# Patient Record
Sex: FEMALE | NOT HISPANIC OR LATINO | Employment: FULL TIME | URBAN - METROPOLITAN AREA
[De-identification: names, ages, dates, MRNs, and addresses within clinical notes are randomized per-mention and may not be internally consistent; named-entity substitution may affect disease eponyms.]

---

## 2017-02-07 ENCOUNTER — ALLIED HEALTH/NURSE VISIT (OUTPATIENT)
Dept: NURSING | Facility: CLINIC | Age: 27
End: 2017-02-07
Payer: COMMERCIAL

## 2017-02-07 VITALS — WEIGHT: 128 LBS | SYSTOLIC BLOOD PRESSURE: 98 MMHG | BODY MASS INDEX: 24.2 KG/M2 | DIASTOLIC BLOOD PRESSURE: 42 MMHG

## 2017-02-07 DIAGNOSIS — Z30.42 SURVEILLANCE FOR DEPO-PROVERA CONTRACEPTION: Primary | ICD-10-CM

## 2017-02-07 PROCEDURE — 99207 ZZC NO CHARGE NURSE ONLY: CPT

## 2017-02-07 PROCEDURE — 96372 THER/PROPH/DIAG INJ SC/IM: CPT

## 2017-02-08 NOTE — NURSING NOTE
"Chief Complaint   Patient presents with     Contraception     DEPO PROVERA 150MG      BP 98/42 mmHg  Wt 128 lb (58.06 kg) Estimated body mass index is 24.2 kg/(m^2) as calculated from the following:    Height as of 6/2/16: 5' 1\" (1.549 m).    Weight as of this encounter: 128 lb (58.06 kg).  bp completed using cuff size: regular      BLOOD PRESSURE: 98/42    DATE OF LAST PAP or ANNUAL EXAM: PAP      NIL   11/2/2015  URINE HCG:not indicated    The following medication was given:     MEDICATION: Depo Provera 150mg  ROUTE: IM  SITE: Deltoid - Left  : [employer]  LOT #: [id number]  EXPIRATION:6/2019  [id number]  NEXT INJECTION DUE: 4/25/2017 TO 5/9/2017  Provider: MONICO Rangel Certified Medical Assistant / AAMA  Clinic Ancillary  Advance Care Facilitator          "

## 2017-05-05 ENCOUNTER — ALLIED HEALTH/NURSE VISIT (OUTPATIENT)
Dept: NURSING | Facility: CLINIC | Age: 27
End: 2017-05-05
Payer: COMMERCIAL

## 2017-05-05 DIAGNOSIS — Z30.42 SURVEILLANCE FOR DEPO-PROVERA CONTRACEPTION: Primary | ICD-10-CM

## 2017-05-05 PROCEDURE — 96372 THER/PROPH/DIAG INJ SC/IM: CPT

## 2017-05-05 NOTE — MR AVS SNAPSHOT
After Visit Summary   5/5/2017    Reginaldo Ferraro    MRN: [id number]           Patient Information     Date Of Birth          1990        Visit Information        Provider Department      5/5/2017 9:30 AM NE ANCILLARY [facility]        Today's Diagnoses     Surveillance for Depo-Provera contraception    -  1       Follow-ups after your visit        Who to contact     If you have questions or need follow up information about today's clinic visit or your schedule please contact [facility] directly at [phone].  Normal or non-critical lab and imaging results will be communicated to you by Zidoff eCommercehart, letter or phone within 4 business days after the clinic has received the results. If you do not hear from us within 7 days, please contact the clinic through Aspiring Mindst or phone. If you have a critical or abnormal lab result, we will notify you by phone as soon as possible.  Submit refill requests through Litbloc or call your pharmacy and they will forward the refill request to us. Please allow 3 business days for your refill to be completed.          Additional Information About Your Visit        MyChart Information     Litbloc gives you secure access to your electronic health record. If you see a primary care provider, you can also send messages to your care team and make appointments. If you have questions, please call your primary care clinic.  If you do not have a primary care provider, please call [phone] and they will assist you.        Care EveryWhere ID     This is your Care EveryWhere ID. This could be used by other organizations to access your [place] medical records  [id number]         Blood Pressure from Last 3 Encounters:   02/07/17 98/42   11/21/16 110/54   09/21/16 104/71    Weight from Last 3 Encounters:   02/07/17 128 lb (58.1 kg)   11/21/16 130 lb (59 kg)   09/21/16 125 lb (56.7 kg)              We Performed the Following      Medroxyprogesterone inj/1mg (Depo Provera J-Code)        Primary Care Provider Office Phone # Fax #    Cally Rucker PA-C [phone]       [facility] [address] [place], [place]. \Hospital [facility] and Outlook.\"" [id number]        Thank you!     Thank you for choosing [facility]  for your care. Our goal is always to provide you with excellent care. Hearing back from our patients is one way we can continue to improve our services. Please take a few minutes to complete the written survey that you may receive in the mail after your visit with us. Thank you!             Your Updated Medication List - Protect others around you: Learn how to safely use, store and throw away your medicines at www.disposemymeds.org.          This list is accurate as of: 5/5/17 10:14 AM.  Always use your most recent med list.                   Brand Name Dispense Instructions for use    cholecalciferol 1000 UNIT tablet    vitamin D    100 tablet    Take 1 tablet (1,000 Units) by mouth daily       medroxyPROGESTERone 150 MG/ML injection    DEPO-PROVERA    3 mL    Inject 1 mL (150 mg) into the muscle every 3 months

## 2017-05-05 NOTE — NURSING NOTE
BLOOD PRESSURE: 110/62  (If over 140/90- RN or Provider needs to evaluate)     WEIGHT: 128lb    DATE OF LAST PAP or ANNUAL EXAM: PAP     11/2/15  URINE HCG:not indicated    MEDICATION: Depo Provera 150mg  ROUTE: IM  SITE: Deltoid - Right  : [employer]  LOT #: [id number]  EXPIRATION:11/2019  NEXT INJECTION DUE: July 21- August 4  Provider: Cally Rucker   Reminder card given to patient? Yes   Date order expires: 9/2017  Last appointment by a provider: 9/21/16    Robyn Mckinney Certified Medical Assistant (AAMA)

## 2017-08-08 DIAGNOSIS — Z30.018 ENCOUNTER FOR INITIAL PRESCRIPTION OF OTHER CONTRACEPTIVES: ICD-10-CM

## 2017-08-08 RX ORDER — MEDROXYPROGESTERONE ACETATE 150 MG/ML
150 INJECTION, SUSPENSION INTRAMUSCULAR
Qty: 3 ML | Refills: 3 | OUTPATIENT
Start: 2017-08-08 | End: 2018-05-21

## 2017-08-08 NOTE — NURSING NOTE
BP: Data Unavailable    LAST PAP/EXAM:   Lab Results   Component Value Date    PAP NIL 11/02/2015     URINE HCG:negative    The following medication was given:     MEDICATION: Depo Provera 150mg  ROUTE: IM  SITE: Deltoid - Right  : [employer]  LOT #: [id number]  EXP:1/30/2020  NEXT INJECTION DUE: 10/25-11/8  Provider: Alka Case MA

## 2017-08-09 ENCOUNTER — ALLIED HEALTH/NURSE VISIT (OUTPATIENT)
Dept: NURSING | Facility: CLINIC | Age: 27
End: 2017-08-09
Payer: COMMERCIAL

## 2017-08-09 VITALS
BODY MASS INDEX: 24.98 KG/M2 | WEIGHT: 132.2 LBS | HEART RATE: 65 BPM | DIASTOLIC BLOOD PRESSURE: 61 MMHG | SYSTOLIC BLOOD PRESSURE: 101 MMHG

## 2017-08-09 DIAGNOSIS — Z30.42 SURVEILLANCE FOR DEPO-PROVERA CONTRACEPTION: Primary | ICD-10-CM

## 2017-08-09 LAB — BETA HCG QUAL IFA URINE: NEGATIVE

## 2017-08-09 PROCEDURE — 96372 THER/PROPH/DIAG INJ SC/IM: CPT

## 2017-08-09 PROCEDURE — 99207 ZZC NO CHARGE NURSE ONLY: CPT

## 2017-08-09 PROCEDURE — 84703 CHORIONIC GONADOTROPIN ASSAY: CPT | Performed by: PHYSICIAN ASSISTANT

## 2017-08-09 NOTE — MR AVS SNAPSHOT
After Visit Summary   8/9/2017    Reginaldo Ferraro    MRN: [id number]           Patient Information     Date Of Birth          1990        Visit Information        Provider Department      8/9/2017 9:30 AM CP ANCILLARY [facility]        Today's Diagnoses     Surveillance for Depo-Provera contraception    -  1       Follow-ups after your visit        Who to contact     If you have questions or need follow up information about today's clinic visit or your schedule please contact [facility] directly at [phone].  Normal or non-critical lab and imaging results will be communicated to you by MyChart, letter or phone within 4 business days after the clinic has received the results. If you do not hear from us within 7 days, please contact the clinic through Narrativet or phone. If you have a critical or abnormal lab result, we will notify you by phone as soon as possible.  Submit refill requests through Curbsy or call your pharmacy and they will forward the refill request to us. Please allow 3 business days for your refill to be completed.          Additional Information About Your Visit        MyChart Information     Curbsy gives you secure access to your electronic health record. If you see a primary care provider, you can also send messages to your care team and make appointments. If you have questions, please call your primary care clinic.  If you do not have a primary care provider, please call [phone] and they will assist you.        Care EveryWhere ID     This is your Care EveryWhere ID. This could be used by other organizations to access your [place] medical records  [id number]        Your Vitals Were     Pulse BMI (Body Mass Index)                65 24.98 kg/m2           Blood Pressure from Last 3 Encounters:   08/09/17 101/61   02/07/17 98/42   11/21/16 110/54    Weight from Last 3 Encounters:   08/09/17 132 lb 3.2 oz (60 kg)    02/07/17 128 lb (58.1 kg)   11/21/16 130 lb (59 kg)              We Performed the Following     Beta HCG qual IFA urine     Medroxyprogesterone inj/1mg (Depo Provera J-Code)        Primary Care Provider Office Phone # Fax #    Cally Rucker PA-C [phone]       [address] [facility] [address]        Equal Access to Services     MAYRA TATE : Hadii aad ku hadasho Soomaali, waaxda luqadaha, qaybta kaalmada adeegyada, waxay idiin hayaan adeeg phush lalobito . So [facility] [phone].    ATENCIÓN: Si jacque hawthorne, tiene a braun disposición servicios gratuitos de asistencia lingüística. Llame al [phone].    We comply with applicable federal civil rights laws and [place] laws. We do not discriminate on the basis of race, color, national origin, age, disability sex, sexual orientation or gender identity.            Thank you!     Thank you for choosing [facility]  for your care. Our goal is always to provide you with excellent care. Hearing back from our patients is one way we can continue to improve our services. Please take a few minutes to complete the written survey that you may receive in the mail after your visit with us. Thank you!             Your Updated Medication List - Protect others around you: Learn how to safely use, store and throw away your medicines at www.disposemymeds.org.          This list is accurate as of: 8/9/17 10:11 AM.  Always use your most recent med list.                   Brand Name Dispense Instructions for use Diagnosis    cholecalciferol 1000 UNIT tablet    vitamin D    100 tablet    Take 1 tablet (1,000 Units) by mouth daily    Unspecified vitamin D deficiency       medroxyPROGESTERone 150 MG/ML injection    DEPO-PROVERA    3 mL    Inject 1 mL (150 mg) into the muscle every 3 months    Encounter for initial prescription of other contraceptives

## 2017-09-11 ENCOUNTER — OFFICE VISIT (OUTPATIENT)
Dept: FAMILY MEDICINE | Facility: CLINIC | Age: 27
End: 2017-09-11
Payer: MEDICAID

## 2017-09-11 VITALS
WEIGHT: 129 LBS | BODY MASS INDEX: 24.37 KG/M2 | SYSTOLIC BLOOD PRESSURE: 97 MMHG | DIASTOLIC BLOOD PRESSURE: 59 MMHG | HEART RATE: 65 BPM | OXYGEN SATURATION: 98 % | TEMPERATURE: 99.5 F

## 2017-09-11 DIAGNOSIS — R11.0 NAUSEA: Primary | ICD-10-CM

## 2017-09-11 DIAGNOSIS — F43.23 ADJUSTMENT DISORDER WITH MIXED ANXIETY AND DEPRESSED MOOD: ICD-10-CM

## 2017-09-11 PROCEDURE — 99213 OFFICE O/P EST LOW 20 MIN: CPT | Performed by: FAMILY MEDICINE

## 2017-09-11 NOTE — PROGRESS NOTES
SUBJECTIVE:   Reginaldo Ferraro is a 26 year old female who presents to clinic today for the following health issues:      Depression follow up  Forms - School  Medication request for Melatonin    Patient has missed a lot of school.  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  She has had chronic anxiety and depression   She had a hypersensitivity to smells and had problems with her clinicals.    She has to repeat the semester because of this     She needs to show she is taking some action towards this     Past Medical History:   Diagnosis Date     High risk HPV infection 10/15/13, 10/31/14    normal pap. plan to repeat pap in 1 year     LSIL (low grade squamous intraepithelial lesion) on Pap smear 12/23/11    cannot rule out HSIL. 4/30/12 colp: no bx taken. pap at that time is NIL/neg HPV     Moderate major depression 12/23/2011     Moderate major depression (H) 12/23/2011     NO ACTIVE PROBLEMS        Past Surgical History:   Procedure Laterality Date     EYE SURGERY  01/2013    Lasic surgery     NO HISTORY OF SURGERY         Family History   Problem Relation Age of Onset     DIABETES Paternal Grandmother      Hypertension Paternal Grandmother      Respiratory Paternal Grandmother      asthma     HEART DISEASE Father        Social History   Substance Use Topics     Smoking status: Never Smoker     Smokeless tobacco: Never Used     Alcohol use No         O: BP 97/59  Pulse 65  Temp 99.5  F (37.5  C) (Oral)  Wt 129 lb (58.5 kg)  SpO2 98%  Breastfeeding? No  BMI 24.37 kg/m2    Patient appears anxious   She is staring at the floor during the interview   She is well groomed   She is appropriately dressed   She is having trouble talking   She is not tearful   Her affect is somewhat flat       ICD-10-CM    1. Nausea R11.0 sertraline (ZOLOFT) 50 MG tablet   2. Adjustment disorder with mixed anxiety and depressed mood F43.23 sertraline (ZOLOFT) 50 MG tablet       Patient has been reluctant to take medication    She had this last addressed 6 years ago   She was put on a trial of omeprazole 2 years ago and she did not take this     She agrees with taking sertaline now     Follow up in 3 weeks

## 2017-09-11 NOTE — NURSING NOTE
"Chief Complaint   Patient presents with     Depression     Forms     School     Medication Request     Melatonin       Initial BP 97/59  Pulse 65  Temp 99.5  F (37.5  C) (Oral)  Wt 129 lb (58.5 kg)  SpO2 98%  Breastfeeding? No  BMI 24.37 kg/m2 Estimated body mass index is 24.37 kg/(m^2) as calculated from the following:    Height as of 6/2/16: 5' 1\" (1.549 m).    Weight as of this encounter: 129 lb (58.5 kg).  Medication Reconciliation: complete   Meche Albert MA      "

## 2017-09-11 NOTE — LETTER
[address] [address]  [phone]      September 11, 2017      RE: Laurensybil AGGIE Ferraro       This patient was in our clinic today and has started treatment for her chronic nausea that she has been experiencing.  She is taking action to deal with this problem. Because of this nausea she is taking a medical leave of absence.     She should be able to start back to school for the next semester, October 1, 2017.     Sincerely,           Mario Newton MD

## 2017-09-11 NOTE — MR AVS SNAPSHOT
After Visit Summary   9/11/2017    Reginaldo Ferraro    MRN: [id number]           Patient Information     Date Of Birth          1990        Visit Information        Provider Department      9/11/2017 1:40 PM Mario Newton MD [facility]        Today's Diagnoses     Nausea    -  1    Adjustment disorder with mixed anxiety and depressed mood           Follow-ups after your visit        Your next 10 appointments already scheduled     Sep 15, 2017  2:20 PM CDT   Office Visit with Melba Soriano PA-C   [facility]    [address] [place] [id number] [phone]           Bring a current list of meds and any records pertaining to this visit. For Physicals, please bring immunization records and any forms needing to be filled out. Please arrive 10 minutes early to complete paperwork.              Who to contact     If you have questions or need follow up information about today's clinic visit or your schedule please contact [facility] directly at [phone].  Normal or non-critical lab and imaging results will be communicated to you by Spireonhart, letter or phone within 4 business days after the clinic has received the results. If you do not hear from us within 7 days, please contact the clinic through Lifesquaret or phone. If you have a critical or abnormal lab result, we will notify you by phone as soon as possible.  Submit refill requests through Unbounce or call your pharmacy and they will forward the refill request to us. Please allow 3 business days for your refill to be completed.          Additional Information About Your Visit        MyChart Information     Unbounce gives you secure access to your electronic health record. If you see a primary care provider, you can also send messages to your care team and make appointments. If you have questions,  please call your primary care clinic.  If you do not have a primary care provider, please call [phone] and they will assist you.        Care EveryWhere ID     This is your Care EveryWhere ID. This could be used by other organizations to access your [place] medical records  [id number]        Your Vitals Were     Pulse Temperature Pulse Oximetry Breastfeeding? BMI (Body Mass Index)       65 99.5  F (37.5  C) (Oral) 98% No 24.37 kg/m2        Blood Pressure from Last 3 Encounters:   09/11/17 97/59   08/09/17 101/61   02/07/17 98/42    Weight from Last 3 Encounters:   09/11/17 129 lb (58.5 kg)   08/09/17 132 lb 3.2 oz (60 kg)   02/07/17 128 lb (58.1 kg)              Today, you had the following     No orders found for display         Today's Medication Changes          These changes are accurate as of: 9/11/17  2:25 PM.  If you have any questions, ask your nurse or doctor.               Start taking these medicines.        Dose/Directions    sertraline 50 MG tablet   Commonly known as:  ZOLOFT   Used for:  Nausea, Adjustment disorder with mixed anxiety and depressed mood   Started by:  Mario Newton MD        Dose:  50 mg   Take 1 tablet (50 mg) by mouth daily   Quantity:  30 tablet   Refills:  1            Where to get your medicines      These medications were sent to [facility] [address] [place], [place] - [address] [facility] [address]  [address] [facility] [address]     Phone:  [phone]     sertraline 50 MG tablet                Primary Care Provider Office Phone # Fax #    Cally Rucker PA-C [phone] [address] [facility] [address]        Equal Access to Services     MAYRA TATE AH: Saroj Levy, april holcomb, nikia kaalmasun cortés, irina nielsen. So [facility] [phone].    ATENCIÓN: Si habla español, tiene a braun disposición servicios gratuitos de asistencia lingüística.  Abdulkadir rene [phone].    We comply with applicable federal civil rights laws and [place] laws. We do not discriminate on the basis of race, color, national origin, age, disability sex, sexual orientation or gender identity.            Thank you!     Thank you for choosing [facility]  for your care. Our goal is always to provide you with excellent care. Hearing back from our patients is one way we can continue to improve our services. Please take a few minutes to complete the written survey that you may receive in the mail after your visit with us. Thank you!             Your Updated Medication List - Protect others around you: Learn how to safely use, store and throw away your medicines at www.disposemymeds.org.          This list is accurate as of: 9/11/17  2:25 PM.  Always use your most recent med list.                   Brand Name Dispense Instructions for use Diagnosis    cholecalciferol 1000 UNIT tablet    vitamin D    100 tablet    Take 1 tablet (1,000 Units) by mouth daily    Unspecified vitamin D deficiency       medroxyPROGESTERone 150 MG/ML injection    DEPO-PROVERA    3 mL    Inject 1 mL (150 mg) into the muscle every 3 months    Encounter for initial prescription of other contraceptives       sertraline 50 MG tablet    ZOLOFT    30 tablet    Take 1 tablet (50 mg) by mouth daily    Nausea, Adjustment disorder with mixed anxiety and depressed mood

## 2017-10-09 ENCOUNTER — ALLIED HEALTH/NURSE VISIT (OUTPATIENT)
Dept: NURSING | Facility: CLINIC | Age: 27
End: 2017-10-09
Payer: COMMERCIAL

## 2017-10-09 DIAGNOSIS — Z23 NEED FOR PROPHYLACTIC VACCINATION AND INOCULATION AGAINST INFLUENZA: Primary | ICD-10-CM

## 2017-10-09 DIAGNOSIS — Z11.1 SCREENING FOR TUBERCULOSIS: ICD-10-CM

## 2017-10-09 PROCEDURE — 90686 IIV4 VACC NO PRSV 0.5 ML IM: CPT

## 2017-10-09 PROCEDURE — 99207 ZZC NO CHARGE NURSE ONLY: CPT

## 2017-10-09 PROCEDURE — 86580 TB INTRADERMAL TEST: CPT

## 2017-10-09 PROCEDURE — 90471 IMMUNIZATION ADMIN: CPT

## 2017-10-09 NOTE — NURSING NOTE
The patient is asked the following questions today and these are her answers:    -Have you had a mantoux administered in the past 30 days?    No  -Have you had a previous positive Mantoux.  No  -Have you received BCG in the past.  No  -Have you had a live vaccine  (MMR, Varicella, OPV, Yellow Fever) in the last 6 weeks.  No  -Have you had and active  viral or bacterial infection in the past 6 weeks.  No  -Have you received corticosteroids or immunosuppressive agents in the past 6 weeks.  No  -Have you been diagnosed with HIV?  No  -Do you have a maglinancy?  No     Mantoux given to patient today    Time of administration 8:13Am      Date of administration 10/9/17     Given in left forearm.     Patient advised to return 48- 72 hours for reading.     Rasheed Flower See LORRAINE Browning

## 2017-10-09 NOTE — NURSING NOTE
Patient instructed to remain in clinic for 15 minutes afterwards, and to report any adverse reaction to me immediately.    Prior to injection verified patient identity using patient's name and date of birth.  Vaccine information supplied.  Ching See LORRAINE Browning

## 2017-10-09 NOTE — PROGRESS NOTES
Injectable Influenza Immunization Documentation    1.  Is the person to be vaccinated sick today?   No    2. Does the person to be vaccinated have an allergy to a component   of the vaccine?   No    3. Has the person to be vaccinated ever had a serious reaction   to influenza vaccine in the past?   No    4. Has the person to be vaccinated ever had Guillain-Barré syndrome?   No    Form completed by Ching See LORRAINE Browning

## 2017-10-09 NOTE — MR AVS SNAPSHOT
After Visit Summary   10/9/2017    Reginaldo Ferraro    MRN: [id number]           Patient Information     Date Of Birth          1990        Visit Information        Provider Department      10/9/2017 8:00 AM CP ANCILLARY [facility]        Today's Diagnoses     Need for prophylactic vaccination and inoculation against influenza    -  1    Screening for tuberculosis           Follow-ups after your visit        Who to contact     If you have questions or need follow up information about today's clinic visit or your schedule please contact [facility] directly at [phone].  Normal or non-critical lab and imaging results will be communicated to you by LikeBrighthart, letter or phone within 4 business days after the clinic has received the results. If you do not hear from us within 7 days, please contact the clinic through Aridhia Informaticst or phone. If you have a critical or abnormal lab result, we will notify you by phone as soon as possible.  Submit refill requests through ThirdMotion or call your pharmacy and they will forward the refill request to us. Please allow 3 business days for your refill to be completed.          Additional Information About Your Visit        MyChart Information     ThirdMotion gives you secure access to your electronic health record. If you see a primary care provider, you can also send messages to your care team and make appointments. If you have questions, please call your primary care clinic.  If you do not have a primary care provider, please call [phone] and they will assist you.        Care EveryWhere ID     This is your Care EveryWhere ID. This could be used by other organizations to access your [place] medical records  [id number]         Blood Pressure from Last 3 Encounters:   09/11/17 97/59   08/09/17 101/61   02/07/17 98/42    Weight from Last 3 Encounters:   09/11/17 129 lb (58.5 kg)   08/09/17 132 lb 3.2 oz (60 kg)   02/07/17  128 lb (58.1 kg)              We Performed the Following     FLU VAC, SPLIT VIRUS IM > 3 YO (QUADRIVALENT) [69663]     TB INTRADERMAL TEST     Vaccine Administration, Initial [54987]        Primary Care Provider Office Phone # Fax #    Cally Rucker PA-C [phone]       [address] [facility] [address]        Equal Access to Services     MAYRA TATE : Hadii aad ku hadasho Soomaali, waaxda luqadaha, qaybta kaalmada adeegyada, waxay idiin hayaan adeeg kharash la'aan . So [facility] [phone].    ATENCIÓN: Si jacque hawthorne, tiene a braun disposición servicios gratuitos de asistencia lingüística. Cheyanneame al [phone].    We comply with applicable federal civil rights laws and [place] laws. We do not discriminate on the basis of race, color, national origin, age, disability, sex, sexual orientation, or gender identity.            Thank you!     Thank you for choosing [facility]  for your care. Our goal is always to provide you with excellent care. Hearing back from our patients is one way we can continue to improve our services. Please take a few minutes to complete the written survey that you may receive in the mail after your visit with us. Thank you!             Your Updated Medication List - Protect others around you: Learn how to safely use, store and throw away your medicines at www.disposemymeds.org.          This list is accurate as of: 10/9/17  8:41 AM.  Always use your most recent med list.                   Brand Name Dispense Instructions for use Diagnosis    cholecalciferol 1000 UNIT tablet    vitamin D    100 tablet    Take 1 tablet (1,000 Units) by mouth daily    Unspecified vitamin D deficiency       medroxyPROGESTERone 150 MG/ML injection    DEPO-PROVERA    3 mL    Inject 1 mL (150 mg) into the muscle every 3 months    Encounter for initial prescription of other contraceptives       sertraline 50 MG tablet    ZOLOFT    30 tablet    Take 1 tablet (50  mg) by mouth daily    Nausea, Adjustment disorder with mixed anxiety and depressed mood

## 2017-10-11 ENCOUNTER — ALLIED HEALTH/NURSE VISIT (OUTPATIENT)
Dept: NURSING | Facility: CLINIC | Age: 27
End: 2017-10-11
Payer: COMMERCIAL

## 2017-10-11 DIAGNOSIS — Z11.1 VISIT FOR MANTOUX TEST: Primary | ICD-10-CM

## 2017-10-11 LAB
PPDINDURATION: 0 MM (ref 0–5)
PPDREDNESS: 0 MM

## 2017-10-11 PROCEDURE — 99207 ZZC NO CHARGE NURSE ONLY: CPT

## 2017-10-11 NOTE — NURSING NOTE
Mantoux result: done at 0813  Lab Results   Component Value Date    PPDREDNESS 0 10/11/17    PPDINDURATIO 0 10/11/17     Divina Moreno RN [facility] Triage.

## 2017-10-11 NOTE — MR AVS SNAPSHOT
After Visit Summary   10/11/2017    Reginaldo Ferraro    MRN: [id number]           Patient Information     Date Of Birth          1990        Visit Information        Provider Department      10/11/2017 8:00 AM CP RN [facility]        Today's Diagnoses     Visit for Mantoux test    -  1       Follow-ups after your visit        Who to contact     If you have questions or need follow up information about today's clinic visit or your schedule please contact [facility] directly at [phone].  Normal or non-critical lab and imaging results will be communicated to you by [email or web address], letter or phone within 4 business days after the clinic has received the results. If you do not hear from us within 7 days, please contact the clinic through [email or web address] or phone. If you have a critical or abnormal lab result, we will notify you by phone as soon as possible.  Submit refill requests through [employer] or call your pharmacy and they will forward the refill request to us. Please allow 3 business days for your refill to be completed.          Additional Information About Your Visit        MyChart Information     [employer] gives you secure access to your electronic health record. If you see a primary care provider, you can also send messages to your care team and make appointments. If you have questions, please call your primary care clinic.  If you do not have a primary care provider, please call [phone] and they will assist you.        Care EveryWhere ID     This is your Care EveryWhere ID. This could be used by other organizations to access your [place] medical records  [id number]         Blood Pressure from Last 3 Encounters:   09/11/17 97/59   08/09/17 101/61   02/07/17 98/42    Weight from Last 3 Encounters:   09/11/17 129 lb (58.5 kg)   08/09/17 132 lb 3.2 oz (60 kg)   02/07/17 128 lb (58.1 kg)              Today, you had the following     No orders found for  display       Primary Care Provider Office Phone # Fax #    Cally Rucker PA-C [phone] [address] [facility] [address]        Equal Access to Services     MAYRA TATE : Hadii aad ku hadsilvioo Soeneidaali, waaxda luqadaha, qaybta kaalmada adeaudra, irina jaramillo lindashayy dutta natan nielsen. So [facility] [phone].    ATENCIÓN: Si habla español, tiene a braun disposición servicios gratuitos de asistencia lingüística. Llame al [phone].    We comply with applicable federal civil rights laws and [place] laws. We do not discriminate on the basis of race, color, national origin, age, disability, sex, sexual orientation, or gender identity.            Thank you!     Thank you for choosing [facility]  for your care. Our goal is always to provide you with excellent care. Hearing back from our patients is one way we can continue to improve our services. Please take a few minutes to complete the written survey that you may receive in the mail after your visit with us. Thank you!             Your Updated Medication List - Protect others around you: Learn how to safely use, store and throw away your medicines at www.disposemymeds.org.          This list is accurate as of: 10/11/17  8:27 AM.  Always use your most recent med list.                   Brand Name Dispense Instructions for use Diagnosis    cholecalciferol 1000 UNIT tablet    vitamin D    100 tablet    Take 1 tablet (1,000 Units) by mouth daily    Unspecified vitamin D deficiency       medroxyPROGESTERone 150 MG/ML injection    DEPO-PROVERA    3 mL    Inject 1 mL (150 mg) into the muscle every 3 months    Encounter for initial prescription of other contraceptives       sertraline 50 MG tablet    ZOLOFT    30 tablet    Take 1 tablet (50 mg) by mouth daily    Nausea, Adjustment disorder with mixed anxiety and depressed mood

## 2017-10-23 ENCOUNTER — OFFICE VISIT (OUTPATIENT)
Dept: FAMILY MEDICINE | Facility: CLINIC | Age: 27
End: 2017-10-23
Payer: COMMERCIAL

## 2017-10-23 VITALS
DIASTOLIC BLOOD PRESSURE: 67 MMHG | OXYGEN SATURATION: 98 % | HEIGHT: 61 IN | WEIGHT: 130.6 LBS | BODY MASS INDEX: 24.66 KG/M2 | TEMPERATURE: 98.5 F | HEART RATE: 70 BPM | SYSTOLIC BLOOD PRESSURE: 101 MMHG

## 2017-10-23 DIAGNOSIS — F43.23 ADJUSTMENT DISORDER WITH MIXED ANXIETY AND DEPRESSED MOOD: Primary | ICD-10-CM

## 2017-10-23 DIAGNOSIS — Z30.42 SURVEILLANCE FOR DEPO-PROVERA CONTRACEPTION: ICD-10-CM

## 2017-10-23 PROCEDURE — 96372 THER/PROPH/DIAG INJ SC/IM: CPT | Performed by: PHYSICIAN ASSISTANT

## 2017-10-23 PROCEDURE — 99213 OFFICE O/P EST LOW 20 MIN: CPT | Mod: 25 | Performed by: PHYSICIAN ASSISTANT

## 2017-10-23 RX ORDER — SERTRALINE HYDROCHLORIDE 100 MG/1
100 TABLET, FILM COATED ORAL DAILY
Qty: 30 TABLET | Refills: 1 | Status: SHIPPED | OUTPATIENT
Start: 2017-10-23 | End: 2017-11-07

## 2017-10-23 ASSESSMENT — PATIENT HEALTH QUESTIONNAIRE - PHQ9: SUM OF ALL RESPONSES TO PHQ QUESTIONS 1-9: 16

## 2017-10-23 NOTE — PATIENT INSTRUCTIONS
Start 100mg zoloft tonight.     Check in with Cally on mychart in 2 weeks. Let me know if your mood seems better-worse or the same.

## 2017-10-23 NOTE — PROGRESS NOTES
"  SUBJECTIVE:   Reginaldo Ferraro is a 27 year old female who presents to clinic today for the following health issues:      Pt is feeling that the Sertraline has not helped. She has not seen any improvements but things are not worse.     Has been going to counseling once per week   Life is just stressful. Having relationship problems. Patient is safe at [facility].   Has not felt any changes in her mood with the zoloft. Has been taking for 6 weeks.   No side effects from this.     Problem list and histories reviewed & adjusted, as indicated.  Additional history: as documented    Patient Active Problem List   Diagnosis     CARDIOVASCULAR SCREENING; LDL GOAL LESS THAN 160     Encounter for initial prescription of other contraceptives     Insomnia, unspecified insomnia     Vitamin D deficiency     Abnormal finding on Pap smear, HPV DNA positive     Surveillance for Depo-Provera contraception     Past Surgical History:   Procedure Laterality Date     EYE SURGERY  01/2013    Lasic surgery     NO HISTORY OF SURGERY         Social History   Substance Use Topics     Smoking status: Never Smoker     Smokeless tobacco: Never Used     Alcohol use No     Family History   Problem Relation Age of Onset     DIABETES Paternal Grandmother      Hypertension Paternal Grandmother      Respiratory Paternal Grandmother      asthma     HEART DISEASE Father              Reviewed and updated as needed this visit by clinical staffTobacco  Allergies  Meds  Med Hx  Surg Hx  Fam Hx  Soc Hx      Reviewed and updated as needed this visit by Provider         ROS:  Constitutional, HEENT, cardiovascular, pulmonary, gi and gu systems are negative, except as otherwise noted.      OBJECTIVE:   /67 (BP Location: Left arm, Patient Position: Chair, Cuff Size: Adult Regular)  Pulse 70  Temp 98.5  F (36.9  C) (Oral)  Ht 5' 1.02\" (1.55 m)  Wt 130 lb 9.6 oz (59.2 kg)  SpO2 98%  Breastfeeding? No  BMI 24.66 kg/m2  Body mass index is 24.66 " kg/(m^2).  GENERAL: healthy, alert and no distress  PSYCH: mentation appears normal, depressed affect.     Diagnostic Test Results:  none     ASSESSMENT/PLAN:       ICD-10-CM    1. Adjustment disorder with mixed anxiety and depressed mood F43.23 sertraline (ZOLOFT) 100 MG tablet     cholecalciferol (VITAMIN D3) 1000 UNIT tablet   2. Surveillance for Depo-Provera contraception Z30.42 Medroxyprogesterone inj  1mg   (Depo Provera J-Code)     INJECTION INTRAMUSCULAR OR SUB-Q   Increase zoloft to 100mg and take daily. Check in via mychart in 2 weeks, if not improving mood will change to Celexa 20mg at that time.   Start vitamin D.   Patient due for depo in 2 days, will give today early.         Cally Rucker PA-C  [facility]

## 2017-10-23 NOTE — LETTER
My Depression Action Plan  Name: Reginaldo Ferraro   Date of Birth 1990  Date: 10/23/2017    My doctor: Cally Rucker   My clinic: [address] [id number] [phone]          GREEN    ZONE   Good Control    What it looks like:     Things are going generally well. You have normal up s and down s. You may even feel depressed from time to time, but bad moods usually last less than a day.   What you need to do:  1. Continue to care for yourself (see self care plan)  2. Check your depression survival kit and update it as needed  3. Follow your physician s recommendations including any medication.  4. Do not stop taking medication unless you consult with your physician first.           YELLOW         ZONE Getting Worse    What it looks like:     Depression is starting to interfere with your life.     It may be hard to get out of bed; you may be starting to isolate yourself from others.    Symptoms of depression are starting to last most all day and this has happened for several days.     You may have suicidal thoughts but they are not constant.   What you need to do:     1. Call your care team, your response to treatment will improve if you keep your care team informed of your progress. Yellow periods are signs an adjustment may need to be made.     2. Continue your self-care, even if you have to fake it!    3. Talk to someone in your support network    4. Open up your depression survival kit           RED    ZONE Medical Alert - Get Help    What it looks like:     Depression is seriously interfering with your life.     You may experience these or other symptoms: You can t get out of bed most days, can t work or engage in other necessary activities, you have trouble taking care of basic hygiene, or basic responsibilities, thoughts of suicide or death that will not go away, self-injurious behavior.     What you need to  do:  1. Call your care team and request a same-day appointment. If they are not available (weekends or after hours) call your local crisis line, emergency room or 911.      Electronically signed by: Cally Rucker, October 23, 2017    Depression Self Care Plan / Survival Kit    Self-Care for Depression  Here s the deal. Your body and mind are really not as separate as most people think.  What you do and think affects how you feel and how you feel influences what you do and think. This means if you do things that people who feel good do, it will help you feel better.  Sometimes this is all it takes.  There is also a place for medication and therapy depending on how severe your depression is, so be sure to consult with your medical provider and/ or Behavioral Health Consultant if your symptoms are worsening or not improving.     In order to better manage my stress, I will:    Exercise  Get some form of exercise, every day. This will help reduce pain and release endorphins, the  feel good  chemicals in your brain. This is almost as good as taking antidepressants!  This is not the same as joining a gym and then never going! (they count on that by the way ) It can be as simple as just going for a walk or doing some gardening, anything that will get you moving.      Hygiene   Maintain good hygiene (Get out of bed in the morning, Make your bed, Brush your teeth, Take a shower, and Get dressed like you were going to work, even if you are unemployed).  If your clothes don't fit try to get ones that do.    Diet  I will strive to eat foods that are good for me, drink plenty of water, and avoid excessive sugar, caffeine, alcohol, and other mood-altering substances.  Some foods that are helpful in depression are: complex carbohydrates, B vitamins, flaxseed, fish or fish oil, fresh fruits and vegetables.    Psychotherapy  I agree to participate in Individual Therapy (if recommended).    Medication  If prescribed medications, I  agree to take them.  Missing doses can result in serious side effects.  I understand that drinking alcohol, or other illicit drug use, may cause potential side effects.  I will not stop my medication abruptly without first discussing it with my provider.    Staying Connected With Others  I will stay in touch with my friends, family members, and my primary care provider/team.    Use your imagination  Be creative.  We all have a creative side; it doesn t matter if it s oil painting, sand castles, or mud pies! This will also kick up the endorphins.    Witness Beauty  (AKA stop and smell the roses) Take a look outside, even in mid-winter. Notice colors, textures. Watch the squirrels and birds.     Service to others  Be of service to others.  There is always someone else in need.  By helping others we can  get out of ourselves  and remember the really important things.  This also provides opportunities for practicing all the other parts of the program.    Humor  Laugh and be silly!  Adjust your TV habits for less news and crime-drama and more comedy.    Control your stress  Try breathing deep, massage therapy, biofeedback, and meditation. Find time to relax each day.     My support system    Clinic Contact:  Phone number:    Contact 1:  Phone number:    Contact 2:  Phone number:    Evangelical/:  Phone number:    Therapist:  Phone number:    Local crisis center:    Phone number:    Other community support:  Phone number:

## 2017-10-23 NOTE — NURSING NOTE
"Chief Complaint   Patient presents with     Other     Reviewing Sertraline med     Health Maintenance     PHQ-9 and DAP       Initial /67 (BP Location: Left arm, Patient Position: Chair, Cuff Size: Adult Regular)  Pulse 70  Temp 98.5  F (36.9  C) (Oral)  Ht 5' 1.02\" (1.55 m)  Wt 130 lb 9.6 oz (59.2 kg)  SpO2 98%  Breastfeeding? No  BMI 24.66 kg/m2 Estimated body mass index is 24.66 kg/(m^2) as calculated from the following:    Height as of this encounter: 5' 1.02\" (1.55 m).    Weight as of this encounter: 130 lb 9.6 oz (59.2 kg).  Medication Reconciliation: tosin Styles MA      "

## 2017-10-23 NOTE — MR AVS SNAPSHOT
After Visit Summary   10/23/2017    Reginaldo Ferraro    MRN: [id number]           Patient Information     Date Of Birth          1990        Visit Information        Provider Department      10/23/2017 2:40 PM Cally Rucker PA-C [facility]        Today's Diagnoses     Adjustment disorder with mixed anxiety and depressed mood    -  1    Surveillance for Depo-Provera contraception          Care Instructions    Start 100mg zoloft tonight.     Check in with Cally on mychart in 2 weeks. Let me know if your mood seems better-worse or the same.             Follow-ups after your visit        Who to contact     If you have questions or need follow up information about today's clinic visit or your schedule please contact [facility] directly at [phone].  Normal or non-critical lab and imaging results will be communicated to you by MyChart, letter or phone within 4 business days after the clinic has received the results. If you do not hear from us within 7 days, please contact the clinic through [employer] or phone. If you have a critical or abnormal lab result, we will notify you by phone as soon as possible.  Submit refill requests through [employer] or call your pharmacy and they will forward the refill request to us. Please allow 3 business days for your refill to be completed.          Additional Information About Your Visit        MyChart Information     [employer] gives you secure access to your electronic health record. If you see a primary care provider, you can also send messages to your care team and make appointments. If you have questions, please call your primary care clinic.  If you do not have a primary care provider, please call [phone] and they will assist you.        Care EveryWhere ID     This is your Care EveryWhere ID. This could be used by other organizations to access your [place] medical records  [id number]        Your Vitals Were   "   Pulse Temperature Height Pulse Oximetry Breastfeeding? BMI (Body Mass Index)    70 98.5  F (36.9  C) (Oral) 5' 1.02\" (1.55 m) 98% No 24.66 kg/m2       Blood Pressure from Last 3 Encounters:   10/23/17 101/67   09/11/17 97/59   08/09/17 101/61    Weight from Last 3 Encounters:   10/23/17 130 lb 9.6 oz (59.2 kg)   09/11/17 129 lb (58.5 kg)   08/09/17 132 lb 3.2 oz (60 kg)              We Performed the Following     DEPRESSION ACTION PLAN (DAP)          Today's Medication Changes          These changes are accurate as of: 10/23/17  3:08 PM.  If you have any questions, ask your nurse or doctor.               These medicines have changed or have updated prescriptions.        Dose/Directions    sertraline 100 MG tablet   Commonly known as:  ZOLOFT   This may have changed:    - medication strength  - how much to take   Used for:  Adjustment disorder with mixed anxiety and depressed mood   Changed by:  Cally Rucker PA-C        Dose:  100 mg   Take 1 tablet (100 mg) by mouth daily   Quantity:  30 tablet   Refills:  1            Where to get your medicines      These medications were sent to [email or web address] [id number] [place] [address] AT SEC OF [address] [address]     Phone:  [phone]     cholecalciferol 1000 UNIT tablet    sertraline 100 MG tablet                Primary Care Provider Office Phone # Fax #    Cally Rucker PA-C [phone] [address] [facility] [address]        Equal Access to Services     [facility] : Hadii gumaro harris hadasho Soomaali, waaxda luqadaha, qaybta kaalmada adeegyasun, irnia nielsen. So [facility] [phone].    ATENCIÓN: Si habla español, tiene a braun disposición servicios gratuitos de asistencia lingüística. Llame al [phone].    We comply with applicable federal civil rights laws and [place] laws. We do not discriminate on the basis of race, color, national origin, " age, disability, sex, sexual orientation, or gender identity.            Thank you!     Thank you for choosing [facility]  for your care. Our goal is always to provide you with excellent care. Hearing back from our patients is one way we can continue to improve our services. Please take a few minutes to complete the written survey that you may receive in the mail after your visit with us. Thank you!             Your Updated Medication List - Protect others around you: Learn how to safely use, store and throw away your medicines at www.disposemymeds.org.          This list is accurate as of: 10/23/17  3:08 PM.  Always use your most recent med list.                   Brand Name Dispense Instructions for use Diagnosis    cholecalciferol 1000 UNIT tablet    vitamin D3    100 tablet    Take 1 tablet (1,000 Units) by mouth daily    Adjustment disorder with mixed anxiety and depressed mood       medroxyPROGESTERone 150 MG/ML injection    DEPO-PROVERA    3 mL    Inject 1 mL (150 mg) into the muscle every 3 months    Encounter for initial prescription of other contraceptives       sertraline 100 MG tablet    ZOLOFT    30 tablet    Take 1 tablet (100 mg) by mouth daily    Adjustment disorder with mixed anxiety and depressed mood

## 2017-12-20 ENCOUNTER — OFFICE VISIT (OUTPATIENT)
Dept: FAMILY MEDICINE | Facility: CLINIC | Age: 27
End: 2017-12-20

## 2017-12-20 VITALS
DIASTOLIC BLOOD PRESSURE: 62 MMHG | HEART RATE: 72 BPM | HEIGHT: 61 IN | WEIGHT: 138 LBS | TEMPERATURE: 98.1 F | SYSTOLIC BLOOD PRESSURE: 108 MMHG | BODY MASS INDEX: 26.06 KG/M2

## 2017-12-20 DIAGNOSIS — M25.562 ACUTE PAIN OF LEFT KNEE: Primary | ICD-10-CM

## 2017-12-20 DIAGNOSIS — F43.23 ADJUSTMENT DISORDER WITH MIXED ANXIETY AND DEPRESSED MOOD: ICD-10-CM

## 2017-12-20 PROCEDURE — 99214 OFFICE O/P EST MOD 30 MIN: CPT | Performed by: PHYSICIAN ASSISTANT

## 2017-12-20 RX ORDER — SERTRALINE HYDROCHLORIDE 100 MG/1
TABLET, FILM COATED ORAL
Qty: 45 TABLET | Refills: 1 | Status: SHIPPED | OUTPATIENT
Start: 2017-12-20 | End: 2018-05-21

## 2017-12-20 ASSESSMENT — ANXIETY QUESTIONNAIRES
3. WORRYING TOO MUCH ABOUT DIFFERENT THINGS: MORE THAN HALF THE DAYS
IF YOU CHECKED OFF ANY PROBLEMS ON THIS QUESTIONNAIRE, HOW DIFFICULT HAVE THESE PROBLEMS MADE IT FOR YOU TO DO YOUR WORK, TAKE CARE OF THINGS AT HOME, OR GET ALONG WITH OTHER PEOPLE: VERY DIFFICULT
6. BECOMING EASILY ANNOYED OR IRRITABLE: MORE THAN HALF THE DAYS
7. FEELING AFRAID AS IF SOMETHING AWFUL MIGHT HAPPEN: MORE THAN HALF THE DAYS
GAD7 TOTAL SCORE: 12
5. BEING SO RESTLESS THAT IT IS HARD TO SIT STILL: SEVERAL DAYS
1. FEELING NERVOUS, ANXIOUS, OR ON EDGE: SEVERAL DAYS
2. NOT BEING ABLE TO STOP OR CONTROL WORRYING: MORE THAN HALF THE DAYS

## 2017-12-20 ASSESSMENT — PATIENT HEALTH QUESTIONNAIRE - PHQ9
SUM OF ALL RESPONSES TO PHQ QUESTIONS 1-9: 14
5. POOR APPETITE OR OVEREATING: MORE THAN HALF THE DAYS

## 2017-12-20 NOTE — MR AVS SNAPSHOT
After Visit Summary   12/20/2017    Reginaldo Ferraro    MRN: [id number]           Patient Information     Date Of Birth          1990        Visit Information        Provider Department      12/20/2017 8:40 AM Cally Rucker PA-C [facility]        Today's Diagnoses     Acute pain of left knee    -  1    Adjustment disorder with mixed anxiety and depressed mood          Care Instructions    Next Depo from 1/8/17-1/22/17          Follow-ups after your visit        Additional Services     [facility] PT, HAND, AND CHIROPRACTIC REFERRAL       **This order will print in the [facility] Scheduling Office**    Physical Therapy, Hand Therapy and Chiropractic Care are available through:    *[facility]  *[facility]  *[facility]    Call one number to schedule at any of the above locations: [phone].    Your provider has referred you to: Physical Therapy at [facility] or [facility]    Indication/Reason for Referral: Left knee pain  Onset of Illness: weeks  Therapy Orders: Evaluate and Treat  Special Programs: None  Special Request: None    Adebayo Fiore      Additional Comments for the Therapist or Chiropractor:     Please be aware that coverage of these services is subject to the terms and limitations of your health insurance plan.  Call member services at your health plan with any benefit or coverage questions.      Please bring the following to your appointment:    *Your personal calendar for scheduling future appointments  *Comfortable clothing                  Follow-up notes from your care team     Return in about 4 weeks (around 1/17/2018) for Depression, Anxiety.      Who to contact     If you have questions or need follow up information about today's clinic visit or your schedule please contact [facility] directly at [phone].  Normal or non-critical lab and imaging results will be communicated to you by Julio Cesar  "letter or phone within 4 business days after the clinic has received the results. If you do not hear from us within 7 days, please contact the clinic through [employer] or phone. If you have a critical or abnormal lab result, we will notify you by phone as soon as possible.  Submit refill requests through [employer] or call your pharmacy and they will forward the refill request to us. Please allow 3 business days for your refill to be completed.          Additional Information About Your Visit        [employer] Information     [employer] gives you secure access to your electronic health record. If you see a primary care provider, you can also send messages to your care team and make appointments. If you have questions, please call your primary care clinic.  If you do not have a primary care provider, please call [phone] and they will assist you.        Care EveryWhere ID     This is your Care EveryWhere ID. This could be used by other organizations to access your [place] medical records  [id number]        Your Vitals Were     Pulse Temperature Height BMI (Body Mass Index)          72 98.1  F (36.7  C) (Oral) 5' 1.02\" (1.55 m) 26.06 kg/m2         Blood Pressure from Last 3 Encounters:   12/20/17 108/62   10/23/17 101/67   09/11/17 97/59    Weight from Last 3 Encounters:   12/20/17 138 lb (62.6 kg)   10/23/17 130 lb 9.6 oz (59.2 kg)   09/11/17 129 lb (58.5 kg)              We Performed the Following     [facility] PT, HAND, AND CHIROPRACTIC REFERRAL          Today's Medication Changes          These changes are accurate as of: 12/20/17  9:11 AM.  If you have any questions, ask your nurse or doctor.               These medicines have changed or have updated prescriptions.        Dose/Directions    sertraline 100 MG tablet   Commonly known as:  ZOLOFT   This may have changed:    - how much to take  - how to take this  - when to take this  - additional instructions   Used for:  Adjustment disorder with mixed anxiety and depressed " mood   Changed by:  Cally Rucker PA-C        Start with 1/2 tablet daily for 4 days then increase to 1 tablet daily for 4 days then increase to 1.5 tablets daily.   Quantity:  45 tablet   Refills:  1            Where to get your medicines      These medications were sent to [facility] [address] [address] [place]  [address] [facility] [address]     Phone:  [phone]     sertraline 100 MG tablet                Primary Care Provider Office Phone # Fax #    Cally Rucker PA-C [phone]       [address] [facility] [address]        Equal Access to Services     MAYRA TATE : Hadii aad ku hadasho Soomaali, waaxda luqadaha, qaybta kaalmada adeegyada, waxay vargasin clint nielsen. So [facility] [phone].    ATENCIÓN: Si habla español, tiene a braun disposición servicios gratuitos de asistencia lingüística. [facility] [phone].    We comply with applicable federal civil rights laws and [place] laws. We do not discriminate on the basis of race, color, national origin, age, disability, sex, sexual orientation, or gender identity.            Thank you!     Thank you for choosing [facility]  for your care. Our goal is always to provide you with excellent care. Hearing back from our patients is one way we can continue to improve our services. Please take a few minutes to complete the written survey that you may receive in the mail after your visit with us. Thank you!             Your Updated Medication List - Protect others around you: Learn how to safely use, store and throw away your medicines at www.disposemymeds.org.          This list is accurate as of: 12/20/17  9:11 AM.  Always use your most recent med list.                   Brand Name Dispense Instructions for use Diagnosis    cholecalciferol 1000 UNIT tablet    vitamin D3    100 tablet    Take 1 tablet (1,000 Units) by mouth daily     Adjustment disorder with mixed anxiety and depressed mood       medroxyPROGESTERone 150 MG/ML injection    DEPO-PROVERA    3 mL    Inject 1 mL (150 mg) into the muscle every 3 months    Encounter for initial prescription of other contraceptives       sertraline 100 MG tablet    ZOLOFT    45 tablet    Start with 1/2 tablet daily for 4 days then increase to 1 tablet daily for 4 days then increase to 1.5 tablets daily.    Adjustment disorder with mixed anxiety and depressed mood

## 2017-12-20 NOTE — PROGRESS NOTES
SUBJECTIVE:   Reginaldo Ferraro is a 27 year old female who presents to clinic today for the following health issues:      Depression and Anxiety Follow-Up    Status since last visit: Worsened due to stopping the medication (insurance issues)    Other associated symptoms:unsure    Complicating factors:     Significant life event: Yes-  Going through a seperation     Current substance abuse: None    PHQ-9 Score and MyChart F/U Questions 11/2/2015 6/2/2016 10/23/2017   Total Score 3 3 16   Q9: Suicide Ideation Not at all Not at all Not at all     MOHAN-7 SCORE 6/2/2016   Total Score 2     PHQ-9  English  PHQ-9   Any Language  GAD7  Suicide Assessment Five-step Evaluation and Treatment (SAFE-T)      Amount of exercise or physical activity: 2-3 days/week for an average of 45-60 minutes    Problems taking medications regularly: Yes,  cost of medication and side effects    Medication side effects: lightheaded, shaky hands, left knee pain    Diet: regular (no restrictions)    Has been off the zoloft for about a month. She ran out and was unable to fill d/t insurance problems. She did not wean from this.     Once she was off the medication she has been having dizziness and lightheadedness and she has noticed a shakiness in her hands. The tremor lasted for about 3 weeks, first week was extreme and then it started to go away.     Currently going through a divorce. Patient is moving out of the house on Monday. Feels safe at home. Was going to counseling weekly and plans to re-start once insurance is sorted out. Patient will have custody of their daughter.   Working 2 jobs currently.     Left knee has been hurting and stiff for 2 weeks. She notes it is hard to straighten it at times. Doesn't recall and injury.     Problem list and histories reviewed & adjusted, as indicated.  Additional history: as documented    Patient Active Problem List   Diagnosis     CARDIOVASCULAR SCREENING; LDL GOAL LESS THAN 160     Encounter for  "initial prescription of other contraceptives     Insomnia, unspecified insomnia     Vitamin D deficiency     Abnormal finding on Pap smear, HPV DNA positive     Surveillance for Depo-Provera contraception     Past Surgical History:   Procedure Laterality Date     EYE SURGERY  01/2013    Lasic surgery     NO HISTORY OF SURGERY         Social History   Substance Use Topics     Smoking status: Never Smoker     Smokeless tobacco: Never Used     Alcohol use Yes      Comment: Socially     Family History   Problem Relation Age of Onset     DIABETES Paternal Grandmother      Hypertension Paternal Grandmother      Respiratory Paternal Grandmother      asthma     HEART DISEASE Father              Reviewed and updated as needed this visit by clinical staffTobacco  Allergies  Meds  Problems  Med Hx  Surg Hx  Fam Hx  Soc Hx        Reviewed and updated as needed this visit by Provider  Allergies  Meds  Problems         ROS:  Constitutional, HEENT, cardiovascular, pulmonary, gi and gu systems are negative, except as otherwise noted.      OBJECTIVE:   /62 (Cuff Size: Adult Regular)  Pulse 72  Temp 98.1  F (36.7  C) (Oral)  Ht 5' 1.02\" (1.55 m)  Wt 138 lb (62.6 kg)  BMI 26.06 kg/m2  Body mass index is 26.06 kg/(m^2).  GENERAL: healthy, alert and no distress  NECK: no adenopathy, no asymmetry, masses, or scars and thyroid normal to palpation  MS: no gross musculoskeletal defects noted, no edema- Left knee with no pain with palpation. Negative valgus and varus. Pain with Yemi's. Negative Lachman's.   PSYCH: mentation appears normal, affect normal/bright    Diagnostic Test Results:  none     ASSESSMENT/PLAN:   1. Adjustment disorder with mixed anxiety and depressed mood  Restart zoloft. Discussed importance of not stopping abruptly. Restart with counseling. Follow up in 4-6 weeks.   - sertraline (ZOLOFT) 100 MG tablet; Start with 1/2 tablet daily for 4 days then increase to 1 tablet daily for 4 days then " increase to 1.5 tablets daily.  Dispense: 45 tablet; Refill: 1    2. Acute pain of left knee  Try physical therapy, if not improving consider MRI.   - [facility] PT, HAND, AND CHIROPRACTIC REFERRAL    FUTURE APPOINTMENTS:       - Follow-up visit in 4-6 weeks    Cally Rucker PA-C  [facility]

## 2017-12-21 ASSESSMENT — ANXIETY QUESTIONNAIRES: GAD7 TOTAL SCORE: 12

## 2018-02-05 ENCOUNTER — TELEPHONE (OUTPATIENT)
Dept: FAMILY MEDICINE | Facility: CLINIC | Age: 28
End: 2018-02-05

## 2018-02-05 NOTE — TELEPHONE ENCOUNTER
Reason for Call:  Other     Detailed comments: Patient needs a note on Kirk letter head stating her immunization and tb results. Please contact patient when this has been completed .    Phone Number Patient can be reached at: Cell number on file:    Telephone Information:   Mobile [phone]       Best Time:     Can we leave a detailed message on this number? Not Applicable    Call taken on 2/5/2018 at 11:27 AM by Gypsy Choe

## 2018-02-05 NOTE — LETTER
February 5, 2018        Reginaldo Ferraro  [address]  [place] [address]          To whom it may concern:    RE: Reginaldo Ferraro    On 10/9/2017 patient had a negative ppd test. On 9/21/2016 patient had a positive antibody test for varicella. Please see the attached immunization sheet for documentation about her vaccinations.     Please contact me for questions or concerns.      Sincerely,        Cally Rucker PA-C

## 2018-05-21 ENCOUNTER — RESULT FOLLOW UP (OUTPATIENT)
Dept: FAMILY MEDICINE | Facility: CLINIC | Age: 28
End: 2018-05-21

## 2018-05-21 ENCOUNTER — OFFICE VISIT (OUTPATIENT)
Dept: FAMILY MEDICINE | Facility: CLINIC | Age: 28
End: 2018-05-21
Payer: COMMERCIAL

## 2018-05-21 VITALS
DIASTOLIC BLOOD PRESSURE: 73 MMHG | OXYGEN SATURATION: 99 % | TEMPERATURE: 98.4 F | HEART RATE: 64 BPM | WEIGHT: 141.6 LBS | BODY MASS INDEX: 26.73 KG/M2 | HEIGHT: 61 IN | SYSTOLIC BLOOD PRESSURE: 109 MMHG

## 2018-05-21 DIAGNOSIS — Z11.3 SCREEN FOR STD (SEXUALLY TRANSMITTED DISEASE): ICD-10-CM

## 2018-05-21 DIAGNOSIS — Z00.00 ROUTINE GENERAL MEDICAL EXAMINATION AT A HEALTH CARE FACILITY: Primary | ICD-10-CM

## 2018-05-21 DIAGNOSIS — F43.23 ADJUSTMENT DISORDER WITH MIXED ANXIETY AND DEPRESSED MOOD: ICD-10-CM

## 2018-05-21 DIAGNOSIS — N91.2 AMENORRHEA: ICD-10-CM

## 2018-05-21 DIAGNOSIS — Z12.4 SCREENING FOR MALIGNANT NEOPLASM OF CERVIX: ICD-10-CM

## 2018-05-21 PROCEDURE — 84702 CHORIONIC GONADOTROPIN TEST: CPT | Performed by: PHYSICIAN ASSISTANT

## 2018-05-21 PROCEDURE — G0145 SCR C/V CYTO,THINLAYER,RESCR: HCPCS | Performed by: PHYSICIAN ASSISTANT

## 2018-05-21 PROCEDURE — 87591 N.GONORRHOEAE DNA AMP PROB: CPT | Performed by: PHYSICIAN ASSISTANT

## 2018-05-21 PROCEDURE — 87389 HIV-1 AG W/HIV-1&-2 AB AG IA: CPT | Performed by: PHYSICIAN ASSISTANT

## 2018-05-21 PROCEDURE — 99395 PREV VISIT EST AGE 18-39: CPT | Performed by: PHYSICIAN ASSISTANT

## 2018-05-21 PROCEDURE — 87624 HPV HI-RISK TYP POOLED RSLT: CPT | Performed by: PHYSICIAN ASSISTANT

## 2018-05-21 PROCEDURE — 36415 COLL VENOUS BLD VENIPUNCTURE: CPT | Performed by: PHYSICIAN ASSISTANT

## 2018-05-21 PROCEDURE — 86780 TREPONEMA PALLIDUM: CPT | Performed by: PHYSICIAN ASSISTANT

## 2018-05-21 PROCEDURE — 86803 HEPATITIS C AB TEST: CPT | Performed by: PHYSICIAN ASSISTANT

## 2018-05-21 PROCEDURE — 87491 CHLMYD TRACH DNA AMP PROBE: CPT | Performed by: PHYSICIAN ASSISTANT

## 2018-05-21 NOTE — LETTER
May 29, 2019      Reginaldo AGGIE Ronan  [address]  [place] [address]    Dear MsAmieRonan,      At [facility], your health and wellness is our primary concern. That is why we are following up on a positive high risk HPV test from 5/21/18. Your provider had recommended that you have a Pap smear and HPV test completed by 5/21/19. Our records do not show that this has been scheduled.    It is important to complete the follow up that your provider has suggested for you to ensure that there are no worsening changes which may, over time, develop into cancer.      Please contact our office at  [phone] to schedule an appointment for a Pap smear and HPV test at your earliest convenience. If you have questions or concerns, please call the clinic and we will be happy to assist you.    If you have completed the tests outside of [facility], please have the results forwarded to our office. We will update the chart for your primary Physician to review before your next annual physical.     Thank you for choosing [facility]!    Sincerely,      Your [facility] Care Team/fariha

## 2018-05-21 NOTE — LETTER
May 8, 2019      Reginaldo Ferraro  [address]  [place] [address]    Dear MsAmieRonan,      At [facility], your health and wellness is our primary concern. That is why we are following up on a positive high risk HPV test from 5/21/18. Your provider had recommended that you have a Pap smear and HPV test completed by 5/21/19. Our records do not show that this has been scheduled.    It is important to complete the follow up that your provider has suggested for you to ensure that there are no worsening changes which may, over time, develop into cancer.      Please contact our office at  [phone] to schedule an appointment for a Pap smear and HPV test at your earliest convenience. If you have questions or concerns, please call the clinic and we will be happy to assist you.    If you have completed the tests outside of [place], please have the results forwarded to our office. We will update the chart for your primary Physician to review before your next annual physical.     Thank you for choosing [facility]!    Sincerely,      Your [facility] Care Team/fariha

## 2018-05-21 NOTE — MR AVS SNAPSHOT
After Visit Summary   5/21/2018    Reginaldo Ferraro    MRN: [id number]           Patient Information     Date Of Birth          1990        Visit Information        Provider Department      5/21/2018 6:00 PM Cally Rucker PA-C [facility]        Today's Diagnoses     Routine general medical examination at a health care facility    -  1    Screening for malignant neoplasm of cervix        Adjustment disorder with mixed anxiety and depressed mood        Amenorrhea        Screen for STD (sexually transmitted disease)          Care Instructions    Melatonin 3mg at night or Benadryl 25mg at night if needed.       Preventive Health Recommendations  Female Ages 26 - 39  Yearly exam:   See your health care provider every year in order to    Review health changes.     Discuss preventive care.      Review your medicines if you your doctor has prescribed any.    Until age 30: Get a Pap test every three years (more often if you have had an abnormal result).    After age 30: Talk to your doctor about whether you should have a Pap test every 3 years or have a Pap test with HPV screening every 5 years.   You do not need a Pap test if your uterus was removed (hysterectomy) and you have not had cancer.  You should be tested each year for STDs (sexually transmitted diseases), if you're at risk.   Talk to your provider about how often to have your cholesterol checked.  If you are at risk for diabetes, you should have a diabetes test (fasting glucose).  Shots: Get a flu shot each year. Get a tetanus shot every 10 years.   Nutrition:     Eat at least 5 servings of fruits and vegetables each day.    Eat whole-grain bread, whole-wheat pasta and brown rice instead of white grains and rice.    Talk to your provider about Calcium and Vitamin D.     Lifestyle    Exercise at least 150 minutes a week (30 minutes a day, 5 days of the week). This will help you control your weight and prevent  "disease.    Limit alcohol to one drink per day.    No smoking.     Wear sunscreen to prevent skin cancer.    See your dentist every six months for an exam and cleaning.            Follow-ups after your visit        Who to contact     If you have questions or need follow up information about today's clinic visit or your schedule please contact [facility] directly at [phone].  Normal or non-critical lab and imaging results will be communicated to you by MyChart, letter or phone within 4 business days after the clinic has received the results. If you do not hear from us within 7 days, please contact the clinic through [employer] or phone. If you have a critical or abnormal lab result, we will notify you by phone as soon as possible.  Submit refill requests through [employer] environment or call your pharmacy and they will forward the refill request to us. Please allow 3 business days for your refill to be completed.          Additional Information About Your Visit        [employer] Information     [employer] environment gives you secure access to your electronic health record. If you see a primary care provider, you can also send messages to your care team and make appointments. If you have questions, please call your primary care clinic.  If you do not have a primary care provider, please call [phone] and they will assist you.        Care EveryWhere ID     This is your Care EveryWhere ID. This could be used by other organizations to access your [place] medical records  [id number]        Your Vitals Were     Pulse Temperature Height Pulse Oximetry Breastfeeding? BMI (Body Mass Index)    64 98.4  F (36.9  C) (Oral) 5' 0.75\" (1.543 m) 99% No 26.98 kg/m2       Blood Pressure from Last 3 Encounters:   05/21/18 109/73   12/20/17 108/62   10/23/17 101/67    Weight from Last 3 Encounters:   05/21/18 141 lb 9.6 oz (64.2 kg)   12/20/17 138 lb (62.6 kg)   10/23/17 130 lb 9.6 oz (59.2 kg)              We Performed the Following  "    Chlamydia trachomatis PCR     HCG Quantitative Pregnancy, Blood (NPE191)     Hepatitis C antibody     HIV Antigen Antibody Combo     HPV High Risk Types DNA Cervical     Neisseria gonorrhoeae PCR     Pap imaged thin layer screen with HPV - recommended age 30 - 65 years (select HPV order below)     Treponema Abs w Reflex to RPR and Titer          Where to get your medicines      These medications were sent to [id number] [address]  [address] [facility] [address]     Phone:  [phone]     cholecalciferol 1000 UNIT tablet          Primary Care Provider Office Phone # Fax #    Cally Rucker PA-C [phone]       [address] [facility] [address]        Equal Access to Services     MAYRA TTAE : Saroj velazquezo Sobo, waaxda luqadaha, qaybta kaalmada adeegyada, irina gama . So [facility] [phone].    ATENCIÓN: Si habla español, tiene a braun disposición servicios gratuitos de asistencia lingüística. Llame al [phone].    We comply with applicable federal civil rights laws and [place] laws. We do not discriminate on the basis of race, color, national origin, age, disability, sex, sexual orientation, or gender identity.            Thank you!     Thank you for choosing [facility]  for your care. Our goal is always to provide you with excellent care. Hearing back from our patients is one way we can continue to improve our services. Please take a few minutes to complete the written survey that you may receive in the mail after your visit with us. Thank you!             Your Updated Medication List - Protect others around you: Learn how to safely use, store and throw away your medicines at www.disposemymeds.org.          This list is accurate as of 5/21/18  6:16 PM.  Always use your most recent med list.                   Brand Name Dispense  Instructions for use Diagnosis    cholecalciferol 1000 UNIT tablet    vitamin D3    100 tablet    Take 1 tablet (1,000 Units) by mouth daily    Adjustment disorder with mixed anxiety and depressed mood

## 2018-05-21 NOTE — PATIENT INSTRUCTIONS
Melatonin 3mg at night or Benadryl 25mg at night if needed.       Preventive Health Recommendations  Female Ages 26 - 39  Yearly exam:   See your health care provider every year in order to    Review health changes.     Discuss preventive care.      Review your medicines if you your doctor has prescribed any.    Until age 30: Get a Pap test every three years (more often if you have had an abnormal result).    After age 30: Talk to your doctor about whether you should have a Pap test every 3 years or have a Pap test with HPV screening every 5 years.   You do not need a Pap test if your uterus was removed (hysterectomy) and you have not had cancer.  You should be tested each year for STDs (sexually transmitted diseases), if you're at risk.   Talk to your provider about how often to have your cholesterol checked.  If you are at risk for diabetes, you should have a diabetes test (fasting glucose).  Shots: Get a flu shot each year. Get a tetanus shot every 10 years.   Nutrition:     Eat at least 5 servings of fruits and vegetables each day.    Eat whole-grain bread, whole-wheat pasta and brown rice instead of white grains and rice.    Talk to your provider about Calcium and Vitamin D.     Lifestyle    Exercise at least 150 minutes a week (30 minutes a day, 5 days of the week). This will help you control your weight and prevent disease.    Limit alcohol to one drink per day.    No smoking.     Wear sunscreen to prevent skin cancer.    See your dentist every six months for an exam and cleaning.

## 2018-05-21 NOTE — PROGRESS NOTES
SUBJECTIVE:   CC: Reginaldo Ferraro is an 27 year old woman who presents for preventive health visit.     Physical   Annual:     Getting at least 3 servings of Calcium per day::  Yes    Bi-annual eye exam::  NO    Dental care twice a year::  NO    Sleep apnea or symptoms of sleep apnea::  Daytime drowsiness    Diet::  Regular (no restrictions)    Frequency of exercise::  2-3 days/week    Duration of exercise::  45-60 minutes    Taking medications regularly::  Not Applicable    Medication side effects::  Not applicable    Additional concerns today::  YES            Stopped the zoloft d/t insurance and then never restarted because she wanted to ok without medication. She cries at random times. No suicidal thoughts.   Had 3-4 days where she couldn't get up and go to work d/t her mood.  Patient is in counseling right. She is doing counseling every other week. Wants to wait on medications.    Hard to fall asleep, wakes up a lot. Feels exhausted all day. Has not taken anything for it, because of her daughter at home.       Vaginal Bleeding  Duration of complaint: x21 days behind, pt took pregnancy test at home and came back negative.   Some nausea, one particularly bad day. Breast tenderness periodically. Just slight weight gain.     Today's PHQ-2 Score:   PHQ-2 ( 1999 Pf[employer]) 5/21/2018   Q1: Little interest or pleasure in doing things 1   Q2: Feeling down, depressed or hopeless 1   PHQ-2 Score 2   Q1: Little interest or pleasure in doing things Several days   Q2: Feeling down, depressed or hopeless Several days   PHQ-2 Score 2       Abuse: Current or Past(Physical, Sexual or Emotional)- No  Do you feel safe in your environment - Yes    Social History   Substance Use Topics     Smoking status: Never Smoker     Smokeless tobacco: Never Used     Alcohol use Yes      Comment: Socially     Alcohol Use 5/21/2018   If you drink alcohol do you typically have greater than 3 drinks per day OR greater than 7 drinks per week? No  "    Reviewed orders with patient.  Reviewed health maintenance and updated orders accordingly - Yes  Labs reviewed in EPIC  BP Readings from Last 3 Encounters:   05/21/18 109/73   12/20/17 108/62   10/23/17 101/67    Wt Readings from Last 3 Encounters:   05/21/18 141 lb 9.6 oz (64.2 kg)   12/20/17 138 lb (62.6 kg)   10/23/17 130 lb 9.6 oz (59.2 kg)                    Mammogram not appropriate for this patient based on age.    Pertinent mammograms are reviewed under the imaging tab.  History of abnormal Pap smear: NO - age 21-29 PAP every 3 years recommended    Reviewed and updated as needed this visit by clinical staff  Tobacco  Allergies  Meds  Problems  Med Hx  Surg Hx  Fam Hx  Soc Hx          Reviewed and updated as needed this visit by Provider  Allergies  Meds  Problems            Review of Systems  CONSTITUTIONAL: NEGATIVE for fever, chills, change in weight  INTEGUMENTARU/SKIN: NEGATIVE for worrisome rashes, moles or lesions  EYES: NEGATIVE for vision changes or irritation  ENT: NEGATIVE for ear, mouth and throat problems  RESP: NEGATIVE for significant cough or SOB  BREAST: NEGATIVE for masses, tenderness or discharge  CV: NEGATIVE for chest pain, palpitations or peripheral edema  GI: NEGATIVE for nausea, abdominal pain, heartburn, or change in bowel habits  : NEGATIVE for unusual urinary or vaginal symptoms. amenorrhea.  MUSCULOSKELETAL: NEGATIVE for significant arthralgias or myalgia  NEURO: NEGATIVE for weakness, dizziness or paresthesias  PSYCHIATRIC: +depression     OBJECTIVE:   /73 (BP Location: Left arm, Patient Position: Chair, Cuff Size: Adult Regular)  Pulse 64  Temp 98.4  F (36.9  C) (Oral)  Ht 5' 0.75\" (1.543 m)  Wt 141 lb 9.6 oz (64.2 kg)  SpO2 99%  Breastfeeding? No  BMI 26.98 kg/m2  Physical Exam  GENERAL: healthy, alert and no distress  EYES: Eyes grossly normal to inspection, PERRL and conjunctivae and sclerae normal  HENT: ear canals and TM's normal, nose and mouth " without ulcers or lesions  NECK: no adenopathy, no asymmetry, masses, or scars and thyroid normal to palpation  RESP: lungs clear to auscultation - no rales, rhonchi or wheezes  BREAST: normal without masses, tenderness or nipple discharge and no palpable axillary masses or adenopathy  CV: regular rate and rhythm, normal S1 S2, no S3 or S4, no murmur, click or rub, no peripheral edema and peripheral pulses strong  ABDOMEN: soft, nontender, no hepatosplenomegaly, no masses and bowel sounds normal   (female): normal female external genitalia, normal urethral meatus, vaginal mucosa pink, moist, well rugated, and normal cervix/adnexa/uterus without masses or discharge  MS: no gross musculoskeletal defects noted, no edema  SKIN: no suspicious lesions or rashes  NEURO: Normal strength and tone, mentation intact and speech normal  PSYCH: mentation appears normal, affect normal/bright    ASSESSMENT/PLAN:       ICD-10-CM    1. Routine general medical examination at a health care facility Z00.00    2. Screening for malignant neoplasm of cervix Z12.4 HPV High Risk Types DNA Cervical     Pap imaged thin layer screen with HPV - recommended age 30 - 65 years (select HPV order below)   3. Adjustment disorder with mixed anxiety and depressed mood F43.23 cholecalciferol (VITAMIN D3) 1000 UNIT tablet   4. Amenorrhea N91.2 HCG Quantitative Pregnancy, Blood (PMQ599)   5. Screen for STD (sexually transmitted disease) Z11.3 Chlamydia trachomatis PCR     Neisseria gonorrhoeae PCR     Hepatitis C antibody     HIV Antigen Antibody Combo     Treponema Abs w Reflex to RPR and Titer   HCG quant as home pregnancy test was negative.   STD testing.   Restart vitamin D.   Pap smear.     COUNSELING:  Reviewed preventive health counseling, as reflected in patient instructions       Regular exercise       Healthy diet/nutrition       Family planning       Safe sex practices/STD prevention     reports that she has never smoked. She has never used  "smokeless tobacco.    Estimated body mass index is 26.98 kg/(m^2) as calculated from the following:    Height as of this encounter: 5' 0.75\" (1.543 m).    Weight as of this encounter: 141 lb 9.6 oz (64.2 kg).   Weight management plan: Discussed healthy diet and exercise guidelines and patient will follow up in 12 months in clinic to re-evaluate.    Counseling Resources:  ATP IV Guidelines  Pooled Cohorts Equation Calculator  Breast Cancer Risk Calculator  FRAX Risk Assessment  ICSI Preventive Guidelines  Dietary Guidelines for Americans, 2010  USDA's MyPlate  ASA Prophylaxis  Lung CA Screening    Cally Rucker PA-C  [facility]  Answers for HPI/ROS submitted by the patient on 5/21/2018   PHQ-2 Score: 2    "

## 2018-05-22 LAB
B-HCG SERPL-ACNC: <1 IU/L (ref 0–5)
HCV AB SERPL QL IA: NONREACTIVE
HIV 1+2 AB+HIV1 P24 AG SERPL QL IA: NONREACTIVE

## 2018-05-22 ASSESSMENT — PATIENT HEALTH QUESTIONNAIRE - PHQ9: SUM OF ALL RESPONSES TO PHQ QUESTIONS 1-9: 13

## 2018-05-23 LAB
C TRACH DNA SPEC QL NAA+PROBE: NEGATIVE
N GONORRHOEA DNA SPEC QL NAA+PROBE: NEGATIVE
SPECIMEN SOURCE: NORMAL
SPECIMEN SOURCE: NORMAL
T PALLIDUM AB SER QL: NONREACTIVE

## 2018-05-25 LAB
COPATH REPORT: NORMAL
PAP: NORMAL

## 2018-05-30 LAB
FINAL DIAGNOSIS: ABNORMAL
HPV HR 12 DNA CVX QL NAA+PROBE: POSITIVE
HPV16 DNA SPEC QL NAA+PROBE: NEGATIVE
HPV18 DNA SPEC QL NAA+PROBE: NEGATIVE
SPECIMEN DESCRIPTION: ABNORMAL
SPECIMEN SOURCE CVX/VAG CYTO: ABNORMAL

## 2018-05-30 NOTE — PROGRESS NOTES
12/23/11--LSIL/cannot rule out HSIL @ age 21. Plan-- colposcopy within 3 months.  3/12/12 reminder phone call  3/22/12 reminder letter sent.  Recheck 2 weeks.   4/4/12 telephone encounter-- return mail received.  msg left to contact clinic with new address  4/16/12 telephone reminder. Recheck 2 weeks.  4/24/12--telephone reminder.   4/30/12-- colposcopy. No bx taken.  Pap--NIL/ neg HPV. Plan-- pap in 1 year (due 4/30/13)  10/15/13 pap NIL/+ HR HPV (58). Plan-- pap in 1 year (due 10/15/14)  10/31/14 pap NIL/+ HR HPV plan-- repeat pap and HPV in 1 year (due 10/31/15)  11/2/15 pap NIL/neg HPV. Plan: pap and HPV in 3 yrs.  Problem List copied and pasted above  5/21/18 NIL pap, + HR HPV (not 16/18) @ age 27. Plan 1 year cotest per provider  5/8/19 My Chart cotest reminder message sent (rlm)  5/29/19 Reminder call placed, busy signal, additional reminder letter sent (rlm)  7/8/19 Patient is lost to follow-up. Routed to provider as JEWEL. (rlm)

## 2018-06-11 PROBLEM — F33.0 MILD EPISODE OF RECURRENT MAJOR DEPRESSIVE DISORDER (H): Status: ACTIVE | Noted: 2018-06-11

## 2018-11-16 ENCOUNTER — OFFICE VISIT (OUTPATIENT)
Dept: FAMILY MEDICINE | Facility: CLINIC | Age: 28
End: 2018-11-16
Payer: COMMERCIAL

## 2018-11-16 VITALS
TEMPERATURE: 98.4 F | HEART RATE: 71 BPM | SYSTOLIC BLOOD PRESSURE: 109 MMHG | BODY MASS INDEX: 24.08 KG/M2 | OXYGEN SATURATION: 98 % | WEIGHT: 126.4 LBS | DIASTOLIC BLOOD PRESSURE: 69 MMHG

## 2018-11-16 DIAGNOSIS — Z86.2 HISTORY OF ANEMIA: ICD-10-CM

## 2018-11-16 DIAGNOSIS — F33.0 MILD EPISODE OF RECURRENT MAJOR DEPRESSIVE DISORDER (H): ICD-10-CM

## 2018-11-16 DIAGNOSIS — Z01.818 PREOP GENERAL PHYSICAL EXAM: Primary | ICD-10-CM

## 2018-11-16 DIAGNOSIS — Z97.5 IUD (INTRAUTERINE DEVICE) IN PLACE: ICD-10-CM

## 2018-11-16 DIAGNOSIS — Z41.1 ENCOUNTER FOR COSMETIC PROCEDURE: ICD-10-CM

## 2018-11-16 LAB
ERYTHROCYTE [DISTWIDTH] IN BLOOD BY AUTOMATED COUNT: 12.6 % (ref 10–15)
HCG SERPL QL: NEGATIVE
HCT VFR BLD AUTO: 37.6 % (ref 35–47)
HGB BLD-MCNC: 12.4 G/DL (ref 11.7–15.7)
MCH RBC QN AUTO: 30.9 PG (ref 26.5–33)
MCHC RBC AUTO-ENTMCNC: 33 G/DL (ref 31.5–36.5)
MCV RBC AUTO: 94 FL (ref 78–100)
PLATELET # BLD AUTO: 437 10E9/L (ref 150–450)
RBC # BLD AUTO: 4.01 10E12/L (ref 3.8–5.2)
WBC # BLD AUTO: 7.1 10E9/L (ref 4–11)

## 2018-11-16 PROCEDURE — 84703 CHORIONIC GONADOTROPIN ASSAY: CPT | Performed by: PHYSICIAN ASSISTANT

## 2018-11-16 PROCEDURE — 99214 OFFICE O/P EST MOD 30 MIN: CPT | Performed by: PHYSICIAN ASSISTANT

## 2018-11-16 PROCEDURE — 85027 COMPLETE CBC AUTOMATED: CPT | Performed by: PHYSICIAN ASSISTANT

## 2018-11-16 PROCEDURE — 36415 COLL VENOUS BLD VENIPUNCTURE: CPT | Performed by: PHYSICIAN ASSISTANT

## 2018-11-16 RX ORDER — COPPER 313.4 MG/1
1 INTRAUTERINE DEVICE INTRAUTERINE ONCE
COMMUNITY
End: 2021-08-19

## 2018-11-16 ASSESSMENT — PATIENT HEALTH QUESTIONNAIRE - PHQ9: SUM OF ALL RESPONSES TO PHQ QUESTIONS 1-9: 12

## 2018-11-16 NOTE — LETTER
My Depression Action Plan  Name: Reginaldo Ferraro   Date of Birth 1990  Date: 11/16/2018    My doctor: Cally Rucker   My clinic: [address] [id number] [phone]          GREEN    ZONE   Good Control    What it looks like:     Things are going generally well. You have normal up s and down s. You may even feel depressed from time to time, but bad moods usually last less than a day.   What you need to do:  1. Continue to care for yourself (see self care plan)  2. Check your depression survival kit and update it as needed  3. Follow your physician s recommendations including any medication.  4. Do not stop taking medication unless you consult with your physician first.           YELLOW         ZONE Getting Worse    What it looks like:     Depression is starting to interfere with your life.     It may be hard to get out of bed; you may be starting to isolate yourself from others.    Symptoms of depression are starting to last most all day and this has happened for several days.     You may have suicidal thoughts but they are not constant.   What you need to do:     1. Call your care team, your response to treatment will improve if you keep your care team informed of your progress. Yellow periods are signs an adjustment may need to be made.     2. Continue your self-care, even if you have to fake it!    3. Talk to someone in your support network    4. Open up your depression survival kit           RED    ZONE Medical Alert - Get Help    What it looks like:     Depression is seriously interfering with your life.     You may experience these or other symptoms: You can t get out of bed most days, can t work or engage in other necessary activities, you have trouble taking care of basic hygiene, or basic responsibilities, thoughts of suicide or death that will not go away, self-injurious behavior.     What you need to  do:  1. Call your care team and request a same-day appointment. If they are not available (weekends or after hours) call your local crisis line, emergency room or 911.            Depression Self Care Plan / Survival Kit    Self-Care for Depression  Here s the deal. Your body and mind are really not as separate as most people think.  What you do and think affects how you feel and how you feel influences what you do and think. This means if you do things that people who feel good do, it will help you feel better.  Sometimes this is all it takes.  There is also a place for medication and therapy depending on how severe your depression is, so be sure to consult with your medical provider and/ or Behavioral Health Consultant if your symptoms are worsening or not improving.     In order to better manage my stress, I will:    Exercise  Get some form of exercise, every day. This will help reduce pain and release endorphins, the  feel good  chemicals in your brain. This is almost as good as taking antidepressants!  This is not the same as joining a gym and then never going! (they count on that by the way ) It can be as simple as just going for a walk or doing some gardening, anything that will get you moving.      Hygiene   Maintain good hygiene (Get out of bed in the morning, Make your bed, Brush your teeth, Take a shower, and Get dressed like you were going to work, even if you are unemployed).  If your clothes don't fit try to get ones that do.    Diet  I will strive to eat foods that are good for me, drink plenty of water, and avoid excessive sugar, caffeine, alcohol, and other mood-altering substances.  Some foods that are helpful in depression are: complex carbohydrates, B vitamins, flaxseed, fish or fish oil, fresh fruits and vegetables.    Psychotherapy  I agree to participate in Individual Therapy (if recommended).    Medication  If prescribed medications, I agree to take them.  Missing doses can result in serious  side effects.  I understand that drinking alcohol, or other illicit drug use, may cause potential side effects.  I will not stop my medication abruptly without first discussing it with my provider.    Staying Connected With Others  I will stay in touch with my friends, family members, and my primary care provider/team.    Use your imagination  Be creative.  We all have a creative side; it doesn t matter if it s oil painting, sand castles, or mud pies! This will also kick up the endorphins.    Witness Beauty  (AKA stop and smell the roses) Take a look outside, even in mid-winter. Notice colors, textures. Watch the squirrels and birds.     Service to others  Be of service to others.  There is always someone else in need.  By helping others we can  get out of ourselves  and remember the really important things.  This also provides opportunities for practicing all the other parts of the program.    Humor  Laugh and be silly!  Adjust your TV habits for less news and crime-drama and more comedy.    Control your stress  Try breathing deep, massage therapy, biofeedback, and meditation. Find time to relax each day.     My support system    Clinic Contact:  Phone number:    Contact 1:  Phone number:    Contact 2:  Phone number:    Mandaen/:  Phone number:    Therapist:  Phone number:    Local crisis center:    Phone number:    Other community support:  Phone number:

## 2018-11-16 NOTE — PROGRESS NOTES
"[address] [id number] [phone]  Dept: [phone]    PRE-OP EVALUATION:  Today's date: 2018    Reginaldo Ferraro (: 1990) presents for pre-operative evaluation assessment as requested by Dr. Bradford.  She requires evaluation and anesthesia risk assessment prior to undergoing surgery/procedure for treatment of breast augmentation  .    Fax number for surgical facility: [facility]   Primary Physician: Cally Rucker  Type of Anesthesia Anticipated: {ANESTHESIA:973189}    Patient has a Health Care Directive or Living Will:  {YES/NO:076034::\"NO\"}    Preop Questions 2018   Who is doing your surgery? dr bradford   What are you having done? breast augmentation   Date of Surgery/Procedure:    Facility or Hospital where procedure/surgery will be performed: [facility]   1.  Do you have a history of Heart attack, stroke, stent, coronary bypass surgery, or other heart surgery? No   2.  Do you ever have any pain or discomfort in your chest? No   3.  Do you have a history of  Heart Failure? No   4.   Are you troubled by shortness of breath when:  walking on a level surface, or up a slight hill, or at night? No   5.  Do you currently have a cold, bronchitis or other respiratory infection? No   6.  Do you have a cough, shortness of breath, or wheezing? No   7.  Do you sometimes get pains in the calves of your legs when you walk? No   8. Do you or anyone in your family have previous history of blood clots? UNKNOWN - ***   9.  Do you or does anyone in your family have a serious bleeding problem such as prolonged bleeding following surgeries or cuts? No   10. Have you ever had problems with anemia or been told to take iron pills? YES - ***   11. Have you had any abnormal blood loss such as black, tarry or bloody stools, or abnormal vaginal bleeding? YES - ***   12. Have you ever had a blood " "transfusion? No   13. Have you or any of your relatives ever had problems with anesthesia? No   14. Do you have sleep apnea, excessive snoring or daytime drowsiness? No   15. Do you have any prosthetic heart valves? No   16. Do you have prosthetic joints? No   17. Is there any chance that you may be pregnant? No         HPI:     HPI related to upcoming procedure: ***      {. Problems:201714}    MEDICAL HISTORY:     Patient Active Problem List    Diagnosis Date Noted     Mild episode of recurrent major depressive disorder (H) 06/11/2018     Priority: Medium     Insomnia, unspecified insomnia 11/02/2015     Priority: Medium     Vitamin D deficiency 11/02/2015     Priority: Medium     Abnormal finding on Pap smear, HPV DNA positive 11/02/2015     Priority: Medium     CARDIOVASCULAR SCREENING; LDL GOAL LESS THAN 160 10/31/2010     Priority: Medium      Past Medical History:   Diagnosis Date     Depressive disorder 12/23/2011     High risk HPV infection 10/15/13, 10/31/14, 5/21/18    see problem list     LSIL (low grade squamous intraepithelial lesion) on Pap smear 12/23/2011    cannot rule out HSIL. 4/30/12 colp: no bx taken. pap at that time is NIL/neg HPV     Moderate major depression 12/23/2011     Moderate major depression (H) 12/23/2011     NO ACTIVE PROBLEMS      Past Surgical History:   Procedure Laterality Date     EYE SURGERY  01/2013    Lasic surgery     NO HISTORY OF SURGERY       Current Outpatient Prescriptions   Medication Sig Dispense Refill     cholecalciferol (VITAMIN D3) 1000 UNIT tablet Take 1 tablet (1,000 Units) by mouth daily 100 tablet 3     sertraline (ZOLOFT) 50 MG tablet Take 1 tablet (50 mg) by mouth daily 30 tablet 1     OTC products: {OTC ANALGESICS:128333}    Allergies   Allergen Reactions     No Known Drug Allergies       Latex Allergy: {YES/NO WITH DEFAULT:964445::\"NO\"}    Social History   Substance Use Topics     Smoking status: Never Smoker     Smokeless tobacco: Never Used     " "Alcohol use Yes      Comment: Socially     History   Drug Use No       REVIEW OF SYSTEMS:   {ROS Preop Choices:562765}    EXAM:   There were no vitals taken for this visit.  {EXAM Preop Choices:407366}    DIAGNOSTICS:   {DIAGNOSTIC FOR PREOP:643071}    Recent Labs   Lab Test  11/02/15   1717  02/18/15   1344   HGB  11.5*  11.5*   PLT  313  340        IMPRESSION:   {PREOP REASONS:139871::\"Reason for surgery/procedure: ***\",\"Diagnosis/reason for consult: ***\"}    The proposed surgical procedure is considered {HIGH=major cardiovascular or procedures requiring prolonged anesthesia >4 hours or large fluid shifts;    INTERMEDIATE=abdominal, most orthopedic and intrathoracic surgery; LOW= endoscopy, cataract and breast surgery:403333} risk.    REVISED CARDIAC RISK INDEX  The patient has the following serious cardiovascular risks for perioperative complications such as (MI, PE, VFib and 3  AV Block):  {PREOP REVISED CARDIAC INDEX (RCI):023402:p:\"No serious cardiac risks\"}  INTERPRETATION: {REVISED CARDIAC RISK INTERPRETATION:129726}    The patient has the following additional risks for perioperative complications:  {Additional perioperative risks:612465:p:\"No identified additional risks\"}      ICD-10-CM    1. Need for prophylactic vaccination and inoculation against influenza Z23    2. Preop general physical exam Z01.818        RECOMMENDATIONS:     {IMPORTANT - Conditions - complete carefully!!:825377}    {IMPORTANT - Medications:207456::\"--Patient is to take all scheduled medications on the day of surgery EXCEPT for modifications listed below.\"}    {IMPORTANT - Approval:227395:p:\"APPROVAL GIVEN to proceed with proposed procedure, without further diagnostic evaluation\"}       Signed Electronically by: Cally Rucker PA-C    Copy of this evaluation report is provided to requesting physician.    [facility] Preop Guidelines    Revised Cardiac Risk Index  "

## 2018-11-16 NOTE — PATIENT INSTRUCTIONS
Before Your Surgery      Call your surgeon if there is any change in your health. This includes signs of a cold or flu (such as a sore throat, runny nose, cough, rash or fever).    Do not smoke, drink alcohol or take over the counter medicine (unless your surgeon or primary care doctor tells you to) for the 24 hours before and after surgery.    If you take prescribed drugs: Follow your doctor s orders about which medicines to take and which to stop until after surgery.    Eating and drinking prior to surgery: follow the instructions from your surgeon    Take a shower or bath the night before surgery. Use the soap your surgeon gave you to gently clean your skin. If you do not have soap from your surgeon, use your regular soap. Do not shave or scrub the surgery site.  Wear clean pajamas and have clean sheets on your bed.     Before Your Surgery      Call your surgeon if there is any change in your health. This includes signs of a cold or flu (such as a sore throat, runny nose, cough, rash or fever).    Do not smoke, drink alcohol or take over the counter medicine (unless your surgeon or primary care doctor tells you to) for the 24 hours before and after surgery.    If you take prescribed drugs: Follow your doctor s orders about which medicines to take and which to stop until after surgery.    Eating and drinking prior to surgery: follow the instructions from your surgeon    Take a shower or bath the night before surgery. Use the soap your surgeon gave you to gently clean your skin. If you do not have soap from your surgeon, use your regular soap. Do not shave or scrub the surgery site.  Wear clean pajamas and have clean sheets on your bed.     You should stop using any Ibuprofen (Advil), Naproxen (Aleve) or Aspirin containing products 7 days before your procedure.

## 2018-11-16 NOTE — PROGRESS NOTES
Your pregnancy test was negative and your CBC was normal, no anemia currently.   Cally Rucker PA-C

## 2018-11-16 NOTE — MR AVS SNAPSHOT
After Visit Summary   11/16/2018    Reginaldo Ferraro    MRN: [id number]           Patient Information     Date Of Birth          1990        Visit Information        Provider Department      11/16/2018 9:20 AM Cally Rucker PA-C [facility]        Today's Diagnoses     Preop general physical exam    -  1    Encounter for cosmetic procedure        IUD (intrauterine device) in place        Mild episode of recurrent major depressive disorder (H)        History of anemia          Care Instructions      Before Your Surgery      Call your surgeon if there is any change in your health. This includes signs of a cold or flu (such as a sore throat, runny nose, cough, rash or fever).    Do not smoke, drink alcohol or take over the counter medicine (unless your surgeon or primary care doctor tells you to) for the 24 hours before and after surgery.    If you take prescribed drugs: Follow your doctor s orders about which medicines to take and which to stop until after surgery.    Eating and drinking prior to surgery: follow the instructions from your surgeon    Take a shower or bath the night before surgery. Use the soap your surgeon gave you to gently clean your skin. If you do not have soap from your surgeon, use your regular soap. Do not shave or scrub the surgery site.  Wear clean pajamas and have clean sheets on your bed.     Before Your Surgery      Call your surgeon if there is any change in your health. This includes signs of a cold or flu (such as a sore throat, runny nose, cough, rash or fever).    Do not smoke, drink alcohol or take over the counter medicine (unless your surgeon or primary care doctor tells you to) for the 24 hours before and after surgery.    If you take prescribed drugs: Follow your doctor s orders about which medicines to take and which to stop until after surgery.    Eating and drinking prior to surgery: follow the instructions from your surgeon    Take  a shower or bath the night before surgery. Use the soap your surgeon gave you to gently clean your skin. If you do not have soap from your surgeon, use your regular soap. Do not shave or scrub the surgery site.  Wear clean pajamas and have clean sheets on your bed.     You should stop using any Ibuprofen (Advil), Naproxen (Aleve) or Aspirin containing products 7 days before your procedure.               Follow-ups after your visit        Who to contact     If you have questions or need follow up information about today's clinic visit or your schedule please contact [facility] directly at [phone].  Normal or non-critical lab and imaging results will be communicated to you by [employer], letter or phone within 4 business days after the clinic has received the results. If you do not hear from us within 7 days, please contact the clinic through [employer] or phone. If you have a critical or abnormal lab result, we will notify you by phone as soon as possible.  Submit refill requests through [employer] or call your pharmacy and they will forward the refill request to us. Please allow 3 business days for your refill to be completed.          Additional Information About Your Visit        [employer] Information     [employer] gives you secure access to your electronic health record. If you see a primary care provider, you can also send messages to your care team and make appointments. If you have questions, please call your primary care clinic.  If you do not have a primary care provider, please call [phone] and they will assist you.        Care EveryWhere ID     This is your Care EveryWhere ID. This could be used by other organizations to access your [facility] medical records  [id number]        Your Vitals Were     Pulse Temperature Pulse Oximetry Breastfeeding? BMI (Body Mass Index)       71 98.4  F (36.9  C) (Oral) 98% No 24.08 kg/m2        Blood Pressure from Last 3 Encounters:   11/16/18 109/69    05/21/18 109/73   12/20/17 108/62    Weight from Last 3 Encounters:   11/16/18 126 lb 6.4 oz (57.3 kg)   05/21/18 141 lb 9.6 oz (64.2 kg)   12/20/17 138 lb (62.6 kg)              We Performed the Following     CBC with platelets     HCG Qual, Blood ([id number])        Primary Care Provider Office Phone # Fax #    Cally Rucker PA-C [phone]       [address] [facility] [address]        Equal Access to Services     [facility] LEA : Hadii aad ku hadasho Soomaali, waaxda luqadaha, qaybta kaalmada adeshayyyasun, irina gama . So [facility] [phone].    ATENCIÓN: Si habla español, tiene a braun disposición servicios gratuitos de asistencia lingüística. Llame al [phone].    We comply with applicable federal civil rights laws and [place] laws. We do not discriminate on the basis of race, color, national origin, age, disability, sex, sexual orientation, or gender identity.            Thank you!     Thank you for choosing [facility]  for your care. Our goal is always to provide you with excellent care. Hearing back from our patients is one way we can continue to improve our services. Please take a few minutes to complete the written survey that you may receive in the mail after your visit with us. Thank you!             Your Updated Medication List - Protect others around you: Learn how to safely use, store and throw away your medicines at www.disposemymeds.org.          This list is accurate as of 11/16/18  9:50 AM.  Always use your most recent med list.                   Brand Name Dispense Instructions for use Diagnosis    cholecalciferol 1000 UNIT tablet    vitamin D3    100 tablet    Take 1 tablet (1,000 Units) by mouth daily    Adjustment disorder with mixed anxiety and depressed mood       paragard intrauterine copper      1 each by Intrauterine route once

## 2018-11-27 ENCOUNTER — TELEPHONE (OUTPATIENT)
Dept: FAMILY MEDICINE | Facility: CLINIC | Age: 28
End: 2018-11-27

## 2018-11-27 NOTE — TELEPHONE ENCOUNTER
Reason for Call:  Other     Detailed comments: patient was in on 11/16 for a pre op. Procedure is today at 12, patient is requesting to get a copy of the pre op paperwork. She would like to pick it this morning.    Phone Number Patient can be reached at: Home number on file [phone] (home)    Best Time: anytime this morning    Can we leave a detailed message on this number? YES    Call taken on 11/27/2018 at 7:34 AM by Ruby Perales

## 2019-01-01 NOTE — PROGRESS NOTES
[address] [id number] [phone]  Dept: [phone]    PRE-OP EVALUATION:  Today's date: 2018    Reginaldo Ferraro (: 1990) presents for pre-operative evaluation assessment as requested by Dr. Bradford.  She requires evaluation and anesthesia risk assessment prior to undergoing surgery/procedure for treatment of cosmetic Breast Augmentation .    Fax number for surgical facility: [phone]  Primary Physician: Cally Rucker  Type of Anesthesia Anticipated: General    Patient has a Health Care Directive or Living Will:  NO    Preop Questions 2018   Who is doing your surgery? dr bradford   What are you having done? breast augmentation   Date of Surgery/Procedure:    Facility or Hospital where procedure/surgery will be performed: [facility]   1.  Do you have a history of Heart attack, stroke, stent, coronary bypass surgery, or other heart surgery? No   2.  Do you ever have any pain or discomfort in your chest? No   3.  Do you have a history of  Heart Failure? No   4.   Are you troubled by shortness of breath when:  walking on a level surface, or up a slight hill, or at night? No   5.  Do you currently have a cold, bronchitis or other respiratory infection? No   6.  Do you have a cough, shortness of breath, or wheezing? No   7.  Do you sometimes get pains in the calves of your legs when you walk? No   8. Do you or anyone in your family have previous history of blood clots? No   9.  Do you or does anyone in your family have a serious bleeding problem such as prolonged bleeding following surgeries or cuts? No   10. Have you ever had problems with anemia or been told to take iron pills? YES -    11. Have you had any abnormal blood loss such as black, tarry or bloody stools, or abnormal vaginal bleeding? YES - heavy prolonged periods   12. Have you ever had a blood transfusion? No   13. Have you or  Neonatology Delivery Note/Sneads History and Physical   Baby Girl  Melissa Emmanueljthy 0 days female MRN: [id number]  Unit/Bed#: L&D 323(N) Encounter: [id number]      Maternal Information     ATTENDING PROVIDER:  Adriana Barraza MD    DELIVERY PROVIDER:  Dr Mary Suarez     Maternal History  History of Present Illness   HPI:  Baby Girl  Melissa Patel is a 3175 g (7 lb) female  at Gestational Age: 38w7d born to a 27 y o   Marcela Boatman  mother with Estimated Date of Delivery: 19      PTA medications:   Medications Prior to Admission   Medication    ferrous sulfate 325 (65 Fe) mg tablet    loratadine (CLARITIN) 10 mg tablet    Prenatal Vit-Fe Fumarate-FA (PRENATAL COMPLETE PO)       Prenatal Labs  Lab Results   Component Value Date/Time    Chlamydia, DNA Probe C  trachomatis Amplified DNA Negative 2018 05:35 PM    N gonorrhoeae, DNA Probe N  gonorrhoeae Amplified DNA Negative 2018 05:35 PM    ABO Grouping A 2019 09:43 PM    Rh Factor Positive 2019 09:43 PM    Antibody Screen Negative 2018 11:02 AM    Hepatitis B Surface Ag Non-reactive 2018 01:33 PM    RPR Non-Reactive 2019 09:43 PM    Rubella IgG Quant 16 2 2018 01:33 PM    HIV-1/HIV-2 Ab Non-Reactive 2018 01:33 PM    Glucose 77 2018 04:28 PM     Externally resulted Prenatal labs  No results found for: EXTCHLAMYDIA, GLUTA, LABGLUC, [id number], EXTRUBELIGGQ   GBS: negative   GBS Prophylaxis: negative  OB Suspicion of Chorio: no  Maternal antibiotics: none  Diabetes: negative  Herpes: negative  Prenatal U/S: normal   Prenatal care: good  Family History: non-contributory    Pregnancy complications:none  Fetal complications: none  Maternal medical history and medications: none    Maternal social history: none  Delivery Summary   Labor was:     Tocolytics: None   Steroid: None  Other medications: None    ROM Date:    ROM Time:    Length of ROM: rupture date, rupture time, delivery date, or any of your relatives ever had problems with anesthesia? No   14. Do you have sleep apnea, excessive snoring or daytime drowsiness? No   15. Do you have any prosthetic heart valves? No   16. Do you have prosthetic joints? No   17. Is there any chance that you may be pregnant? No         HPI:     HPI related to upcoming procedure: Patient desires a breast augmentation.       See problem list for active medical problems.  Problems all longstanding and stable, except as noted/documented.  See ROS for pertinent symptoms related to these conditions.                                                                                                                                                          .    MEDICAL HISTORY:     Patient Active Problem List    Diagnosis Date Noted     Mild episode of recurrent major depressive disorder (H) 06/11/2018     Priority: Medium     Insomnia, unspecified insomnia 11/02/2015     Priority: Medium     Vitamin D deficiency 11/02/2015     Priority: Medium     Abnormal finding on Pap smear, HPV DNA positive 11/02/2015     Priority: Medium     CARDIOVASCULAR SCREENING; LDL GOAL LESS THAN 160 10/31/2010     Priority: Medium      Past Medical History:   Diagnosis Date     Depressive disorder 12/23/2011     High risk HPV infection 10/15/13, 10/31/14, 5/21/18    see problem list     LSIL (low grade squamous intraepithelial lesion) on Pap smear 12/23/2011    cannot rule out HSIL. 4/30/12 colp: no bx taken. pap at that time is NIL/neg HPV     Moderate major depression 12/23/2011     Moderate major depression (H) 12/23/2011     NO ACTIVE PROBLEMS      Past Surgical History:   Procedure Laterality Date     EYE SURGERY  01/2013    Lasic surgery     NO HISTORY OF SURGERY       Current Outpatient Prescriptions   Medication Sig Dispense Refill     paragard intrauterine copper 1 each by Intrauterine route once       cholecalciferol (VITAMIN D3) 1000 UNIT tablet Take 1 tablet (1,000 Units) by mouth daily  delivery time have not been documented                Fluid Color: Clear    Additional  information:  Forceps:   No [0]   Vacuum:   No [0]   Number of pop offs: None   Presentation: vertex       Anesthesia:   Cord Complications:   Nuchal Cord #:     Nuchal Cord Description:     Delayed Cord Clamping: Yes    Birth information:  YOB: 2019   Time of birth: 4:54 PM   Sex: female   Delivery type: Vaginal, Spontaneous   Gestational Age: 38w7d           APGARS  One minute Five minutes Ten minutes   Heart rate: 2  2      Respiratory Effort: 2  2      Muscle tone: 2  2       Reflex Irritability: 2   2         Skin color: 1  1        Totals: 9  9          Neonatologist Note   I was called the Delivery Room for the birth of Baby Girl  Salena  My presence requested was due to meconium by [facility] Provider   interventions: dried, warmed and stimulated  Infant response to intervention: good  Vitamin K given:   Recent administrations for PHYTONADIONE 1 MG/0 5ML IJ SOLN:    2019 1719         Erythromycin given:   Recent administrations for ERYTHROMYCIN 5 MG/GM OP OINT:    2019 1720         Meds/Allergies   None    Objective   Vitals:   Temperature: 98 1 °F (36 7 °C)  Pulse: 138  Respirations: 48  Length: 20" (50 8 cm)(Filed from Delivery Summary)  Weight: 3175 g (7 lb)(Filed from Delivery Summary)    Physical Exam:   General Appearance:  Alert, active, no distress  Head:  Normocephalic, AFOF                             Eyes:  Conjunctiva clear, +RR  Ears:  Normally placed, no anomalies  Nose: nares patent                           Mouth:  Palate intact  Respiratory:  No grunting, flaring, retractions, breath sounds clear and equal  Cardiovascular:  Regular rate and rhythm  No murmur  Adequate perfusion/capillary refill   Femoral pulse present  Abdomen:   Soft, non-distended, no masses, bowel sounds present, no HSM  Genitourinary:  Normal genitalia  Spine:  No hair domonique, dimples  Musculoskeletal: (Patient not taking: Reported on 11/16/2018) 100 tablet 3     OTC products: None, except as noted above    Allergies   Allergen Reactions     No Known Drug Allergies       Latex Allergy: NO    Social History   Substance Use Topics     Smoking status: Never Smoker     Smokeless tobacco: Never Used     Alcohol use Yes      Comment: Socially     History   Drug Use No       REVIEW OF SYSTEMS:   CONSTITUTIONAL: NEGATIVE for fever, chills, change in weight  INTEGUMENTARY/SKIN: NEGATIVE for worrisome rashes, moles or lesions  ENT/MOUTH: NEGATIVE for ear, mouth and throat problems  RESP: NEGATIVE for significant cough or SOB  BREAST: NEGATIVE for masses, tenderness or discharge  CV: NEGATIVE for chest pain, palpitations or peripheral edema  GI: NEGATIVE for nausea, abdominal pain, heartburn, or change in bowel habits  : NEGATIVE for frequency, dysuria, or hematuria  MUSCULOSKELETAL: NEGATIVE for significant arthralgias or myalgia  NEURO: NEGATIVE for weakness, dizziness or paresthesias  HEME: NEGATIVE for bleeding problems  PSYCHIATRIC: NEGATIVE for changes in mood or affect    EXAM:   /69 (BP Location: Right arm, Patient Position: Chair, Cuff Size: Adult Regular)  Pulse 71  Temp 98.4  F (36.9  C) (Oral)  Wt 126 lb 6.4 oz (57.3 kg)  SpO2 98%  Breastfeeding? No  BMI 24.08 kg/m2    GENERAL APPEARANCE: healthy, alert and no distress     EYES: EOMI, PERRL     HENT: ear canals and TM's normal and nose and mouth without ulcers or lesions     NECK: no adenopathy, no asymmetry, masses, or scars and thyroid normal to palpation     RESP: lungs clear to auscultation - no rales, rhonchi or wheezes     CV: regular rates and rhythm, normal S1 S2, no S3 or S4 and no murmur, click or rub     ABDOMEN:  soft, nontender, no HSM or masses and bowel sounds normal     MS: extremities normal- no gross deformities noted, no evidence of inflammation in joints, FROM in all extremities.     SKIN: no suspicious lesions or rashes      Normal hips  Skin/Hair/Nails:   Skin warm, dry, and intact, no rashes               Neurologic:   Normal tone and reflexes    Assessment/Plan     Assessment:  Well   Plan:  Routine care    Hearing screen, CCHD,  screen, bili check per protocol and Hep B vaccine after parental consent prior to d/c    Electronically signed by Yeni Strickland MD 2019 9:22 PM NEURO: Normal strength and tone, sensory exam grossly normal, mentation intact and speech normal     PSYCH: mentation appears normal. and affect normal/bright     LYMPHATICS: No cervical adenopathy    DIAGNOSTICS:     EKG: Not indicated due to non-vascular surgery and low risk of event (age <65 and without cardiac risk factors)  Labs Drawn and in Process:   Unresulted Labs Ordered in the Past 30 Days of this Admission     Date and Time Order Name Status Description    11/16/2018 0947 HCG QUALITATIVE In process           Recent Labs   Lab Test  11/02/15   1717  02/18/15   1344   HGB  11.5*  11.5*   PLT  313  340        IMPRESSION:   Reason for surgery/procedure: Breast Augmentation  Diagnosis/reason for consult: Pre-operative consult.     The proposed surgical procedure is considered LOW risk.    REVISED CARDIAC RISK INDEX  The patient has the following serious cardiovascular risks for perioperative complications such as (MI, PE, VFib and 3  AV Block):  No serious cardiac risks  INTERPRETATION: 0 risks: Class I (very low risk - 0.4% complication rate)    The patient has the following additional risks for perioperative complications:  No identified additional risks      ICD-10-CM    1. Preop general physical exam Z01.818    2. Encounter for cosmetic procedure Z41.1    3. IUD (intrauterine device) in place Z97.5 HCG Qual, Blood (UWS301)   4. Mild episode of recurrent major depressive disorder (H) F33.0    5. History of anemia Z86.2 CBC with platelets       RECOMMENDATIONS:   Anemia  Anemia and does not require treatment prior to surgery.  Monitor Hemoglobin postoperatively.    --Patient is on no chronic medications    APPROVAL GIVEN to proceed with proposed procedure, without further diagnostic evaluation       Signed Electronically by: Cally Rucker PA-C    Copy of this evaluation report is provided to requesting physician.    [facility] Preop Guidelines    Revised Cardiac Risk Index      The above chart was reviewed.   The patient was not examined by me.  Alex Ryan MD (supervising physician)  Family Medicine  [facility]

## 2020-02-24 ENCOUNTER — HEALTH MAINTENANCE LETTER (OUTPATIENT)
Age: 30
End: 2020-02-24

## 2020-09-11 ASSESSMENT — ENCOUNTER SYMPTOMS
NAUSEA: 0
ARTHRALGIAS: 0
DYSURIA: 0
BREAST MASS: 0
HEMATURIA: 0
EYE PAIN: 0
DIZZINESS: 1
WEAKNESS: 0
SHORTNESS OF BREATH: 1
HEADACHES: 1
JOINT SWELLING: 0
DIARRHEA: 0
HEMATOCHEZIA: 0
CHILLS: 0
PALPITATIONS: 0
PARESTHESIAS: 0
HEARTBURN: 0
SORE THROAT: 0
COUGH: 1
FEVER: 0
NERVOUS/ANXIOUS: 1
FREQUENCY: 1
MYALGIAS: 0
CONSTIPATION: 0

## 2020-09-17 ENCOUNTER — OFFICE VISIT (OUTPATIENT)
Dept: FAMILY MEDICINE | Facility: CLINIC | Age: 30
End: 2020-09-17
Payer: COMMERCIAL

## 2020-09-17 VITALS
OXYGEN SATURATION: 99 % | BODY MASS INDEX: 25.53 KG/M2 | DIASTOLIC BLOOD PRESSURE: 62 MMHG | SYSTOLIC BLOOD PRESSURE: 102 MMHG | WEIGHT: 135.2 LBS | TEMPERATURE: 98.1 F | HEIGHT: 61 IN | HEART RATE: 69 BPM

## 2020-09-17 DIAGNOSIS — B96.89 BV (BACTERIAL VAGINOSIS): ICD-10-CM

## 2020-09-17 DIAGNOSIS — Z12.4 CERVICAL CANCER SCREENING: ICD-10-CM

## 2020-09-17 DIAGNOSIS — Z00.00 ROUTINE GENERAL MEDICAL EXAMINATION AT A HEALTH CARE FACILITY: Primary | ICD-10-CM

## 2020-09-17 DIAGNOSIS — R09.81 CHRONIC NASAL CONGESTION: ICD-10-CM

## 2020-09-17 DIAGNOSIS — Z11.3 SCREEN FOR STD (SEXUALLY TRANSMITTED DISEASE): ICD-10-CM

## 2020-09-17 DIAGNOSIS — N76.0 BV (BACTERIAL VAGINOSIS): ICD-10-CM

## 2020-09-17 LAB
SPECIMEN SOURCE: ABNORMAL
WET PREP SPEC: ABNORMAL

## 2020-09-17 PROCEDURE — 36415 COLL VENOUS BLD VENIPUNCTURE: CPT | Performed by: PHYSICIAN ASSISTANT

## 2020-09-17 PROCEDURE — 87389 HIV-1 AG W/HIV-1&-2 AB AG IA: CPT | Performed by: PHYSICIAN ASSISTANT

## 2020-09-17 PROCEDURE — 86803 HEPATITIS C AB TEST: CPT | Performed by: PHYSICIAN ASSISTANT

## 2020-09-17 PROCEDURE — 87491 CHLMYD TRACH DNA AMP PROBE: CPT | Performed by: PHYSICIAN ASSISTANT

## 2020-09-17 PROCEDURE — 86780 TREPONEMA PALLIDUM: CPT | Performed by: PHYSICIAN ASSISTANT

## 2020-09-17 PROCEDURE — 87210 SMEAR WET MOUNT SALINE/INK: CPT | Performed by: PHYSICIAN ASSISTANT

## 2020-09-17 PROCEDURE — 99213 OFFICE O/P EST LOW 20 MIN: CPT | Mod: 25 | Performed by: PHYSICIAN ASSISTANT

## 2020-09-17 PROCEDURE — G0145 SCR C/V CYTO,THINLAYER,RESCR: HCPCS | Performed by: PHYSICIAN ASSISTANT

## 2020-09-17 PROCEDURE — 87591 N.GONORRHOEAE DNA AMP PROB: CPT | Performed by: PHYSICIAN ASSISTANT

## 2020-09-17 PROCEDURE — 99395 PREV VISIT EST AGE 18-39: CPT | Performed by: PHYSICIAN ASSISTANT

## 2020-09-17 RX ORDER — METRONIDAZOLE 7.5 MG/G
1 GEL VAGINAL DAILY
Qty: 70 G | Refills: 0 | Status: SHIPPED | OUTPATIENT
Start: 2020-09-17 | End: 2020-09-22

## 2020-09-17 ASSESSMENT — ANXIETY QUESTIONNAIRES
1. FEELING NERVOUS, ANXIOUS, OR ON EDGE: SEVERAL DAYS
2. NOT BEING ABLE TO STOP OR CONTROL WORRYING: SEVERAL DAYS
7. FEELING AFRAID AS IF SOMETHING AWFUL MIGHT HAPPEN: SEVERAL DAYS
5. BEING SO RESTLESS THAT IT IS HARD TO SIT STILL: SEVERAL DAYS
6. BECOMING EASILY ANNOYED OR IRRITABLE: SEVERAL DAYS
3. WORRYING TOO MUCH ABOUT DIFFERENT THINGS: SEVERAL DAYS
IF YOU CHECKED OFF ANY PROBLEMS ON THIS QUESTIONNAIRE, HOW DIFFICULT HAVE THESE PROBLEMS MADE IT FOR YOU TO DO YOUR WORK, TAKE CARE OF THINGS AT HOME, OR GET ALONG WITH OTHER PEOPLE: NOT DIFFICULT AT ALL
GAD7 TOTAL SCORE: 7

## 2020-09-17 ASSESSMENT — ENCOUNTER SYMPTOMS
DIZZINESS: 1
CHILLS: 0
DYSURIA: 0
CONSTIPATION: 0
NAUSEA: 0
DIARRHEA: 0
MYALGIAS: 0
EYE PAIN: 0
HEMATOCHEZIA: 0
WEAKNESS: 0
JOINT SWELLING: 0
ARTHRALGIAS: 0
SHORTNESS OF BREATH: 1
HEARTBURN: 0
COUGH: 1
FEVER: 0
BREAST MASS: 0
PALPITATIONS: 0
FREQUENCY: 1
HEADACHES: 1
HEMATURIA: 0
NERVOUS/ANXIOUS: 1
PARESTHESIAS: 0
SORE THROAT: 0

## 2020-09-17 ASSESSMENT — PATIENT HEALTH QUESTIONNAIRE - PHQ9
5. POOR APPETITE OR OVEREATING: SEVERAL DAYS
SUM OF ALL RESPONSES TO PHQ QUESTIONS 1-9: 7

## 2020-09-17 ASSESSMENT — MIFFLIN-ST. JEOR: SCORE: 1270.38

## 2020-09-17 NOTE — RESULT ENCOUNTER NOTE
Reginaldo,     Your vaginal swab did show bacterial vaginosis. This is not an STD but rather an infection of the vagina. This could be causing the discharge. I have sent an prescription to your pharmacy for you to use. I will send a message when your other labs result.   Cally Rucker PA-C

## 2020-09-17 NOTE — PATIENT INSTRUCTIONS
I recommend that everyone age 6 months or older receives the flu shot every year. Please schedule with our medical assistants for your flu vaccine. All local pharmacies also vaccinate adults for the flu. Please speak to your pharmacist about receiving this vaccine. Egg free versions are available to those with allergies.       Preventive Health Recommendations  Female Ages 26 - 39  Yearly exam:   See your health care provider every year in order to    Review health changes.     Discuss preventive care.      Review your medicines if you your doctor has prescribed any.    Until age 30: Get a Pap test every three years (more often if you have had an abnormal result).    After age 30: Talk to your doctor about whether you should have a Pap test every 3 years or have a Pap test with HPV screening every 5 years.   You do not need a Pap test if your uterus was removed (hysterectomy) and you have not had cancer.  You should be tested each year for STDs (sexually transmitted diseases), if you're at risk.   Talk to your provider about how often to have your cholesterol checked.  If you are at risk for diabetes, you should have a diabetes test (fasting glucose).  Shots: Get a flu shot each year. Get a tetanus shot every 10 years.   Nutrition:     Eat at least 5 servings of fruits and vegetables each day.    Eat whole-grain bread, whole-wheat pasta and brown rice instead of white grains and rice.    Get adequate Calcium and Vitamin D.     Lifestyle    Exercise at least 150 minutes a week (30 minutes a day, 5 days of the week). This will help you control your weight and prevent disease.    Limit alcohol to one drink per day.    No smoking.     Wear sunscreen to prevent skin cancer.    See your dentist every six months for an exam and cleaning.
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
## 2020-09-18 LAB
C TRACH DNA SPEC QL NAA+PROBE: NEGATIVE
HCV AB SERPL QL IA: NONREACTIVE
HIV 1+2 AB+HIV1 P24 AG SERPL QL IA: NONREACTIVE
N GONORRHOEA DNA SPEC QL NAA+PROBE: NEGATIVE
SPECIMEN SOURCE: NORMAL
SPECIMEN SOURCE: NORMAL
T PALLIDUM AB SER QL: NONREACTIVE

## 2020-09-18 ASSESSMENT — ANXIETY QUESTIONNAIRES: GAD7 TOTAL SCORE: 7

## 2020-09-22 LAB
COPATH REPORT: NORMAL
PAP: NORMAL

## 2020-10-05 ENCOUNTER — PATIENT OUTREACH (OUTPATIENT)
Dept: FAMILY MEDICINE | Facility: CLINIC | Age: 30
End: 2020-10-05

## 2020-10-05 NOTE — TELEPHONE ENCOUNTER
12/23/11--LSIL/cannot rule out HSIL @ age 21. Plan-- colposcopy within 3 months.  4/30/12-- colposcopy. No bx taken.  Pap--NIL/ neg HPV. Plan-- pap in 1 year (due 4/30/13)  10/15/13 pap NIL/+ HR HPV (58). Plan-- pap in 1 year (due 10/15/14)  10/31/14 pap NIL/+ HR HPV plan-- repeat pap and HPV in 1 year (due 10/31/15)  11/2/15 pap NIL/neg HPV. Plan: pap and HPV in 3 yrs.  5/21/18 NIL pap, + HR HPV (not 16/18) @ age 27. Plan 1 year cotest per provider  9/17/20 NIL pap, HPV was not able to be added due to age of sample. Plan: Cotest in 1 yr, due 9/17/21

## 2020-11-24 ENCOUNTER — OFFICE VISIT (OUTPATIENT)
Dept: OTOLARYNGOLOGY | Facility: CLINIC | Age: 30
End: 2020-11-24
Payer: COMMERCIAL

## 2020-11-24 VITALS
RESPIRATION RATE: 17 BRPM | BODY MASS INDEX: 25.49 KG/M2 | OXYGEN SATURATION: 99 % | HEIGHT: 61 IN | HEART RATE: 70 BPM | WEIGHT: 135 LBS

## 2020-11-24 DIAGNOSIS — J34.89 NASAL OBSTRUCTION: ICD-10-CM

## 2020-11-24 DIAGNOSIS — R05.3 CHRONIC COUGH: ICD-10-CM

## 2020-11-24 DIAGNOSIS — J32.4 CHRONIC PANSINUSITIS: Primary | ICD-10-CM

## 2020-11-24 PROCEDURE — 99204 OFFICE O/P NEW MOD 45 MIN: CPT | Mod: 25 | Performed by: OTOLARYNGOLOGY

## 2020-11-24 PROCEDURE — 31231 NASAL ENDOSCOPY DX: CPT | Performed by: OTOLARYNGOLOGY

## 2020-11-24 RX ORDER — PREDNISONE 20 MG/1
TABLET ORAL
Qty: 20 TABLET | Refills: 0 | Status: SHIPPED | OUTPATIENT
Start: 2020-11-24 | End: 2021-02-04

## 2020-11-24 ASSESSMENT — MIFFLIN-ST. JEOR: SCORE: 1264.48

## 2020-11-24 NOTE — LETTER
November 24, 2020      Reginaldo Ferraro  [address] [address]        To Whom It May Concern:    Reginaldo Ferraro was seen in our clinic. She may return to work without restrictions. She has a chronic cough condition we are working to improve. It is not COVID related at this time.      Sincerely,        Miquel Rutledge MD

## 2020-11-24 NOTE — LETTER
11/24/2020         RE: Reginaldo Ferraro  [address] [address]        Dear Colleague,    Thank you for referring your patient, Reginaldo Ferraro, to the [facility]. Please see a copy of my visit note below.    I am seeing this patient in consultation for chronic nasal congestion at the request of the provider Cally Rucker.    Chief Complaint - Nasal obstruction    History of Present Illness - Reginaldo Ferraro is a 30 year old female who presents for evaluation of nasal obstruction. The patient describes symptoms of nasal obstruction, only occasional drainage for the past year. It came on after an upper respiratory infection. The patient notes symptoms on both sides. She has some cough she feels is due to dryness. The patient notes no allergies. Treatments have included sudefed, nasal steroids. The treatments seem to not help. No prior history of nasal surgery. no history of smoking. no epistaxis. She has poor smell. No family history of nose problems. She has lots of coughing fits. Also had this when I sprayed her nose with lidocaine to scope her.     Past Medical History -   Patient Active Problem List   Diagnosis     CARDIOVASCULAR SCREENING; LDL GOAL LESS THAN 160     Cervical high risk HPV (human papillomavirus) test positive     Insomnia, unspecified insomnia     Vitamin D deficiency     Mild episode of recurrent major depressive disorder (H)       Current Medications -   Current Outpatient Medications:      cholecalciferol (VITAMIN D3) 1000 UNIT tablet, Take 1 tablet (1,000 Units) by mouth daily (Patient not taking: Reported on 11/16/2018), Disp: 100 tablet, Rfl: 3     paragard intrauterine copper, 1 each by Intrauterine route once, Disp: , Rfl:     Allergies -   Allergies   Allergen Reactions     No Known Drug Allergies        Social History -   Social History     Socioeconomic History     Marital status: Single     Spouse name: partner- Pastor     Number of  "children: 0     Years of education: Not on file     Highest education level: Not on file   Occupational History     Occupation:      Employer: UNKNOWN     Comment: [place]     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   Social Needs     Financial resource strain: Not on file     Food insecurity     Worry: Not on file     Inability: Not on file     Transportation needs     Medical: Not on file     Non-medical: Not on file   Tobacco Use     Smoking status: Never Smoker     Smokeless tobacco: Never Used   Substance and Sexual Activity     Alcohol use: Yes     Comment: Socially     Drug use: No     Sexual activity: Yes     Partners: Male     Birth control/protection: Pull-out method, Condom, Injection   Lifestyle     Physical activity     Days per week: Not on file     Minutes per session: Not on file     Stress: Not on file   Relationships     Social connections     Talks on phone: Not on file     Gets together: Not on file     Attends Jainism service: Not on file     Active member of club or organization: Not on file     Attends meetings of clubs or organizations: Not on file     Relationship status: Not on file     Intimate partner violence     Fear of current or ex partner: Not on file     Emotionally abused: Not on file     Physically abused: Not on file     Forced sexual activity: Not on file   Other Topics Concern     Parent/sibling w/ CABG, MI or angioplasty before 65F 55M? No   Social History Narrative     Not on file       Family History -   Family History   Problem Relation Age of Onset     Diabetes Paternal Grandmother      Hypertension Paternal Grandmother      Respiratory Paternal Grandmother         asthma     Heart Disease Father        Review of Systems - As per HPI and PMHx, otherwise 10+ comprehensive system review is negative.    Physical Exam  Pulse 70   Resp 17   Ht 1.541 m (5' 0.67\")   Wt 61.2 kg (135 lb)   SpO2 99%   BMI 25.79 kg/m    General - The patient is in no distress. Alert and " oriented to person and place, answers questions and cooperates with examination appropriately.   Neurologic - CN II-XII are grossly intact. No focal neurologic deficits.   Voice and Breathing - The patient was breathing comfortably without the use of accessory muscles. There was no wheezing, stridor, or stertor.  The patients voice was clear and strong.  Eyes - Extraocular movements intact. Sclera were not icteric or injected, conjunctiva were pink and moist.  Mouth - Examination of the oral cavity showed pink, healthy oral mucosa. No lesions or ulcerations noted.  The tongue was mobile and midline, and the dentition were in good condition.    Throat - The walls of the oropharynx were smooth, pink, moist, symmetric, and had no lesions or ulcerations.  The tonsillar pillars and soft palate were symmetric.  The uvula was midline on elevation. Tonsils 1-2+, no exudate.  Neck -  Soft, non-tender. Palpation of the occipital, submental, submandibular, internal jugular chain, and supraclavicular nodes did not demonstrate any abnormal lymph nodes or masses. No parotid masses. Palpation of the thyroid was soft and smooth, with no nodules or goiter appreciated.  The trachea was mobile and midline.  Cardiovascular - carotid pulses are 2+ bilaterally, regular rhythm  Nose - External contour is symmetric, no gross deflection or scars.  Nasal mucosa is pink and moist with clear mucus. However the turbinates are hypertrophic. The septum was deviated to the right.  No polyps, masses, or purulence noted on examination anteriorly.     NASAL ENDOSCOPY- To further evaluate the nasal cavity, I performed flexible nasal endoscopy, and color photographs were taken for the permanent medical record.  I first sprayed both sides of the nasal cavity with a mix of lidocaine and neosynephrine.  I then began on the right side using an adult flexible fiberoptic endoscope.  The septum was deviated to the left so the nasal airway was more open right  side.the head of the right middle turbinate had polyp growing off.  The right middle turbinate and middle meatus were clearly visualized and with some edema.  Looking up, the olfactory cleft was unobstructed.  Going further back, the sphenoethmoid recess was normal in appearance, with healthy appearing mucosa on the face of the sphenoid.  The nasopharynx was unremarkable, and the eustachian tube opening on this side was unobstructed. Some clear postnasal drainage.      I then turned my attention to the left side.  Once again, the septum was deviated to the left with a spur. The airway was narrow.  No abnormal secretions, purulence, polyps were noted on this side.  The left middle turbinate and middle meatus were clearly visualized and normal in appearance.  Looking up, the olfactory cleft was unobstructed.  Going further back the left sphenoethmoid recess was normal in appearance, and eustachian tube opening was unobstructed.        A/P - Reginaldo Ferraro is a 30 year old female with nasal obstruction due to turbinate hypertrophy. I suspect chronic rhinosinusitis as she had a nasal polyp right side. She has significant cough and I worry about asthma or chronic rhinosinusitis and postnasal drainage causing cough. I recommend a CT sinus and chest x-ray. We may have to consider treatments for chronic rhinosinusitis, and we'll try prednisone for now. Also to consider PFTs and singulair. Allergy referral for this would be appropriate. I'll call with results and next steps. She hates nose sprays.     Miquel Rutledge MD  Otolaryngology  [facility]        Again, thank you for allowing me to participate in the care of your patient.        Sincerely,        Miquel Rutledge MD

## 2020-11-24 NOTE — PROGRESS NOTES
I am seeing this patient in consultation for chronic nasal congestion at the request of the provider Cally Rucker.    Chief Complaint - Nasal obstruction    History of Present Illness - Reginaldo Ferraro is a 30 year old female who presents for evaluation of nasal obstruction. The patient describes symptoms of nasal obstruction, only occasional drainage for the past year. It came on after an upper respiratory infection. The patient notes symptoms on both sides. She has some cough she feels is due to dryness. The patient notes no allergies. Treatments have included sudefed, nasal steroids. The treatments seem to not help. No prior history of nasal surgery. no history of smoking. no epistaxis. She has poor smell. No family history of nose problems. She has lots of coughing fits. Also had this when I sprayed her nose with lidocaine to scope her.     Past Medical History -   Patient Active Problem List   Diagnosis     CARDIOVASCULAR SCREENING; LDL GOAL LESS THAN 160     Cervical high risk HPV (human papillomavirus) test positive     Insomnia, unspecified insomnia     Vitamin D deficiency     Mild episode of recurrent major depressive disorder (H)       Current Medications -   Current Outpatient Medications:      cholecalciferol (VITAMIN D3) 1000 UNIT tablet, Take 1 tablet (1,000 Units) by mouth daily (Patient not taking: Reported on 11/16/2018), Disp: 100 tablet, Rfl: 3     paragard intrauterine copper, 1 each by Intrauterine route once, Disp: , Rfl:     Allergies -   Allergies   Allergen Reactions     No Known Drug Allergies        Social History -   Social History     Socioeconomic History     Marital status: Single     Spouse name: partnerLisa Baumann     Number of children: 0     Years of education: Not on file     Highest education level: Not on file   Occupational History     Occupation:      Employer: UNKNOWN     Comment: [place]     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   Social Needs     Financial resource  "strain: Not on file     Food insecurity     Worry: Not on file     Inability: Not on file     Transportation needs     Medical: Not on file     Non-medical: Not on file   Tobacco Use     Smoking status: Never Smoker     Smokeless tobacco: Never Used   Substance and Sexual Activity     Alcohol use: Yes     Comment: Socially     Drug use: No     Sexual activity: Yes     Partners: Male     Birth control/protection: Pull-out method, Condom, Injection   Lifestyle     Physical activity     Days per week: Not on file     Minutes per session: Not on file     Stress: Not on file   Relationships     Social connections     Talks on phone: Not on file     Gets together: Not on file     Attends Scientologist service: Not on file     Active member of club or organization: Not on file     Attends meetings of clubs or organizations: Not on file     Relationship status: Not on file     Intimate partner violence     Fear of current or ex partner: Not on file     Emotionally abused: Not on file     Physically abused: Not on file     Forced sexual activity: Not on file   Other Topics Concern     Parent/sibling w/ CABG, MI or angioplasty before 65F 55M? No   Social History Narrative     Not on file       Family History -   Family History   Problem Relation Age of Onset     Diabetes Paternal Grandmother      Hypertension Paternal Grandmother      Respiratory Paternal Grandmother         asthma     Heart Disease Father        Review of Systems - As per HPI and PMHx, otherwise 10+ comprehensive system review is negative.    Physical Exam  Pulse 70   Resp 17   Ht 1.541 m (5' 0.67\")   Wt 61.2 kg (135 lb)   SpO2 99%   BMI 25.79 kg/m    General - The patient is in no distress. Alert and oriented to person and place, answers questions and cooperates with examination appropriately.   Neurologic - CN II-XII are grossly intact. No focal neurologic deficits.   Voice and Breathing - The patient was breathing comfortably without the use of accessory " muscles. There was no wheezing, stridor, or stertor.  The patients voice was clear and strong.  Eyes - Extraocular movements intact. Sclera were not icteric or injected, conjunctiva were pink and moist.  Mouth - Examination of the oral cavity showed pink, healthy oral mucosa. No lesions or ulcerations noted.  The tongue was mobile and midline, and the dentition were in good condition.    Throat - The walls of the oropharynx were smooth, pink, moist, symmetric, and had no lesions or ulcerations.  The tonsillar pillars and soft palate were symmetric.  The uvula was midline on elevation. Tonsils 1-2+, no exudate.  Neck -  Soft, non-tender. Palpation of the occipital, submental, submandibular, internal jugular chain, and supraclavicular nodes did not demonstrate any abnormal lymph nodes or masses. No parotid masses. Palpation of the thyroid was soft and smooth, with no nodules or goiter appreciated.  The trachea was mobile and midline.  Cardiovascular - carotid pulses are 2+ bilaterally, regular rhythm  Nose - External contour is symmetric, no gross deflection or scars.  Nasal mucosa is pink and moist with clear mucus. However the turbinates are hypertrophic. The septum was deviated to the right.  No polyps, masses, or purulence noted on examination anteriorly.     NASAL ENDOSCOPY- To further evaluate the nasal cavity, I performed flexible nasal endoscopy, and color photographs were taken for the permanent medical record.  I first sprayed both sides of the nasal cavity with a mix of lidocaine and neosynephrine.  I then began on the right side using an adult flexible fiberoptic endoscope.  The septum was deviated to the left so the nasal airway was more open right side.the head of the right middle turbinate had polyp growing off.  The right middle turbinate and middle meatus were clearly visualized and with some edema.  Looking up, the olfactory cleft was unobstructed.  Going further back, the sphenoethmoid recess was  normal in appearance, with healthy appearing mucosa on the face of the sphenoid.  The nasopharynx was unremarkable, and the eustachian tube opening on this side was unobstructed. Some clear postnasal drainage.      I then turned my attention to the left side.  Once again, the septum was deviated to the left with a spur. The airway was narrow.  No abnormal secretions, purulence, polyps were noted on this side.  The left middle turbinate and middle meatus were clearly visualized and normal in appearance.  Looking up, the olfactory cleft was unobstructed.  Going further back the left sphenoethmoid recess was normal in appearance, and eustachian tube opening was unobstructed.        A/P - Reginaldo Ferraro is a 30 year old female with nasal obstruction due to turbinate hypertrophy. I suspect chronic rhinosinusitis as she had a nasal polyp right side. She has significant cough and I worry about asthma or chronic rhinosinusitis and postnasal drainage causing cough. I recommend a CT sinus and chest x-ray. We may have to consider treatments for chronic rhinosinusitis, and we'll try prednisone for now. Also to consider PFTs and singulair. Allergy referral for this would be appropriate. I'll call with results and next steps. She hates nose sprays.     Miquel Rutledge MD  Otolaryngology  [facility]

## 2020-11-25 ENCOUNTER — ANCILLARY PROCEDURE (OUTPATIENT)
Dept: GENERAL RADIOLOGY | Facility: CLINIC | Age: 30
End: 2020-11-25
Attending: OTOLARYNGOLOGY
Payer: COMMERCIAL

## 2020-11-25 ENCOUNTER — ANCILLARY PROCEDURE (OUTPATIENT)
Dept: CT IMAGING | Facility: CLINIC | Age: 30
End: 2020-11-25
Attending: OTOLARYNGOLOGY
Payer: COMMERCIAL

## 2020-11-25 DIAGNOSIS — J32.4 CHRONIC PANSINUSITIS: Primary | ICD-10-CM

## 2020-11-25 DIAGNOSIS — R05.3 CHRONIC COUGH: ICD-10-CM

## 2020-11-25 DIAGNOSIS — J32.4 CHRONIC PANSINUSITIS: ICD-10-CM

## 2020-11-25 PROCEDURE — 71046 X-RAY EXAM CHEST 2 VIEWS: CPT

## 2020-11-25 PROCEDURE — 70486 CT MAXILLOFACIAL W/O DYE: CPT | Mod: TC

## 2020-11-25 RX ORDER — MONTELUKAST SODIUM 10 MG/1
10 TABLET ORAL AT BEDTIME
Qty: 30 TABLET | Refills: 3 | Status: SHIPPED | OUTPATIENT
Start: 2020-11-25 | End: 2021-08-19

## 2020-11-25 NOTE — PROGRESS NOTES
I spoke with the patient on the phone and gave her her CT sinus and chest x-ray results.  Her chest x-ray was clear.  Her CT sinus does show some mucosal thickening in the sinuses and narrowing of the right ostiomeatal complex.  That is consistent with in clinic nasal endoscopy that showed more edema in her right middle meatus.  Fortunately she does not have any acute infection and still has a significant cough.  It is possible this is chronic rhinosinusitis, but I am more worried about something such as reactive airway disease or asthma and possibly allergic rhinitis I want her to take the course of prednisone but also try Singulair.  Return in 3 to 4 weeks.

## 2020-12-13 ENCOUNTER — HEALTH MAINTENANCE LETTER (OUTPATIENT)
Age: 30
End: 2020-12-13

## 2020-12-16 ENCOUNTER — OFFICE VISIT (OUTPATIENT)
Dept: OTOLARYNGOLOGY | Facility: CLINIC | Age: 30
End: 2020-12-16
Payer: COMMERCIAL

## 2020-12-16 ENCOUNTER — MYC MEDICAL ADVICE (OUTPATIENT)
Dept: FAMILY MEDICINE | Facility: CLINIC | Age: 30
End: 2020-12-16

## 2020-12-16 VITALS
DIASTOLIC BLOOD PRESSURE: 73 MMHG | BODY MASS INDEX: 25.49 KG/M2 | RESPIRATION RATE: 16 BRPM | WEIGHT: 135 LBS | HEART RATE: 94 BPM | OXYGEN SATURATION: 95 % | HEIGHT: 61 IN | SYSTOLIC BLOOD PRESSURE: 109 MMHG

## 2020-12-16 DIAGNOSIS — R09.81 NASAL CONGESTION: ICD-10-CM

## 2020-12-16 DIAGNOSIS — R05.3 CHRONIC COUGH: Primary | ICD-10-CM

## 2020-12-16 PROCEDURE — 99214 OFFICE O/P EST MOD 30 MIN: CPT | Performed by: OTOLARYNGOLOGY

## 2020-12-16 ASSESSMENT — PAIN SCALES - GENERAL: PAINLEVEL: NO PAIN (0)

## 2020-12-16 ASSESSMENT — MIFFLIN-ST. JEOR: SCORE: 1264.5

## 2020-12-16 NOTE — PATIENT INSTRUCTIONS
General Scheduling Information  To schedule your CT/MRI scan, please contact [place] [facility] at [phone]   [address] [place] [address]    To schedule your Surgery, please contact our Specialty Schedulers at [phone]    ENT Clinic Locations Clinic Hours Telephone Number     [facility]  [address] [place] [address]   Tuesday:       8:00am -- 4:00pm    Wednesday:  8:00am - 4:00pm   To schedule an appointment with   Dr. Rutledge,   please contact our   Specialty Scheduling Department at:     [phone]       [facility]  [address] [place] [address]   Friday:          8:00am - 4:00pm         Urgent Care Locations Clinic Hours Telephone Numbers     [facility]  [address] [place] [address]     Monday-Friday:     11:00pm - 9:00pm    Saturday-Sunday:  9:00am - 5:00pm   [phone]     [place]  [address] [place] [address]     Monday-Friday:      5:00pm - 9:00pm     Saturday-Sunday:  9:00am - 5:00pm   [phone]

## 2020-12-16 NOTE — TELEPHONE ENCOUNTER
Please see Topicmark[employer] message and advise. Cued referral if needed.    Per chart review, pt was seen by ENT, Dr. Rutledge 12/16:  A/P - Reginaldo Ferraro is a 30 year old female with nasal obstruction due to turbinate hypertrophy and septal deviation. She has significant cough and I worry about asthma or chronic rhinosinusitis and postnasal drainage causing cough/reactive airway disease. prednisone helped some, but she didn't tolerate the side effects. I recommend allergy referral with allergy testing and PFTs.  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  I did explain that this procedure may not help her cough but would be best geared for improving nasal obstruction and reducing sinusitis.  She will return after meeting with allergy and trying their proposed plan.

## 2020-12-16 NOTE — LETTER
12/16/2020         RE: Reginaldo Ferraro  [address] [facility] [address]        Dear Colleague,    Thank you for referring your patient, Reginaldo Ferraro, to the [facility]. Please see a copy of my visit note below.    Chief Complaint - Nasal symptoms, cough recheck    History of Present Illness - Reginaldo Ferraro is a 30 year old female who returns for evaluation of nasal symptoms. The patient describes symptoms of nasal obstruction, only occasional drainage for the past year. It came on after an upper respiratory infection. The patient notes symptoms on both sides. She has some cough she feels is due to dryness. The patient notes no allergies. The treatments seem to not help. No prior history of nasal surgery. no history of smoking. no epistaxis. She has poor smell. No family history of nose problems. She has lots of coughing fits. Also had this when I sprayed her nose with lidocaine to scope her.     Her chest x-ray was clear.  Her CT sinus, images personally reviewed with the patient, does show some mucosal thickening in the sinuses and narrowing of the right ostiomeatal complex.  That is consistent with in clinic nasal endoscopy last visit that showed more edema in her right middle meatus. She also has significant left septal deviation, and conchal bullosa causing nasal obstruction. Fortunately she does not have any acute infection, but still has a significant cough.  It is possible this is chronic rhinosinusitis, but I am more worried about something such as reactive airway disease or asthma and possibly allergic rhinitis. I had her take a course of prednisone and try Singulair.  She returns and notes the prednisone made her feel sick, nauseaus, stomach pain, dizziness and headaches. The cough got better though, but is still present. She still has nasal congestion. She can't breath through her nose. No new symptoms.     Past Medical History -   Patient Active Problem List    Diagnosis     CARDIOVASCULAR SCREENING; LDL GOAL LESS THAN 160     Cervical high risk HPV (human papillomavirus) test positive     Insomnia, unspecified insomnia     Vitamin D deficiency     Mild episode of recurrent major depressive disorder (H)       Current Medications -   Current Outpatient Medications:      cholecalciferol (VITAMIN D3) 1000 UNIT tablet, Take 1 tablet (1,000 Units) by mouth daily (Patient not taking: Reported on 11/16/2018), Disp: 100 tablet, Rfl: 3     montelukast (SINGULAIR) 10 MG tablet, Take 1 tablet (10 mg) by mouth At Bedtime, Disp: 30 tablet, Rfl: 3     paragard intrauterine copper, 1 each by Intrauterine route once, Disp: , Rfl:      predniSONE (DELTASONE) 20 MG tablet, Take 2 tabs by mouth daily x 7 days, then 1 tabs daily x 4 days, then 1/2 tab daily x 4 days., Disp: 20 tablet, Rfl: 0    Allergies -   Allergies   Allergen Reactions     No Known Drug Allergies        Social History -   Social History     Socioeconomic History     Marital status: Single     Spouse name: partner- Pastor     Number of children: 0     Years of education: Not on file     Highest education level: Not on file   Occupational History     Occupation:      Employer: UNKNOWN     Comment: [place]     Occupation: [employer]   Social Needs     Financial resource strain: Not on file     Food insecurity     Worry: Not on file     Inability: Not on file     Transportation needs     Medical: Not on file     Non-medical: Not on file   Tobacco Use     Smoking status: Never Smoker     Smokeless tobacco: Never Used   Substance and Sexual Activity     Alcohol use: Yes     Comment: Socially     Drug use: No     Sexual activity: Yes     Partners: Male     Birth control/protection: Pull-out method, Condom, Injection   Lifestyle     Physical activity     Days per week: Not on file     Minutes per session: Not on file     Stress: Not on file   Relationships     Social connections     Talks on phone: Not on  "file     Gets together: Not on file     Attends [employer] service: Not on file     Active member of club or organization: Not on file     Attends meetings of clubs or organizations: Not on file     Relationship status: Not on file     Intimate partner violence     Fear of current or ex partner: Not on file     Emotionally abused: Not on file     Physically abused: Not on file     Forced sexual activity: Not on file   Other Topics Concern     Parent/sibling w/ CABG, MI or angioplasty before 65F 55M? No   Social History Narrative     Not on file       Family History -   Family History   Problem Relation Age of Onset     Diabetes Paternal Grandmother      Hypertension Paternal Grandmother      Respiratory Paternal Grandmother         asthma     Heart Disease Father      Review of Systems - As per HPI and PMHx, otherwise 7 system review of the head and neck is negative.    Physical Exam  /73   Pulse 94   Resp 16   Ht 1.541 m (5' 0.67\")   Wt 61.2 kg (135 lb)   SpO2 95%   BMI 25.79 kg/m    General - The patient is in no distress. Alert and oriented to person and place, answers questions and cooperates with examination appropriately.   Neurologic - CN II-XII are grossly intact. No focal neurologic deficits.   Voice and Breathing - The patient was breathing comfortably without the use of accessory muscles. There was no wheezing, stridor, or stertor.  The patients voice was clear and strong.  Eyes - Extraocular movements intact. Sclera were not icteric or injected, conjunctiva were pink and moist.  Neck -  Soft, non-tender. Palpation of the occipital, submental, submandibular, internal jugular chain, and supraclavicular nodes did not demonstrate any abnormal lymph nodes or masses. No parotid masses. Palpation of the thyroid was soft and smooth, with no nodules or goiter appreciated.  The trachea was mobile and midline.  Nose - External contour is symmetric, no gross deflection or scars.  Nasal mucosa is pink and " moist with clear mucus. However the turbinates are hypertrophic. The septum was deviated to the left.  No polyps, masses, or purulence noted on examination anteriorly.     A/P - Reginaldo Ferraro is a 30 year old female with nasal obstruction due to turbinate hypertrophy and septal deviation. She has significant cough and I worry about asthma or chronic rhinosinusitis and postnasal drainage causing cough/reactive airway disease. prednisone helped some, but she didn't tolerate the side effects. I recommend allergy referral with allergy testing and PFTs.  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  I did explain that this procedure may not help her cough but would be best geared for improving nasal obstruction and reducing sinusitis.  She will return after meeting with allergy and trying their proposed plan.    Miquel Rutledge MD  Otolaryngology  [facility]        Again, thank you for allowing me to participate in the care of your patient.        Sincerely,        Miquel Rutledge MD

## 2020-12-16 NOTE — PROGRESS NOTES
Chief Complaint - Nasal symptoms, cough recheck    History of Present Illness - Reginaldo Ferraro is a 30 year old female who returns for evaluation of nasal symptoms. The patient describes symptoms of nasal obstruction, only occasional drainage for the past year. It came on after an upper respiratory infection. The patient notes symptoms on both sides. She has some cough she feels is due to dryness. The patient notes no allergies. The treatments seem to not help. No prior history of nasal surgery. no history of smoking. no epistaxis. She has poor smell. No family history of nose problems. She has lots of coughing fits. Also had this when I sprayed her nose with lidocaine to scope her.     Her chest x-ray was clear.  Her CT sinus, images personally reviewed with the patient, does show some mucosal thickening in the sinuses and narrowing of the right ostiomeatal complex.  That is consistent with in clinic nasal endoscopy last visit that showed more edema in her right middle meatus. She also has significant left septal deviation, and conchal bullosa causing nasal obstruction. Fortunately she does not have any acute infection, but still has a significant cough.  It is possible this is chronic rhinosinusitis, but I am more worried about something such as reactive airway disease or asthma and possibly allergic rhinitis. I had her take a course of prednisone and try Singulair.  She returns and notes the prednisone made her feel sick, nauseaus, stomach pain, dizziness and headaches. The cough got better though, but is still present. She still has nasal congestion. She can't breath through her nose. No new symptoms.     Past Medical History -   Patient Active Problem List   Diagnosis     CARDIOVASCULAR SCREENING; LDL GOAL LESS THAN 160     Cervical high risk HPV (human papillomavirus) test positive     Insomnia, unspecified insomnia     Vitamin D deficiency     Mild episode of recurrent major depressive disorder (H)        Current Medications -   Current Outpatient Medications:      cholecalciferol (VITAMIN D3) 1000 UNIT tablet, Take 1 tablet (1,000 Units) by mouth daily (Patient not taking: Reported on 11/16/2018), Disp: 100 tablet, Rfl: 3     montelukast (SINGULAIR) 10 MG tablet, Take 1 tablet (10 mg) by mouth At Bedtime, Disp: 30 tablet, Rfl: 3     paragard intrauterine copper, 1 each by Intrauterine route once, Disp: , Rfl:      predniSONE (DELTASONE) 20 MG tablet, Take 2 tabs by mouth daily x 7 days, then 1 tabs daily x 4 days, then 1/2 tab daily x 4 days., Disp: 20 tablet, Rfl: 0    Allergies -   Allergies   Allergen Reactions     No Known Drug Allergies        Social History -   Social History     Socioeconomic History     Marital status: Single     Spouse name: partner- Pastor     Number of children: 0     Years of education: Not on file     Highest education level: Not on file   Occupational History     Occupation:      Employer: UNKNOWN     Comment: [place]     Occupation: [employer]   Social Needs     Financial resource strain: Not on file     Food insecurity     Worry: Not on file     Inability: Not on file     Transportation needs     Medical: Not on file     Non-medical: Not on file   Tobacco Use     Smoking status: Never Smoker     Smokeless tobacco: Never Used   Substance and Sexual Activity     Alcohol use: Yes     Comment: Socially     Drug use: No     Sexual activity: Yes     Partners: Male     Birth control/protection: Pull-out method, Condom, Injection   Lifestyle     Physical activity     Days per week: Not on file     Minutes per session: Not on file     Stress: Not on file   Relationships     Social connections     Talks on phone: Not on file     Gets together: Not on file     Attends Orthodox service: Not on file     Active member of club or organization: Not on file     Attends meetings of clubs or organizations: Not on file     Relationship status: Not on file     Intimate  "partner violence     Fear of current or ex partner: Not on file     Emotionally abused: Not on file     Physically abused: Not on file     Forced sexual activity: Not on file   Other Topics Concern     Parent/sibling w/ CABG, MI or angioplasty before 65F 55M? No   Social History Narrative     Not on file       Family History -   Family History   Problem Relation Age of Onset     Diabetes Paternal Grandmother      Hypertension Paternal Grandmother      Respiratory Paternal Grandmother         asthma     Heart Disease Father      Review of Systems - As per HPI and PMHx, otherwise 7 system review of the head and neck is negative.    Physical Exam  /73   Pulse 94   Resp 16   Ht 1.541 m (5' 0.67\")   Wt 61.2 kg (135 lb)   SpO2 95%   BMI 25.79 kg/m    General - The patient is in no distress. Alert and oriented to person and place, answers questions and cooperates with examination appropriately.   Neurologic - CN II-XII are grossly intact. No focal neurologic deficits.   Voice and Breathing - The patient was breathing comfortably without the use of accessory muscles. There was no wheezing, stridor, or stertor.  The patients voice was clear and strong.  Eyes - Extraocular movements intact. Sclera were not icteric or injected, conjunctiva were pink and moist.  Neck -  Soft, non-tender. Palpation of the occipital, submental, submandibular, internal jugular chain, and supraclavicular nodes did not demonstrate any abnormal lymph nodes or masses. No parotid masses. Palpation of the thyroid was soft and smooth, with no nodules or goiter appreciated.  The trachea was mobile and midline.  Nose - External contour is symmetric, no gross deflection or scars.  Nasal mucosa is pink and moist with clear mucus. However the turbinates are hypertrophic. The septum was deviated to the left.  No polyps, masses, or purulence noted on examination anteriorly.     A/P - Reginaldo Ferraro is a 30 year old female with nasal obstruction " due to turbinate hypertrophy and septal deviation. She has significant cough and I worry about asthma or chronic rhinosinusitis and postnasal drainage causing cough/reactive airway disease. prednisone helped some, but she didn't tolerate the side effects. I recommend allergy referral with allergy testing and PFTs.  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  I did explain that this procedure may not help her cough but would be best geared for improving nasal obstruction and reducing sinusitis.  She will return after meeting with allergy and trying their proposed plan.    Miquel Rutledge MD  Otolaryngology  [facility]

## 2020-12-17 NOTE — TELEPHONE ENCOUNTER
This message is in reference to her daughter-Anne Marie. Please make an encounter under the correct patient so that action can be taken appropriately. Likely her daughter will need a telephone visit to discuss symptoms.   Cally Rucker PA-C

## 2020-12-17 NOTE — TELEPHONE ENCOUNTER
Mychart sent. Need to verify full name and .    Archana Aviles RN  [facility]

## 2021-02-04 ENCOUNTER — OFFICE VISIT (OUTPATIENT)
Dept: ALLERGY | Facility: CLINIC | Age: 31
End: 2021-02-04
Payer: COMMERCIAL

## 2021-02-04 VITALS
DIASTOLIC BLOOD PRESSURE: 67 MMHG | OXYGEN SATURATION: 94 % | SYSTOLIC BLOOD PRESSURE: 104 MMHG | BODY MASS INDEX: 27.47 KG/M2 | WEIGHT: 143.8 LBS | HEART RATE: 71 BPM

## 2021-02-04 DIAGNOSIS — R05.9 COUGH: ICD-10-CM

## 2021-02-04 DIAGNOSIS — R09.81 CHRONIC NASAL CONGESTION: Primary | ICD-10-CM

## 2021-02-04 DIAGNOSIS — R06.02 SHORTNESS OF BREATH: ICD-10-CM

## 2021-02-04 PROCEDURE — 95004 PERQ TESTS W/ALRGNC XTRCS: CPT | Performed by: ALLERGY & IMMUNOLOGY

## 2021-02-04 PROCEDURE — 36415 COLL VENOUS BLD VENIPUNCTURE: CPT | Performed by: ALLERGY & IMMUNOLOGY

## 2021-02-04 PROCEDURE — 99204 OFFICE O/P NEW MOD 45 MIN: CPT | Mod: 25 | Performed by: ALLERGY & IMMUNOLOGY

## 2021-02-04 PROCEDURE — 86003 ALLG SPEC IGE CRUDE XTRC EA: CPT | Performed by: ALLERGY & IMMUNOLOGY
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
## 2021-02-05 LAB
A ALTERNATA IGE QN: <0.1 KU(A)/L
A FUMIGATUS IGE QN: <0.1 KU(A)/L
C HERBARUM IGE QN: <0.1 KU(A)/L
CAT DANDER IGG QN: >100 KU(A)/L
CEDAR IGE QN: <0.1 KU(A)/L
COCKSFOOT IGE QN: 1.73 KU(A)/L
COMMON RAGWEED IGE QN: 3.82 KU(A)/L
COTTONWOOD IGE QN: 0.18 KU(A)/L
D FARINAE IGE QN: 0.29 KU(A)/L
D PTERONYSS IGE QN: 0.51 KU(A)/L
DOG DANDER+EPITH IGE QN: 15.4 KU(A)/L
E PURPURASCENS IGE QN: 0.11 KU(A)/L
EAST WHITE PINE IGE QN: 0.32 KU(A)/L
ENGL PLANTAIN IGE QN: 0.12 KU(A)/L
FIREBUSH IGE QN: <0.1 KU(A)/L
GIANT RAGWEED IGE QN: 0.26 KU(A)/L
GOOSEFOOT IGE QN: 0.16 KU(A)/L
JOHNSON GRASS IGE QN: 0.16 KU(A)/L
MAPLE IGE QN: 0.16 KU(A)/L
MUGWORT IGE QN: <0.1 KU(A)/L
NETTLE IGE QN: 0.12 KU(A)/L
P NOTATUM IGE QN: <0.1 KU(A)/L
RED MULBERRY IGE QN: <0.1 KU(A)/L
SALTWORT IGE QN: 0.17 KU(A)/L
SHEEP SORREL IGE QN: 0.16 KU(A)/L
SILVER BIRCH IGE QN: 14.2 KU(A)/L
TIMOTHY IGE QN: 1.93 KU(A)/L
WHITE ASH IGE QN: 0.12 KU(A)/L
WHITE ELM IGE QN: <0.1 KU(A)/L
WHITE MULBERRY IGE QN: <0.1 KU(A)/L
WHITE OAK IGE QN: 9.64 KU(A)/L
WORMWOOD IGE QN: <0.1 KU(A)/L

## 2021-02-08 ENCOUNTER — TELEPHONE (OUTPATIENT)
Dept: ALLERGY | Facility: CLINIC | Age: 31
End: 2021-02-08

## 2021-02-08 LAB — CALIF WALNUT POLN IGE QN: 0.12 KU(A)/L

## 2021-08-16 ASSESSMENT — ENCOUNTER SYMPTOMS
NAUSEA: 1
DIZZINESS: 1
ARTHRALGIAS: 0
CONSTIPATION: 0
PALPITATIONS: 0
FREQUENCY: 1
SHORTNESS OF BREATH: 1
COUGH: 0
HEADACHES: 1
MYALGIAS: 0
DIARRHEA: 0
PARESTHESIAS: 0
HEARTBURN: 0
CHILLS: 1
HEMATOCHEZIA: 0
DYSURIA: 0
HEMATURIA: 0
SORE THROAT: 0
EYE PAIN: 0
NERVOUS/ANXIOUS: 0
JOINT SWELLING: 0
FEVER: 0
ABDOMINAL PAIN: 1
BREAST MASS: 0

## 2021-08-19 ENCOUNTER — OFFICE VISIT (OUTPATIENT)
Dept: FAMILY MEDICINE | Facility: CLINIC | Age: 31
End: 2021-08-19
Payer: COMMERCIAL

## 2021-08-19 VITALS
SYSTOLIC BLOOD PRESSURE: 127 MMHG | OXYGEN SATURATION: 99 % | HEIGHT: 61 IN | DIASTOLIC BLOOD PRESSURE: 78 MMHG | BODY MASS INDEX: 26.92 KG/M2 | WEIGHT: 142.6 LBS | HEART RATE: 75 BPM | TEMPERATURE: 98.7 F

## 2021-08-19 DIAGNOSIS — Z12.4 CERVICAL CANCER SCREENING: ICD-10-CM

## 2021-08-19 DIAGNOSIS — Z11.3 SCREENING FOR STD (SEXUALLY TRANSMITTED DISEASE): ICD-10-CM

## 2021-08-19 DIAGNOSIS — R10.84 ABDOMINAL PAIN, GENERALIZED: ICD-10-CM

## 2021-08-19 DIAGNOSIS — Z00.00 ROUTINE GENERAL MEDICAL EXAMINATION AT A HEALTH CARE FACILITY: Primary | ICD-10-CM

## 2021-08-19 DIAGNOSIS — B96.89 BV (BACTERIAL VAGINOSIS): ICD-10-CM

## 2021-08-19 DIAGNOSIS — Z32.00 PREGNANCY EXAMINATION OR TEST, PREGNANCY UNCONFIRMED: ICD-10-CM

## 2021-08-19 DIAGNOSIS — N76.0 BV (BACTERIAL VAGINOSIS): ICD-10-CM

## 2021-08-19 DIAGNOSIS — R11.0 NAUSEA: ICD-10-CM

## 2021-08-19 LAB
CLUE CELLS: PRESENT
HCG UR QL: NEGATIVE
TRICHOMONAS, WET PREP: ABNORMAL
WBC'S/HIGH POWER FIELD, WET PREP: ABNORMAL
YEAST, WET PREP: ABNORMAL

## 2021-08-19 PROCEDURE — 86780 TREPONEMA PALLIDUM: CPT | Performed by: PHYSICIAN ASSISTANT

## 2021-08-19 PROCEDURE — 99213 OFFICE O/P EST LOW 20 MIN: CPT | Mod: 25 | Performed by: PHYSICIAN ASSISTANT

## 2021-08-19 PROCEDURE — 99395 PREV VISIT EST AGE 18-39: CPT | Performed by: PHYSICIAN ASSISTANT

## 2021-08-19 PROCEDURE — 81025 URINE PREGNANCY TEST: CPT | Performed by: PHYSICIAN ASSISTANT

## 2021-08-19 PROCEDURE — G0124 SCREEN C/V THIN LAYER BY MD: HCPCS | Performed by: PATHOLOGY

## 2021-08-19 PROCEDURE — 87491 CHLMYD TRACH DNA AMP PROBE: CPT | Performed by: PHYSICIAN ASSISTANT

## 2021-08-19 PROCEDURE — 87624 HPV HI-RISK TYP POOLED RSLT: CPT | Performed by: PHYSICIAN ASSISTANT

## 2021-08-19 PROCEDURE — 87591 N.GONORRHOEAE DNA AMP PROB: CPT | Performed by: PHYSICIAN ASSISTANT

## 2021-08-19 PROCEDURE — 87210 SMEAR WET MOUNT SALINE/INK: CPT | Performed by: PHYSICIAN ASSISTANT

## 2021-08-19 PROCEDURE — 87389 HIV-1 AG W/HIV-1&-2 AB AG IA: CPT | Performed by: PHYSICIAN ASSISTANT

## 2021-08-19 PROCEDURE — G0123 SCREEN CERV/VAG THIN LAYER: HCPCS | Performed by: PHYSICIAN ASSISTANT

## 2021-08-19 PROCEDURE — 36415 COLL VENOUS BLD VENIPUNCTURE: CPT | Performed by: PHYSICIAN ASSISTANT

## 2021-08-19 PROCEDURE — 87902 NFCT AGT GNTYP ALYS HEP C: CPT | Performed by: PHYSICIAN ASSISTANT

## 2021-08-19 RX ORDER — ONDANSETRON 4 MG/1
4 TABLET, ORALLY DISINTEGRATING ORAL EVERY 8 HOURS PRN
Qty: 10 TABLET | Refills: 0 | Status: SHIPPED | OUTPATIENT
Start: 2021-08-19 | End: 2022-03-17

## 2021-08-19 RX ORDER — FEXOFENADINE HCL AND PSEUDOEPHEDRINE HCL 180; 240 MG/1; MG/1
1 TABLET, EXTENDED RELEASE ORAL DAILY
COMMUNITY
End: 2021-12-29

## 2021-08-19 ASSESSMENT — ENCOUNTER SYMPTOMS
ARTHRALGIAS: 0
CHILLS: 1
HEADACHES: 1
PARESTHESIAS: 0
CONSTIPATION: 0
EYE PAIN: 0
SHORTNESS OF BREATH: 1
DIARRHEA: 0
NAUSEA: 1
FEVER: 0
HEMATURIA: 0
ABDOMINAL PAIN: 1
MYALGIAS: 0
FREQUENCY: 1
JOINT SWELLING: 0
COUGH: 0
HEARTBURN: 0
NERVOUS/ANXIOUS: 0
BREAST MASS: 0
SORE THROAT: 0
PALPITATIONS: 0
HEMATOCHEZIA: 0
DIZZINESS: 1
DYSURIA: 0

## 2021-08-19 ASSESSMENT — MIFFLIN-ST. JEOR: SCORE: 1304.21

## 2021-08-19 ASSESSMENT — PATIENT HEALTH QUESTIONNAIRE - PHQ9: SUM OF ALL RESPONSES TO PHQ QUESTIONS 1-9: 6
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
## 2021-08-19 NOTE — PATIENT INSTRUCTIONS
I recommend all patients receive the COVID-19 vaccine. We are now scheduling all people age 12 and older for COVID-19 vaccines (patients age 12-17 can only receive the Pfizer vaccine).     To schedule your appointment, please log in to [employer] using this link to see when and where we have openings.     If you have technical difficulty using [employer], call [phone] for assistance.      More information is on our website: [email or web address].     Patient Education     Preventive Health Recommendations  Female Ages 26 - 39  Yearly exam:   See your health care provider every year in order to    Review health changes.     Discuss preventive care.      Review your medicines if you your doctor has prescribed any.    Until age 30: Get a Pap test every three years (more often if you have had an abnormal result).    After age 30: Talk to your doctor about whether you should have a Pap test every 3 years or have a Pap test with HPV screening every 5 years.   You do not need a Pap test if your uterus was removed (hysterectomy) and you have not had cancer.  You should be tested each year for STDs (sexually transmitted diseases), if you're at risk.   Talk to your provider about how often to have your cholesterol checked.  If you are at risk for diabetes, you should have a diabetes test (fasting glucose).  Shots: Get a flu shot each year. Get a tetanus shot every 10 years.   Nutrition:     Eat at least 5 servings of fruits and vegetables each day.    Eat whole-grain bread, whole-wheat pasta and brown rice instead of white grains and rice.    Get adequate Calcium and Vitamin D.     Lifestyle    Exercise at least 150 minutes a week (30 minutes a day, 5 days of the week). This will help you control your weight and prevent disease.    Limit alcohol to one drink per day.    No smoking.     Wear sunscreen to prevent skin cancer.    See your dentist every six months for an exam and cleaning.

## 2021-08-20 LAB
C TRACH DNA SPEC QL NAA+PROBE: NEGATIVE
HIV 1+2 AB+HIV1 P24 AG SERPL QL IA: NONREACTIVE
N GONORRHOEA DNA SPEC QL NAA+PROBE: NEGATIVE
T PALLIDUM AB SER QL: NONREACTIVE

## 2021-08-20 RX ORDER — METRONIDAZOLE 500 MG/1
500 TABLET ORAL 2 TIMES DAILY
Qty: 14 TABLET | Refills: 0 | Status: SHIPPED | OUTPATIENT
Start: 2021-08-20 | End: 2021-08-27

## 2021-08-20 NOTE — RESULT ENCOUNTER NOTE
Reginaldo,     It looks like you have a bacterial infection of the vagina. I would recommend that we treat this. I have prescribed an antibiotic for you to take 2 times a day for 7 days. You should not drink any alcohol with this medication. I will let you know on the rest of your labs as they come back.   Cally Rucker PA-C

## 2021-08-22 LAB — HCV RNA SERPL NAA+PROBE-ACNC: NOT DETECTED IU/ML

## 2021-08-25 LAB
BKR LAB AP GYN ADEQUACY: ABNORMAL
BKR LAB AP GYN INTERPRETATION: ABNORMAL
BKR LAB AP HPV REFLEX: ABNORMAL
BKR LAB AP LMP: ABNORMAL
BKR LAB AP PREVIOUS ABNL DX: ABNORMAL
BKR LAB AP PREVIOUS ABNORMAL: ABNORMAL
PATH REPORT.COMMENTS IMP SPEC: ABNORMAL
PATH REPORT.RELEVANT HX SPEC: ABNORMAL

## 2021-08-26 LAB
HUMAN PAPILLOMA VIRUS 16 DNA: NEGATIVE
HUMAN PAPILLOMA VIRUS 18 DNA: NEGATIVE
HUMAN PAPILLOMA VIRUS FINAL DIAGNOSIS: NORMAL
HUMAN PAPILLOMA VIRUS OTHER HR: NEGATIVE

## 2021-08-30 ENCOUNTER — PATIENT OUTREACH (OUTPATIENT)
Dept: FAMILY MEDICINE | Facility: CLINIC | Age: 31
End: 2021-08-30

## 2021-08-30 ENCOUNTER — TELEPHONE (OUTPATIENT)
Dept: OBGYN | Facility: CLINIC | Age: 31
End: 2021-08-30

## 2021-08-30 NOTE — TELEPHONE ENCOUNTER
[facility] Call [facility]    Phone Message    May a detailed message be left on voicemail: yes     Reason for Call: Other: pt calling and needs a ooloscopy, please advise     Action Taken: Message routed to:  [facility] p [phone]    Travel Screening: Not Applicable

## 2021-08-31 ENCOUNTER — TELEPHONE (OUTPATIENT)
Dept: OBGYN | Facility: CLINIC | Age: 31
End: 2021-08-31

## 2021-08-31 NOTE — TELEPHONE ENCOUNTER
[facility] Call [facility]    Phone Message    May a detailed message be left on voicemail: yes     Reason for Call: Patient calling regarding getting scheduled for a Colposcopy. Patient stated she has availability between 11:15- 11:45 for call or after 5:30pm. Please advise. Thank you    Action Taken: Message routed to:  [facility] p [phone]    Travel Screening: Not Applicable

## 2021-09-16 ENCOUNTER — OFFICE VISIT (OUTPATIENT)
Dept: OBGYN | Facility: CLINIC | Age: 31
End: 2021-09-16
Payer: COMMERCIAL

## 2021-09-16 VITALS
DIASTOLIC BLOOD PRESSURE: 73 MMHG | HEART RATE: 91 BPM | WEIGHT: 132.5 LBS | SYSTOLIC BLOOD PRESSURE: 111 MMHG | OXYGEN SATURATION: 98 % | BODY MASS INDEX: 25.04 KG/M2

## 2021-09-16 DIAGNOSIS — B37.31 YEAST INFECTION OF THE VAGINA: ICD-10-CM

## 2021-09-16 DIAGNOSIS — B96.89 BV (BACTERIAL VAGINOSIS): Primary | ICD-10-CM

## 2021-09-16 DIAGNOSIS — Z01.812 PRE-PROCEDURE LAB EXAM: ICD-10-CM

## 2021-09-16 DIAGNOSIS — N76.0 BV (BACTERIAL VAGINOSIS): Primary | ICD-10-CM

## 2021-09-16 DIAGNOSIS — N89.8 VAGINAL DISCHARGE: ICD-10-CM

## 2021-09-16 DIAGNOSIS — R87.612 LGSIL ON PAP SMEAR OF CERVIX: Primary | ICD-10-CM

## 2021-09-16 LAB
CLUE CELLS: PRESENT
HCG UR QL: NEGATIVE
TRICHOMONAS, WET PREP: ABNORMAL
WBC'S/HIGH POWER FIELD, WET PREP: ABNORMAL
YEAST, WET PREP: PRESENT

## 2021-09-16 PROCEDURE — 87210 SMEAR WET MOUNT SALINE/INK: CPT | Performed by: OBSTETRICS & GYNECOLOGY

## 2021-09-16 PROCEDURE — 88305 TISSUE EXAM BY PATHOLOGIST: CPT | Performed by: PATHOLOGY

## 2021-09-16 PROCEDURE — 81025 URINE PREGNANCY TEST: CPT | Performed by: OBSTETRICS & GYNECOLOGY

## 2021-09-16 PROCEDURE — 99203 OFFICE O/P NEW LOW 30 MIN: CPT | Mod: 25 | Performed by: OBSTETRICS & GYNECOLOGY

## 2021-09-16 PROCEDURE — 57456 ENDOCERV CURETTAGE W/SCOPE: CPT | Performed by: OBSTETRICS & GYNECOLOGY

## 2021-09-16 RX ORDER — METRONIDAZOLE 500 MG/1
500 TABLET ORAL 2 TIMES DAILY
Qty: 14 TABLET | Refills: 0 | Status: SHIPPED | OUTPATIENT
Start: 2021-09-16 | End: 2021-09-23

## 2021-09-16 RX ORDER — FLUCONAZOLE 150 MG/1
150 TABLET ORAL ONCE
Qty: 1 TABLET | Refills: 0 | Status: SHIPPED | OUTPATIENT
Start: 2021-09-16 | End: 2021-09-16

## 2021-09-16 ASSESSMENT — PAIN SCALES - GENERAL: PAINLEVEL: NO PAIN (0)

## 2021-09-16 NOTE — PATIENT INSTRUCTIONS
If you have any questions regarding your visit, Please contact your care team.    [facility]: [phone]      [facility] HOURS TELEPHONE NUMBER   Ene Fitzgerald DO.    Josefina -  Dina -     DANAE Damon RN     Monday, Wednesday, Thursday and Friday[facility]  8:30a.m-5:00 p.m   [facility]  [address] [place] [address]  [phone] ask for [facility]    Imaging [phone]       Urgent Care locations:    [facility]   Monday-Friday   10 am - 8 pm  Saturday and Sunday   9 am - 5 pm    Monday-Friday   10 am - 8 pm  Saturday and Sunday   9 am - 5 pm   [phone]     [facility] Labor and Delivery:  [phone]    If you need a medication refill, please contact your pharmacy. Please allow 3 business days for your refill to be completed.  As always, Thank you for trusting us with your healthcare needs!

## 2021-09-16 NOTE — PROGRESS NOTES
This 29 y/o female, , LMP 2021, using condoms for contraception, presents as a new patient to the [facility] g[facility] dept for colposcopy examination due to her hx of a LSIL pap on 2021 with normal DNA marker results.  Her UPT is negative and she denies any risk of recent pregnancy exposure.  She also would like to be checked for a possible BV infection due to a 4-day course of vaginal irritation, odor, and yellow-tinged discharge.  However, she declines a STD screen.  Informed consent was reviewed and obtained for the colposcopy with biopsy procedure and all her questions and concerns were addressed.  /73 (BP Location: Left arm, Patient Position: Chair, Cuff Size: Adult Regular)   Pulse 91   Wt 60.1 kg (132 lb 8 oz)   LMP 2021   SpO2 98%   Breastfeeding No   BMI 25.04 kg/m    ROS:  10 systems were reviewed and the positives were listed under problems.  In the dorsal lithotomy position, a bi-valve speculum was placed and a wet prep was obtained and submitted.  There was a small amount of yellow-tinged vaginal discharge noted but no lesions or growths were seen.  Next, her cervix was soaked with 3% acetic acid.  After several minutes, this fluid was removed and colposcopy examination was performed.  No areas of acetowhite were noted on the ectocervix so endocervical curettings were obtained and submitted.  She had difficulty tolerating the procedure but there were no complications.  EBL was 1 ml and it was a satisfactory examination.  Assessment - Colposcopy examination with biopsy for follow up of a LSIL pap, vaginal discharge with odor  Plan - Submit wet prep and treat if +.  Also, submit ECC to pathology with follow up if indicated.  If normal, then the plan is a repeat pap in 1 year.  Her test results were discussed in detail and all her questions and concerns were addressed.    20 minutes were spent today in chart review, the patient visit, review of tests, and documentation in  regards to the issues noted above.  This did NOT include the time spent in the procedure (colposcopy with biopsy).

## 2021-09-20 LAB
PATH REPORT.COMMENTS IMP SPEC: NORMAL
PATH REPORT.COMMENTS IMP SPEC: NORMAL
PATH REPORT.FINAL DX SPEC: NORMAL
PATH REPORT.GROSS SPEC: NORMAL
PATH REPORT.MICROSCOPIC SPEC OTHER STN: NORMAL
PATH REPORT.RELEVANT HX SPEC: NORMAL
PHOTO IMAGE: NORMAL

## 2021-09-26 ENCOUNTER — HEALTH MAINTENANCE LETTER (OUTPATIENT)
Age: 31
End: 2021-09-26

## 2021-10-19 ENCOUNTER — PATIENT OUTREACH (OUTPATIENT)
Dept: OBGYN | Facility: CLINIC | Age: 31
End: 2021-10-19

## 2021-11-23 ENCOUNTER — MYC MEDICAL ADVICE (OUTPATIENT)
Dept: FAMILY MEDICINE | Facility: CLINIC | Age: 31
End: 2021-11-23
Payer: COMMERCIAL

## 2021-11-24 ENCOUNTER — E-VISIT (OUTPATIENT)
Dept: URGENT CARE | Facility: URGENT CARE | Age: 31
End: 2021-11-24
Payer: COMMERCIAL

## 2021-11-24 DIAGNOSIS — R05.9 COUGH: Primary | ICD-10-CM

## 2021-11-24 PROCEDURE — 99207 PR NON-BILLABLE SERV PER CHARTING: CPT | Performed by: PHYSICIAN ASSISTANT

## 2021-11-24 NOTE — TELEPHONE ENCOUNTER
Appointment needed for refill, inhaler not noted on current or past med list.   Cassie Ibanez RN

## 2021-11-24 NOTE — PATIENT INSTRUCTIONS
Dear Reginaldo Ferraro,    We are sorry you are not feeling well. Based on the responses you provided, it is recommended that you be seen in-person in urgent care so we can better evaluate your symptoms. Please click here to find the nearest urgent care location to you.   You will not be charged for this Visit. Thank you for trusting us with your care.    Fitz Arroyo PA-C

## 2021-12-09 ENCOUNTER — MYC MEDICAL ADVICE (OUTPATIENT)
Dept: FAMILY MEDICINE | Facility: CLINIC | Age: 31
End: 2021-12-09
Payer: COMMERCIAL

## 2021-12-20 ENCOUNTER — MYC MEDICAL ADVICE (OUTPATIENT)
Dept: ALLERGY | Facility: CLINIC | Age: 31
End: 2021-12-20
Payer: COMMERCIAL

## 2021-12-21 NOTE — TELEPHONE ENCOUNTER
Corwinhart message sent to patient to advise that provider is out of the office. Patient also advised that she should schedule a follow up with provider as it has been almost 1 year since last OV.    Chandrika العراقي MA

## 2021-12-22 ENCOUNTER — OFFICE VISIT (OUTPATIENT)
Dept: FAMILY MEDICINE | Facility: CLINIC | Age: 31
End: 2021-12-22
Payer: COMMERCIAL

## 2021-12-22 VITALS
TEMPERATURE: 97.6 F | SYSTOLIC BLOOD PRESSURE: 108 MMHG | OXYGEN SATURATION: 98 % | DIASTOLIC BLOOD PRESSURE: 68 MMHG | WEIGHT: 143 LBS | HEART RATE: 76 BPM | BODY MASS INDEX: 27.02 KG/M2

## 2021-12-22 DIAGNOSIS — R05.9 COUGH: ICD-10-CM

## 2021-12-22 DIAGNOSIS — N91.2 AMENORRHEA: Primary | ICD-10-CM

## 2021-12-22 DIAGNOSIS — R06.2 WHEEZING: ICD-10-CM

## 2021-12-22 DIAGNOSIS — Z32.01 PREGNANCY TEST POSITIVE: ICD-10-CM

## 2021-12-22 LAB — HCG UR QL: POSITIVE

## 2021-12-22 PROCEDURE — 81025 URINE PREGNANCY TEST: CPT | Performed by: PHYSICIAN ASSISTANT

## 2021-12-22 PROCEDURE — 99214 OFFICE O/P EST MOD 30 MIN: CPT | Performed by: PHYSICIAN ASSISTANT

## 2021-12-22 RX ORDER — ALBUTEROL SULFATE 90 UG/1
1-2 AEROSOL, METERED RESPIRATORY (INHALATION) EVERY 4 HOURS PRN
Qty: 18 G | Refills: 1 | Status: SHIPPED | OUTPATIENT
Start: 2021-12-22 | End: 2022-11-09

## 2021-12-22 RX ORDER — ALBUTEROL SULFATE 0.83 MG/ML
2.5 SOLUTION RESPIRATORY (INHALATION) EVERY 6 HOURS PRN
Qty: 90 ML | Refills: 1 | Status: SHIPPED | OUTPATIENT
Start: 2021-12-22 | End: 2022-04-28

## 2021-12-22 RX ORDER — ALBUTEROL SULFATE 90 UG/1
AEROSOL, METERED RESPIRATORY (INHALATION)
COMMUNITY
Start: 2021-12-06 | End: 2021-12-22

## 2021-12-22 RX ORDER — AZITHROMYCIN 250 MG/1
TABLET, FILM COATED ORAL
Qty: 6 TABLET | Refills: 0 | Status: SHIPPED | OUTPATIENT
Start: 2021-12-22 | End: 2021-12-27

## 2021-12-22 RX ORDER — PRENATAL VIT/IRON FUM/FOLIC AC 27MG-0.8MG
1 TABLET ORAL DAILY
Qty: 90 TABLET | Refills: 3 | Status: SHIPPED | OUTPATIENT
Start: 2021-12-22 | End: 2022-03-17

## 2021-12-22 RX ORDER — BENZONATATE 100 MG/1
100 CAPSULE ORAL
COMMUNITY
Start: 2021-12-06 | End: 2022-01-20

## 2021-12-22 NOTE — PROGRESS NOTES
Assessment & Plan     1. Amenorrhea    2. Cough    3. Wheezing    4. Pregnancy test positive      A pregnancy test was obtained since patient has missed a period and was positive. Patient will follow up with OB/GYN. Start a prenatal. Change allegra D to Allegra at this time.     Discussed risks and benefits of x-ray while pregnant. Patient elected not to have an x-ray and will treat with azithromycin at this time. Patient would benefit from continued prn albuterol use and will add in pulmicort BID. If worsening or not improving needs to be seen. Differential includes worsening pneumonia, PE, COVID or influenza.                  No follow-ups on file.    Cally Rucker PA-C  [facility] NEGRITA Hair is a 31 year old who presents for the following health issues     HPI     ED/ Followup:    Facility:  [facility]  Date of visit: 12/6/2021  Reason for visit: Cough  Current Status: Pt stated that she is still feeling s.o.b.. Pt said that she did already finish the prednisone and inhaler that was prescribed for her. There is construction going on at home and was thinking that maybe the cause. But she said even when she leaves the house the symptoms still are present. She said that when she coughs that she sometimes has issues with leaking of urine.        Had a negative COVID-19 test prior to being seen at .   Medications did help. Albuterol, benzonate and perdnisone. Still using the benzonatte prn. She notes she was given an albuterol inhaler on 12/6/21 and she is out of the inhaler already.   She notes she sometimes needs it every hour.   No chest x-ray while at .   Was not tested for flu at that time. No other symptoms other than breathing wheezing and coughing.   She did an inhaler at about 3am. Woke up, unable to breathe. Coughing fits. Having urinary leakage when she coughs. Sometimes hard to catch her breath. She is wheezing a lot.    Has an appointment for PFT's tomorrow.   Has construction going on in her house. Is planning on getting the vents cleaned    No period since October. Has not taken a pregnancy test.   LMP 10/22/21.    . Review of Systems   Constitutional, HEENT, cardiovascular, pulmonary, gi and gu systems are negative, except as otherwise noted.      Objective    /68 (BP Location: Right arm, Patient Position: Chair, Cuff Size: Adult Regular)   Pulse 76   Temp 97.6  F (36.4  C) (Oral)   Wt 64.9 kg (143 lb)   LMP 10/25/2021   SpO2 98%   Breastfeeding No   BMI 27.02 kg/m    Body mass index is 27.02 kg/m .  Physical Exam   GENERAL: healthy, alert and no distress  HENT: ear canals and TM's normal, nose and mouth without ulcers or lesions  NECK: no adenopathy, no asymmetry, masses, or scars and thyroid normal to palpation  RESP: Harsh barking cough. Right upper and lower lobes with expiratory wheezing. Left lung fields are clear.   CV: regular rate and rhythm, normal S1 S2, no S3 or S4, no murmur,

## 2021-12-22 NOTE — PATIENT INSTRUCTIONS
Schedule with OB/GYN- number below.   Start a prenatal vitamin over the counter     Start zithromax antibiotics.   Use albtuerol as needed.     Start Pulmicort inhaler 2 times a day after your testing tomorrow.     Follow up next week or sooner if your cough does not improve.

## 2021-12-23 ENCOUNTER — TELEPHONE (OUTPATIENT)
Dept: OBGYN | Facility: CLINIC | Age: 31
End: 2021-12-23

## 2021-12-23 DIAGNOSIS — R05.9 COUGH: ICD-10-CM

## 2021-12-23 DIAGNOSIS — R06.02 SHORTNESS OF BREATH: ICD-10-CM

## 2021-12-23 DIAGNOSIS — Z32.01 PREGNANCY TEST POSITIVE: Primary | ICD-10-CM

## 2021-12-23 PROCEDURE — 94060 EVALUATION OF WHEEZING: CPT | Performed by: INTERNAL MEDICINE

## 2021-12-23 PROCEDURE — 94726 PLETHYSMOGRAPHY LUNG VOLUMES: CPT | Performed by: INTERNAL MEDICINE

## 2021-12-23 PROCEDURE — 94729 DIFFUSING CAPACITY: CPT | Performed by: INTERNAL MEDICINE

## 2021-12-23 NOTE — TELEPHONE ENCOUNTER
[facility] Call Center    Phone Message    May a detailed message be left on voicemail: yes     Reason for Call: Order(s): Other:   Reason for requested: OBI US  Date needed: 1/6/2022  Provider name:Nikki       Action Taken: Message routed to:  Other: corine rn    Travel Screening: Not Applicable

## 2021-12-24 ENCOUNTER — NURSE TRIAGE (OUTPATIENT)
Dept: FAMILY MEDICINE | Facility: CLINIC | Age: 31
End: 2021-12-24
Payer: COMMERCIAL

## 2021-12-24 NOTE — TELEPHONE ENCOUNTER
RN received call thru priority line      Patient having difficulty breathing    Patient had difficulty speaking on the phone.    Had appointment 12/22.    Using her inhaler and nebulizer without relief.    RN advised patient 911 needed to be called    RN called 911.    As RN speaking to 911 dispatch,  Patient stated her friends were there and they would take her to the emergency room.  Patient stopped speaking with RN and began talking to Friends.  Phone disconnected        Reason for Disposition    SEVERE difficulty breathing (e.g., struggling for each breath, speaks in single words, pulse > 120)    Answer Assessment - Initial Assessment Questions  See Documentation    Protocols used: BREATHING DIFFICULTY-A-OH        Noah Green, RN, BSN, PHN  [facility]

## 2021-12-26 LAB
DLCOUNC-%PRED-PRE: 110 %
DLCOUNC-PRE: 22.38 ML/MIN/MMHG
DLCOUNC-PRED: 20.16 ML/MIN/MMHG
ERV-%PRED-PRE: 26 %
ERV-PRE: 0.26 L
ERV-PRED: 0.98 L
EXPTIME-PRE: 6.37 SEC
FEF2575-%PRED-POST: 72 %
FEF2575-%PRED-PRE: 30 %
FEF2575-POST: 2.29 L/SEC
FEF2575-PRE: 0.96 L/SEC
FEF2575-PRED: 3.14 L/SEC
FEFMAX-%PRED-PRE: 61 %
FEFMAX-PRE: 4.02 L/SEC
FEFMAX-PRED: 6.55 L/SEC
FEV1-%PRED-PRE: 56 %
FEV1-PRE: 1.51 L
FEV1FEV6-PRE: 69 %
FEV1FEV6-PRED: 85 %
FEV1FVC-PRE: 68 %
FEV1FVC-PRED: 86 %
FEV1SVC-PRE: 54 %
FEV1SVC-PRED: 77 %
FIFMAX-PRE: 4.04 L/SEC
FRCPLETH-%PRED-PRE: 81 %
FRCPLETH-PRE: 2.03 L
FRCPLETH-PRED: 2.5 L
FVC-%PRED-PRE: 70 %
FVC-PRE: 2.21 L
FVC-PRED: 3.13 L
IC-%PRED-PRE: 101 %
IC-PRE: 2.56 L
IC-PRED: 2.52 L
RVPLETH-%PRED-PRE: 135 %
RVPLETH-PRE: 1.77 L
RVPLETH-PRED: 1.3 L
TLCPLETH-%PRED-PRE: 103 %
TLCPLETH-PRE: 4.58 L
TLCPLETH-PRED: 4.44 L
VA-%PRED-PRE: 87 %
VA-PRE: 3.76 L
VC-%PRED-PRE: 80 %
VC-PRE: 2.81 L
VC-PRED: 3.5 L

## 2021-12-29 ENCOUNTER — OFFICE VISIT (OUTPATIENT)
Dept: FAMILY MEDICINE | Facility: CLINIC | Age: 31
End: 2021-12-29
Payer: COMMERCIAL

## 2021-12-29 VITALS
OXYGEN SATURATION: 97 % | DIASTOLIC BLOOD PRESSURE: 62 MMHG | TEMPERATURE: 98 F | WEIGHT: 140.8 LBS | HEIGHT: 61 IN | HEART RATE: 79 BPM | SYSTOLIC BLOOD PRESSURE: 103 MMHG | BODY MASS INDEX: 26.58 KG/M2

## 2021-12-29 DIAGNOSIS — J10.1 INFLUENZA A: Primary | ICD-10-CM

## 2021-12-29 PROCEDURE — 99212 OFFICE O/P EST SF 10 MIN: CPT | Performed by: PHYSICIAN ASSISTANT

## 2021-12-29 ASSESSMENT — MIFFLIN-ST. JEOR: SCORE: 1291.04

## 2021-12-29 NOTE — PROGRESS NOTES
"  Assessment & Plan     Influenza A          No follow-ups on file.    Sulaiman Fontaine PA-C  [facility] NEGRITA Hair is a 31 year old who presents for the following health issues  accompanied by her self.    [facility]       Hospital Follow-up Visit:    Hospital/Nursing Home/IP Rehab Facility: [facility]  Date of Admission: 12/24/2021  Date of Discharge: 12/25/2021  Reason(s) for Admission: Fever and cough, Tested Positive for Influenza A      Was your hospitalization related to COVID-19? No   Problems taking medications regularly:  None  Medication changes since discharge: None  Problems adhering to non-medication therapy:  None    Summary of hospitalization:  CareEverywhere information obtained and reviewed  Diagnostic Tests/Treatments reviewed.  Follow up needed: none  Other Healthcare Providers Involved in Patient s Care:         None  Update since discharge: improved.       Post Discharge Medication Reconciliation: discharge medications reconciled, continue medications without change.  Plan of care communicated with patient              Review of Systems   Constitutional, HEENT, cardiovascular, pulmonary, gi and gu systems are negative, except as otherwise noted.      Objective    /62   Pulse 79   Temp 98  F (36.7  C) (Oral)   Ht 1.549 m (5' 1\")   Wt 63.9 kg (140 lb 12.8 oz)   SpO2 97%   BMI 26.60 kg/m    Body mass index is 26.6 kg/m .  Physical Exam   GENERAL: healthy, alert and no distress  NECK: no adenopathy, no asymmetry, masses, or scars and thyroid normal to palpation  RESP: lungs clear to auscultation - no rales, rhonchi or wheezes  CV: regular rate and rhythm, normal S1 S2, no S3 or S4, no murmur, click or rub, no peripheral edema and peripheral pulses strong  MS: no gross musculoskeletal defects noted, no edema                "

## 2021-12-30 ENCOUNTER — TELEPHONE (OUTPATIENT)
Dept: FAMILY MEDICINE | Facility: CLINIC | Age: 31
End: 2021-12-30
Payer: COMMERCIAL

## 2021-12-30 NOTE — TELEPHONE ENCOUNTER
Left a message for Reginaldo to call the clinic to schedule an appointment. Please help the patient schedule for Provider Follow up appointment in clinic.  Melissa Lamar-  Vanessa

## 2021-12-30 NOTE — TELEPHONE ENCOUNTER
Pt has appointment on 1/6/22 with Zhen to have forms filled out. Giving form to new provider to have at appointment.  Melissa Lamra-  Vanessa

## 2021-12-30 NOTE — TELEPHONE ENCOUNTER
Reason for call:  Form   Our goal is to have forms completed within 72 hours, however some forms may require a visit or additional information.     Who is the form from? Insurance comp  Where did the form come from? form was faxed in  What clinic location was the form placed at? [facility]  Where was the form placed? Given to physician  What number is listed as a contact on the form? [employer]  Ph: [phone]  Fx: [phone]    Phone call message - patient request for a letter, form or note:     Date needed: as soon as possible  Please fax to [phone]  Has the patient signed a consent form for release of information? unknown    Additional comments: [place] form. Additional copy sent in via fax and saved in [employer] work book if needed.    Type of letter, form or note: FMLA

## 2022-01-03 PROBLEM — E87.6 HYPOKALEMIA: Status: ACTIVE | Noted: 2021-12-24

## 2022-01-03 PROBLEM — J09.X1 INFLUENZA A WITH PNEUMONIA: Status: ACTIVE | Noted: 2021-12-24

## 2022-01-03 PROBLEM — A41.9 SEPSIS (H): Status: ACTIVE | Noted: 2021-12-24

## 2022-01-04 PROBLEM — Z34.90 SUPERVISION OF NORMAL PREGNANCY: Status: ACTIVE | Noted: 2022-01-04

## 2022-01-05 NOTE — TELEPHONE ENCOUNTER
Patient is re-scheduled with Dr. Velasco on 1/6/2022. Form in the Team Blue book. Cecilia Velez,

## 2022-01-06 ENCOUNTER — NURSE TRIAGE (OUTPATIENT)
Dept: NURSING | Facility: CLINIC | Age: 32
End: 2022-01-06
Payer: COMMERCIAL

## 2022-01-06 ENCOUNTER — OFFICE VISIT (OUTPATIENT)
Dept: FAMILY MEDICINE | Facility: CLINIC | Age: 32
End: 2022-01-06
Payer: COMMERCIAL

## 2022-01-06 ENCOUNTER — ANCILLARY PROCEDURE (OUTPATIENT)
Dept: ULTRASOUND IMAGING | Facility: CLINIC | Age: 32
End: 2022-01-06
Attending: ADVANCED PRACTICE MIDWIFE
Payer: COMMERCIAL

## 2022-01-06 VITALS
WEIGHT: 142 LBS | HEIGHT: 60 IN | HEART RATE: 75 BPM | BODY MASS INDEX: 27.88 KG/M2 | OXYGEN SATURATION: 98 % | SYSTOLIC BLOOD PRESSURE: 111 MMHG | DIASTOLIC BLOOD PRESSURE: 66 MMHG | TEMPERATURE: 99 F

## 2022-01-06 DIAGNOSIS — Z32.01 PREGNANCY TEST POSITIVE: ICD-10-CM

## 2022-01-06 DIAGNOSIS — J10.1 INFLUENZA A: Primary | ICD-10-CM

## 2022-01-06 DIAGNOSIS — Z34.81 ENCOUNTER FOR SUPERVISION OF OTHER NORMAL PREGNANCY IN FIRST TRIMESTER: Primary | ICD-10-CM

## 2022-01-06 DIAGNOSIS — O09.91 HRP (HIGH RISK PREGNANCY), FIRST TRIMESTER: Primary | ICD-10-CM

## 2022-01-06 PROCEDURE — 99213 OFFICE O/P EST LOW 20 MIN: CPT | Performed by: FAMILY MEDICINE

## 2022-01-06 PROCEDURE — 76801 OB US < 14 WKS SINGLE FETUS: CPT | Mod: 26 | Performed by: OBSTETRICS & GYNECOLOGY

## 2022-01-06 PROCEDURE — 76801 OB US < 14 WKS SINGLE FETUS: CPT

## 2022-01-06 ASSESSMENT — MIFFLIN-ST. JEOR: SCORE: 1286.86

## 2022-01-06 NOTE — TELEPHONE ENCOUNTER
Huddled with the MA working with the provider today. The form is at their work space. Cecilia Velez,

## 2022-01-06 NOTE — TELEPHONE ENCOUNTER
Patient calling requesting to confirm she is to keep 9 a.m. ultrasound appointment.  Reporting 940 a.m. appointment had been cancelled.    Per [facility] reviewed appointment is for 9 a.m.    Connected patient to [facility] [phone] to verify appointment for ultrasound remains as scheduled.      Additional Information    Negative: Nursing judgment    Negative: Nursing judgment    Negative: Nursing judgment    Negative: Nursing judgment    Information only question and nurse able to answer    Protocols used: INFORMATION ONLY CALL - NO TRIAGE-A-OH

## 2022-01-06 NOTE — PROGRESS NOTES
"  Assessment & Plan     Influenza A  Pt feels well and is back to work on 12-28-21  Advised follow up as needed    Forms were work filled out for her time off 12-24 to 12-28-21    Return in about 8 months (around 9/6/2022) for Physical Exam.    Shani Velasco MD  [facility] NEGRITA Hair is a 31 year old who presents for the following health issues  accompanied by her self.  Pt had Influenza Dec 24th and was Off till 12-28-21  [facility]-see Care everywhere  She Feels fine  Needs Forms done for work    HPI     Patient presents with:  Forms: [employer]- Disability and FMLA    Review of Systems   CONSTITUTIONAL: NEGATIVE for fever, chills, change in weight  ENT/MOUTH: NEGATIVE for ear, mouth and throat problems  RESP: NEGATIVE for significant cough or SOB  CV: NEGATIVE for chest pain, palpitations or peripheral edema  GI: NEGATIVE for nausea, abdominal pain, heartburn, or change in bowel habits  ROS otherwise negative      Objective    /66   Pulse 75   Temp 99  F (37.2  C) (Oral)   Ht 1.534 m (5' 0.39\")   Wt 64.4 kg (142 lb)   LMP 10/22/2021   SpO2 98%   BMI 27.37 kg/m    Body mass index is 27.37 kg/m .  Physical Exam   GENERAL: healthy, alert and no distress  HENT: ear canals and TM's normal, nose and mouth without ulcers or lesions  NECK: no adenopathy, no asymmetry, masses, or scars and thyroid normal to palpation  RESP: lungs clear to auscultation - no rales, rhonchi or wheezes  CV: regular rate and rhythm, normal S1 S2, no S3 or S4, no murmur, click or rub, no peripheral edema and peripheral pulses strong  ABDOMEN: soft, nontender, no hepatosplenomegaly, no masses and bowel sounds normal  MS: no gross musculoskeletal defects noted, no edema      "

## 2022-01-06 NOTE — TELEPHONE ENCOUNTER
The Form has been completed by the provider, confirmed faxed to the fax number on the form and listed below and a copy has been sent to be added to the chart. Cecilia Velez,     Patient left with a copy.

## 2022-01-20 ENCOUNTER — OFFICE VISIT (OUTPATIENT)
Dept: OBGYN | Facility: CLINIC | Age: 32
End: 2022-01-20
Attending: ADVANCED PRACTICE MIDWIFE
Payer: COMMERCIAL

## 2022-01-20 ENCOUNTER — ANCILLARY PROCEDURE (OUTPATIENT)
Dept: ULTRASOUND IMAGING | Facility: CLINIC | Age: 32
End: 2022-01-20
Attending: MIDWIFE
Payer: COMMERCIAL

## 2022-01-20 VITALS
BODY MASS INDEX: 26.58 KG/M2 | HEIGHT: 61 IN | HEART RATE: 77 BPM | WEIGHT: 140.8 LBS | DIASTOLIC BLOOD PRESSURE: 71 MMHG | SYSTOLIC BLOOD PRESSURE: 109 MMHG

## 2022-01-20 DIAGNOSIS — O09.91 SUPERVISION OF HIGH RISK PREGNANCY IN FIRST TRIMESTER: ICD-10-CM

## 2022-01-20 DIAGNOSIS — O35.8XX0 FETAL CYSTIC HYGROMA, SINGLE GESTATION: ICD-10-CM

## 2022-01-20 DIAGNOSIS — Z34.81 ENCOUNTER FOR SUPERVISION OF OTHER NORMAL PREGNANCY IN FIRST TRIMESTER: ICD-10-CM

## 2022-01-20 PROBLEM — Z34.90 SUPERVISION OF NORMAL PREGNANCY: Status: RESOLVED | Noted: 2022-01-04 | Resolved: 2022-01-20

## 2022-01-20 PROCEDURE — 99207 PR PRENATAL VISIT: CPT | Performed by: ADVANCED PRACTICE MIDWIFE

## 2022-01-20 PROCEDURE — 76801 OB US < 14 WKS SINGLE FETUS: CPT | Mod: 26 | Performed by: OBSTETRICS & GYNECOLOGY

## 2022-01-20 PROCEDURE — 76801 OB US < 14 WKS SINGLE FETUS: CPT

## 2022-01-20 PROCEDURE — G0463 HOSPITAL OUTPT CLINIC VISIT: HCPCS | Mod: 25

## 2022-01-20 RX ORDER — SIMETHICONE 40MG/0.6ML
40 SUSPENSION, DROPS(FINAL DOSAGE FORM)(ML) ORAL 4 TIMES DAILY PRN
Qty: 30 ML | Refills: 1 | Status: SHIPPED | OUTPATIENT
Start: 2022-01-20 | End: 2022-01-24 | Stop reason: ALTCHOICE

## 2022-01-20 RX ORDER — DIPHENHYDRAMINE HCL 25 MG
25 CAPSULE ORAL EVERY 6 HOURS PRN
Qty: 90 CAPSULE | Refills: 1 | Status: SHIPPED | OUTPATIENT
Start: 2022-01-20 | End: 2022-03-31

## 2022-01-20 RX ORDER — LANOLIN ALCOHOL/MO/W.PET/CERES
50 CREAM (GRAM) TOPICAL 3 TIMES DAILY PRN
Qty: 90 TABLET | Refills: 1 | Status: SHIPPED | OUTPATIENT
Start: 2022-01-20 | End: 2022-08-16

## 2022-01-20 ASSESSMENT — MIFFLIN-ST. JEOR: SCORE: 1291.04

## 2022-01-20 NOTE — PATIENT INSTRUCTIONS
It was a pleasure to meet you today!    For optimal nutrition/health we recommend that you:  -Take a prenatal vitamin daily  -Take 1,000mg Fish Oil or 500mg DHA daily  -Take 2,000 IU vitamin D daily  -150 mcg of Iodine daily    -Try to eat small frequent meals with a focus on protein, ideally consuming 80-100g of protein/daily.   -Aim for 1,200mg Calcium daily     -Exercise more days out of the week than not with getting your heart rate up for at least 30 minutes.     -The recommended weight gain for your pregnancy is: 15-25      Recommendations to reduce Nausea of pregnancy:    -small frequent meals, focus on protein 80-100g/day. Avoid heavy, greasy meals  -fresh air/exercise more days out of the week than not, 30 min  -Bella or peppermint tea, lozenges, gum  -Seabands  -vitamin B6 50mg three times a day, with 50mg of Unisom at night (unisom makes you sleepy)  (recommend benadryl 25-50mg due to allergies)

## 2022-01-20 NOTE — LETTER
"2022       RE: Reginaldo Ferraro  [address] [facility] [place] [address]     Dear Colleague,    Thank you for referring your patient, Reginaldo Ferraro, to the [facility] [place] at [facility]. Please see a copy of my visit note below.    [facility] OB Intake note  Subjective   31 year old female presents to clinic for initiation of OB care.     Patient's last menstrual period was 10/22/2021.      at 12 by LMP, Estimated Date of Delivery: 2022 based on LMP, US confirms      Pregnancy is unplanned but welcomed.    US 22: SLIUP /3 with skin edema from fetal head to trunk  22: US large cystic hygroma noted, 11w6d,   Reginaldo is tearful during her visit today, she is very upset about the news of the abnormal ultrasound report.    Partner name - Kevon, who is present today      Symptoms since LMP:  Reginaldo feels like this pregnancy is completely different than her last pregnancy: she has headaches, congestion, nausea, vomiting. She typically takes Allegra D, but hasn't as it isn't recommended in pregnancy. She has taken Claritin in the past, but it has not worked for her.  She is feeling nauseous but has not vomited much.   Reginaldo also was hospitalized for influenza A      Reginaldo has a history of abnormal paps, depression, allergies (HA with allergies)    2010: , 39/0, 6lbs 11oz, PROM, induction  Induced for PROM, , no tear, no PPH, no PPD, \"passed out after giving birth due to the pain\" and \"had to be shaken awake to push out the placenta\"    Currently feeling anxiety and depression related to the news of an abnormal finding on the baby's US  Her family is in town and are supportive  Kevon has family that is in town    - Genetic/Infection questionnaire completed, risks include None  . Pt  does not have a recent known exposure to Parvo or CMV so IgG/IgM testing WILL NOT be ordered. "   Recommended Flu Vaccine.  Patient declined, will consider next visit   Reginaldo was hospitalized for influenza A recently   Will consider COVID vaccine this pregnancy    - Current Medications    Current Outpatient Medications   Medication Sig Dispense Refill     diphenhydrAMINE (BENADRYL) 25 MG capsule Take 1 capsule (25 mg) by mouth every 6 hours as needed for itching or allergies 90 capsule 1     ondansetron (ZOFRAN-ODT) 4 MG ODT tab Take 1 tablet (4 mg) by mouth every 8 hours as needed for nausea 10 tablet 0     Prenatal Vit-Fe Fumarate-FA (PRENATAL MULTIVITAMIN W/IRON) 27-0.8 MG tablet Take 1 tablet by mouth daily 90 tablet 3     simethicone (MYLICON) 40 MG/0.6ML suspension Take 0.6 mLs (40 mg) by mouth 4 times daily as needed for cramping 30 mL 1     VENTOLIN  (90 Base) MCG/ACT inhaler Inhale 1-2 puffs into the lungs every 4 hours as needed for shortness of breath / dyspnea or wheezing 18 g 1     vitamin B6 (PYRIDOXINE) 50 MG TABS Take 1 tablet (50 mg) by mouth 3 times daily as needed (nausea) 90 tablet 1     albuterol (PROVENTIL) (2.5 MG/3ML) 0.083% neb solution Take 1 vial (2.5 mg) by nebulization every 6 hours as needed for shortness of breath / dyspnea or wheezing (Patient not taking: Reported on 1/20/2022) 90 mL 1     budesonide (PULMICORT FLEXHALER) 180 MCG/ACT inhaler Inhale 1 puff into the lungs 2 times daily (Patient not taking: Reported on 1/20/2022) 1 each 1         - Co-morbids    Past Medical History:   Diagnosis Date     Abnormal Pap smear of cervix 12/23/2011 9/21 LSIL     Cervical high risk HPV (human papillomavirus) test positive 2013    10/31/14, 5/21/18     Depressive disorder 12/23/2011    possibly chronic     Environmental allergies      Migraine      Migraines     headaches with allergies     Moderate major depression 12/23/2011     Moderate major depression (H) 12/23/2011     NO ACTIVE PROBLEMS      Seasonal allergic rhinitis      - Risk for GDM : First degree relative with  GDM or DM so  WILL have an early GCT and Hgb A1C    - High risk factors for Pre E-  No known risk factors of High risk for Pre E       - Moderate risk factor for Pre E Sociodemographic characteristics (Racism, Less access given lower SES)  Meets one high risk factors or one of the moderate risk facrtors  so WILL NOT consider starting low dose aspirin (81mg) starting between 12 and 28 weeks to prevent early onset preeclampsia    - The patient  does not have a history of spontaneous  birth so  WILL NOT consider progesterone starting at 16-20 weeks and/or serial transvaginal cervical length ultrasounds from 16-24 weeks.     -The patient does not have a history of immunosuppresion or HIV so Toxoplasma IgG/IgM WILL NOT be ordered.    -Assess risk for asymptomatic latent TB (prior infection, recent immigrant from epidemic areas, immunosuppression, living in overcrowded environment):   WILL NOT have PPD skin test or Quantiferon-TB Gold Plus blood draw. *both options valid*       PERSONAL/SOCIAL HISTORY  partnered  Lives with partner and her daughter (half of the time, has joint custody with her father)  Employment: Full time as a .  Job involves sedentary activity .  Kevon works in [employer]  History of anxiety or depression, very tearful today when discussing US results and her pregnancy     Additional items: declines need for social support   PHQ9: 0  Objective  -VS: reviewed and within normal limits   -General appearance: no acute distress, patient is comfortable   NEUROLOGICAL/PSYCHIATRIC   - Orientated x3,   -Mood and affect: : normal     Assessment/Plan  Reginaldo was seen today for prenatal care.    Diagnoses and all orders for this visit:    Supervision of high risk pregnancy in first trimester  -     ABO/Rh type and screen  -     CBC with platelets  -     HIV Antigen Antibody Combo  -     Hepatitis B surface antigen  -     Hepatitis B Surface Antibody  -     Hepatitis C  antibody  -     Rubella Antibody IgG  -     Treponema Abs w Reflex to RPR and Titer  -     Vitamin D Deficiency  -     Urine Culture  -     Varicella Zoster Virus Antibody IgG  -     simethicone (MYLICON) 40 MG/0.6ML suspension; Take 0.6 mLs (40 mg) by mouth 4 times daily as needed for cramping  -     vitamin B6 (PYRIDOXINE) 50 MG TABS; Take 1 tablet (50 mg) by mouth 3 times daily as needed (nausea)  -     diphenhydrAMINE (BENADRYL) 25 MG capsule; Take 1 capsule (25 mg) by mouth every 6 hours as needed for itching or allergies  -     Hgb A1c  -     [facility] Referral - Pregnancy; Future    Fetal cystic hygroma, single gestation        31 year old  12/6 weeks of pregnancy with CATIE of 2022 by LMP and confirmed by early US  Large cystic hygroma noted on fetal US  Family hx of DM  Desires genetic screening and level II US  Hx of depression/anxiety    Outpatient Encounter Medications as of 2022   Medication Sig Dispense Refill     diphenhydrAMINE (BENADRYL) 25 MG capsule Take 1 capsule (25 mg) by mouth every 6 hours as needed for itching or allergies 90 capsule 1     ondansetron (ZOFRAN-ODT) 4 MG ODT tab Take 1 tablet (4 mg) by mouth every 8 hours as needed for nausea 10 tablet 0     Prenatal Vit-Fe Fumarate-FA (PRENATAL MULTIVITAMIN W/IRON) 27-0.8 MG tablet Take 1 tablet by mouth daily 90 tablet 3     simethicone (MYLICON) 40 MG/0.6ML suspension Take 0.6 mLs (40 mg) by mouth 4 times daily as needed for cramping 30 mL 1     VENTOLIN  (90 Base) MCG/ACT inhaler Inhale 1-2 puffs into the lungs every 4 hours as needed for shortness of breath / dyspnea or wheezing 18 g 1     vitamin B6 (PYRIDOXINE) 50 MG TABS Take 1 tablet (50 mg) by mouth 3 times daily as needed (nausea) 90 tablet 1     albuterol (PROVENTIL) (2.5 MG/3ML) 0.083% neb solution Take 1 vial (2.5 mg) by nebulization every 6 hours as needed for shortness of breath / dyspnea or wheezing (Patient not taking: Reported on 2022)  90 mL 1     budesonide (PULMICORT FLEXHALER) 180 MCG/ACT inhaler Inhale 1 puff into the lungs 2 times daily (Patient not taking: Reported on 1/20/2022) 1 each 1     [DISCONTINUED] benzonatate (TESSALON) 100 MG capsule Take 100 mg by mouth       No facility-administered encounter medications on file as of 1/20/2022.      Orders Placed This Encounter   Procedures     CBC with platelets     HIV Antigen Antibody Combo     Hepatitis B surface antigen     Hepatitis B Surface Antibody     Hepatitis C antibody     Rubella Antibody IgG     Treponema Abs w Reflex to RPR and Titer     Vitamin D Deficiency     Varicella Zoster Virus Antibody IgG     Hgb A1c     [facility] Referral - Pregnancy                 Orders Placed This Encounter   Procedures     CBC with platelets     HIV Antigen Antibody Combo     Hepatitis B surface antigen     Hepatitis B Surface Antibody     Hepatitis C antibody     Rubella Antibody IgG     Treponema Abs w Reflex to RPR and Titer     Vitamin D Deficiency     Varicella Zoster Virus Antibody IgG     Hgb A1c     [facility] Referral - Pregnancy     ABO/Rh type and screen       -Discussed that a cystic hygroma can be associated with Down's Syndrome, but that we don't know for sure if that is what the baby has.   -Offered therapeutic listening and emotional support. Offered mental health therapy to help Reginaldo process this news, Loretta declines for now.   -Has appt with MFM 1/27/22 for NT/MFM consult  -Will also order level II US  -Recommended Vitamin B6 TID with 25-50mg Benadryl at night (to see if that doesn't help with her allergies/headaches)  -Claritin or Zyrtec for allergies  -Encouraged hydration, 1,000mg Tylenol, caffeine to help with headaches. Call if they persist, may need to consider Reglan or other therapies    - Oriented to Practice, types of care, and how to reach a provider.  Pt prefers Undecided between CNM and MD, will continue to consider.   - Patient received 1st  trimester new OB education packet complete with aide of [employer] Expectant Family booklet including information on genetic screening test options.  - Patient declines all genetic screening and desires level II ultrasound which was ordered.  - Educational handout on the prevention of infections diseases during pregnancy provided.  - Patient was encouraged to start prenatal vitamins as tolerated.    - Patient was sent to lab for routine OB labs including varicella IgG.   - Reviewed low risk for PTL/PTB   - Reviewed increased risk of diabetes due to family hx, accepts early GCT next visit   - Reviewed low risk for preeclampsia  - Pregnancy concerns to be addressed by provider at new OB exam include: follow up from M appt re cystic hygroma, mental health check in.    Pt to RTO for NOB visit in 4 weeks or sooner prn if questions or concerns    Nikki Soria CNM, APRN

## 2022-01-20 NOTE — PROGRESS NOTES
"[facility] OB Intake note  Subjective   31 year old female presents to clinic for initiation of OB care.     Patient's last menstrual period was 10/22/2021.      at 12/6 by LMP, Estimated Date of Delivery: 2022 based on LMP, US confirms      Pregnancy is unplanned but welcomed.    US 22: SLIUP 9/3 with skin edema from fetal head to trunk  22: US large cystic hygroma noted, 11w6d,   Reginaldo is tearful during her visit today, she is very upset about the news of the abnormal ultrasound report.    Partner name - Kevon, who is present today      Symptoms since LMP:  Reginaldo feels like this pregnancy is completely different than her last pregnancy: she has headaches, congestion, nausea, vomiting. She typically takes Allegra D, but hasn't as it isn't recommended in pregnancy. She has taken Claritin in the past, but it has not worked for her.  She is feeling nauseous but has not vomited much.   Reginaldo also was hospitalized for influenza A      Reginaldo has a history of abnormal paps, depression, allergies (HA with allergies)    2010: , 39/0, 6lbs 11oz, PROM, induction  Induced for PROM, , no tear, no PPH, no PPD, \"passed out after giving birth due to the pain\" and \"had to be shaken awake to push out the placenta\"    Currently feeling anxiety and depression related to the news of an abnormal finding on the baby's US  Her family is in town and are supportive  Kevon has family that is in town    - Genetic/Infection questionnaire completed, risks include None  . Pt  does not have a recent known exposure to Parvo or CMV so IgG/IgM testing WILL NOT be ordered.   Recommended Flu Vaccine.  Patient declined, will consider next visit   Reginaldo was hospitalized for influenza A recently   Will consider COVID vaccine this pregnancy    - Current Medications    Current Outpatient Medications   Medication Sig Dispense Refill     diphenhydrAMINE (BENADRYL) 25 MG capsule Take 1 capsule (25 " mg) by mouth every 6 hours as needed for itching or allergies 90 capsule 1     ondansetron (ZOFRAN-ODT) 4 MG ODT tab Take 1 tablet (4 mg) by mouth every 8 hours as needed for nausea 10 tablet 0     Prenatal Vit-Fe Fumarate-FA (PRENATAL MULTIVITAMIN W/IRON) 27-0.8 MG tablet Take 1 tablet by mouth daily 90 tablet 3     simethicone (MYLICON) 40 MG/0.6ML suspension Take 0.6 mLs (40 mg) by mouth 4 times daily as needed for cramping 30 mL 1     VENTOLIN  (90 Base) MCG/ACT inhaler Inhale 1-2 puffs into the lungs every 4 hours as needed for shortness of breath / dyspnea or wheezing 18 g 1     vitamin B6 (PYRIDOXINE) 50 MG TABS Take 1 tablet (50 mg) by mouth 3 times daily as needed (nausea) 90 tablet 1     albuterol (PROVENTIL) (2.5 MG/3ML) 0.083% neb solution Take 1 vial (2.5 mg) by nebulization every 6 hours as needed for shortness of breath / dyspnea or wheezing (Patient not taking: Reported on 1/20/2022) 90 mL 1     budesonide (PULMICORT FLEXHALER) 180 MCG/ACT inhaler Inhale 1 puff into the lungs 2 times daily (Patient not taking: Reported on 1/20/2022) 1 each 1         - Co-morbids    Past Medical History:   Diagnosis Date     Abnormal Pap smear of cervix 12/23/2011 9/21 LSIL     Cervical high risk HPV (human papillomavirus) test positive 2013    10/31/14, 5/21/18     Depressive disorder 12/23/2011    possibly chronic     Environmental allergies      Migraine      Migraines     headaches with allergies     Moderate major depression 12/23/2011     Moderate major depression (H) 12/23/2011     NO ACTIVE PROBLEMS      Seasonal allergic rhinitis      - Risk for GDM : First degree relative with GDM or DM so  WILL have an early GCT and Hgb A1C    - High risk factors for Pre E-  No known risk factors of High risk for Pre E       - Moderate risk factor for Pre E Sociodemographic characteristics (Racism, Less access given lower SES)  Meets one high risk factors or one of the moderate risk facrtors  so WILL NOT consider  starting low dose aspirin (81mg) starting between 12 and 28 weeks to prevent early onset preeclampsia    - The patient  does not have a history of spontaneous  birth so  WILL NOT consider progesterone starting at 16-20 weeks and/or serial transvaginal cervical length ultrasounds from 16-24 weeks.     -The patient does not have a history of immunosuppresion or HIV so Toxoplasma IgG/IgM WILL NOT be ordered.    -Assess risk for asymptomatic latent TB (prior infection, recent immigrant from epidemic areas, immunosuppression, living in overcrowded environment):   WILL NOT have PPD skin test or Quantiferon-TB Gold Plus blood draw. *both options valid*       PERSONAL/SOCIAL HISTORY  partnered  Lives with partner and her daughter (half of the time, has joint custody with her father)  Employment: Full time as a .  Job involves sedentary activity .  Kevon works in [employer]  History of anxiety or depression, very tearful today when discussing US results and her pregnancy     Additional items: declines need for social support   PHQ9: 0  Objective  -VS: reviewed and within normal limits   -General appearance: no acute distress, patient is comfortable   NEUROLOGICAL/PSYCHIATRIC   - Orientated x3,   -Mood and affect: : normal     Assessment/Plan  Reginaldo was seen today for prenatal care.    Diagnoses and all orders for this visit:    Supervision of high risk pregnancy in first trimester  -     ABO/Rh type and screen  -     CBC with platelets  -     HIV Antigen Antibody Combo  -     Hepatitis B surface antigen  -     Hepatitis B Surface Antibody  -     Hepatitis C antibody  -     Rubella Antibody IgG  -     Treponema Abs w Reflex to RPR and Titer  -     Vitamin D Deficiency  -     Urine Culture  -     Varicella Zoster Virus Antibody IgG  -     simethicone (MYLICON) 40 MG/0.6ML suspension; Take 0.6 mLs (40 mg) by mouth 4 times daily as needed for cramping  -     vitamin B6 (PYRIDOXINE) 50 MG  TABS; Take 1 tablet (50 mg) by mouth 3 times daily as needed (nausea)  -     diphenhydrAMINE (BENADRYL) 25 MG capsule; Take 1 capsule (25 mg) by mouth every 6 hours as needed for itching or allergies  -     Hgb A1c  -     [facility] Referral - Pregnancy; Future    Fetal cystic hygroma, single gestation        31 year old  12/6 weeks of pregnancy with CATIE of 2022 by LMP and confirmed by early US  Large cystic hygroma noted on fetal US  Family hx of DM  Desires genetic screening and level II US  Hx of depression/anxiety    Outpatient Encounter Medications as of 2022   Medication Sig Dispense Refill     diphenhydrAMINE (BENADRYL) 25 MG capsule Take 1 capsule (25 mg) by mouth every 6 hours as needed for itching or allergies 90 capsule 1     ondansetron (ZOFRAN-ODT) 4 MG ODT tab Take 1 tablet (4 mg) by mouth every 8 hours as needed for nausea 10 tablet 0     Prenatal Vit-Fe Fumarate-FA (PRENATAL MULTIVITAMIN W/IRON) 27-0.8 MG tablet Take 1 tablet by mouth daily 90 tablet 3     simethicone (MYLICON) 40 MG/0.6ML suspension Take 0.6 mLs (40 mg) by mouth 4 times daily as needed for cramping 30 mL 1     VENTOLIN  (90 Base) MCG/ACT inhaler Inhale 1-2 puffs into the lungs every 4 hours as needed for shortness of breath / dyspnea or wheezing 18 g 1     vitamin B6 (PYRIDOXINE) 50 MG TABS Take 1 tablet (50 mg) by mouth 3 times daily as needed (nausea) 90 tablet 1     albuterol (PROVENTIL) (2.5 MG/3ML) 0.083% neb solution Take 1 vial (2.5 mg) by nebulization every 6 hours as needed for shortness of breath / dyspnea or wheezing (Patient not taking: Reported on 2022) 90 mL 1     budesonide (PULMICORT FLEXHALER) 180 MCG/ACT inhaler Inhale 1 puff into the lungs 2 times daily (Patient not taking: Reported on 2022) 1 each 1     [DISCONTINUED] benzonatate (TESSALON) 100 MG capsule Take 100 mg by mouth       No facility-administered encounter medications on file as of 2022.      Orders  Placed This Encounter   Procedures     CBC with platelets     HIV Antigen Antibody Combo     Hepatitis B surface antigen     Hepatitis B Surface Antibody     Hepatitis C antibody     Rubella Antibody IgG     Treponema Abs w Reflex to RPR and Titer     Vitamin D Deficiency     Varicella Zoster Virus Antibody IgG     Hgb A1c     [facility] Referral - Pregnancy                 Orders Placed This Encounter   Procedures     CBC with platelets     HIV Antigen Antibody Combo     Hepatitis B surface antigen     Hepatitis B Surface Antibody     Hepatitis C antibody     Rubella Antibody IgG     Treponema Abs w Reflex to RPR and Titer     Vitamin D Deficiency     Varicella Zoster Virus Antibody IgG     Hgb A1c     [facility] Referral - Pregnancy     ABO/Rh type and screen       -Discussed that a cystic hygroma can be associated with Down's Syndrome, but that we don't know for sure if that is what the baby has.   -Offered therapeutic listening and emotional support. Offered mental health therapy to help Reginaldo process this news, Loretta declines for now.   -Has appt with MFM 1/27/22 for NT/MFM consult  -Will also order level II US  -Recommended Vitamin B6 TID with 25-50mg Benadryl at night (to see if that doesn't help with her allergies/headaches)  -Claritin or Zyrtec for allergies  -Encouraged hydration, 1,000mg Tylenol, caffeine to help with headaches. Call if they persist, may need to consider Reglan or other therapies    - Oriented to Practice, types of care, and how to reach a provider.  Pt prefers Undecided between CNM and MD, will continue to consider.   - Patient received 1st trimester new OB education packet complete with aide of The Expectant Family booklet including information on genetic screening test options.  - Patient declines all genetic screening and desires level II ultrasound which was ordered.  - Educational handout on the prevention of infections diseases during pregnancy provided.  -  Patient was encouraged to start prenatal vitamins as tolerated.    - Patient was sent to lab for routine OB labs including varicella IgG.   - Reviewed low risk for PTL/PTB   - Reviewed increased risk of diabetes due to family hx, accepts early GCT next visit   - Reviewed low risk for preeclampsia  - Pregnancy concerns to be addressed by provider at new OB exam include: follow up from M appt re cystic hygroma, mental health check in.    Pt to RTO for NOB visit in 4 weeks or sooner prn if questions or concerns    Nikki Soria, SANTY, APRN

## 2022-01-21 PROBLEM — O35.8XX0 FETAL CYSTIC HYGROMA, SINGLE GESTATION: Status: ACTIVE | Noted: 2022-01-21

## 2022-01-24 DIAGNOSIS — R14.1 FLATULENCE, ERUCTATION AND GAS PAIN: Primary | ICD-10-CM

## 2022-01-24 DIAGNOSIS — K30 INDIGESTION: ICD-10-CM

## 2022-01-24 DIAGNOSIS — R14.3 FLATULENCE, ERUCTATION AND GAS PAIN: Primary | ICD-10-CM

## 2022-01-24 DIAGNOSIS — O09.91 SUPERVISION OF HIGH RISK PREGNANCY IN FIRST TRIMESTER: Primary | ICD-10-CM

## 2022-01-24 DIAGNOSIS — R14.2 FLATULENCE, ERUCTATION AND GAS PAIN: Primary | ICD-10-CM

## 2022-01-24 RX ORDER — SIMETHICONE 125 MG
125 TABLET,CHEWABLE ORAL 2 TIMES DAILY
Qty: 30 TABLET | Refills: 1 | Status: ON HOLD | OUTPATIENT
Start: 2022-01-24 | End: 2022-02-16

## 2022-01-25 ENCOUNTER — PRE VISIT (OUTPATIENT)
Dept: MATERNAL FETAL MEDICINE | Facility: CLINIC | Age: 32
End: 2022-01-25
Payer: COMMERCIAL

## 2022-01-25 ENCOUNTER — OFFICE VISIT (OUTPATIENT)
Dept: MATERNAL FETAL MEDICINE | Facility: CLINIC | Age: 32
End: 2022-01-25
Attending: MIDWIFE
Payer: COMMERCIAL

## 2022-01-25 ENCOUNTER — HOSPITAL ENCOUNTER (OUTPATIENT)
Dept: ULTRASOUND IMAGING | Facility: CLINIC | Age: 32
End: 2022-01-25
Attending: MIDWIFE
Payer: COMMERCIAL

## 2022-01-25 ENCOUNTER — TRANSCRIBE ORDERS (OUTPATIENT)
Dept: MATERNAL FETAL MEDICINE | Facility: CLINIC | Age: 32
End: 2022-01-25
Payer: COMMERCIAL

## 2022-01-25 DIAGNOSIS — O35.8XX0 FETAL CYSTIC HYGROMA, SINGLE GESTATION: ICD-10-CM

## 2022-01-25 DIAGNOSIS — O28.3 ABNORMAL FETAL ULTRASOUND: Primary | ICD-10-CM

## 2022-01-25 DIAGNOSIS — O26.90 PREGNANCY RELATED CONDITION, ANTEPARTUM: ICD-10-CM

## 2022-01-25 DIAGNOSIS — O35.8XX0 FETAL CYSTIC HYGROMA, SINGLE GESTATION: Primary | ICD-10-CM

## 2022-01-25 DIAGNOSIS — O26.90 PREGNANCY RELATED CONDITION, ANTEPARTUM: Primary | ICD-10-CM

## 2022-01-25 DIAGNOSIS — O28.3 ABNORMAL FETAL ULTRASOUND: ICD-10-CM

## 2022-01-25 LAB
ABO/RH(D): NORMAL
ANTIBODY SCREEN: NEGATIVE
Lab: NORMAL
PERFORMING LABORATORY: NORMAL
SPECIMEN EXPIRATION DATE: NORMAL
SPECIMEN STATUS: NORMAL
TEST NAME: NORMAL

## 2022-01-25 PROCEDURE — 76813 OB US NUCHAL MEAS 1 GEST: CPT | Mod: 26 | Performed by: OBSTETRICS & GYNECOLOGY

## 2022-01-25 PROCEDURE — 81265 STR MARKERS SPECIMEN ANAL: CPT | Performed by: OBSTETRICS & GYNECOLOGY

## 2022-01-25 PROCEDURE — 76945 ECHO GUIDE VILLUS SAMPLING: CPT

## 2022-01-25 PROCEDURE — 86901 BLOOD TYPING SEROLOGIC RH(D): CPT | Performed by: OBSTETRICS & GYNECOLOGY

## 2022-01-25 PROCEDURE — 76813 OB US NUCHAL MEAS 1 GEST: CPT

## 2022-01-25 PROCEDURE — 96040 HC GENETIC COUNSELING, EACH 30 MINUTES: CPT | Performed by: GENETIC COUNSELOR, MS

## 2022-01-25 PROCEDURE — 88267 CHROMOSOME ANALYS PLACENTA: CPT | Performed by: OBSTETRICS & GYNECOLOGY

## 2022-01-25 PROCEDURE — 88275 CYTOGENETICS 100-300: CPT | Mod: 59 | Performed by: OBSTETRICS & GYNECOLOGY

## 2022-01-25 PROCEDURE — 76945 ECHO GUIDE VILLUS SAMPLING: CPT | Mod: 26 | Performed by: OBSTETRICS & GYNECOLOGY

## 2022-01-25 PROCEDURE — 250N000011 HC RX IP 250 OP 636: Performed by: OBSTETRICS & GYNECOLOGY

## 2022-01-25 PROCEDURE — 36415 COLL VENOUS BLD VENIPUNCTURE: CPT | Performed by: OBSTETRICS & GYNECOLOGY

## 2022-01-25 PROCEDURE — 59015 CHORION BIOPSY: CPT | Performed by: OBSTETRICS & GYNECOLOGY

## 2022-01-25 PROCEDURE — 59015 CHORION BIOPSY: CPT

## 2022-01-25 RX ORDER — HEPARIN SODIUM (PORCINE) LOCK FLUSH IV SOLN 100 UNIT/ML 100 UNIT/ML
1 SOLUTION INTRAVENOUS ONCE
Status: COMPLETED | OUTPATIENT
Start: 2022-01-25 | End: 2022-01-25

## 2022-01-25 RX ADMIN — HEPARIN 1 ML: 100 SYRINGE at 16:30

## 2022-01-25 NOTE — PROGRESS NOTES
[facility]  Genetic Counseling Consult    Patient: Reginaldo Ferraro YOB: 1990   Date of Service: 1/25/22      Reginaldo Ferraro was seen at [facility] for genetic consultation to discuss the options for routine screening for fetal chromosome abnormalities. The indication was abnormal first trimester outside ultrasound. She was accompanied by her partner.       Impression/Plan:   1.  Reginaldo had an ultrasound and CVS procedure.  The following testing was ordered on the prenatal sample: FISH preliminary analysis with relfex to chromosomes (FISH abnormal) or microarray analysis (FISH abnormal).  Results are expected within 3-14 days, and will be available in [facility].  We will contact her to discuss the results, and a copy will be forwarded to the office of the referring OB provider. Reginaldo Ferraro provided verbal permission for results to be left on her voicemail.    2. We discussed the next steps in the pregnancy. Reginaldo is unsure how they would use the diagnostic information but has experienced a lot of anxiety since hearing about these concerns. She feels simply knowing for sure if there is a chromosome condition will be helpful. Depending on the diagnosis and potential prognosis Reginaldo feels termination of the pregnancy would be an option for them and she asked for a brief summary of those options. She was overwhelmed by the information as she was hoping the termination could be done simply with pills and passing at home. We discussed that this gestation cervical dilation would be needed. If Reginaldo does elect for termination we reviewed that [facility] will help check insurance coverage and help coordinate the logistics such as scheduling.     If Rgeinaldo continues the pregnancy, we will likely see her for another ultrasound around 15-16 weeks, an anatomy ultrasound at 18-20 weeks and a fetal echocardiogram  at 20-22 weeks.     Pregnancy History:   /Parity:    Age at Delivery: 31 year old  CATIE: 2022, by Ultrasound   Gestational Age: 12w1d    Risk Assessment for Chromosome Conditions:   We explained that the risk for fetal chromosome abnormalities increases with maternal age. We discussed specific features of common chromosome abnormalities, including Down syndrome, trisomy 13, trisomy 18, and sex chromosome trisomies.      At age 31 at midtrimester, the risk to have a baby with Down syndrome is 1 in 597.    At age 31 at midtrimester, the risk to have a baby with any chromosome abnormality is 1 in 299.     We reviewed the cystic hygroma finding in today's ultrasound.     Nuchal translucency refers to a region between the skin and soft tissues behind the cervical spine. This space is expected to have an amount of fluid between the 10th and 14th weeks of gestation and is considered normal below a defined threshold. The nuchal translucency measurement is taken when the fetus measures a crown-rump length between 36 to 84 mm which corresponds to approximately the 10th to 14 week of gestation. Typically any measurement over 3.0 mm or the 95th percentile is concerning.     Increased nuchal translucency has been associated with an increased risk for aneuploidy, heart defects, and other more rare genetic conditions. Approximately 20-65% of babies who have an increased nuchal translucency on ultrasound may have an associated chromosome abnormality, most commonly Down syndrome or Rajan syndrome. As the measurement increases the chance of aneuploidy, fetal death, and major anomalies increases.  Increased nuchal translucency has been associated with a 5-20% risk for a heart defect.     Increased nuchal translucency can resolve spontaneously by the second trimester and this is reassuring in the context of a normal euploid fetus. However, due to the finding of an increased nuchal translucency, extra ultrasounds and  possibly a fetal echocardiogram may still be indicated. If the increased nuchal translucency remains or progresses there is concern for an underlying abnormality even in the context of normal screening or diagnostic chromosome analysis.     A cystic hygroma is a congenital malformation resulting from lymph accumulation and are often large in size and extend further down the length of the fetus than an increased nuchal translucency. Cystic hygromas are generally more concerning than an increased nuchal translucency. The mean size of a first trimester cystic hygroma is 8mm with some measuring over 30mm. Cystic hygromas can be septated or simple. Cystic hygromas are associated with an increased risk for aneuploidy and structural malformations which can increase the risk for miscarriages, hydrops, fetal demise, and  death.  A septated cystic hygroma does have increased risk for aneuploidy compared to simple. Furthermore, one third of euploid fetuses with first trimester septated cystic hygromas have major structural anomalies, primarily cardiac and skeletal. In comparison, over 80% of euploidy fetuses with first trimester simple cystic hygromas have them resolve within four weeks and are phenotypically normal     We discussed the options of screening by NIPT or diagnostic options such as a CVS or amniocentesis. Laurensybil elected to proceed with CVS.    Testing Options:   We discussed the following options:    Chorionic villus sampling (CVS)    Invasive procedure typically performed in the first trimester by which placental villi are obtained for the purpose of chromosome analysis and/or other prenatal genetic analysis    Diagnostic results; >99% sensitivity for fetal chromosome abnormalities    Cannot test for open neural tube defects; maternal serum AFP after 15 weeks is recommended    The risk of pregnancy loss association with CVS is generally estimated to be 1/300 to 1/500.     We reviewed the chorionic  villus sampling consent form as well as the genetic testing consent form. All questions were answered to their apparent satisfaction. The following testing was ordered on the prenatal sample:    FISH preliminary analysis with results available in 24-72 hours     If FISH results are abnormal- reflex to chromatosome analysis with results likely taking 7-10 days after the FISH results    If FISH results are normal-reflex to microarray analysis with results likely taking 7-10 days after the FISH results.       We discussed that FISH results are considered preliminary with chromosome analysis or a microarray result determined final. There is a less than 1% chance for FISH results to be discordant from the final results. We often encourage patients to make pregnancy decisions based on those final results but understand that some patients may feel comfortable making those after FISH given the context.       Due to the sample being placental in origin, rather than fetal, there is a less than 1% chance the results will be discordant from the fetus. Given a positive result this chance may be lower if ultrasound anomalies or aneuploidy markers are detected. This chance can increase if there are mosaic results, discussed below.       Mosaic genetic changes are common in the placenta. In these cases the genetic change, like an extra chromosome, is present in some cells but not all. Even if a chance is mosaic this can be enough to cause a non-invasive prenatal screen (which is based on the placenta cell-free DNA) to be abnormal. The fetus could also be affected, potentially also mosaic, or not at all. Mosaicism in the placenta is more common for some chromosome conditions over others. Furthermore, if the mosaicism is found the chance that the result is concordant with the fetus differs for each condition.     For trisomy 21 (Down syndrome) there is an estimated 2% chance of confined placental mosaicism. In those cases, there was  a 44% chance of confirming trisomy 21 in the fetus.       These results will be available in [facility].      We will contact her to discuss the results, and a copy will be forwarded to the office of the referring OB provider.    We reviewed the benefits and limitations of this testing.  Screening tests provide a risk assessment specific to the pregnancy for certain fetal chromosome abnormalities, but cannot definitively diagnose or exclude a fetal chromosome abnormality.  Follow-up genetic counseling and consideration of diagnostic testing is recommended with any abnormal screening result.      It was a pleasure to be involved with Reginaldo s care. Face-to-face time of the meeting was 45 minutes.    Cindi Snow MS, [facility]  Licensed Genetic Counselor   [facility]  Maternal Fetal Medicine  [email or web address]  [email or web address]  Office: [phone]  Pager [phone]  M: [phone]   Fax: [phone]

## 2022-01-25 NOTE — NURSING NOTE
Pt here for Chorionic Villi Sampling d/t cystic hygroma. See genetic counseling dictation.  After consent signed and TimeOut completed, Dr. Christiansen withdrew adequate villi x1  transabdominal pass.  Maternal cell contamination bloodwork drawn.  Pt is RH positive.  MD reviewed blood type and screen and Rhogam ordered. Rhogam not given today. Discharge teaching completed and questions answered.   Patient reports minimal pain.  Pt discharged ambulatory and stable.  Charge tech pager called to notify of specimen, specimen transported to [facility] and handed off to [facility].  Work-aid completed, signed and accompanied specimen.  Dayanara Ward RN

## 2022-01-26 NOTE — PROGRESS NOTES
"Please see \"Imaging\" tab under \"Chart Review\" for details of today's visit.    Guerline Christiansen    "

## 2022-01-28 ENCOUNTER — TELEPHONE (OUTPATIENT)
Dept: MATERNAL FETAL MEDICINE | Facility: CLINIC | Age: 32
End: 2022-01-28
Payer: COMMERCIAL

## 2022-01-28 LAB — CHROM ANALY INTERPHASE CVS FISH-IMP: NORMAL

## 2022-01-28 NOTE — TELEPHONE ENCOUNTER
Called Reginaldo to discuss the results from her chorionic villus sampling earlier this week. Reginaldo's pregnancy was diagnosed with skin edema at 9w3d and 11w6d on outside scans and confirmed at the time of her CVS. After discussing the cystic hygroma and pleural effusions, Reginaldo elected for CVS.     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     We reviewed chromosomal inheritance. I explained that we typically have 46 chromosomes total and that chromosomes come in pairs. We inherit one copy of each chromosome from our mother and one copy of each chromosome from our father. Chromosomes are numbered 1-22 and these are the same for men and women. The 23rd pair of chromosomes is the sex chromosomes and these determine our gender. Most women have two X chromosomes and most men have one X and one Y chromosome. The presence of a Y chromosome early in development results in development as a male. If there is no Y chromosome present, an embryo develops as a female.    We discussed monosomy X, also called Rajan syndrome. This is a chromosome difference that occurs in about 1 in 2000 females. The presence of 45, X causes the symptoms of Rajan syndrome. The features of Rajan syndrome can include: shorter stature, absence of the ovaries and infertility, learning trouble, webbing at the neck, increased risk for thyroid trouble, increased risk for type ll diabetes, and congenital heart defects. The features or symptoms are variable; it is unlikely that a female with monosomy X would have each and every symptom. However, short stature and infertility or lack of menstrual cycles are consistent findings in females with Rajan syndrome.     We discussed that monosomy X is a common chromosome abnormality in pregnancy losses. In fact, approximately 10% of spontaneous abortions that have testing have a finding of monosomy X. In addition, approximately 99% of babies with monosomy X  will not survive to birth. The majority of these pregnancy losses occur in the first trimester. Those affected babies that do survive to birth likely have less severe features or may be mosaic which means some of their cells have two copies of X (46,XX) and others only have one copy of X (45,X)    There is limited research regarding the diagnostic value of sonographic findings in fetuses with Rajan syndrome. One study of 69 reported cases of Rajan syndrome suggests that 29.8% of affected fetuses have a sign detected in the first trimester including an increased NT (>3mm) and a nuchal cystic hygroma. However, most cases of cystic hygroma were diagnosed in the second trimester. Other sonographic findings in the second trimester inlcude hydrops, ventriculomegaly, renal abnormalities, short femur, choroid plexus cyst, echogenic bowel, and echogenic intracardial focus.Congenital heart defects were diagnosed in 13% of cases, most commonly aortic coarctation. Overall 68.1% of affected fetuses had findings on sonography while 31.9% did not. While this study is limited by small sample size it does support the value of sonographic findings to increase the detection rate of Rajan syndrome but not be diagnostic. [Susannah et al., 2006]     Unfortunately, the ultrasound findings and this diagnosis of monosomy X are associated with a high chance of pregnancy loss. If the pregnancy continues we will recommend another ultrasound with [facility] in a few weeks. Later in pregnancy we will also do a comprehensive ultrasound and fetal echocardiogram.     Reginaldo and her partner are undecided about how they would like to proceed. They may be interested in completing the 24 hour womens right to know consent on Monday.     Plan: Dr. Christiansen will call them Monday to discuss the prognosis, next steps in the pregnancy and/or termination    Cindi Snow MS, [facility]  Licensed Genetic Counselor   [facility] Fetal  Medicine  [email or web address]  [email or web address]  Office: [phone]  Pager [phone]  [facility]: [phone]   Fax: [phone]

## 2022-02-01 ENCOUNTER — MYC MEDICAL ADVICE (OUTPATIENT)
Dept: ALLERGY | Facility: CLINIC | Age: 32
End: 2022-02-01
Payer: COMMERCIAL

## 2022-02-01 ENCOUNTER — TELEPHONE (OUTPATIENT)
Dept: MATERNAL FETAL MEDICINE | Facility: CLINIC | Age: 32
End: 2022-02-01
Payer: COMMERCIAL

## 2022-02-01 NOTE — TELEPHONE ENCOUNTER
Called Reginaldo to connect after she complete the 24 hour womens right to know consent with Dr. Christiansen yesterday.     We discussed insurance coverage was verified. Reginaldo's primary insurance through [facility] [employer] [facility] [place] will NOT cover the termination of pregnancy unless maternal life is in danger, which cannot technically be argued in this case. However, Reginaldo informed me she also has active  care MA. This was verified with the financial counselors. Once Reginaldo's D&C is scheduled financial counselor will need to be notified so they can ensure her primary billed insurance is the MA AND the MA waiver will need to be completed by the physician doing the procedure.     Since Reginaldo is a patient of [facility], she will be referred back to those providers for the D&C.       Cindi Snow MS, [facility]  Licensed Genetic Counselor   [facility]  Maternal Fetal Medicine  [email or web address]  [email or web address]  Office: [phone]  Pager [phone]  M: [phone]   Fax: [phone]

## 2022-02-02 ENCOUNTER — DOCUMENTATION ONLY (OUTPATIENT)
Dept: MATERNAL FETAL MEDICINE | Facility: CLINIC | Age: 32
End: 2022-02-02
Payer: COMMERCIAL

## 2022-02-02 NOTE — TELEPHONE ENCOUNTER
Called [facility], and provided warm hand off to schedule patient's D&C. Patient was seen in [facility] and had a CVS due to presence of cystic hygroma. Preliminary CVS (FISH) results consistent with a fetal diagnosis of Monosomy X.     Sylvia Tomlinson MS, [facility]  Maternal Fetal Medicine  [facility]  Phone:[phone]

## 2022-02-04 ENCOUNTER — TELEPHONE (OUTPATIENT)
Dept: MATERNAL FETAL MEDICINE | Facility: CLINIC | Age: 32
End: 2022-02-04
Payer: COMMERCIAL

## 2022-02-04 ENCOUNTER — PREP FOR PROCEDURE (OUTPATIENT)
Dept: OBGYN | Facility: CLINIC | Age: 32
End: 2022-02-04
Payer: COMMERCIAL

## 2022-02-04 DIAGNOSIS — Q96.9 MONOSOMY X: Primary | ICD-10-CM

## 2022-02-04 NOTE — PROGRESS NOTES
Reginaldo Ferraro is a 31 year old  at 13w4d    CATIE: 22  Fetal Diagnosis: Cystic hygroma, CVS with FISH consistent with monosomy X  Ob History:  x 1  Referral provider: ELAINE MADERA    Surgery planning:  -- WRTK: done 22  -- Miso: yes  -- Osmotic dilators: likely no, if scheduled prior to 18w  -- Cremation: unknown  -- Cytogenetics: unkonwn    Pre-op orders placed.  MD Nathanael

## 2022-02-04 NOTE — TELEPHONE ENCOUNTER
Called Reginaldo with results from her CVS chromosome result. We previously discussed positive FISH results which determined a large number, if not all, of the cells to be monosomy X meaning the presence of only one X chromosome. We discussed the diagnosis of monosomy X did likely explain the findings of cystic hygroma and pleural effusions as those are common findings for monosomy X (Rajan syndrome). We discussed that fetuses affected with monosomy X have poor outcomes and the majority, close to 99%, of those pregnancy will end in a miscarriage at some point during the pregnancy. Even without the genetic diagnosis, the ultrasound findings alone have a high chance of pregnancy loss.     The chromosome results (below) also found cells with monosomy X but also found cells with two normal X chromosomes. The condition of monosomy X is not uncommon to find mosaic in either the placenta or fetus. Most cases of monosomy X are thought to have some level of mosaicism although some cases are undetectable.     [email or web address] is now performing maternal cell contamination studies to determine if any of the normal XX cells are due to maternal contamination. Those results should be available this coming Wednesday or Thursday and will be communicated with Reginaldo when available. She had no further questions.     Test Performed: Chromosome Analysis  Specimen Type: Chorionic Villi  Indication for Testing: Cystic hygroma, Pleural effusion  Number of cells counted: 26  Number of cells analyzed: 26  Number of cells karyotyped: 26  [facility]: 400  Banding method: G-Banding  -------------------------------------------------------  RESULT  Abnormal Karyotype  Monosomy X with X Chromosome Mosaicism  45,X[6]/46,XX[20]  ---------------------------------------------------------  INTERPRETATION  This analysis detected two cell lines. One cell line showed a  single X chromosome (monosomy) without a second sex chromosome  in 6/26  cells (23%). The remaining cells showed a normal female  karyotype.  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 Due to the nature of the sample submitted (CVS), it  is uncertain whether this abnormality is confined to the  placental tissues, represents true fetal mosaicism, or complete  monosomy X with maternal cell contamination. A follow-up  amniocentesis is recommended to determine if this abnormality is  present in the fetus (see recommendations). If representative of  confined placental mosaicism (CPM), placental function may be  impacted.  Complete monosomy X (45,X) is consistent with a clinical  diagnosis of Rajan syndrome (TS). However, individuals mosaic  for monosomy X and a 46,XX cell line can have a phenotype that  ranges from normal female to classic TS. Based on prospective  studies, the majority of 45,X/46,XX mosaic individuals diagnosed  prenatally have a normal phenotype at birth, and if features of  TS exist, they tend to be mild. Features associated with TS may  include kidney and cardiac defects, lymphedema, a short and  webbed neck, broad chest with widely spaced nipples, short  stature, and gonadal dysgenesis. Although fertility is often  spared in women with monosomy X mosaicism, premature ovarian  failure is common. Intelligence is generally within the normal  range, but delayed motor skills/poor coordination as well as  difficulties with math and social skills may be present.    Cindi Snow MS, [facility]  Licensed Genetic Counselor   [facility]  Maternal Fetal Medicine  [email or web address]  [email or web address].org  Office: [phone]  Pager [phone]  [facility]: [phone]   Fax: [phone]

## 2022-02-07 ENCOUNTER — HOSPITAL ENCOUNTER (OUTPATIENT)
Facility: CLINIC | Age: 32
End: 2022-02-07
Attending: OBSTETRICS & GYNECOLOGY | Admitting: OBSTETRICS & GYNECOLOGY
Payer: COMMERCIAL

## 2022-02-07 ENCOUNTER — TELEPHONE (OUTPATIENT)
Dept: OBGYN | Facility: CLINIC | Age: 32
End: 2022-02-07
Payer: COMMERCIAL

## 2022-02-07 DIAGNOSIS — Z11.59 ENCOUNTER FOR SCREENING FOR OTHER VIRAL DISEASES: Primary | ICD-10-CM

## 2022-02-07 DIAGNOSIS — Z33.2 LEGAL ABORTION: Primary | ICD-10-CM

## 2022-02-07 RX ORDER — MISOPROSTOL 200 UG/1
TABLET ORAL
Qty: 2 TABLET | Refills: 0 | Status: ON HOLD | OUTPATIENT
Start: 2022-02-07 | End: 2022-02-16

## 2022-02-07 NOTE — TELEPHONE ENCOUNTER
"Referral received from [facility] for D&E.  She is being [facility] because of cystic hygroma and preliminary CVS results consistent with Monosomy X  Gestational age 14 0/7 weeks  EDD8/8/22  Medical records have been received    An email has been sent to Jan/financial counselor to verify insurance.  Verified covered service  WTRK consent completed:1/31/22  If MA, the Medical Necessity form is complete and has been priority scanned to the patient's chart.    Reginaldo is scheduled for 2/9 at 1030.  Reviewed NPO instructions, need for covid testing (she plans to do through \"[facility]\", and states that they will provide PCR results next day).  She will route results via [employer], and bring results day of surgery as well.  Reviewed shower with antibacterial soap.    Directions provided to surgery center and parking.    She does not want to speak with a  at this time, declines day of procedure.    Rx reviewed and sent to local pharmacy for .    Reginaldo denies further questions at this time, has direct number for triage if concerns arise          "

## 2022-02-07 NOTE — TELEPHONE ENCOUNTER
Scheduled surgery, note placed in chart per RN checklist.    Type of surgery: DILATION AND EVACUATION, UTERUS  Location of surgery: [facility]  Date and time of surgery: 2/9/22  Surgeon: Mitzi Farias  Pre-Op Appt Date: GERALDINE  Post-Op Appt Date: NA   Packet sent out: Not Applicable - RN to call  Pre-cert/Authorization completed:  Yes  Date: 2/7/22    Telma Rothman  Clinical Services Assistant

## 2022-02-08 ENCOUNTER — ANESTHESIA EVENT (OUTPATIENT)
Dept: SURGERY | Facility: CLINIC | Age: 32
End: 2022-02-08
Payer: COMMERCIAL

## 2022-02-08 RX ORDER — OXYCODONE HYDROCHLORIDE 5 MG/1
5 TABLET ORAL EVERY 4 HOURS PRN
Status: CANCELLED | OUTPATIENT
Start: 2022-02-08

## 2022-02-08 RX ORDER — FENTANYL CITRATE 50 UG/ML
25 INJECTION, SOLUTION INTRAMUSCULAR; INTRAVENOUS EVERY 5 MIN PRN
Status: CANCELLED | OUTPATIENT
Start: 2022-02-08

## 2022-02-08 RX ORDER — HYDROMORPHONE HYDROCHLORIDE 1 MG/ML
0.2 INJECTION, SOLUTION INTRAMUSCULAR; INTRAVENOUS; SUBCUTANEOUS EVERY 5 MIN PRN
Status: CANCELLED | OUTPATIENT
Start: 2022-02-08

## 2022-02-08 RX ORDER — ONDANSETRON 2 MG/ML
4 INJECTION INTRAMUSCULAR; INTRAVENOUS EVERY 30 MIN PRN
Status: CANCELLED | OUTPATIENT
Start: 2022-02-08

## 2022-02-08 RX ORDER — ONDANSETRON 4 MG/1
4 TABLET, ORALLY DISINTEGRATING ORAL EVERY 30 MIN PRN
Status: CANCELLED | OUTPATIENT
Start: 2022-02-08

## 2022-02-08 RX ORDER — SODIUM CHLORIDE, SODIUM LACTATE, POTASSIUM CHLORIDE, CALCIUM CHLORIDE 600; 310; 30; 20 MG/100ML; MG/100ML; MG/100ML; MG/100ML
INJECTION, SOLUTION INTRAVENOUS CONTINUOUS
Status: CANCELLED | OUTPATIENT
Start: 2022-02-08

## 2022-02-08 RX ORDER — FENTANYL CITRATE 50 UG/ML
25 INJECTION, SOLUTION INTRAMUSCULAR; INTRAVENOUS
Status: CANCELLED | OUTPATIENT
Start: 2022-02-08

## 2022-02-08 RX ORDER — LIDOCAINE 40 MG/G
CREAM TOPICAL
Status: CANCELLED | OUTPATIENT
Start: 2022-02-08

## 2022-02-09 ENCOUNTER — MYC MEDICAL ADVICE (OUTPATIENT)
Dept: FAMILY MEDICINE | Facility: CLINIC | Age: 32
End: 2022-02-09
Payer: COMMERCIAL

## 2022-02-09 ENCOUNTER — ANESTHESIA (OUTPATIENT)
Dept: SURGERY | Facility: CLINIC | Age: 32
End: 2022-02-09
Payer: COMMERCIAL

## 2022-02-09 NOTE — TELEPHONE ENCOUNTER
Reginaldo's boyfriend called this AM to report that Reginaldo developed a cough last night, and was having difficulty breathing last night.  She is currently in ED at [facility] and was given prednisone.    Procedure cancelled.  They will call after discharge to reschedule.

## 2022-02-10 ENCOUNTER — TELEPHONE (OUTPATIENT)
Dept: MATERNAL FETAL MEDICINE | Facility: CLINIC | Age: 32
End: 2022-02-10
Payer: COMMERCIAL

## 2022-02-10 LAB — MCCMA SPECIMEN: NORMAL

## 2022-02-10 NOTE — TELEPHONE ENCOUNTER
Called Reginaldo to discuss the final results from her CVS.    We previously discussed positive FISH results which determined a large number, if not all, of the cells to be monosomy X meaning the presence of only one X chromosome. We discussed the diagnosis of monosomy X did likely explain the findings of cystic hygroma and pleural effusions as those are common findings for monosomy X (Rajan syndrome). We discussed that fetuses affected with monosomy X have poor outcomes and the majority, close to 99%, of those pregnancy will end in a miscarriage at some point during the pregnancy. Even without the genetic diagnosis, the ultrasound findings alone have a high chance of pregnancy loss.      The chromosome results also found cells with monosomy X but also found cells with two normal X chromosomes. In fact, 23% of the cells were monosomy X and the remaining were two X chromosomes. The condition of monosomy X is not uncommon to find mosaic in either the placenta or fetus. Most cases of monosomy X are thought to have some level of mosaicism although some cases are undetectable.     Today, the maternal cell contamination studies showed 10% and 7% (in two separate sample extractions) of cells were maternal in origin. Therefore, it is possible that a large portion of the normal XX cells in the chromosome sample were maternal normal XX cells. Also possible, is a mosaic placenta and fetus with some X and some XX and a contamination of some maternal XX cells. Overall, this suggests the true mosaic level likely is higher than 23% abnormal. Regardless, the ultrasound findings are still significant.     Reginaldo had no further questions. Her D&C was originally scheduled for 02/11/2022 but was cancelled due to bronchospasms. They will work with [facility] to reschedule.           Cindi Snow MS, [facility]  Licensed Genetic Counselor   [facility]  Maternal Fetal Medicine  [email or web address]  [email or web address]  Office:  [phone]  Pager [phone]  [facility]: [phone]   Fax: [phone]

## 2022-02-11 ENCOUNTER — TELEPHONE (OUTPATIENT)
Dept: OBGYN | Facility: CLINIC | Age: 32
End: 2022-02-11

## 2022-02-11 ENCOUNTER — OFFICE VISIT (OUTPATIENT)
Dept: FAMILY MEDICINE | Facility: CLINIC | Age: 32
End: 2022-02-11
Payer: COMMERCIAL

## 2022-02-11 VITALS
DIASTOLIC BLOOD PRESSURE: 71 MMHG | HEIGHT: 61 IN | SYSTOLIC BLOOD PRESSURE: 116 MMHG | BODY MASS INDEX: 26.06 KG/M2 | HEART RATE: 79 BPM | TEMPERATURE: 97.3 F | OXYGEN SATURATION: 97 % | WEIGHT: 138 LBS

## 2022-02-11 DIAGNOSIS — J98.01 ACUTE BRONCHOSPASM: Primary | ICD-10-CM

## 2022-02-11 DIAGNOSIS — J98.01 BRONCHOSPASM, ACUTE: Primary | ICD-10-CM

## 2022-02-11 DIAGNOSIS — O35.8XX0 FETAL CYSTIC HYGROMA, SINGLE GESTATION: ICD-10-CM

## 2022-02-11 PROCEDURE — 99495 TRANSJ CARE MGMT MOD F2F 14D: CPT | Performed by: FAMILY MEDICINE

## 2022-02-11 ASSESSMENT — MIFFLIN-ST. JEOR: SCORE: 1278.34

## 2022-02-11 NOTE — Clinical Note
Future Appointments  2/15/2022  8:45 AM    Miquel Rutledge MD [facility]  2/17/2022  12:40 PM   Polo Mccain MD [facility]  5/10/2022  2:00 PM     PFL C                   [facility]  5/10/2022  3:00 PM    Uzair Machado MD [facility]

## 2022-02-11 NOTE — TELEPHONE ENCOUNTER
Reginaldo was seen by her PCP today as follow up to ED visit.  She is given clearance to reschedule her D&E procedure anytime after 2/14/22.     normal range of motion

## 2022-02-11 NOTE — TELEPHONE ENCOUNTER
----- Message from Bee Urbano RN sent at 2/11/2022  4:03 PM CST -----  Regarding: calling adolfo back about procedure

## 2022-02-11 NOTE — LETTER
[facility]  [address]  [address]  [facility] [address]  [phone]          February 11, 2022    RE:  Reginaldo Ferraro                                                                                                                                                       [address] [facility] [address]            To whom it may concern:    Reginaldo Ferraro is under our professional care for    Acute bronchospasm and Fetal cystic hygroma .   She needs to rest at home until next week when she will see her PCP and gynecologist      Sincerely,        Constantino Amaya MD

## 2022-02-11 NOTE — Clinical Note
Hi Dr. Machado - this new pt will be ~28 wks pregnant when she sees you for new pt appt on 5/10 (acute bronchospasm).  We will set her up for pfts, but I didn't order CXR, let me know if you need one.     Co-ordinators - Please link pft prior to appt with Dr. Machado.      Thanks, Ho

## 2022-02-14 ENCOUNTER — OFFICE VISIT (OUTPATIENT)
Dept: FAMILY MEDICINE | Facility: CLINIC | Age: 32
End: 2022-02-14
Payer: COMMERCIAL

## 2022-02-14 VITALS
WEIGHT: 138 LBS | DIASTOLIC BLOOD PRESSURE: 68 MMHG | SYSTOLIC BLOOD PRESSURE: 107 MMHG | TEMPERATURE: 98 F | HEART RATE: 74 BPM | OXYGEN SATURATION: 96 % | BODY MASS INDEX: 26.07 KG/M2

## 2022-02-14 DIAGNOSIS — O09.91 SUPERVISION OF HIGH RISK PREGNANCY IN FIRST TRIMESTER: ICD-10-CM

## 2022-02-14 DIAGNOSIS — J98.01 ACUTE BRONCHOSPASM: Primary | ICD-10-CM

## 2022-02-14 DIAGNOSIS — J45.20 MILD INTERMITTENT ASTHMA WITHOUT COMPLICATION: ICD-10-CM

## 2022-02-14 PROCEDURE — 99213 OFFICE O/P EST LOW 20 MIN: CPT | Performed by: PHYSICIAN ASSISTANT

## 2022-02-14 RX ORDER — BENZONATATE 200 MG/1
200 CAPSULE ORAL 3 TIMES DAILY PRN
Qty: 30 CAPSULE | Refills: 0 | Status: SHIPPED | OUTPATIENT
Start: 2022-02-14 | End: 2022-05-27

## 2022-02-14 ASSESSMENT — PATIENT HEALTH QUESTIONNAIRE - PHQ9: SUM OF ALL RESPONSES TO PHQ QUESTIONS 1-9: 12

## 2022-02-14 NOTE — PROGRESS NOTES
Chief Complaint - Nasal symptoms, cough recheck    History of Present Illness - Reginaldo Ferraro is a 31 year old female who returns for evaluation of nasal symptoms. The patient describes symptoms of nasal obstruction, only occasional drainage for the past year. It came on after an upper respiratory infection. The patient notes symptoms on both sides. She has some cough she feels is due to dryness. The patient notes no allergies. The treatments seem to not help. No prior history of nasal surgery. no history of smoking. She has poor smell. No family history of nose problems. She has lots of coughing fits.     Her chest x-ray was clear.  Her CT sinus, images personally reviewed with the patient, does show some mucosal thickening in the sinuses and narrowing of the right ostiomeatal complex.  That is consistent with in clinic nasal endoscopy last visit that showed more edema in her right middle meatus. She also has significant left septal deviation, and conchal bullosa causing nasal obstruction. I had her take a course of prednisone and try Singulair. The prednisone made her feel sick, nauseaus, stomach pain, dizziness and headaches. She still has nasal congestion. She can't breath through her nose at all. I reviewed her PFTs from 12/23/21 that showed moderately severe airflow obstruction consistent with asthma. She was prescribed inhalers and this has helped her cough. She underwent allergy testing 1/4/21 and I reviewed the results showing allergies to dust mites, grasses, dog, and severe to cat.    She has some nasal dryness and bleeding. She feels she can't breath through nose. Sometimes gets discolored drainage.    Past Medical History -   Patient Active Problem List   Diagnosis     CARDIOVASCULAR SCREENING; LDL GOAL LESS THAN 160     Cervical high risk HPV (human papillomavirus) test positive     Insomnia, unspecified insomnia     Vitamin D deficiency     Mild episode of recurrent major depressive disorder (H)      Adjustment disorder with depressed mood     Hypokalemia     Influenza A with pneumonia     Mixed personality disorder (H)     Sepsis (H)     Supervision of high risk pregnancy in first trimester     Fetal cystic hygroma, single gestation       Current Medications -   Current Outpatient Medications:      albuterol (PROVENTIL) (2.5 MG/3ML) 0.083% neb solution, Take 1 vial (2.5 mg) by nebulization every 6 hours as needed for shortness of breath / dyspnea or wheezing (Patient not taking: Reported on 1/20/2022), Disp: 90 mL, Rfl: 1     budesonide (PULMICORT FLEXHALER) 180 MCG/ACT inhaler, Inhale 1 puff into the lungs 2 times daily (Patient not taking: Reported on 1/20/2022), Disp: 1 each, Rfl: 1     diphenhydrAMINE (BENADRYL) 25 MG capsule, Take 1 capsule (25 mg) by mouth every 6 hours as needed for itching or allergies, Disp: 90 capsule, Rfl: 1     fluticasone-salmeterol (ADVAIR) 250-50 MCG/DOSE inhaler, Inhale 1 puff into the lungs every 12 hours, Disp: 60 each, Rfl: 0     misoprostol (CYTOTEC) 200 MCG tablet, Take 3 hours before procedure. Place 1 tab between cheek and gum on the right, and 2nd tab on the left. Dissolve for 30 min, then swallow., Disp: 2 tablet, Rfl: 0     ondansetron (ZOFRAN-ODT) 4 MG ODT tab, Take 1 tablet (4 mg) by mouth every 8 hours as needed for nausea, Disp: 10 tablet, Rfl: 0     Prenatal Vit-Fe Fumarate-FA (PRENATAL MULTIVITAMIN W/IRON) 27-0.8 MG tablet, Take 1 tablet by mouth daily, Disp: 90 tablet, Rfl: 3     simethicone (MYLICON) 125 MG chewable tablet, Take 1 tablet (125 mg) by mouth 2 times daily, Disp: 30 tablet, Rfl: 1     simethicone 80 MG TABS, Take 0.5 tablets by mouth 4 times daily as needed (cramping), Disp: 60 tablet, Rfl: 1     VENTOLIN  (90 Base) MCG/ACT inhaler, Inhale 1-2 puffs into the lungs every 4 hours as needed for shortness of breath / dyspnea or wheezing, Disp: 18 g, Rfl: 1     vitamin B6 (PYRIDOXINE) 50 MG TABS, Take 1 tablet (50 mg) by mouth 3 times daily  as needed (nausea), Disp: 90 tablet, Rfl: 1    Current Facility-Administered Medications:      doxycycline (VIBRAMYCIN) 200 mg in sodium chloride 0.9 % 100 mL intermittent infusion, 200 mg, Intravenous, Q12H, Vianey Domingo MD    Allergies -   No Known Allergies    Social History -   Social History     Socioeconomic History     Marital status: Single     Spouse name: partner- Pastor     Number of children: 0     Years of education: Not on file     Highest education level: Not on file   Occupational History     Occupation:      Employer: UNKNOWN     Comment: [place]     Occupation: [employer]   Social Needs     Financial resource strain: Not on file     Food insecurity     Worry: Not on file     Inability: Not on file     Transportation needs     Medical: Not on file     Non-medical: Not on file   Tobacco Use     Smoking status: Never Smoker     Smokeless tobacco: Never Used   Substance and Sexual Activity     Alcohol use: Yes     Comment: Socially     Drug use: No     Sexual activity: Yes     Partners: Male     Birth control/protection: Pull-out method, Condom, Injection   Lifestyle     Physical activity     Days per week: Not on file     Minutes per session: Not on file     Stress: Not on file   Relationships     Social connections     Talks on phone: Not on file     Gets together: Not on file     Attends Gnosticism service: Not on file     Active member of club or organization: Not on file     Attends meetings of clubs or organizations: Not on file     Relationship status: Not on file     Intimate partner violence     Fear of current or ex partner: Not on file     Emotionally abused: Not on file     Physically abused: Not on file     Forced sexual activity: Not on file   Other Topics Concern     Parent/sibling w/ CABG, MI or angioplasty before 65F 55M? No   Social History Narrative     Not on file       Family History -   Family History   Problem Relation Age of Onset     Diabetes  Paternal Grandmother      Hypertension Paternal Grandmother      Respiratory Paternal Grandmother         asthma     Heart Disease Father      Physical Exam  General - The patient is in no distress. Alert and oriented to person and place, answers questions and cooperates with examination appropriately.   Neurologic - CN II-XII are grossly intact. No focal neurologic deficits.   Voice and Breathing - The patient was breathing comfortably without the use of accessory muscles. There was no wheezing, stridor, or stertor.  The patients voice was clear and strong.  Eyes - Extraocular movements intact. Sclera were not icteric or injected, conjunctiva were pink and moist.  Neck -  Soft, non-tender. Palpation of the occipital, submental, submandibular, internal jugular chain, and supraclavicular nodes did not demonstrate any abnormal lymph nodes or masses. No parotid masses. Palpation of the thyroid was soft and smooth, with no nodules or goiter appreciated.  The trachea was mobile and midline.  Nose - External contour is symmetric, no gross deflection or scars.  Nasal mucosa is pink and moist with clear mucus. However the turbinates are hypertrophic. The septum was deviated to the left.  No polyps, masses, or purulence noted on examination anteriorly.     A/P -     ICD-10-CM    1. Nasal obstruction  J34.89 Case Request: FUNCTIONAL ENDOSCOPIC SINUS SURGERY, with bilateral maxillary antrostomies and bilateral michael bullosa reduction, WITH NASAL SEPTOPLASTY and bilateral inferior turbinate reduction     Case Request: FUNCTIONAL ENDOSCOPIC SINUS SURGERY, with bilateral maxillary antrostomies and bilateral michael bullosa reduction, WITH NASAL SEPTOPLASTY and bilateral inferior turbinate reduction   2. Nasal septal deviation  J34.2 Case Request: FUNCTIONAL ENDOSCOPIC SINUS SURGERY, with bilateral maxillary antrostomies and bilateral michael bullosa reduction, WITH NASAL SEPTOPLASTY and bilateral inferior turbinate reduction      Case Request: FUNCTIONAL ENDOSCOPIC SINUS SURGERY, with bilateral maxillary antrostomies and bilateral michael bullosa reduction, WITH NASAL SEPTOPLASTY and bilateral inferior turbinate reduction   3. Nasal turbinate hypertrophy  J34.3 Case Request: FUNCTIONAL ENDOSCOPIC SINUS SURGERY, with bilateral maxillary antrostomies and bilateral michael bullosa reduction, WITH NASAL SEPTOPLASTY and bilateral inferior turbinate reduction     Case Request: FUNCTIONAL ENDOSCOPIC SINUS SURGERY, with bilateral maxillary antrostomies and bilateral michael bullosa reduction, WITH NASAL SEPTOPLASTY and bilateral inferior turbinate reduction   4. Chronic maxillary sinusitis  J32.0 Case Request: FUNCTIONAL ENDOSCOPIC SINUS SURGERY, with bilateral maxillary antrostomies and bilateral michael bullosa reduction, WITH NASAL SEPTOPLASTY and bilateral inferior turbinate reduction     Case Request: FUNCTIONAL ENDOSCOPIC SINUS SURGERY, with bilateral maxillary antrostomies and bilateral michael bullosa reduction, WITH NASAL SEPTOPLASTY and bilateral inferior turbinate reduction   5. Encounter for preoperative screening laboratory testing for COVID-19 virus  Z01.812 Asymptomatic COVID-19 Virus (Coronavirus) by PCR    Z20.822        Reginaldo Ferraro is a 31 year old female with nasal obstruction due to turbinate hypertrophy and septal deviation and allergic rhinitis. She has significant cough due to asthma and likely flaired by chronic rhinosinusitis and postnasal drainage causing cough/reactive airway disease. Her PFTs showed moderately severe airway obstruction. Prednisone helped some, but she didn't tolerate the side effects. Her breathing and cough are better using inhalers. Her nasal obstruction is still a severe problem with little to no airway breathing despite maximal medical treatment.     I recommend surgery in the form of septoplasty and bilateral inferior turbinate reduction with endoscopic sinus surgery to open up her narrowed  ostiomeatal complex bilaterally and remove her michael bullosa. She is considering dorsum hump reduction and I can file this at the time. I did explain that this procedure may not help her cough or other allergy symptoms and asthma symptoms but would be best geared for improving nasal obstruction, opening her airway, and reducing sinusitis.     The basic premise of functional endoscopic sinus surgery was discussed at length.  This included the concept behind restoration of the natural drainage pathways for the paranasal sinuses using the latest minimally invasive, minimally traumatic techniques and instruments.    Risks discussed included the risks on infection, bleeding, the risks of general anesthesia.  Also specific to functional endoscopic sinus surgery, the risks of permanent damage to the eyes/vision, tear ducts, sense of smell, base of skull/brain, and CSF leak were described.  And finally, the possibility that functional endoscopic sinus surgery may not relieve sinus disease, and that there might be continued need for medical management was also discussed.  The patient understood and wished to proceed with scheduling.      Miquel Rutledge MD  Otolaryngology  [facility]

## 2022-02-14 NOTE — PROGRESS NOTES
"  Assessment & Plan     1. Acute bronchospasm    2. Mild intermittent asthma without complication    3. Supervision of high risk pregnancy in first trimester      Will try tessalon, did discuss not studied in pregnancy. Patient aware. No wheezing, use advair and albuterol as needed.   Short term disability filled out regarding her breathing. Patient notes OB will fill out paperwork related to the D&C.              BMI:   Estimated body mass index is 26.07 kg/m  as calculated from the following:    Height as of 2/11/22: 1.549 m (5' 1\").    Weight as of this encounter: 62.6 kg (138 lb).           No follow-ups on file.    Cally Rucker PA-C  [facility] NEGRITA Hair is a 31 year old who presents for the following health issues     History of Present Illness     Asthma:  She presents for follow up of asthma.  She has some cough, some wheezing, and some shortness of breath. She is using a relief medication 2-3 times per day. She does not have a controller medication. Patient is aware of the following triggers: animal dander, cold air, dust mites, emotions, humidity, mold, pollens, strong odors and fumes and upper respiratory infections. The patient has had a visit to the Emergency Room, Urgent Care or Hospital due to asthma since the last clinic visit. She has been to the Emergency Room or Urgent Care 1 time.She has had a Hospitalization 0 times.    She eats 0-1 servings of fruits and vegetables daily.She consumes 0 sweetened beverage(s) daily.She exercises with enough effort to increase her heart rate 10 to 19 minutes per day.  She exercises with enough effort to increase her heart rate 3 or less days per week.   She is taking medications regularly.       Patient is here for Paper work for Short Term Disability    Patient with recent ED visit due to bronchospasm. Improving now, but still with a cough and shortness of breath at times. Just picked up advair today.   If she talks a lot " or has to breath a lot then she is coughing a lot.   Carmen used her albuterol at 9am. It is lasting longer now.   She thinks she had a panic attack at the ED.     Patient also with current pregnancy showing cystic hygroma-planned for D&C, had to cancel due to the bronchospasm.   Rescheduling is planned.       Review of Systems         Objective    /68 (BP Location: Right arm, Patient Position: Chair, Cuff Size: Adult Regular)   Pulse 74   Temp 98  F (36.7  C) (Oral)   Wt 62.6 kg (138 lb)   LMP 10/22/2021   SpO2 96%   BMI 26.07 kg/m    Body mass index is 26.07 kg/m .  Physical Exam   GENERAL: healthy, alert and no distress  RESP: lungs clear to auscultation - no rales, rhonchi or wheezes- harsh coughing fits.   CV: regular rate and rhythm, normal S1 S2, no S3 or S4, no murmur,   PSYCH: mentation appears normal, affect depressed and tearful at times.

## 2022-02-15 ENCOUNTER — TELEPHONE (OUTPATIENT)
Dept: OBGYN | Facility: CLINIC | Age: 32
End: 2022-02-15

## 2022-02-15 ENCOUNTER — OFFICE VISIT (OUTPATIENT)
Dept: OTOLARYNGOLOGY | Facility: CLINIC | Age: 32
End: 2022-02-15
Payer: COMMERCIAL

## 2022-02-15 ENCOUNTER — TELEPHONE (OUTPATIENT)
Dept: OTOLARYNGOLOGY | Facility: CLINIC | Age: 32
End: 2022-02-15

## 2022-02-15 VITALS
SYSTOLIC BLOOD PRESSURE: 109 MMHG | RESPIRATION RATE: 16 BRPM | DIASTOLIC BLOOD PRESSURE: 69 MMHG | HEART RATE: 77 BPM | OXYGEN SATURATION: 96 %

## 2022-02-15 DIAGNOSIS — J34.2 NASAL SEPTAL DEVIATION: ICD-10-CM

## 2022-02-15 DIAGNOSIS — J34.89 NASAL OBSTRUCTION: Primary | ICD-10-CM

## 2022-02-15 DIAGNOSIS — Z01.812 ENCOUNTER FOR PREOPERATIVE SCREENING LABORATORY TESTING FOR COVID-19 VIRUS: Primary | ICD-10-CM

## 2022-02-15 DIAGNOSIS — Z01.812 ENCOUNTER FOR PREOPERATIVE SCREENING LABORATORY TESTING FOR COVID-19 VIRUS: ICD-10-CM

## 2022-02-15 DIAGNOSIS — Z11.52 ENCOUNTER FOR PREOPERATIVE SCREENING LABORATORY TESTING FOR COVID-19 VIRUS: Primary | ICD-10-CM

## 2022-02-15 DIAGNOSIS — J34.3 NASAL TURBINATE HYPERTROPHY: ICD-10-CM

## 2022-02-15 DIAGNOSIS — Z11.52 ENCOUNTER FOR PREOPERATIVE SCREENING LABORATORY TESTING FOR COVID-19 VIRUS: ICD-10-CM

## 2022-02-15 DIAGNOSIS — J32.0 CHRONIC MAXILLARY SINUSITIS: ICD-10-CM

## 2022-02-15 PROCEDURE — 99214 OFFICE O/P EST MOD 30 MIN: CPT | Performed by: OTOLARYNGOLOGY

## 2022-02-15 NOTE — ANESTHESIA PREPROCEDURE EVALUATION
Anesthesia Pre-Procedure Evaluation    Patient: Reginaldo Ferraro   MRN: [id number] : 1990        Preoperative Diagnosis: Monosomy X [Q96.9]    Procedure : Procedure(s):  DILATION AND EVACUATION, UTERUS          Past Medical History:   Diagnosis Date     Abnormal Pap smear of cervix 2011 [facility]     Cervical high risk HPV (human papillomavirus) test positive 2013    10/31/14, 18     Depressive disorder 2011    possibly chronic     Environmental allergies      Migraine      Migraines     headaches with allergies     Moderate major depression 2011     Moderate major depression (H) 2011     NO ACTIVE PROBLEMS      Seasonal allergic rhinitis       Past Surgical History:   Procedure Laterality Date     EYE SURGERY  2013    Lasic surgery     NO HISTORY OF SURGERY       UT BREAST AUGMENTATION        No Known Allergies   Social History     Tobacco Use     Smoking status: Never Smoker     Smokeless tobacco: Never Used   Substance Use Topics     Alcohol use: Not Currently     Comment: Socially      Wt Readings from Last 1 Encounters:   22 62.6 kg (138 lb)        Anesthesia Evaluation            ROS/MED HX  ENT/Pulmonary:     (+) asthma Last exacerbation: 22,      Neurologic:  - neg neurologic ROS     Cardiovascular:     (+) Dyslipidemia -----    METS/Exercise Tolerance:     Hematologic:  - neg hematologic  ROS     Musculoskeletal:  - neg musculoskeletal ROS     GI/Hepatic:  - neg GI/hepatic ROS     Renal/Genitourinary:  - neg Renal ROS     Endo:  - neg endo ROS     Psychiatric/Substance Use: Comment: Depression, mixed personality disorder      Infectious Disease:  - neg infectious disease ROS     Malignancy:  - neg malignancy ROS     Other: Comment: Fetal cystic hygroma              OUTSIDE LABS:  CBC:   Lab Results   Component Value Date    WBC 7.1 2018    WBC 8.3 2015    HGB 12.4 2018    HGB 11.5 (L) 2015    HCT 37.6 2018    HCT 34.2  (L) 11/02/2015     11/16/2018     11/02/2015     BMP: No results found for: NA, POTASSIUM, CHLORIDE, CO2, BUN, CR, GLC  COAGS: No results found for: PTT, INR, FIBR  POC:   Lab Results   Component Value Date    HCG Positive (A) 12/22/2021    HCGS Negative 11/16/2018     HEPATIC: No results found for: ALBUMIN, PROTTOTAL, ALT, AST, GGT, ALKPHOS, BILITOTAL, BILIDIRECT, ZHANG  OTHER:   Lab Results   Component Value Date    TSH 1.13 10/31/2014       Anesthesia Plan    ASA Status:  2      Anesthesia Type: MAC.   Induction: Intravenous.   Maintenance: TIVA.        Consents            Postoperative Care    Pain management: IV analgesics, Oral pain medications, Multi-modal analgesia.   PONV prophylaxis: Ondansetron (or other 5HT-3), Dexamethasone or Solumedrol     Comments:                Josefina Snowden

## 2022-02-15 NOTE — TELEPHONE ENCOUNTER
Left message for patient to call back about covid test as patient was a last minute add on. Two call attempts were made.    Telma Rothman  Clinical Services Assistant

## 2022-02-15 NOTE — TELEPHONE ENCOUNTER
Reginaldo was rescheduled due to a medical issue on her previously scheduled date.    Now scheduled for 2/16/22 at 11AM.  All instructions reviewed.    She has not yet been contacted for covid testing.  Provided phone number to schedule.

## 2022-02-15 NOTE — TELEPHONE ENCOUNTER
RECORDS RECEIVED FROM: [facility], [facility]   DATE RECEIVED: 5.10.22   NOTES STATUS DETAILS   OFFICE NOTE from referring provider Internal 2.11.22 KATRIN Amaya Loxley   OFFICE NOTE from other specialist Care Everywhere 2.14.22, 12.22.21 KATRIN Rucker  12.6.21 SAYRA Mahan  5.13.21 SINDI Tompkins   DISCHARGE SUMMARY from hospital     DISCHARGE REPORT from the ER Care Everywhere 2.9.22 Ulises Rose   MEDICATION LIST Internal    IMAGING  (NEED IMAGES AND REPORTS)     CT SCAN     CHEST XRAY (CXR) In process/ internal 11.25.20 2.9.22 [facility]  12.24.21 [facility]   TESTS     PULMONARY FUNCTION TESTING (PFT) Internal 12.23.21      Action 2.15.22 MJ   Action Taken Requested images from [facility]

## 2022-02-15 NOTE — TELEPHONE ENCOUNTER
Type of surgery: FUNCTIONAL ENDOSCOPIC SINUS SURGERY, with bilateral maxillary antrostomies and bilateral michael bullosa reduction, WITH NASAL SEPTOPLASTY and bilateral inferior turbinate reduction (Bilateral)   CPT 27897,47017,53494,16445   Nasal obstruction J34.89     Nasal septal deviation J34.2     Nasal turbinate hypertrophy J34.3     Chronic maxillary sinusitis J32.0    [facility] of surgery:  [facility]  Date and time of surgery: 4-18-22  Surgeon: Aamir  Pre-Op Appt Date: Patient will schedule  Post-Op Appt Date: 4-26-22 and 5-10-22   Packet sent out: Yes  Pre-cert/Authorization completed: Per [employer]: Procedure Code 1  44082    Reference Number  01  Status  NO AUTH REQUIRED    Medical Policy Information or Criteria  XI-03 Site of Service for Selected Outpatient Procedures: [facility]  IV-167 Occipital Nerve Decompression for Treatment of Chronic Headache    Procedure Code 2  87863    Reference Number  02  Status  NO AUTH REQUIRED    Procedure Code 3  49193    Reference Number  03  Status  NO AUTH REQUIRED    Procedure Code 4  83621    Reference Number  04  Status  NO AUTH REQUIRED    Per [employer] online list, no prior auth required.    Date: 2-15-22    Kaye Resendez  Prior Authorization Dept  [phone]

## 2022-02-15 NOTE — LETTER
2/15/2022         RE: Reginaldo Ferraro  [address] [facility] [address]        Dear Colleague,    Thank you for referring your patient, Reginaldo Ferraro, to the [facility]. Please see a copy of my visit note below.    Chief Complaint - Nasal symptoms, cough recheck    History of Present Illness - Reginaldo Ferraro is a 31 year old female who returns for evaluation of nasal symptoms. The patient describes symptoms of nasal obstruction, only occasional drainage for the past year. It came on after an upper respiratory infection. The patient notes symptoms on both sides. She has some cough she feels is due to dryness. The patient notes no allergies. The treatments seem to not help. No prior history of nasal surgery. no history of smoking. She has poor smell. No family history of nose problems. She has lots of coughing fits.     Her chest x-ray was clear.  Her CT sinus, images personally reviewed with the patient, does show some mucosal thickening in the sinuses and narrowing of the right ostiomeatal complex.  That is consistent with in clinic nasal endoscopy last visit that showed more edema in her right middle meatus. She also has significant left septal deviation, and conchal bullosa causing nasal obstruction. I had her take a course of prednisone and try Singulair. The prednisone made her feel sick, nauseaus, stomach pain, dizziness and headaches. She still has nasal congestion. She can't breath through her nose at all. I reviewed her PFTs from 12/23/21 that showed moderately severe airflow obstruction consistent with asthma. She was prescribed inhalers and this has helped her cough. She underwent allergy testing 1/4/21 and I reviewed the results showing allergies to dust mites, grasses, dog, and severe to cat.    She has some nasal dryness and bleeding. She feels she can't breath through nose. Sometimes gets discolored drainage.    Past Medical History -   Patient Active  Problem List   Diagnosis     CARDIOVASCULAR SCREENING; LDL GOAL LESS THAN 160     Cervical high risk HPV (human papillomavirus) test positive     Insomnia, unspecified insomnia     Vitamin D deficiency     Mild episode of recurrent major depressive disorder (H)     Adjustment disorder with depressed mood     Hypokalemia     Influenza A with pneumonia     Mixed personality disorder (H)     Sepsis (H)     Supervision of high risk pregnancy in first trimester     Fetal cystic hygroma, single gestation       Current Medications -   Current Outpatient Medications:      albuterol (PROVENTIL) (2.5 MG/3ML) 0.083% neb solution, Take 1 vial (2.5 mg) by nebulization every 6 hours as needed for shortness of breath / dyspnea or wheezing (Patient not taking: Reported on 1/20/2022), Disp: 90 mL, Rfl: 1     budesonide (PULMICORT FLEXHALER) 180 MCG/ACT inhaler, Inhale 1 puff into the lungs 2 times daily (Patient not taking: Reported on 1/20/2022), Disp: 1 each, Rfl: 1     diphenhydrAMINE (BENADRYL) 25 MG capsule, Take 1 capsule (25 mg) by mouth every 6 hours as needed for itching or allergies, Disp: 90 capsule, Rfl: 1     fluticasone-salmeterol (ADVAIR) 250-50 MCG/DOSE inhaler, Inhale 1 puff into the lungs every 12 hours, Disp: 60 each, Rfl: 0     misoprostol (CYTOTEC) 200 MCG tablet, Take 3 hours before procedure. Place 1 tab between cheek and gum on the right, and 2nd tab on the left. Dissolve for 30 min, then swallow., Disp: 2 tablet, Rfl: 0     ondansetron (ZOFRAN-ODT) 4 MG ODT tab, Take 1 tablet (4 mg) by mouth every 8 hours as needed for nausea, Disp: 10 tablet, Rfl: 0     Prenatal Vit-Fe Fumarate-FA (PRENATAL MULTIVITAMIN W/IRON) 27-0.8 MG tablet, Take 1 tablet by mouth daily, Disp: 90 tablet, Rfl: 3     simethicone (MYLICON) 125 MG chewable tablet, Take 1 tablet (125 mg) by mouth 2 times daily, Disp: 30 tablet, Rfl: 1     simethicone 80 MG TABS, Take 0.5 tablets by mouth 4 times daily as needed (cramping), Disp: 60 tablet,  Rfl: 1     VENTOLIN  (90 Base) MCG/ACT inhaler, Inhale 1-2 puffs into the lungs every 4 hours as needed for shortness of breath / dyspnea or wheezing, Disp: 18 g, Rfl: 1     vitamin B6 (PYRIDOXINE) 50 MG TABS, Take 1 tablet (50 mg) by mouth 3 times daily as needed (nausea), Disp: 90 tablet, Rfl: 1    Current Facility-Administered Medications:      doxycycline (VIBRAMYCIN) 200 mg in sodium chloride 0.9 % 100 mL intermittent infusion, 200 mg, Intravenous, Q12H, Vianey Domingo MD    Allergies -   No Known Allergies    Social History -   Social History     Socioeconomic History     Marital status: Single     Spouse name: partner- Pastor     Number of children: 0     Years of education: Not on file     Highest education level: Not on file   Occupational History     Occupation:      Employer: UNKNOWN     Comment: [employer]     Occupation: [employer]   Social Needs     Financial resource strain: Not on file     Food insecurity     Worry: Not on file     Inability: Not on file     Transportation needs     Medical: Not on file     Non-medical: Not on file   Tobacco Use     Smoking status: Never Smoker     Smokeless tobacco: Never Used   Substance and Sexual Activity     Alcohol use: Yes     Comment: Socially     Drug use: No     Sexual activity: Yes     Partners: Male     Birth control/protection: Pull-out method, Condom, Injection   Lifestyle     Physical activity     Days per week: Not on file     Minutes per session: Not on file     Stress: Not on file   Relationships     Social connections     Talks on phone: Not on file     Gets together: Not on file     Attends [employer] service: Not on file     Active member of club or organization: Not on file     Attends meetings of clubs or organizations: Not on file     Relationship status: Not on file     Intimate partner violence     Fear of current or ex partner: Not on file     Emotionally abused: Not on file     Physically abused: Not on  file     Forced sexual activity: Not on file   Other Topics Concern     Parent/sibling w/ CABG, MI or angioplasty before 65F 55M? No   Social History Narrative     Not on file       Family History -   Family History   Problem Relation Age of Onset     Diabetes Paternal Grandmother      Hypertension Paternal Grandmother      Respiratory Paternal Grandmother         asthma     Heart Disease Father      Physical Exam  General - The patient is in no distress. Alert and oriented to person and place, answers questions and cooperates with examination appropriately.   Neurologic - CN II-XII are grossly intact. No focal neurologic deficits.   Voice and Breathing - The patient was breathing comfortably without the use of accessory muscles. There was no wheezing, stridor, or stertor.  The patients voice was clear and strong.  Eyes - Extraocular movements intact. Sclera were not icteric or injected, conjunctiva were pink and moist.  Neck -  Soft, non-tender. Palpation of the occipital, submental, submandibular, internal jugular chain, and supraclavicular nodes did not demonstrate any abnormal lymph nodes or masses. No parotid masses. Palpation of the thyroid was soft and smooth, with no nodules or goiter appreciated.  The trachea was mobile and midline.  Nose - External contour is symmetric, no gross deflection or scars.  Nasal mucosa is pink and moist with clear mucus. However the turbinates are hypertrophic. The septum was deviated to the left.  No polyps, masses, or purulence noted on examination anteriorly.     A/P -     ICD-10-CM    1. Nasal obstruction  J34.89 Case Request: FUNCTIONAL ENDOSCOPIC SINUS SURGERY, with bilateral maxillary antrostomies and bilateral michael bullosa reduction, WITH NASAL SEPTOPLASTY and bilateral inferior turbinate reduction     Case Request: FUNCTIONAL ENDOSCOPIC SINUS SURGERY, with bilateral maxillary antrostomies and bilateral michael bullosa reduction, WITH NASAL SEPTOPLASTY and bilateral  inferior turbinate reduction   2. Nasal septal deviation  J34.2 Case Request: FUNCTIONAL ENDOSCOPIC SINUS SURGERY, with bilateral maxillary antrostomies and bilateral michael bullosa reduction, WITH NASAL SEPTOPLASTY and bilateral inferior turbinate reduction     Case Request: FUNCTIONAL ENDOSCOPIC SINUS SURGERY, with bilateral maxillary antrostomies and bilateral michael bullosa reduction, WITH NASAL SEPTOPLASTY and bilateral inferior turbinate reduction   3. Nasal turbinate hypertrophy  J34.3 Case Request: FUNCTIONAL ENDOSCOPIC SINUS SURGERY, with bilateral maxillary antrostomies and bilateral michael bullosa reduction, WITH NASAL SEPTOPLASTY and bilateral inferior turbinate reduction     Case Request: FUNCTIONAL ENDOSCOPIC SINUS SURGERY, with bilateral maxillary antrostomies and bilateral michael bullosa reduction, WITH NASAL SEPTOPLASTY and bilateral inferior turbinate reduction   4. Chronic maxillary sinusitis  J32.0 Case Request: FUNCTIONAL ENDOSCOPIC SINUS SURGERY, with bilateral maxillary antrostomies and bilateral michael bullosa reduction, WITH NASAL SEPTOPLASTY and bilateral inferior turbinate reduction     Case Request: FUNCTIONAL ENDOSCOPIC SINUS SURGERY, with bilateral maxillary antrostomies and bilateral michael bullosa reduction, WITH NASAL SEPTOPLASTY and bilateral inferior turbinate reduction   5. Encounter for preoperative screening laboratory testing for COVID-19 virus  Z01.812 Asymptomatic COVID-19 Virus (Coronavirus) by PCR    Z20.822        Reginaldo Ferraro is a 31 year old female with nasal obstruction due to turbinate hypertrophy and septal deviation and allergic rhinitis. She has significant cough due to asthma and likely flaired by chronic rhinosinusitis and postnasal drainage causing cough/reactive airway disease. Her PFTs showed moderately severe airway obstruction. Prednisone helped some, but she didn't tolerate the side effects. Her breathing and cough are better using inhalers. Her nasal  obstruction is still a severe problem with little to no airway breathing despite maximal medical treatment.     I recommend surgery in the form of septoplasty and bilateral inferior turbinate reduction with endoscopic sinus surgery to open up her narrowed ostiomeatal complex bilaterally and remove her michael bullosa. She is considering dorsum hump reduction and I can file this at the time. I did explain that this procedure may not help her cough or other allergy symptoms and asthma symptoms but would be best geared for improving nasal obstruction, opening her airway, and reducing sinusitis.     The basic premise of functional endoscopic sinus surgery was discussed at length.  This included the concept behind restoration of the natural drainage pathways for the paranasal sinuses using the latest minimally invasive, minimally traumatic techniques and instruments.    Risks discussed included the risks on infection, bleeding, the risks of general anesthesia.  Also specific to functional endoscopic sinus surgery, the risks of permanent damage to the eyes/vision, tear ducts, sense of smell, base of skull/brain, and CSF leak were described.  And finally, the possibility that functional endoscopic sinus surgery may not relieve sinus disease, and that there might be continued need for medical management was also discussed.  The patient understood and wished to proceed with scheduling.      Miquel Rutledge MD  Otolaryngology  [facility]        Again, thank you for allowing me to participate in the care of your patient.        Sincerely,        Miquel Rutledge MD

## 2022-02-16 ENCOUNTER — SURGERY (OUTPATIENT)
Age: 32
End: 2022-02-16
Payer: COMMERCIAL

## 2022-02-16 ENCOUNTER — HOSPITAL ENCOUNTER (OUTPATIENT)
Facility: CLINIC | Age: 32
Discharge: HOME | End: 2022-02-16
Attending: OBSTETRICS & GYNECOLOGY | Admitting: OBSTETRICS & GYNECOLOGY
Payer: COMMERCIAL

## 2022-02-16 VITALS
WEIGHT: 137.57 LBS | SYSTOLIC BLOOD PRESSURE: 96 MMHG | DIASTOLIC BLOOD PRESSURE: 56 MMHG | BODY MASS INDEX: 25.97 KG/M2 | HEART RATE: 75 BPM | TEMPERATURE: 98 F | HEIGHT: 61 IN | OXYGEN SATURATION: 96 % | RESPIRATION RATE: 16 BRPM

## 2022-02-16 DIAGNOSIS — O35.8XX0 FETAL CYSTIC HYGROMA, SINGLE GESTATION: Primary | ICD-10-CM

## 2022-02-16 LAB
ABO/RH(D): NORMAL
ANTIBODY SCREEN: NEGATIVE
GLUCOSE BLDC GLUCOMTR-MCNC: 91 MG/DL (ref 70–99)
SPECIMEN EXPIRATION DATE: NORMAL

## 2022-02-16 PROCEDURE — 76998 US GUIDE INTRAOP: CPT | Mod: 26 | Performed by: OBSTETRICS & GYNECOLOGY

## 2022-02-16 PROCEDURE — 999N000141 HC STATISTIC PRE-PROCEDURE NURSING ASSESSMENT: Performed by: OBSTETRICS & GYNECOLOGY

## 2022-02-16 PROCEDURE — 88305 TISSUE EXAM BY PATHOLOGIST: CPT | Mod: 26 | Performed by: PATHOLOGY

## 2022-02-16 PROCEDURE — 250N000011 HC RX IP 250 OP 636: Performed by: OBSTETRICS & GYNECOLOGY

## 2022-02-16 PROCEDURE — 250N000013 HC RX MED GY IP 250 OP 250 PS 637: Performed by: STUDENT IN AN ORGANIZED HEALTH CARE EDUCATION/TRAINING PROGRAM

## 2022-02-16 PROCEDURE — 258N000003 HC RX IP 258 OP 636: Performed by: STUDENT IN AN ORGANIZED HEALTH CARE EDUCATION/TRAINING PROGRAM

## 2022-02-16 PROCEDURE — 82962 GLUCOSE BLOOD TEST: CPT

## 2022-02-16 PROCEDURE — 250N000009 HC RX 250: Performed by: STUDENT IN AN ORGANIZED HEALTH CARE EDUCATION/TRAINING PROGRAM

## 2022-02-16 PROCEDURE — 86850 RBC ANTIBODY SCREEN: CPT | Performed by: ANESTHESIOLOGY

## 2022-02-16 PROCEDURE — 258N000003 HC RX IP 258 OP 636: Performed by: OBSTETRICS & GYNECOLOGY

## 2022-02-16 PROCEDURE — 272N000002 HC OR SUPPLY OTHER OPNP: Performed by: OBSTETRICS & GYNECOLOGY

## 2022-02-16 PROCEDURE — 250N000009 HC RX 250: Performed by: OBSTETRICS & GYNECOLOGY

## 2022-02-16 PROCEDURE — 36415 COLL VENOUS BLD VENIPUNCTURE: CPT | Performed by: ANESTHESIOLOGY

## 2022-02-16 PROCEDURE — 360N000075 HC SURGERY LEVEL 2, PER MIN: Performed by: OBSTETRICS & GYNECOLOGY

## 2022-02-16 PROCEDURE — 250N000011 HC RX IP 250 OP 636: Performed by: STUDENT IN AN ORGANIZED HEALTH CARE EDUCATION/TRAINING PROGRAM

## 2022-02-16 PROCEDURE — 88305 TISSUE EXAM BY PATHOLOGIST: CPT | Mod: TC | Performed by: OBSTETRICS & GYNECOLOGY

## 2022-02-16 PROCEDURE — 59841 INDUCED ABORTION DILAT&EVAC: CPT | Mod: GC | Performed by: OBSTETRICS & GYNECOLOGY

## 2022-02-16 PROCEDURE — 272N000001 HC OR GENERAL SUPPLY STERILE: Performed by: OBSTETRICS & GYNECOLOGY

## 2022-02-16 PROCEDURE — 370N000017 HC ANESTHESIA TECHNICAL FEE, PER MIN: Performed by: OBSTETRICS & GYNECOLOGY

## 2022-02-16 PROCEDURE — 710N000012 HC RECOVERY PHASE 2, PER MINUTE: Performed by: OBSTETRICS & GYNECOLOGY

## 2022-02-16 PROCEDURE — 86901 BLOOD TYPING SEROLOGIC RH(D): CPT | Performed by: ANESTHESIOLOGY

## 2022-02-16 RX ORDER — FENTANYL CITRATE 50 UG/ML
25 INJECTION, SOLUTION INTRAMUSCULAR; INTRAVENOUS EVERY 5 MIN PRN
Status: DISCONTINUED | OUTPATIENT
Start: 2022-02-16 | End: 2022-02-16 | Stop reason: HOSPADM

## 2022-02-16 RX ORDER — PROPOFOL 10 MG/ML
INJECTION, EMULSION INTRAVENOUS PRN
Status: DISCONTINUED | OUTPATIENT
Start: 2022-02-16 | End: 2022-02-16

## 2022-02-16 RX ORDER — PROPOFOL 10 MG/ML
INJECTION, EMULSION INTRAVENOUS CONTINUOUS PRN
Status: DISCONTINUED | OUTPATIENT
Start: 2022-02-16 | End: 2022-02-16

## 2022-02-16 RX ORDER — LIDOCAINE 40 MG/G
CREAM TOPICAL
Status: DISCONTINUED | OUTPATIENT
Start: 2022-02-16 | End: 2022-02-16 | Stop reason: HOSPADM

## 2022-02-16 RX ORDER — SODIUM CHLORIDE, SODIUM LACTATE, POTASSIUM CHLORIDE, CALCIUM CHLORIDE 600; 310; 30; 20 MG/100ML; MG/100ML; MG/100ML; MG/100ML
INJECTION, SOLUTION INTRAVENOUS CONTINUOUS
Status: DISCONTINUED | OUTPATIENT
Start: 2022-02-16 | End: 2022-02-16 | Stop reason: HOSPADM

## 2022-02-16 RX ORDER — HYDRALAZINE HYDROCHLORIDE 20 MG/ML
2.5-5 INJECTION INTRAMUSCULAR; INTRAVENOUS EVERY 10 MIN PRN
Status: DISCONTINUED | OUTPATIENT
Start: 2022-02-16 | End: 2022-02-16 | Stop reason: HOSPADM

## 2022-02-16 RX ORDER — HYDROMORPHONE HYDROCHLORIDE 1 MG/ML
0.2 INJECTION, SOLUTION INTRAMUSCULAR; INTRAVENOUS; SUBCUTANEOUS EVERY 5 MIN PRN
Status: DISCONTINUED | OUTPATIENT
Start: 2022-02-16 | End: 2022-02-16 | Stop reason: HOSPADM

## 2022-02-16 RX ORDER — ACETAMINOPHEN 500 MG
1000 TABLET ORAL ONCE
Status: COMPLETED | OUTPATIENT
Start: 2022-02-16 | End: 2022-02-16

## 2022-02-16 RX ORDER — SODIUM CHLORIDE, SODIUM LACTATE, POTASSIUM CHLORIDE, CALCIUM CHLORIDE 600; 310; 30; 20 MG/100ML; MG/100ML; MG/100ML; MG/100ML
INJECTION, SOLUTION INTRAVENOUS CONTINUOUS PRN
Status: DISCONTINUED | OUTPATIENT
Start: 2022-02-16 | End: 2022-02-16

## 2022-02-16 RX ORDER — DEXMEDETOMIDINE HYDROCHLORIDE 4 UG/ML
INJECTION, SOLUTION INTRAVENOUS PRN
Status: DISCONTINUED | OUTPATIENT
Start: 2022-02-16 | End: 2022-02-16

## 2022-02-16 RX ORDER — ONDANSETRON 2 MG/ML
4 INJECTION INTRAMUSCULAR; INTRAVENOUS EVERY 30 MIN PRN
Status: DISCONTINUED | OUTPATIENT
Start: 2022-02-16 | End: 2022-02-16 | Stop reason: HOSPADM

## 2022-02-16 RX ORDER — METOPROLOL TARTRATE 1 MG/ML
1-2 INJECTION, SOLUTION INTRAVENOUS EVERY 5 MIN PRN
Status: DISCONTINUED | OUTPATIENT
Start: 2022-02-16 | End: 2022-02-16 | Stop reason: HOSPADM

## 2022-02-16 RX ORDER — OXYCODONE HYDROCHLORIDE 5 MG/1
5 TABLET ORAL EVERY 4 HOURS PRN
Status: DISCONTINUED | OUTPATIENT
Start: 2022-02-16 | End: 2022-02-16 | Stop reason: HOSPADM

## 2022-02-16 RX ORDER — ONDANSETRON 4 MG/1
4 TABLET, ORALLY DISINTEGRATING ORAL EVERY 30 MIN PRN
Status: DISCONTINUED | OUTPATIENT
Start: 2022-02-16 | End: 2022-02-16 | Stop reason: HOSPADM

## 2022-02-16 RX ORDER — FENTANYL CITRATE 50 UG/ML
INJECTION, SOLUTION INTRAMUSCULAR; INTRAVENOUS PRN
Status: DISCONTINUED | OUTPATIENT
Start: 2022-02-16 | End: 2022-02-16

## 2022-02-16 RX ORDER — ALBUTEROL SULFATE 0.83 MG/ML
2.5 SOLUTION RESPIRATORY (INHALATION) ONCE
Status: COMPLETED | OUTPATIENT
Start: 2022-02-16 | End: 2022-02-16

## 2022-02-16 RX ADMIN — PROPOFOL 30 MCG/KG/MIN: 10 INJECTION, EMULSION INTRAVENOUS at 12:44

## 2022-02-16 RX ADMIN — PROPOFOL 30 MG: 10 INJECTION, EMULSION INTRAVENOUS at 12:48

## 2022-02-16 RX ADMIN — DEXMEDETOMIDINE 0.3 MCG/KG/HR: 100 INJECTION, SOLUTION, CONCENTRATE INTRAVENOUS at 12:40

## 2022-02-16 RX ADMIN — MIDAZOLAM 1 MG: 1 INJECTION INTRAMUSCULAR; INTRAVENOUS at 12:38

## 2022-02-16 RX ADMIN — OXYCODONE HYDROCHLORIDE 5 MG: 5 TABLET ORAL at 14:00

## 2022-02-16 RX ADMIN — ACETAMINOPHEN 1000 MG: 500 TABLET ORAL at 10:01

## 2022-02-16 RX ADMIN — FENTANYL CITRATE 50 MCG: 50 INJECTION, SOLUTION INTRAMUSCULAR; INTRAVENOUS at 12:38

## 2022-02-16 RX ADMIN — SODIUM CHLORIDE, POTASSIUM CHLORIDE, SODIUM LACTATE AND CALCIUM CHLORIDE: 600; 310; 30; 20 INJECTION, SOLUTION INTRAVENOUS at 12:36

## 2022-02-16 RX ADMIN — ALBUTEROL SULFATE 2.5 MG: 2.5 SOLUTION RESPIRATORY (INHALATION) at 10:39

## 2022-02-16 RX ADMIN — FENTANYL CITRATE 50 MCG: 50 INJECTION, SOLUTION INTRAMUSCULAR; INTRAVENOUS at 12:55

## 2022-02-16 RX ADMIN — VASOPRESSIN 20 ML: 20 INJECTION INTRAVENOUS at 12:53

## 2022-02-16 RX ADMIN — DEXMEDETOMIDINE 50 MCG: 100 INJECTION, SOLUTION, CONCENTRATE INTRAVENOUS at 12:23

## 2022-02-16 RX ADMIN — PROPOFOL 30 MG: 10 INJECTION, EMULSION INTRAVENOUS at 12:57

## 2022-02-16 RX ADMIN — DOXYCYCLINE 200 MG: 100 INJECTION, POWDER, LYOPHILIZED, FOR SOLUTION INTRAVENOUS at 12:57

## 2022-02-16 NOTE — ANESTHESIA POSTPROCEDURE EVALUATION
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    Anesthesia Type:  MAC    Note:  Disposition: Outpatient   Postop Pain Control: Uneventful            Sign Out: Well controlled pain   PONV: No   Neuro/Psych: Uneventful            Sign Out: Acceptable/Baseline neuro status   Airway/Respiratory: Uneventful            Sign Out: Acceptable/Baseline resp. status   CV/Hemodynamics: Uneventful            Sign Out: Acceptable CV status; No obvious hypovolemia; No obvious fluid overload   Other NRE: NONE   DID A NON-ROUTINE EVENT OCCUR? No           Last vitals:  Vitals Value Taken Time   /68 02/16/22 1330   Temp 36.7  C (98  F) 02/16/22 1317   Pulse 69 02/16/22 1330   Resp     SpO2 95 % 02/16/22 1341   Vitals shown include unvalidated device data.    Electronically Signed By: Luis Manuel Chris DO  February 16, 2022  1:41 PM

## 2022-02-16 NOTE — DISCHARGE INSTRUCTIONS
Same-Day Surgery   Adult Discharge Orders & Instructions     For 24 hours after surgery:  1. Get plenty of rest.  A responsible adult must stay with you for at least 24 hours after you leave the hospital.   2. Pain medication can slow your reflexes. Do not drive or use heavy equipment.  If you have weakness or tingling, don't drive or use heavy equipment until this feeling goes away.  3. Mixing alcohol and pain medication can cause dizziness and slow your breathing. It can even be fatal. Do not drink alcohol while taking pain medication.  4. Avoid strenuous or risky activities.  Ask for help when climbing stairs.   5. You may feel lightheaded.  If so, sit for a few minutes before standing.  Have someone help you get up.   6. If you have nausea (feel sick to your stomach), drink only clear liquids such as apple juice, ginger ale, broth or 7-Up.  Rest may also help.  Be sure to drink enough fluids.  Move to a regular diet as you feel able. Take pain medications with a small amount of solid food, such as toast or crackers, to avoid nausea.   7. A slight fever is normal. Call the doctor if your fever is over 100 F (37.7 C) (taken under the tongue) or lasts longer than 24 hours.  8. You may have a dry mouth, muscle aches, trouble sleeping or a sore throat.  These symptoms should go away after 24 hours.  9. Do not make important or legal decisions.   Pain Management:      1. Take pain medication (if prescribed) for pain as directed by your physician.        2. WARNING: If the pain medication you have been prescribed contains Tylenol (acetaminophen), DO NOT take additional doses of Tylenol (acetaminophen).     Call your doctor for any of the followin.  Signs of infection (fever, growing tenderness at the surgery site, severe pain, a large amount of drainage or bleeding, foul-smelling drainage, redness, swelling).    2.  It has been over 8 to 10 hours since surgery and you are still not able to urinate (pee).    3.   Headache for over 24 hours.    4.  Numbness, tingling or weakness the day after surgery (if you had spinal anesthesia).  To contact a doctor, call  or:   \*   [phone] and ask for the Resident On Call for:          OB/GYN (answered 24 hours a day)      Emergency Department:  [facility]: [phone]  [place] [facility]: [phone]               Rev. 10/2014   Discharge Instructions: Following a Dilation   and Curettage    What to expect:    Expect small to moderate amount of vaginal bleeding which should taper off in 4-5 days. It should not be heavier than your regular menstrual flow.    Do not douche, and use a pad rather than tampons.     No intercourse until bleeding has ceased.    Activity:    Rest the day of surgery. You may resume normal activity the next day.    You may bathe or shower.    Avoid heavy lifting (10-15 lbs) for one week.    Comfort:    The amount of discomfort you can expect is very unpredictable. If you have pain that cannot be controlled with non-aspirin pain relievers or with the prescription you may have received, you should notify your doctor.    Abdominal cramping (like menstrual cramps) or low back ache are common and should not be a cause for concern. You will be drowsy and weak the day of surgery and possibly the following day.    Diet:    You have no restrictions on your diet. Following surgery, drink plenty of fluids and eat a light meal.    Nausea:    The anesthesia medications you received during your surgical procedure may produce some nausea.    If you feel nauseated, stay in bed, keep your head down and try drinking fluids such as Seven-Up, tea or soup.    Notify Physician at once if you experience:    A fever over 100.4 degrees (a low grade fever under 100 degrees is usual after surgery).    Heavy flow and/or passing large clots. Saturating more than 1 pad per hour for 2 or more hours.     Severe pain or cramps.    Important numbers  M  [facility] ([address]) - [place]: [phone]   M [facility] ([address]) : [phone]  Rev. 5/12

## 2022-02-16 NOTE — H&P
Pre-op H&P  Name: Reginaldo Ferraro   : 1990   MRN: [id number]   Proposed procedure: Dilation and Evacuation   Date of Surgery/ Procedure: 22   Hospital/Surgical Facility:      [facility]      3rd Floor      Fax: [phone]      Phone: [phone]   Reginaldo Ferraro, 31 year old  at 15w2d, presents for pre-operative evaluation and assessment prior to undergoing surgery/procedure for treatment of fetal cystic hydroma and monosomy X.     Preop questions   Primary Physician: Cally Rucker  Type of Anesthesia Anticipated: Local with MAC  History of anesthesia complications: NONE  History of  abnormal bleeding: NONE     Preoperative Questions   No - Have you ever had a heart attack or stroke?  No - Have you ever had surgery on your heart or blood vessels, such as a stent, coronary (heart) bypass, or surgery on an artery in the head, neck, heart, or legs?  No - Do you have chest pain when you are physically active?  No - Do you have a history of heart failure?  No - Do you currently have a cold, bronchitis, or symptoms of other respiratory (head and chest) infections?  No - Do you have a cough, shortness of breath, or wheezing?  No - Do you or anyone in your family have a history of blood clots?  No - Do you or anyone in your family have a serious bleeding problem, such as long-lasting bleeding after surgeries or cuts?  Yes - Are you willing to have a blood transfusion if it is medically needed before, during, or after your surgery?  No - Have you or anyone in your family ever had problems with anesthesia (sedation for surgery)?  No - Do you have any artifical heart valves or other implanted medical devices, such as a pacemaker, defibrillator, or continuous glucose monitor?  No - Do you have any artifical joints?  No - Are you allergic to latex?    REVIEW OF SYSTEMS:   Constitutional, HEENT, cardiovascular, pulmonary, gi and gu systems are negative, except as  "otherwise noted.    PMH/PSH/Meds/Allergy/SH/FH     PMH: bronchitis, denied personal history of cardiac, pulmonary, or other dz; denied personal history of bleeding/clotting disorder, blood clots like DVT or PE  PSH: breast augmentation   Meds: ibuprofen, albuterol  Allergies: none  SH:   Social History     Socioeconomic History    Marital status: Single     Spouse name: partner- Pastor    Number of children: 0    Years of education: None    Highest education level: None   Occupational History    Occupation:      Employer: UNKNOWN     Comment: [employer]    Occupation: [employer]   Tobacco Use    Smoking status: Never Smoker    Smokeless tobacco: Never Used   Vaping Use    Vaping Use: Never used   Substance and Sexual Activity    Alcohol use: Not Currently     Comment: Socially    Drug use: No    Sexual activity: Yes     Partners: Male     Birth control/protection: Pull-out method, Condom   Other Topics Concern    Parent/sibling w/ CABG, MI or angioplasty before 65F 55M? No   Social History Narrative    None     FH: denied family history of bleeding disorder, clotting disorder, or adverse reaction to anesthesia    EXAM:   /80 (BP Location: Right arm)   Pulse 93   Temp 98.8  F (37.1  C) (Oral)   Resp 16   Ht 1.549 m (5' 0.98\")   Wt 62.4 kg (137 lb 9.1 oz)   LMP 10/22/2021   SpO2 97%   BMI 26.01 kg/m    Gen: Resting comfortably, NAD  CV: RRR, no murmurs  Pulm: CTAB, no wheezes  Abd: Soft, appropriately ttp, non-distended  Ext: trace LE edema b/l     DIAGNOSTICS:       Ref. Range 2/15/2022 03:20 2/16/2022 08:48 2/16/2022 09:11   GLUCOSE BY METER POCT Latest Ref Range: 70 - 99 mg/dL  91    ABO/Rh(D) Unknown   O POS   Antibody Screen Latest Ref Range: Negative    Negative   SPECIMEN EXPIRATION DATE Unknown   [id number]   COVID-19 Virus by PCR (External Result) Unknown Negative         RISK ASSESSMENT:     Cardiovascular Risk:  -Patient is able to participate in strenuous activities " without chest pain.  -The patient does not have chest pain with exertion.  -Patient does not have a history of congestive heart failure.    -The patient does not have a history of stroke and does not have a history of valvular disease.    Pulmonary Risk:  -In terms of risk factors for pulmonary complication, the patient has no risk factors    Perioperative Complications:  -The patient does not have a history of bleeding or clotting problems in the past.    -The patient has not had complications from surgeries.    -The patient does not have a family history of any anesthesia or surgical complications.      IMPRESSION:     The proposed surgical procedure is considered LOW risk.    For above listed surgery and anesthesia:   Patient is at LOW risk for surgery/procedure and perioperative/procedure complications.    RECOMMENDATIONS:     Proceed to planned surgery    Lupis Baugh MD  N OBGYN PGY-4  Gyn resident pager (weekday 6AM-6PM): [phone]  OB G3 pager (weeknight 6PM-6AM, weekend): [phone]  02/16/2022 11:14 AM     I personally examined and evaluated Reginaldo Ferraro on 2/16/2022.  I discussed the patient with Dr. Baugh and agree with the presentation, exam and plan of care documented in this note with edits by me.   Vianey Domingo MD MPH

## 2022-02-17 ENCOUNTER — OFFICE VISIT (OUTPATIENT)
Dept: ALLERGY | Facility: CLINIC | Age: 32
End: 2022-02-17
Payer: COMMERCIAL

## 2022-02-17 ENCOUNTER — TELEPHONE (OUTPATIENT)
Dept: ALLERGY | Facility: CLINIC | Age: 32
End: 2022-02-17

## 2022-02-17 VITALS
BODY MASS INDEX: 26.28 KG/M2 | HEART RATE: 96 BPM | DIASTOLIC BLOOD PRESSURE: 71 MMHG | OXYGEN SATURATION: 97 % | WEIGHT: 139 LBS | SYSTOLIC BLOOD PRESSURE: 112 MMHG

## 2022-02-17 DIAGNOSIS — J30.89 ALLERGIC RHINITIS DUE TO DUST MITE: ICD-10-CM

## 2022-02-17 DIAGNOSIS — J30.1 SEASONAL ALLERGIC RHINITIS DUE TO POLLEN: ICD-10-CM

## 2022-02-17 DIAGNOSIS — R06.02 SHORTNESS OF BREATH: ICD-10-CM

## 2022-02-17 DIAGNOSIS — Z51.6 NEED FOR DESENSITIZATION TO ALLERGENS: ICD-10-CM

## 2022-02-17 DIAGNOSIS — R05.3 CHRONIC COUGH: Primary | ICD-10-CM

## 2022-02-17 DIAGNOSIS — J30.81 ALLERGIC RHINITIS DUE TO ANIMALS: Primary | ICD-10-CM

## 2022-02-17 PROCEDURE — 99214 OFFICE O/P EST MOD 30 MIN: CPT | Performed by: ALLERGY & IMMUNOLOGY

## 2022-02-17 RX ORDER — EPINEPHRINE 0.3 MG/.3ML
INJECTION SUBCUTANEOUS
Qty: 2 EACH | Refills: 2 | Status: SHIPPED | OUTPATIENT
Start: 2022-02-17 | End: 2023-07-31

## 2022-02-17 ASSESSMENT — ASTHMA QUESTIONNAIRES: ACT_TOTALSCORE: 11

## 2022-02-17 NOTE — LETTER
2/17/2022         RE: Reginaldo Ferraro  [address] [facility] [address]        Dear Colleague,    Thank you for referring your patient, Reginaldo Ferraro, to the [facility]. Please see a copy of my visit note below.    Reginaldo Ferraro was seen in the Allergy Clinic at [facility].      Reginaldo Ferraro is a 31 year old [place] female who is seen today for a follow-up visit. She reports that her allergy symptoms have not improved and seem to have worsened. She has been taking Allegra-D twice daily but stopped 2 months ago when she found out she was pregnant. Her symptoms include chronic nasal congestion, nasal itching, sneezing, cough, and watery eyes. She does not have any rash but has occasional itching. Reginaldo is not using nasal sprays and states that she has tried several OTC versions in the past. The nasal sprays are uncomfortable and provide only temporary relief. She is unsure if which nasal spray medications she has tried.    Reginaldo reports that she has had increased shortness of breath recently. She was diagnosed with influenza in December and recovered. She returned to the ED last week with acute shortness of breath and was prescribed prednisone and Advair. She completed the 5 day course of prednisone and is feeling much better currently. She had been using albuterol intermittently prior to her illness in December but was using it more frequently in the past 2 months.      PFTs 12/31/21:  IMPRESSION:     The study should be interpreted with caution given the reported difficulties with testing.     Moderately Severe Airflow Obstruction  -Asthmatic Type     There is a significant bronchodilator response.     Normal diffusing capacity.     The lung volumes are within normal limits.     No previous studies available for comparison.     Leticia Mckeon MD     Past Medical History:   Diagnosis Date     Abnormal Pap smear of cervix  12/23/2011 9/21 LSIL     Cervical high risk HPV (human papillomavirus) test positive 2013    10/31/14, 5/21/18     Depressive disorder 12/23/2011    possibly chronic     Environmental allergies      Migraine      Migraines     headaches with allergies     Moderate major depression 12/23/2011     Moderate major depression (H) 12/23/2011     NO ACTIVE PROBLEMS      Seasonal allergic rhinitis      Family History   Problem Relation Age of Onset     Diabetes Paternal Grandmother      Hypertension Paternal Grandmother      Respiratory Paternal Grandmother         asthma     Heart Disease Father      Social History     Tobacco Use     Smoking status: Never Smoker     Smokeless tobacco: Never Used   Vaping Use     Vaping Use: Never used   Substance Use Topics     Alcohol use: Not Currently     Comment: Socially     Drug use: No     Social History     Social History Narrative     Not on file       Past medical, family, and social history were reviewed.    REVIEW OF SYSTEMS:  General: negative for weight gain. negative for weight loss. negative for changes in sleep.   Eyes: negative for itching. negative for redness. negative for tearing/watering. negative for vision changes  Ears: negative for fullness. negative for hearing loss. negative for dizziness.   Nose: negative for snoring.negative for changes in smell. negative for drainage. Positive for congestin.  Throat: negative for hoarseness. negative for sore throat. negative for trouble swallowing.   Lungs: negative for cough. negative for shortness of breath.negative for wheezing. negative for sputum production.   Cardiovascular: negative for chest pain. negative for swelling of ankles. negative for fast or irregular heartbeat.   Gastrointestinal: negative for nausea. negative for heartburn. negative for acid reflux.   Musculoskeletal: negative for joint pain. negative for joint stiffness. negative for joint swelling.   Neurologic: negative for seizures. negative for  fainting. negative for weakness.   Psychiatric: negative for changes in mood. negative for anxiety.   Endocrine: negative for cold intolerance. negative for heat intolerance. negative for tremors.   Hematologic: negative for easy bruising. negative for easy bleeding.  Integumentary: negative for rash. negative for scaling. negative for nail changes.       Current Outpatient Medications:      albuterol (PROVENTIL) (2.5 MG/3ML) 0.083% neb solution, Take 1 vial (2.5 mg) by nebulization every 6 hours as needed for shortness of breath / dyspnea or wheezing, Disp: 90 mL, Rfl: 1     fluticasone-salmeterol (ADVAIR) 250-50 MCG/DOSE inhaler, Inhale 1 puff into the lungs every 12 hours, Disp: 60 each, Rfl: 0     VENTOLIN  (90 Base) MCG/ACT inhaler, Inhale 1-2 puffs into the lungs every 4 hours as needed for shortness of breath / dyspnea or wheezing, Disp: 18 g, Rfl: 1     vitamin B6 (PYRIDOXINE) 50 MG TABS, Take 1 tablet (50 mg) by mouth 3 times daily as needed (nausea), Disp: 90 tablet, Rfl: 1     benzonatate (TESSALON) 200 MG capsule, Take 1 capsule (200 mg) by mouth 3 times daily as needed for cough (Patient not taking: Reported on 2/17/2022), Disp: 30 capsule, Rfl: 0     diphenhydrAMINE (BENADRYL) 25 MG capsule, Take 1 capsule (25 mg) by mouth every 6 hours as needed for itching or allergies (Patient not taking: Reported on 2/17/2022), Disp: 90 capsule, Rfl: 1     ondansetron (ZOFRAN-ODT) 4 MG ODT tab, Take 1 tablet (4 mg) by mouth every 8 hours as needed for nausea (Patient not taking: Reported on 2/17/2022), Disp: 10 tablet, Rfl: 0     Prenatal Vit-Fe Fumarate-FA (PRENATAL MULTIVITAMIN W/IRON) 27-0.8 MG tablet, Take 1 tablet by mouth daily (Patient not taking: Reported on 2/17/2022), Disp: 90 tablet, Rfl: 3     simethicone 80 MG TABS, Take 0.5 tablets by mouth 4 times daily as needed (cramping) (Patient not taking: Reported on 2/17/2022), Disp: 60 tablet, Rfl: 1  No Known Allergies    EXAM:   /71 (BP  Location: Right arm, Patient Position: Sitting, Cuff Size: Adult Regular)   Pulse 96   Wt 63 kg (139 lb)   LMP 10/22/2021   SpO2 97%   BMI 26.28 kg/m    GENERAL APPEARANCE: alert, cooperative and not in distress  SKIN: no rashes, no lesions  HEAD: atraumatic, normocephalic  EYES: lids and lashes normal, conjunctivae and sclerae clear  ENT: no scars or lesions, nasal exam showed clear rhinorrhea and mucosal edema, tongue midline and normal, soft palate, uvula, and tonsils normal  NECK: no asymmetry, masses, or scars  LUNGS: unlabored respirations, no intercostal retractions or accessory muscle use, clear to auscultation without rales or wheezes  HEART: regular rate and rhythm without murmurs and normal S1 and S2  MUSCULOSKELETAL: no musculoskeletal defects are noted  NEURO: no focal deficits noted  PSYCH: does not appear depressed or anxious      WORKUP:  None    ASSESSMENT/PLAN:  Reginaldo Ferraro is a 31 year old female here for a follow-up visit.    1. Allergic rhinitis due to animals - Obedaudrey reports she has persistent rhinoconjunctivitis symptoms that have been worsening. Her symptoms were not fully controlled with medications but worsened in the past 2 months when she stopped medications due to her pregnancy. She is no longer pregnant and would like to resume medications and discuss other treatment including immunotherapy. We reviewed the risks, benefits, and recommended duration of treatment. Additionally, she was counseled regarding potential insurance costs and billing procedures and advised to contact her insurance regarding coverage and potential out of pocket costs.    - resume fexofenadine - 180mg once to twice daily  - recommend allergen immunotherapy treatment - she will contact us after speaking with her insurance company regarding coverage    2. Allergic rhinitis due to dust mite - see above    3. Seasonal allergic rhinitis due to pollen - see above    4. Shortness of breath - Reginaldo  reports acute worsening of her symptoms with influenza infection 2 months ago. She had another acute episode of shortness of breath last week for which she went to the ED after albuterol did not relieve her symptoms. She has completed a recent course of steroids and was started on ICS/LABA therapy.    - take 2 to 4 puffs of albuterol HFA and/or use nebulizer every 4 hours as needed  - fluticasone-salmeterol (ADVAIR) 250-50 MCG/DOSE inhaler; Inhale 1 puff into the lungs every 12 hours  Dispense: 1 each; Refill: 0    5. Need for desensitization to allergens    - EPINEPHrine (ANY BX GENERIC EQUIV) 0.3 MG/0.3ML injection 2-pack; Inject into the muscle as directed for anaphylaxis  Dispense: 2 each; Refill: 2      Follow-up in 4 weeks, sooner if needed.      Thank you for allowing me to participate in the care of Reginaldo Ferraro.      Polo Mccain MD, FAAAAI  Allergy/Immunology  [facility]      Chart documentation done in part with Dragon Voice Recognition Software. Although reviewed after completion, some word and grammatical errors may remain.      Again, thank you for allowing me to participate in the care of your patient.        Sincerely,        Polo Mccain MD

## 2022-02-17 NOTE — TELEPHONE ENCOUNTER
Called and spoke with patient. Did not review PFT results with her during her visit. Her FEV1 was 56% on PFTs obtained last December. Advised that this is too low to start immunotherapy. She states she had influenza at that time and ended up in the hospital the evening after completing the PFTs. Orders for repeat PFTs were placed and she was advised to continue taking the Advair. A follow-up visit was set up for 4 weeks.

## 2022-02-17 NOTE — PROGRESS NOTES
Reginaldo Ferraro was seen in the Allergy Clinic at [facility].      Reginaldo Ferraro is a 31 year old [place] female who is seen today for a follow-up visit. She reports that her allergy symptoms have not improved and seem to have worsened. She has been taking Allegra-D twice daily but stopped 2 months ago when she found out she was pregnant. Her symptoms include chronic nasal congestion, nasal itching, sneezing, cough, and watery eyes. She does not have any rash but has occasional itching. Reginaldo is not using nasal sprays and states that she has tried several OTC versions in the past. The nasal sprays are uncomfortable and provide only temporary relief. She is unsure if which nasal spray medications she has tried.    Reginaldo reports that she has had increased shortness of breath recently. She was diagnosed with influenza in December and recovered. She returned to the ED last week with acute shortness of breath and was prescribed prednisone and Advair. She completed the 5 day course of prednisone and is feeling much better currently. She had been using albuterol intermittently prior to her illness in December but was using it more frequently in the past 2 months.      PFTs 12/31/21:  IMPRESSION:     The study should be interpreted with caution given the reported difficulties with testing.     Moderately Severe Airflow Obstruction  -Asthmatic Type     There is a significant bronchodilator response.     Normal diffusing capacity.     The lung volumes are within normal limits.     No previous studies available for comparison.     Leticia Mckeon MD     Past Medical History:   Diagnosis Date     Abnormal Pap smear of cervix 12/23/2011 9/21 LSIL     Cervical high risk HPV (human papillomavirus) test positive 2013    10/31/14, 5/21/18     Depressive disorder 12/23/2011    possibly chronic     Environmental allergies      Migraine      Migraines     headaches with allergies      Moderate major depression 12/23/2011     Moderate major depression (H) 12/23/2011     NO ACTIVE PROBLEMS      Seasonal allergic rhinitis      Family History   Problem Relation Age of Onset     Diabetes Paternal Grandmother      Hypertension Paternal Grandmother      Respiratory Paternal Grandmother         asthma     Heart Disease Father      Social History     Tobacco Use     Smoking status: Never Smoker     Smokeless tobacco: Never Used   Vaping Use     Vaping Use: Never used   Substance Use Topics     Alcohol use: Not Currently     Comment: Socially     Drug use: No     Social History     Social History Narrative     Not on file       Past medical, family, and social history were reviewed.    REVIEW OF SYSTEMS:  General: negative for weight gain. negative for weight loss. negative for changes in sleep.   Eyes: negative for itching. negative for redness. negative for tearing/watering. negative for vision changes  Ears: negative for fullness. negative for hearing loss. negative for dizziness.   Nose: negative for snoring.negative for changes in smell. negative for drainage. Positive for congestin.  Throat: negative for hoarseness. negative for sore throat. negative for trouble swallowing.   Lungs: negative for cough. negative for shortness of breath.negative for wheezing. negative for sputum production.   Cardiovascular: negative for chest pain. negative for swelling of ankles. negative for fast or irregular heartbeat.   Gastrointestinal: negative for nausea. negative for heartburn. negative for acid reflux.   Musculoskeletal: negative for joint pain. negative for joint stiffness. negative for joint swelling.   Neurologic: negative for seizures. negative for fainting. negative for weakness.   Psychiatric: negative for changes in mood. negative for anxiety.   Endocrine: negative for cold intolerance. negative for heat intolerance. negative for tremors.   Hematologic: negative for easy bruising. negative for easy  bleeding.  Integumentary: negative for rash. negative for scaling. negative for nail changes.       Current Outpatient Medications:      albuterol (PROVENTIL) (2.5 MG/3ML) 0.083% neb solution, Take 1 vial (2.5 mg) by nebulization every 6 hours as needed for shortness of breath / dyspnea or wheezing, Disp: 90 mL, Rfl: 1     fluticasone-salmeterol (ADVAIR) 250-50 MCG/DOSE inhaler, Inhale 1 puff into the lungs every 12 hours, Disp: 60 each, Rfl: 0     VENTOLIN  (90 Base) MCG/ACT inhaler, Inhale 1-2 puffs into the lungs every 4 hours as needed for shortness of breath / dyspnea or wheezing, Disp: 18 g, Rfl: 1     vitamin B6 (PYRIDOXINE) 50 MG TABS, Take 1 tablet (50 mg) by mouth 3 times daily as needed (nausea), Disp: 90 tablet, Rfl: 1     benzonatate (TESSALON) 200 MG capsule, Take 1 capsule (200 mg) by mouth 3 times daily as needed for cough (Patient not taking: Reported on 2/17/2022), Disp: 30 capsule, Rfl: 0     diphenhydrAMINE (BENADRYL) 25 MG capsule, Take 1 capsule (25 mg) by mouth every 6 hours as needed for itching or allergies (Patient not taking: Reported on 2/17/2022), Disp: 90 capsule, Rfl: 1     ondansetron (ZOFRAN-ODT) 4 MG ODT tab, Take 1 tablet (4 mg) by mouth every 8 hours as needed for nausea (Patient not taking: Reported on 2/17/2022), Disp: 10 tablet, Rfl: 0     Prenatal Vit-Fe Fumarate-FA (PRENATAL MULTIVITAMIN W/IRON) 27-0.8 MG tablet, Take 1 tablet by mouth daily (Patient not taking: Reported on 2/17/2022), Disp: 90 tablet, Rfl: 3     simethicone 80 MG TABS, Take 0.5 tablets by mouth 4 times daily as needed (cramping) (Patient not taking: Reported on 2/17/2022), Disp: 60 tablet, Rfl: 1  No Known Allergies    EXAM:   /71 (BP Location: Right arm, Patient Position: Sitting, Cuff Size: Adult Regular)   Pulse 96   Wt 63 kg (139 lb)   LMP 10/22/2021   SpO2 97%   BMI 26.28 kg/m    GENERAL APPEARANCE: alert, cooperative and not in distress  SKIN: no rashes, no lesions  HEAD: atraumatic,  normocephalic  EYES: lids and lashes normal, conjunctivae and sclerae clear  ENT: no scars or lesions, nasal exam showed clear rhinorrhea and mucosal edema, tongue midline and normal, soft palate, uvula, and tonsils normal  NECK: no asymmetry, masses, or scars  LUNGS: unlabored respirations, no intercostal retractions or accessory muscle use, clear to auscultation without rales or wheezes  HEART: regular rate and rhythm without murmurs and normal S1 and S2  MUSCULOSKELETAL: no musculoskeletal defects are noted  NEURO: no focal deficits noted  PSYCH: does not appear depressed or anxious      WORKUP:  None    ASSESSMENT/PLAN:  Reginaldo Ferraro is a 31 year old female here for a follow-up visit.    1. Allergic rhinitis due to animals - Reginaldo reports she has persistent rhinoconjunctivitis symptoms that have been worsening. Her symptoms were not fully controlled with medications but worsened in the past 2 months when she stopped medications due to her pregnancy. She is no longer pregnant and would like to resume medications and discuss other treatment including immunotherapy. We reviewed the risks, benefits, and recommended duration of treatment. Additionally, she was counseled regarding potential insurance costs and billing procedures and advised to contact her insurance regarding coverage and potential out of pocket costs.    - resume fexofenadine - 180mg once to twice daily  - recommend allergen immunotherapy treatment - she will contact us after speaking with her insurance company regarding coverage    2. Allergic rhinitis due to dust mite - see above    3. Seasonal allergic rhinitis due to pollen - see above    4. Shortness of breath - Reginaldo reports acute worsening of her symptoms with influenza infection 2 months ago. She had another acute episode of shortness of breath last week for which she went to the ED after albuterol did not relieve her symptoms. She has completed a recent course of steroids and  was started on ICS/LABA therapy.    - take 2 to 4 puffs of albuterol HFA and/or use nebulizer every 4 hours as needed  - fluticasone-salmeterol (ADVAIR) 250-50 MCG/DOSE inhaler; Inhale 1 puff into the lungs every 12 hours  Dispense: 1 each; Refill: 0    5. Need for desensitization to allergens    - EPINEPHrine (ANY BX GENERIC EQUIV) 0.3 MG/0.3ML injection 2-pack; Inject into the muscle as directed for anaphylaxis  Dispense: 2 each; Refill: 2      Follow-up in 4 weeks, sooner if needed.      Thank you for allowing me to participate in the care of Reginaldo Ferraro.      Polo Mccain MD, FAAAAI  Allergy/Immunology  [facility]      Chart documentation done in part with Dragon Voice Recognition Software. Although reviewed after completion, some word and grammatical errors may remain.

## 2022-02-17 NOTE — PATIENT INSTRUCTIONS
If you have any questions regarding your allergies, asthma, or what we discussed during your visit today please call the allergy clinic or contact us via Starbates.    [facility] Allergy RN Line: [phone] - call this number with any questions during or after business/clinic hours  [facility] Scheduling Line: [phone]  [facility] Scheduling Line: [phone] - this number is ONLY for scheduling at the [facility] and should not be used to get in touch with the allergy team    All visits for food challenges, medication/drug allergy testing, and drug challenges MUST be scheduled through the allergy clinic nurse. Please call the nurse at [phone] or send a Starbates message for scheduling. Appointments for these visits that are made through the schedulers or via Starbates may be cancelled or rescheduled.    Clinic Schedule:   [place] - Monday, Tuesday, and Thursday  [address] [place] [address]    [facility] - Wednesday  [address] [place] [address]      These are the billing and diagnosis codes that your insurance company may need to help you determine if allergy shots are covered and what potential out of pocked costs you may have. You may also want to contact the [place] Business Office/Cost of Care Estimate Line at [phone] for more information.    Regular Allergy Shot Visits: 46365    Cluster Accelerated Build: 13340 - rapid desensitization. This could be a single unit or multiple units billed per day depending on the length of your visit.    Allergy Shot Serum: 12620 - the amount of serum in total varies depending on how many allergy shots you will need. At the start of treatment, each injection is billed for 30 doses. Treatment consists of a minimum of 1 injection up to a maximum of 4 injections depending on how many allergens are included in the treatment. You will most likely need 2 to 3  injections.    Potential Diagnosis Codes:    Allergic Rhinitis Due to Dust Mite - J30.89  Allergic Rhinitis Due to Animals - J30.81  Seasonal Allergic Rhinitis Due to Pollens - J30.1  Allergic Conjunctivitis - H10.13      Allergy Shots (Immunotherapy)    What You Need to Know      What are allergy shots?  Allergy shots are used to treat allergic reactions. They are given in the upper arm. These shots will slowly build your tolerance to the things you are allergic to (such as pollen, mold and animal dander). They reduce the body's allergic response.    What are the benefits of getting allergy shots?  Allergy shots may:    Relieve symptoms long after treatment has ended    Allow you to take less allergy medicine, or stop taking it altogether    Prevent new allergies in the future    Keep children's allergies from getting worse and turning into asthma    Improve eczema caused by allergies.      The average patient sees a large improvement in his or her symptoms (up to 80%).    Who should have allergy shots?  Allergy shots are helpful when allergies cause:    Asthma    Itchy, watery eyes (allergic conjunctivitis)    Runny nose, sneezing, sore throat and other symptoms (allergic rhinitis)    Severe reaction to insect stings.    For children, it is best to wait until age 5 before starting allergy shots.    How will I feel during and after treatment?  You will have shots once or twice a week for 4 to 8 months. The time may be longer if you experience a reaction or if injections are not received on a regular basis. We will increase the dose each time until we reach a level that works for you. After that, you will have the shots less often. It may take up to a year for symptoms to improve. Many people improve much sooner. You will likely have shots for 3 to 5 years in total. You will need to see your physician every 6-12 months while receiving allergy shots. Allergy shots can reduce your symptoms a little or lot. Some people  find that their allergies go away entirely. Most people have long-lasting relief after treatment ends. You should see your doctor every year to find out if you need more shots.      What are the risks?  With each allergy shot, there is the risk of an allergic reaction. Some reactions are mild. Others can be life threatening and must be treated immediately.    Common reactions  It is common to have a mild reaction such as redness, swelling, pain, and itching in the area of the shot. This can occur right away or up to several hours after the shot. Sometimes the reaction moves into the upper arm. Call your doctor if:    The reaction area is more than 2 inches wide.    You still have the reaction the day after the shot.      Severe reactions  The reactions below are rare, but serious. If not treated, they can lead to very low blood pressure and   even death. If you have any of these, get help at once.    Hives include a rash, swelling and itching on more than one part of the body. They may occur within minutes to hours after a shot.    Swelling of any part of the body. For example, you  may have swelling of the ears, tongue, lips, throat, intestines, hands or feet. Swelling may affect more than one body part. These reactions could occur within minutes to hours after the shot.    Trouble breathing. You may develop sneezing, runny nose, stuffy nose, cough, or asthma symptoms. These reactions can occur within minutes to hours after the shot.    Anaphylactic shock is sudden, severe and may include symptoms such as hives, trouble    breathing, stomach pain, feeling faint or dizzy and low blood pressure. It may cause a loss of  consciousness and could lead to death. This reaction usually occurs within minutes of the shot but can happen a few hours later. It is very rare.    You might have a severe reaction after the first shot, or it may occur after a series of shots. If you have a reaction, we will treat you right away. In  the future, we may change the dose of your allergy shots.    Taking an antihistamine such as cetirizine (Zyrtec), fexofenadine (Allegra), or levocetirizine (Xyzal) 30 to 60 minutes prior to your appointment can help to decrease the risk of an allergic reaction    You should not exercise for 1 hour prior, or 2 hours after, receiving your allergy shots to reduce the risk of an allergic reaction    What happens after each visit?  You must wait in the doctor's office for 30 minutes after each shot. If you have a reaction, we may ask you to stay longer. If you cannot wait the 30 minutes,  you should not receive your allergy shot.    If you have a severe reaction after leaving the doctor's office, use your epinephrine auto-injector and go to the nearest emergency room. If treated promptly, severe reactions are rarely life threatening. We will never give an allergy shot unless medical help is nearby in case of emergency.    What else do I need to know?  If you start taking any new medicines  It is not safe to have these shots while taking certain medicines. If any doctor prescribes new medicine for you, please let us know. This includes:    Beta blockers, often used for heart disease    Blood pressure medicine    Medicine for migraine headaches    Glaucoma medicine.      A note for women  If you get pregnant, tell the office staff at once. Your doctor will decide how often you should receive shots during pregnancy. We will not increase your dose while you are pregnant.    If you will have shots at another clinic  If you will have your allergy shots at another clinic, you must provide the name and address of the doctor who will give the shots. We will ask you to fill out a form before you receive treatment at an outside clinic. Once allergy serum has been shipped to an outside clinic it cannot be accepted back to one of the [facility] sites.      Will my Insurance cover allergy shots?  You should be aware of  the potential expenses associated with allergy shots. Health insurance plans have a wide range of coverage. For specific information about your insurance coverage you should contact your insurance provider before therapy begins.    How much does treatment cost?  Allergy immunotherapy can range in cost depending on your specific therapy plan. You are encouraged to obtain a cost of care estimate prior to starting treatment and reviewing coverage with your insurance company. You may contact the business office at [phone] to request an estimate of the cost.    Can I return my serum?  Because allergy serum is customized to your specific allergies, serum cannot be returned or used for other patients. Once the serum has been made, should you decide to discontinue allergy immunotherapy treatment, you will still be responsible for the cost of your personalized serum. Once the consent has been signed, allergy serum will be ordered and billed.    Patient expectations    You must wait in the allergy clinic for 30 minutes after receiving your allergy shots - there are no exceptions to this rule. If you cannot wait the full 30 minutes you should reschedule your appointment. If you fail to wait 30 minutes your treatment may be discontinued.    You must bring your epinephrine auto-injector with you to each injection visit - allergy shots may not be administered if you do not have your auto-injector with you at the time of your appointment    To reduce the risk of an allergic reaction, do not exercise for 1 hour before or 2 hours after receiving your allergy shots    If you have a fever, are ill, or are having increased asthma symptoms you may not receive your allergy shots. Please contact the clinic ahead of your visit to discuss your symptoms to determine if you will still be able to receive your shots as scheduled.    Patients are asked to follow instructions as given by the allergy shot care team. If there are any questions  or concerns these may be addressed with their primary allergist at a separate time.      If you have any questions or concerns, please speak to your doctor or a member of our staff.

## 2022-02-21 ENCOUNTER — MYC MEDICAL ADVICE (OUTPATIENT)
Dept: FAMILY MEDICINE | Facility: CLINIC | Age: 32
End: 2022-02-21
Payer: COMMERCIAL

## 2022-02-21 ENCOUNTER — TELEPHONE (OUTPATIENT)
Dept: OBGYN | Facility: CLINIC | Age: 32
End: 2022-02-21
Payer: COMMERCIAL

## 2022-02-21 LAB
PATH REPORT.COMMENTS IMP SPEC: NORMAL
PATH REPORT.FINAL DX SPEC: NORMAL
PATH REPORT.GROSS SPEC: NORMAL
PATH REPORT.MICROSCOPIC SPEC OTHER STN: NORMAL
PATH REPORT.RELEVANT HX SPEC: NORMAL
PHOTO IMAGE: NORMAL

## 2022-02-21 NOTE — TELEPHONE ENCOUNTER
Reginaldo reports that she is experiencing insomnia.  She was able to sleep about 3 hours last night after taking Nyquil, but took benadryl 50 mg x2 nights without relief.  She is also taking advair BID and Albuterol inhaler 1-2 x per day for bronchospasm.      States that she feels very sleepy, but lays down and starts to drift off, then startles awake.    She is hoping for something to help her get some rest.      Routed to Dr Dodge to advise.

## 2022-02-21 NOTE — TELEPHONE ENCOUNTER
----- Message from Dinah Villalobos RN sent at 2/21/2022 12:56 PM CST -----  Regarding: post-procedure questions  Patient calling with questions pertaining to D&C last Wednesday.

## 2022-02-22 NOTE — TELEPHONE ENCOUNTER
She could try ambien 5mg if she would like. You could give her #20. But not to take with benadryl. Make sure mood is okay. Thanks!  Nano Dodge MD

## 2022-02-23 NOTE — TELEPHONE ENCOUNTER
Called Reginalod.  She is sleeping now with 2 benadryl at bedtime.  Because she has significant allergies, will continue with that since it is working, rather than needing to stop benadryl in exchange for ambien.      Reginaldo denies any mood concerns.    She will call back if she changes her mind and desires ambien rx.

## 2022-02-25 DIAGNOSIS — R05.9 COUGH: Primary | ICD-10-CM

## 2022-02-25 PROCEDURE — 95012 NITRIC OXIDE EXP GAS DETER: CPT | Mod: 26 | Performed by: PEDIATRICS

## 2022-02-25 PROCEDURE — 94060 EVALUATION OF WHEEZING: CPT

## 2022-02-25 PROCEDURE — 94060 EVALUATION OF WHEEZING: CPT | Mod: 26 | Performed by: PEDIATRICS

## 2022-02-28 ENCOUNTER — OFFICE VISIT (OUTPATIENT)
Dept: FAMILY MEDICINE | Facility: CLINIC | Age: 32
End: 2022-02-28
Payer: COMMERCIAL

## 2022-02-28 VITALS
WEIGHT: 138 LBS | TEMPERATURE: 97.1 F | BODY MASS INDEX: 27.09 KG/M2 | DIASTOLIC BLOOD PRESSURE: 60 MMHG | OXYGEN SATURATION: 96 % | HEIGHT: 60 IN | RESPIRATION RATE: 28 BRPM | HEART RATE: 92 BPM | SYSTOLIC BLOOD PRESSURE: 80 MMHG

## 2022-02-28 DIAGNOSIS — H10.32 ACUTE BACTERIAL CONJUNCTIVITIS OF LEFT EYE: ICD-10-CM

## 2022-02-28 DIAGNOSIS — L70.0 ACNE VULGARIS: Primary | ICD-10-CM

## 2022-02-28 PROCEDURE — 99213 OFFICE O/P EST LOW 20 MIN: CPT | Performed by: FAMILY MEDICINE

## 2022-02-28 RX ORDER — ERYTHROMYCIN 5 MG/G
0.5 OINTMENT OPHTHALMIC 2 TIMES DAILY
Qty: 3.5 G | Refills: 0 | Status: SHIPPED | OUTPATIENT
Start: 2022-02-28 | End: 2022-03-31

## 2022-02-28 RX ORDER — BENZOYL PEROXIDE 10 G/100G
SUSPENSION TOPICAL AT BEDTIME
Qty: 148 G | Refills: 0 | Status: SHIPPED | OUTPATIENT
Start: 2022-02-28 | End: 2022-03-31

## 2022-02-28 RX ORDER — DOXYCYCLINE 100 MG/1
100 CAPSULE ORAL 2 TIMES DAILY
Qty: 40 CAPSULE | Refills: 0 | Status: SHIPPED | OUTPATIENT
Start: 2022-02-28 | End: 2022-03-17

## 2022-02-28 ASSESSMENT — PAIN SCALES - GENERAL: PAINLEVEL: NO PAIN (0)

## 2022-02-28 NOTE — PROGRESS NOTES
Assessment & Plan     Acne vulgaris  Rash on her cheeks, forehead looks more acne.  Advised patient supportive care, she was given benzoyl peroxide, wash to use at bedtime. In addition, doxycycline.    She does have dry sores at her nostrils area,  not sure if this could be impetigo or part of her acne  Given Doxycyline to treat that.    - doxycycline hyclate (VIBRAMYCIN) 100 MG capsule; Take 1 capsule (100 mg) by mouth 2 times daily for 20 days  - benzoyl peroxide (PANOXYL) 10 % external liquid; Apply topically At Bedtime    Acute bacterial conjunctivitis of left eye  Discussed good hygiene.  - erythromycin (ROMYCIN) 5 MG/GM ophthalmic ointment; Place 0.5 inches Into the left eye 2 times daily    No follow-ups on file.    Bunny Hunt MD  [facility]    Judith Hair is a 31 year old who presents for the following health issues:  Patient reports for the past 2 weeks or so she has been having a small pimple on her cheek, forehead area in addition, in the right nostril area.  She reports these rashes comes and goes, they feel more pimple, then they dry out and [place].  She has no new medication, no new detergent or food, no stress.  She has no fever no chills, she has no history of acne  No sore throat, no cough.    History of Present Illness     Reason for visit:  Rash type on face with clear liquid coming out  Symptom onset:  1-2 weeks ago  Symptoms include:  Rash like pimples coming out around face ans inside nostrils with a yellow/clear liquid coming out  Symptom intensity:  Moderate  Symptom progression:  Worsening  Had these symptoms before:  No  What makes it worse:  No  What makes it better:  No    She eats 2-3 servings of fruits and vegetables daily.She consumes 0 sweetened beverage(s) daily.She exercises with enough effort to increase her heart rate 30 to 60 minutes per day.  She exercises with enough effort to increase her heart rate 4 days per week.   She is  taking medications regularly.     Review of Systems   Constitutional, HEENT, cardiovascular, pulmonary, gi and gu systems are negative, except as otherwise noted.      Objective    LMP 10/22/2021   There is no height or weight on file to calculate BMI.  Physical Exam   GENERAL: healthy, alert and no distress  HENT: both ears: normal: no effusions, no erythema, normal landmarks   Nostrils : dry pimple at opening of each nostril  SKIN: acne, rash on forehead and cheeks  Eye exam: left lower eyelid, rash, discharges.      Bunny Hunt MD

## 2022-03-01 ENCOUNTER — TELEPHONE (OUTPATIENT)
Dept: OBGYN | Facility: CLINIC | Age: 32
End: 2022-03-01
Payer: COMMERCIAL

## 2022-03-01 ENCOUNTER — TELEPHONE (OUTPATIENT)
Dept: OBGYN | Facility: CLINIC | Age: 32
End: 2022-03-01

## 2022-03-01 DIAGNOSIS — Z11.59 ENCOUNTER FOR SCREENING FOR OTHER VIRAL DISEASES: Primary | ICD-10-CM

## 2022-03-01 NOTE — TELEPHONE ENCOUNTER
----- Message from Bee Urbano RN sent at 3/1/2022  9:20 AM CST -----  Regarding: message for Honey - results from D&C questions

## 2022-03-01 NOTE — TELEPHONE ENCOUNTER
Telephone call    Called pt to discuss fetal path results.  Reviewed finding and abnormal placenta consistent with monosomy X.    Mitzi Farias MD

## 2022-03-14 ENCOUNTER — MYC MEDICAL ADVICE (OUTPATIENT)
Dept: ALLERGY | Facility: CLINIC | Age: 32
End: 2022-03-14
Payer: COMMERCIAL

## 2022-03-17 ENCOUNTER — OFFICE VISIT (OUTPATIENT)
Dept: ALLERGY | Facility: CLINIC | Age: 32
End: 2022-03-17
Payer: COMMERCIAL

## 2022-03-17 VITALS — DIASTOLIC BLOOD PRESSURE: 69 MMHG | SYSTOLIC BLOOD PRESSURE: 104 MMHG | OXYGEN SATURATION: 97 % | HEART RATE: 75 BPM

## 2022-03-17 DIAGNOSIS — J30.89 ALLERGIC RHINITIS DUE TO DUST MITE: ICD-10-CM

## 2022-03-17 DIAGNOSIS — J30.81 ALLERGIC RHINITIS DUE TO ANIMALS: Primary | ICD-10-CM

## 2022-03-17 DIAGNOSIS — J45.40 MODERATE PERSISTENT ASTHMA WITHOUT COMPLICATION: Primary | ICD-10-CM

## 2022-03-17 DIAGNOSIS — J30.1 SEASONAL ALLERGIC RHINITIS DUE TO POLLEN: ICD-10-CM

## 2022-03-17 DIAGNOSIS — J30.81 ALLERGIC RHINITIS DUE TO ANIMALS: ICD-10-CM

## 2022-03-17 PROCEDURE — 99214 OFFICE O/P EST MOD 30 MIN: CPT | Performed by: ALLERGY & IMMUNOLOGY

## 2022-03-17 ASSESSMENT — ASTHMA QUESTIONNAIRES: ACT_TOTALSCORE: 22

## 2022-03-17 NOTE — LETTER
"    3/17/2022         RE: Reginaldo Ferraro  [address] [facility] [address]        Dear Colleague,    Thank you for referring your patient, Reginaldo Ferraro, to the [facility]. Please see a copy of my visit note below.    Reginaldo Ferraro was seen in the Allergy Clinic at [facility].      Reginaldo Ferraro is a 31 year old [place] female who is seen today for a follow-up visit. In the interim she saw a pulmonologist at [facility]. Her Advair dose was increased and she was also prescribed Combivent for rescue in place of the albuterol. She has not noticed any difference or improvement with Combivent vs albuterol. Her asthma symptoms have improved with the addition of Advair. Last December her FEV1 was 1.51L (56% predicted) and FEV1/FVC was 68%. These values have improved to 2.29L (85% predicted) and 74% at the end of last month.    Reginaldo reports that her rhinoconjunctivitis symptoms continue to worsen and are present \"all day every day.\" She is ready to begin immunotherapy treatment and also has sinus surgery scheduled for 4/4/2022.      Past Medical History:   Diagnosis Date     Abnormal Pap smear of cervix 12/23/2011 9/21 LSIL     Cervical high risk HPV (human papillomavirus) test positive 2013    10/31/14, 5/21/18     Depressive disorder 12/23/2011    possibly chronic     Environmental allergies      Migraine      Migraines     headaches with allergies     Moderate major depression 12/23/2011     Moderate major depression (H) 12/23/2011     NO ACTIVE PROBLEMS      Seasonal allergic rhinitis      Family History   Problem Relation Age of Onset     Diabetes Paternal Grandmother      Hypertension Paternal Grandmother      Respiratory Paternal Grandmother         asthma     Heart Disease Father      Social History     Tobacco Use     Smoking status: Never Smoker     Smokeless tobacco: Never Used   Vaping Use     Vaping Use: Never used "   Substance Use Topics     Alcohol use: Not Currently     Comment: Socially     Drug use: No     Social History     Social History Narrative     Not on file       Past medical, family, and social history were reviewed.    REVIEW OF SYSTEMS:  General: negative for weight gain. negative for weight loss. negative for changes in sleep.   Eyes: positive  for itching. positive  for redness. negative for tearing/watering. negative for vision changes  Ears: negative for fullness. negative for hearing loss. negative for dizziness.   Nose: negative for snoring.negative for changes in smell. positive  for drainage. Positive for congestion.  Throat: negative for hoarseness. negative for sore throat. negative for trouble swallowing.   Lungs: positive  for cough. negative for shortness of breath.negative for wheezing. negative for sputum production.   Cardiovascular: negative for chest pain. negative for swelling of ankles. negative for fast or irregular heartbeat.   Gastrointestinal: negative for nausea. negative for heartburn. negative for acid reflux.   Musculoskeletal: negative for joint pain. negative for joint stiffness. negative for joint swelling.   Neurologic: negative for seizures. negative for fainting. negative for weakness.   Psychiatric: negative for changes in mood. negative for anxiety.   Endocrine: negative for cold intolerance. negative for heat intolerance. negative for tremors.   Hematologic: negative for easy bruising. negative for easy bleeding.  Integumentary: negative for rash. negative for scaling. negative for nail changes.       Current Outpatient Medications:      albuterol (PROVENTIL) (2.5 MG/3ML) 0.083% neb solution, Take 1 vial (2.5 mg) by nebulization every 6 hours as needed for shortness of breath / dyspnea or wheezing, Disp: 90 mL, Rfl: 1     benzonatate (TESSALON) 200 MG capsule, Take 1 capsule (200 mg) by mouth 3 times daily as needed for cough, Disp: 30 capsule, Rfl: 0     benzoyl peroxide  (PANOXYL) 10 % external liquid, Apply topically At Bedtime, Disp: 148 g, Rfl: 0     diphenhydrAMINE (BENADRYL) 25 MG capsule, Take 1 capsule (25 mg) by mouth every 6 hours as needed for itching or allergies, Disp: 90 capsule, Rfl: 1     doxycycline hyclate (VIBRAMYCIN) 100 MG capsule, Take 1 capsule (100 mg) by mouth 2 times daily for 20 days, Disp: 40 capsule, Rfl: 0     EPINEPHrine (ANY BX GENERIC EQUIV) 0.3 MG/0.3ML injection 2-pack, Inject into the muscle as directed for anaphylaxis, Disp: 2 each, Rfl: 2     erythromycin (ROMYCIN) 5 MG/GM ophthalmic ointment, Place 0.5 inches Into the left eye 2 times daily, Disp: 3.5 g, Rfl: 0     fluticasone-salmeterol (ADVAIR) 500-50 MCG/DOSE inhaler, Inhale 1 puff into the lungs 2 times daily, Disp: , Rfl:      VENTOLIN  (90 Base) MCG/ACT inhaler, Inhale 1-2 puffs into the lungs every 4 hours as needed for shortness of breath / dyspnea or wheezing, Disp: 18 g, Rfl: 1     vitamin B6 (PYRIDOXINE) 50 MG TABS, Take 1 tablet (50 mg) by mouth 3 times daily as needed (nausea), Disp: 90 tablet, Rfl: 1  No Known Allergies    EXAM:   /69 (BP Location: Right arm, Patient Position: Sitting, Cuff Size: Adult Regular)   Pulse 75   LMP 10/22/2021   SpO2 97%   GENERAL APPEARANCE: alert, cooperative and not in distress  SKIN: no rashes, no lesions  HEAD: atraumatic, normocephalic  EYES: lids and lashes normal, conjunctivae and sclerae clear  ENT: no scars or lesions, nasal exam showed no discharge, swelling or lesions noted  NECK: no asymmetry, masses, or scars, supple without significant adenopathy  LUNGS: unlabored respirations, no intercostal retractions or accessory muscle use, clear to auscultation without rales or wheezes  HEART: regular rate and rhythm without murmurs and normal S1 and S2  MUSCULOSKELETAL: no musculoskeletal defects are noted  NEURO: no focal deficits noted  PSYCH: does not appear depressed or anxious      WORKUP:  None    ASSESSMENT/PLAN:  Reginaldo  AGGIE Ferraro is a 31 year old female here for a follow-up visit.    1. Moderate persistent asthma without complication - Reginaldo reports that her asthma symptoms have improved with the addition of Advair. Her dose was recently increased to the 500/50mcg, strength and she reports no side effects from the medication. Combivent was also prescribed as an alternative to albuterol but she has not noticed any improvement and was advised to discontinue this medication.    - continue Advair 500/50mcg, 1 puff twice daily  - take 2 to 4 puffs of albuterol HFA every 4 hours as needed, also take 2 puffs 15 minutes prior to exercise/activity  - discontinue Combivent    2. Allergic rhinitis due to animals - Reginaldo is interested in proceeding with allergen immunotherapy treatment. The risks, benefits, duration of treatment, and potential billing/out of pocket costs were reviewed.    - plan to initiate allergen immunotherapy treatment - consent form along with immunotherapy acknowledgment documents signed in clinic today  - epinephrine auto-injector required per protocol  - continue fexofenadine - 180mg twice daily    3. Allergic rhinitis due to dust mite - see above    4. Seasonal allergic rhinitis due to pollen - see above      Follow-up in 3 months, sooner if needed      Thank you for allowing me to participate in the care of Reginaldo Ferraro.      Polo Mccain MD, FAAAAI  Allergy/Immunology  [facility]      Chart documentation done in part with Dragon Voice Recognition Software. Although reviewed after completion, some word and grammatical errors may remain.      Again, thank you for allowing me to participate in the care of your patient.        Sincerely,        Polo Mccain MD

## 2022-03-17 NOTE — PROGRESS NOTES
"Reginaldo Ferraro was seen in the Allergy Clinic at [facility].      Reginaldo Ferraro is a 31 year old [place]ian female who is seen today for a follow-up visit. In the interim she saw a pulmonologist at [facility]. Her Advair dose was increased and she was also prescribed Combivent for rescue in place of the albuterol. She has not noticed any difference or improvement with Combivent vs albuterol. Her asthma symptoms have improved with the addition of Advair. Last December her FEV1 was 1.51L (56% predicted) and FEV1/FVC was 68%. These values have improved to 2.29L (85% predicted) and 74% at the end of last month.    Reginaldo reports that her rhinoconjunctivitis symptoms continue to worsen and are present \"all day every day.\" She is ready to begin immunotherapy treatment and also has sinus surgery scheduled for 4/4/2022.      Past Medical History:   Diagnosis Date     Abnormal Pap smear of cervix 12/23/2011 9/21 LSIL     Cervical high risk HPV (human papillomavirus) test positive 2013    10/31/14, 5/21/18     Depressive disorder 12/23/2011    possibly chronic     Environmental allergies      Migraine      Migraines     headaches with allergies     Moderate major depression 12/23/2011     Moderate major depression (H) 12/23/2011     NO ACTIVE PROBLEMS      Seasonal allergic rhinitis      Family History   Problem Relation Age of Onset     Diabetes Paternal Grandmother      Hypertension Paternal Grandmother      Respiratory Paternal Grandmother         asthma     Heart Disease Father      Social History     Tobacco Use     Smoking status: Never Smoker     Smokeless tobacco: Never Used   Vaping Use     Vaping Use: Never used   Substance Use Topics     Alcohol use: Not Currently     Comment: Socially     Drug use: No     Social History     Social History Narrative     Not on file       Past medical, family, and social history were reviewed.    REVIEW OF SYSTEMS:  General: negative for " weight gain. negative for weight loss. negative for changes in sleep.   Eyes: positive  for itching. positive  for redness. negative for tearing/watering. negative for vision changes  Ears: negative for fullness. negative for hearing loss. negative for dizziness.   Nose: negative for snoring.negative for changes in smell. positive  for drainage. Positive for congestion.  Throat: negative for hoarseness. negative for sore throat. negative for trouble swallowing.   Lungs: positive  for cough. negative for shortness of breath.negative for wheezing. negative for sputum production.   Cardiovascular: negative for chest pain. negative for swelling of ankles. negative for fast or irregular heartbeat.   Gastrointestinal: negative for nausea. negative for heartburn. negative for acid reflux.   Musculoskeletal: negative for joint pain. negative for joint stiffness. negative for joint swelling.   Neurologic: negative for seizures. negative for fainting. negative for weakness.   Psychiatric: negative for changes in mood. negative for anxiety.   Endocrine: negative for cold intolerance. negative for heat intolerance. negative for tremors.   Hematologic: negative for easy bruising. negative for easy bleeding.  Integumentary: negative for rash. negative for scaling. negative for nail changes.       Current Outpatient Medications:      albuterol (PROVENTIL) (2.5 MG/3ML) 0.083% neb solution, Take 1 vial (2.5 mg) by nebulization every 6 hours as needed for shortness of breath / dyspnea or wheezing, Disp: 90 mL, Rfl: 1     benzonatate (TESSALON) 200 MG capsule, Take 1 capsule (200 mg) by mouth 3 times daily as needed for cough, Disp: 30 capsule, Rfl: 0     benzoyl peroxide (PANOXYL) 10 % external liquid, Apply topically At Bedtime, Disp: 148 g, Rfl: 0     diphenhydrAMINE (BENADRYL) 25 MG capsule, Take 1 capsule (25 mg) by mouth every 6 hours as needed for itching or allergies, Disp: 90 capsule, Rfl: 1     doxycycline hyclate  (VIBRAMYCIN) 100 MG capsule, Take 1 capsule (100 mg) by mouth 2 times daily for 20 days, Disp: 40 capsule, Rfl: 0     EPINEPHrine (ANY BX GENERIC EQUIV) 0.3 MG/0.3ML injection 2-pack, Inject into the muscle as directed for anaphylaxis, Disp: 2 each, Rfl: 2     erythromycin (ROMYCIN) 5 MG/GM ophthalmic ointment, Place 0.5 inches Into the left eye 2 times daily, Disp: 3.5 g, Rfl: 0     fluticasone-salmeterol (ADVAIR) 500-50 MCG/DOSE inhaler, Inhale 1 puff into the lungs 2 times daily, Disp: , Rfl:      VENTOLIN  (90 Base) MCG/ACT inhaler, Inhale 1-2 puffs into the lungs every 4 hours as needed for shortness of breath / dyspnea or wheezing, Disp: 18 g, Rfl: 1     vitamin B6 (PYRIDOXINE) 50 MG TABS, Take 1 tablet (50 mg) by mouth 3 times daily as needed (nausea), Disp: 90 tablet, Rfl: 1  No Known Allergies    EXAM:   /69 (BP Location: Right arm, Patient Position: Sitting, Cuff Size: Adult Regular)   Pulse 75   LMP 10/22/2021   SpO2 97%   GENERAL APPEARANCE: alert, cooperative and not in distress  SKIN: no rashes, no lesions  HEAD: atraumatic, normocephalic  EYES: lids and lashes normal, conjunctivae and sclerae clear  ENT: no scars or lesions, nasal exam showed no discharge, swelling or lesions noted  NECK: no asymmetry, masses, or scars, supple without significant adenopathy  LUNGS: unlabored respirations, no intercostal retractions or accessory muscle use, clear to auscultation without rales or wheezes  HEART: regular rate and rhythm without murmurs and normal S1 and S2  MUSCULOSKELETAL: no musculoskeletal defects are noted  NEURO: no focal deficits noted  PSYCH: does not appear depressed or anxious      WORKUP:  None    ASSESSMENT/PLAN:  Reginaldo Ferraro is a 31 year old female here for a follow-up visit.    1. Moderate persistent asthma without complication - Reginaldo reports that her asthma symptoms have improved with the addition of Advair. Her dose was recently increased to the 500/50mcg,  strength and she reports no side effects from the medication. Combivent was also prescribed as an alternative to albuterol but she has not noticed any improvement and was advised to discontinue this medication.    - continue Advair 500/50mcg, 1 puff twice daily  - take 2 to 4 puffs of albuterol HFA every 4 hours as needed, also take 2 puffs 15 minutes prior to exercise/activity  - discontinue Combivent    2. Allergic rhinitis due to animals - Reginaldo is interested in proceeding with allergen immunotherapy treatment. The risks, benefits, duration of treatment, and potential billing/out of pocket costs were reviewed.    - plan to initiate allergen immunotherapy treatment - consent form along with immunotherapy acknowledgment documents signed in clinic today  - epinephrine auto-injector required per protocol  - continue fexofenadine - 180mg twice daily    3. Allergic rhinitis due to dust mite - see above    4. Seasonal allergic rhinitis due to pollen - see above      Follow-up in 3 months, sooner if needed      Thank you for allowing me to participate in the care of Laurenaudrey AGGIE Resendizon.      Polo Mccain MD, FAAAAI  Allergy/Immunology  [facility]      Chart documentation done in part with Dragon Voice Recognition Software. Although reviewed after completion, some word and grammatical errors may remain.

## 2022-03-17 NOTE — PROGRESS NOTES
ALLERGY SOLUTION NEW REQUEST    Reginaldo Ferraro 1990 MRN: [id number]    DATE NEEDED:  2-3 weeks  Vial Color   Content   Top Dose         Vial Size  Green 1:1,000, Blue 1:100, Yellow 1:10 and Red 1:1  Cat, Dog, Dust Mite  Red 1:1 0.5   5mL  Green 1:1,000, Blue 1:100, Yellow 1:10 and Red 1:1  Trees    Red 1:1 0.5   5mL  Green 1:1,000, Blue 1:100, Yellow 1:10 and Red 1:1  Grass, Weeds   Red 1:1 0.5   5mL      Shot Clinic Location:  [place]  Ship to Location: [place]  Billing Location: [place]  Special Instructions:  None        Requester Signature  Polo Mccain MD

## 2022-03-18 DIAGNOSIS — J30.81 ALLERGIC RHINITIS DUE TO ANIMALS: Primary | ICD-10-CM

## 2022-03-18 DIAGNOSIS — J30.89 ALLERGIC RHINITIS DUE TO DUST MITE: ICD-10-CM

## 2022-03-18 PROCEDURE — 95165 ANTIGEN THERAPY SERVICES: CPT | Performed by: ALLERGY & IMMUNOLOGY

## 2022-03-18 NOTE — PROGRESS NOTES
Allergy serums billed to [facility].     Vials billed below:    Vial Color Content                      Vial Size Expiration Date  Green 1:1,000, Blue 1:100, Yellow 1:10 and Red 1:1 Cat, Dog, Dust Mite 5mL each Green 6/18/22, Blue-9/18/22, Yellow & Red-3/18/2023    Billed 30 units    Checked by .jessica      Signature  Brian Corona LPN

## 2022-03-24 DIAGNOSIS — J30.1 SEASONAL ALLERGIC RHINITIS DUE TO POLLEN: Primary | ICD-10-CM

## 2022-03-24 PROCEDURE — 95165 ANTIGEN THERAPY SERVICES: CPT | Performed by: ALLERGY & IMMUNOLOGY

## 2022-03-24 NOTE — PROGRESS NOTES
Allergy serums billed to [place].     Vials billed below:    Vial Color  Content                      Vial Size Expiration Date  Green 1:1,000, Blue 1:100, Yellow 1:10 and Red 1:1 Trees   5mL each Green-6/24/22, Blue-9/24/22, Yellow & Red-3/24/23      Billed 30 units    Checked by Brian Corona / LPN        Signature  Brian Corona LPN

## 2022-03-25 DIAGNOSIS — J30.1 SEASONAL ALLERGIC RHINITIS DUE TO POLLEN: Primary | ICD-10-CM

## 2022-03-25 PROCEDURE — 95165 ANTIGEN THERAPY SERVICES: CPT | Performed by: ALLERGY & IMMUNOLOGY

## 2022-03-25 NOTE — PROGRESS NOTES
Allergy serums billed to [place].     Vials billed below:    Vial Color Content                      Vial Size Expiration Date  Green 1:1,000,  Grass, Weeds                         5mL  6/25/2022  Blue 1:100,  Grass, Weeds                         5mL   9/25/2022  Yellow 1:10   Grass, Weeds                         5mL  3/25/2023  Red 1:1                       Grass, Weeds                         5mL  3/25/2023          Billed 30 units    Checked by Mercedes Diaz RN

## 2022-03-31 ENCOUNTER — OFFICE VISIT (OUTPATIENT)
Dept: FAMILY MEDICINE | Facility: CLINIC | Age: 32
End: 2022-03-31
Payer: COMMERCIAL

## 2022-03-31 VITALS
HEART RATE: 67 BPM | BODY MASS INDEX: 27.18 KG/M2 | TEMPERATURE: 98.4 F | OXYGEN SATURATION: 97 % | WEIGHT: 141.2 LBS | SYSTOLIC BLOOD PRESSURE: 108 MMHG | DIASTOLIC BLOOD PRESSURE: 65 MMHG

## 2022-03-31 DIAGNOSIS — J34.89 NASAL OBSTRUCTION: ICD-10-CM

## 2022-03-31 DIAGNOSIS — Z01.818 PREOP GENERAL PHYSICAL EXAM: Primary | ICD-10-CM

## 2022-03-31 DIAGNOSIS — J34.2 NASAL SEPTAL DEVIATION: ICD-10-CM

## 2022-03-31 DIAGNOSIS — F43.21 ADJUSTMENT DISORDER WITH DEPRESSED MOOD: ICD-10-CM

## 2022-03-31 DIAGNOSIS — F33.0 MILD EPISODE OF RECURRENT MAJOR DEPRESSIVE DISORDER (H): ICD-10-CM

## 2022-03-31 DIAGNOSIS — F43.21 GRIEF: ICD-10-CM

## 2022-03-31 DIAGNOSIS — J32.0 CHRONIC MAXILLARY SINUSITIS: ICD-10-CM

## 2022-03-31 DIAGNOSIS — J34.3 NASAL TURBINATE HYPERTROPHY: ICD-10-CM

## 2022-03-31 PROBLEM — O35.8XX0 FETAL CYSTIC HYGROMA, SINGLE GESTATION: Status: RESOLVED | Noted: 2022-01-21 | Resolved: 2022-03-31

## 2022-03-31 PROBLEM — O09.91 SUPERVISION OF HIGH RISK PREGNANCY IN FIRST TRIMESTER: Status: RESOLVED | Noted: 2022-01-20 | Resolved: 2022-03-31

## 2022-03-31 LAB
EXPTIME-PRE: 6.28 SEC
FEF2575-%PRED-POST: 71 %
FEF2575-%PRED-PRE: 54 %
FEF2575-POST: 2.24 L/SEC
FEF2575-PRE: 1.7 L/SEC
FEF2575-PRED: 3.12 L/SEC
FEFMAX-%PRED-PRE: 106 %
FEFMAX-PRE: 6.9 L/SEC
FEFMAX-PRED: 6.51 L/SEC
FEV1-%PRED-PRE: 85 %
FEV1-PRE: 2.29 L
FEV1FEV6-PRE: 75 %
FEV1FEV6-PRED: 85 %
FEV1FVC-PRE: 74 %
FEV1FVC-PRED: 86 %
FIFMAX-PRE: 6.32 L/SEC
FVC-%PRED-PRE: 99 %
FVC-PRE: 3.07 L
FVC-PRED: 3.09 L

## 2022-03-31 PROCEDURE — 99214 OFFICE O/P EST MOD 30 MIN: CPT | Performed by: PHYSICIAN ASSISTANT

## 2022-03-31 ASSESSMENT — ASTHMA QUESTIONNAIRES: ACT_TOTALSCORE: 21

## 2022-03-31 NOTE — PROGRESS NOTES
Allergy serums received at [place].     Vials received below:    Vial Color Content                      Vial Size Expiration Date  Green 1:1,000 Grass, Weeds 5mL  6/25/22  Blue 1:100 Cat, Dog, Dust Mite 5mL  6/18/22  Green 1:1,000 Trees 5mL  6/24/22      Vial Color Content                      Vial Size Expiration Date  Blue 1:100 Grass, Weeds 5mL  9/25/22  Blue 1:100 Cat, Dog, Dust Mite 5mL  9/18/22  Blue 1:100 Trees 5mL  9/24/22    Vial Color Content                      Vial Size Expiration Date  Yellow 1:10 Grass, Weeds 5mL  3/25/23  Yellow 1:10 Cat, Dog, Dust Mite 5mL  3/18/23  Yellow 1:10 Trees 5mL  3/24/23      Vial Color Content                      Vial Size Expiration Date  Red 1:1 Grass, Weeds 5mL  3/25/23  Red 1:1 Cat, Dog, Dust Mite 5mL  3/18/23  Red 1:1 Trees 5mL  3/24/23        Florence Monsalve MA

## 2022-03-31 NOTE — PROGRESS NOTES
[facility]  [address] [place] [address]  [place] [address]  Phone: [phone]  Primary Provider: Cally Rucker  Pre-op Performing Provider: CALLY RUCKER      PREOPERATIVE EVALUATION:  Today's date: 3/31/2022    Reginaldo Ferraro is a 31 year old female who presents for a preoperative evaluation.    Surgical Information:  Surgery/Procedure: FUNCTIONAL ENDOSCOPIC SINUS SURGERY, with bilateral maxillary antrostomies and bilateral michael bullosa reduction, WITH NASAL SEPTOPLASTY and bilateral inferior turbinate reduction  Surgery Location: [facility]  Surgeon: Dr. Rutledge  Surgery Date: 4/4/22  Time of Surgery: 10:30 am  Where patient plans to recover: At home with family  Fax number for surgical facility: Note does not need to be faxed, will be available electronically in Epic.    Type of Anesthesia Anticipated: General    Assessment & Plan     The proposed surgical procedure is considered INTERMEDIATE risk.    1. Preop general physical exam    2. Grief    3. Adjustment disorder with depressed mood    4. Mild episode of recurrent major depressive disorder (H)    5. Nasal obstruction    6. Nasal septal deviation    7. Nasal turbinate hypertrophy    8. Chronic maxillary sinusitis      Patient with recent pregnancy termination due to chromosomal abnormality. Counseling referral placed .         Risks and Recommendations:  The patient has the following additional risks and recommendations for perioperative complications:   - No identified additional risk factors other than previously addressed    Medication Instructions:  Patient is to take all scheduled medications on the day of surgery    RECOMMENDATION:  APPROVAL GIVEN to proceed with proposed procedure, without further diagnostic evaluation.                      Subjective     HPI related to upcoming procedure: Patient with chronic nasal congestion requiring surgery.     Preop Questions 3/26/2022   1. Have you ever  had a heart attack or stroke? No   2. Have you ever had surgery on your heart or blood vessels, such as a stent placement, a coronary artery bypass, or surgery on an artery in your head, neck, heart, or legs? No   3. Do you have chest pain with activity? No   4. Do you have a history of  heart failure? No   5. Do you currently have a cold, bronchitis or symptoms of other infection? No   6. Do you have a cough, shortness of breath, or wheezing? YES - asthma   7. Do you or anyone in your family have previous history of blood clots? No   8. Do you or does anyone in your family have a serious bleeding problem such as prolonged bleeding following surgeries or cuts? No   9. Have you ever had problems with anemia or been told to take iron pills? YES -    10. Have you had any abnormal blood loss such as black, tarry or bloody stools, or abnormal vaginal bleeding? No   11. Have you ever had a blood transfusion? No   12. Are you willing to have a blood transfusion if it is medically needed before, during, or after your surgery? Yes   13. Have you or any of your relatives ever had problems with anesthesia? No   14. Do you have sleep apnea, excessive snoring or daytime drowsiness? No   15. Do you have any artifical heart valves or other implanted medical devices like a pacemaker, defibrillator, or continuous glucose monitor? No   16. Do you have artificial joints? No   17. Are you allergic to latex? No   18. Is there any chance that you may be pregnant? No       Health Care Directive:  Patient does not have a Health Care Directive or Living Will: Discussed advance care planning with patient; however, patient declined at this time.    Preoperative Review of :   reviewed - no record of controlled substances prescribed.          Review of Systems  CONSTITUTIONAL: NEGATIVE for fever, chills, change in weight  INTEGUMENTARY/SKIN: NEGATIVE for worrisome rashes, moles or lesions  ENT/MOUTH: NEGATIVE for ear, mouth and throat  problems  RESP: NEGATIVE for significant cough or SOB  CV: NEGATIVE for chest pain, palpitations or peripheral edema  GI: NEGATIVE for nausea, abdominal pain, heartburn, or change in bowel habits  : NEGATIVE for frequency, dysuria, or hematuria  MUSCULOSKELETAL: NEGATIVE for significant arthralgias or myalgia  NEURO: NEGATIVE for weakness, dizziness or paresthesias  HEME: NEGATIVE for bleeding problems  PSYCHIATRIC: NEGATIVE for changes in mood or affect    Patient Active Problem List    Diagnosis Date Noted     Moderate persistent asthma 03/17/2022     Priority: Medium     Allergic rhinitis due to animals 03/17/2022     Priority: Medium     Allergic rhinitis due to dust mite 03/17/2022     Priority: Medium     Seasonal allergic rhinitis due to pollen 03/17/2022     Priority: Medium     Nasal obstruction 02/15/2022     Priority: Medium     Added automatically from request for surgery [id number]       Nasal septal deviation 02/15/2022     Priority: Medium     Added automatically from request for surgery [id number]       Nasal turbinate hypertrophy 02/15/2022     Priority: Medium     Added automatically from request for surgery [id number]       Chronic maxillary sinusitis 02/15/2022     Priority: Medium     Added automatically from request for surgery [id number]       Hypokalemia 12/24/2021     Priority: Medium     Influenza A with pneumonia 12/24/2021     Priority: Medium     Sepsis (H) 12/24/2021     Priority: Medium     Mild episode of recurrent major depressive disorder (H) 06/11/2018     Priority: Medium     Adjustment disorder with depressed mood 08/15/2016     Priority: Medium     Mixed personality disorder (H) 08/15/2016     Priority: Medium     Insomnia, unspecified insomnia 11/02/2015     Priority: Medium     Vitamin D deficiency 11/02/2015     Priority: Medium     Cervical high risk HPV (human papillomavirus) test positive 01/17/2012     Priority: Medium     12/23/11--LSIL/cannot rule out HSIL @ age 21. Plan--  colposcopy within 3 months.  4/30/12-- colposcopy. No bx taken.  Pap--NIL/ neg HPV. Plan-- pap in 1 year (due 4/30/13)  10/15/13 pap NIL/+ HR HPV (58). Plan-- pap in 1 year (due 10/15/14)  10/31/14 pap NIL/+ HR HPV plan-- repeat pap and HPV in 1 year (due 10/31/15)  11/2/15 pap NIL/neg HPV. Plan: pap and HPV in 3 yrs.  5/21/18 NIL pap, + HR HPV (not 16/18) @ age 27. Plan 1 year cotest per provider  9/17/20 NIL pap, HPV was not able to be added due to age of sample. Plan: Cotest in 1 yr, due 9/17/21 8/19/21 LSIL Pap, Neg HPV. [place] due by 11/19/21.    8/30/21 Pt notified by phone.   9/16/21 colp ECC - neg. Plan cotest in 1 year from last pap due 8/19/2022.   10/13/21 Pt viewed result on [employer].             CARDIOVASCULAR SCREENING; LDL GOAL LESS THAN 160 10/31/2010     Priority: Medium      Past Medical History:   Diagnosis Date     Abnormal Pap smear of cervix 12/23/2011 9/21 LSIL     Cervical high risk HPV (human papillomavirus) test positive 2013    10/31/14, 5/21/18     Depressive disorder 12/23/2011    possibly chronic     Environmental allergies      Migraine      Migraines     headaches with allergies     Moderate major depression 12/23/2011     Moderate major depression (H) 12/23/2011     Moderate persistent asthma 3/17/2022     NO ACTIVE PROBLEMS      Seasonal allergic rhinitis      Past Surgical History:   Procedure Laterality Date     DILATION AND EVACUATION N/A 2/16/2022    Procedure: DILATION AND EVACUATION, UTERUS;  Surgeon: Vianey Domingo MD;  Location: [facility] OR     EYE SURGERY  01/2013    Lasic surgery     NO HISTORY OF SURGERY       NH BREAST AUGMENTATION  2018     Current Outpatient Medications   Medication Sig Dispense Refill     albuterol (PROVENTIL) (2.5 MG/3ML) 0.083% neb solution Take 1 vial (2.5 mg) by nebulization every 6 hours as needed for shortness of breath / dyspnea or wheezing 90 mL 1     benzonatate (TESSALON) 200 MG capsule Take 1 capsule (200 mg) by mouth 3 times daily as  needed for cough 30 capsule 0     EPINEPHrine (ANY BX GENERIC EQUIV) 0.3 MG/0.3ML injection 2-pack Inject into the muscle as directed for anaphylaxis 2 each 2     fluticasone-salmeterol (ADVAIR) 500-50 MCG/DOSE inhaler Inhale 1 puff into the lungs 2 times daily       ORDER FOR ALLERGEN IMMUNOTHERAPY Name of Mix: Mix #1  Dust Mite, Cat, Dog  Cat Hair, Standardized 10,000 BAU/mL, ALK  2.0 ml  Dog Hair-Dander, A. P.  1:100 w/v, HS  1.0 ml  Dust Mites DF. 10,000 AU/mL, HS  1.0 ml  Dust Mites DP. 10,000 AU/mL, HS  1.0 ml   Diluent: HSA qs to 5ml 5 mL PRN     ORDER FOR ALLERGEN IMMUNOTHERAPY Name of Mix: Mix #2  Tree   Sylvain, White 1:20 w/v, HS  0.5 ml  Birch Mix PRW 1:20 w/v, HS  0.5 ml  Boxelder-Maple Mix BHR (Boxelder Hard Red) 1:20 w/v, HS  0.5 ml  Bergen, Common 1:20 w/v, HS  0.5 ml  Oak Mix RVW 1:20 w/v, HS 0.5 ml  Pine, White 1:20 w/v, ALK 0.5 ml  [place] Tree, Black 1:20 w/v, HS 0.5 ml  Diluent: HSA qs to 5ml 5 mL PRN     ORDER FOR ALLERGEN IMMUNOTHERAPY Name of Mix: Mix #3  Grass, Weeds  Prabhu Grass 1:20 w/v, HS 0.5 ml  Steve Grass (Std) 100,000 BAU/mL, HS 0.4 ml  Lamb's Quarters 1:20 w/v, HS 0.5 ml  Nettle 1:20 w/v, HS 0.5 ml  Plantain, English 1:20 w/v, HS 0.5 ml  Ragweed Mixed 1:20 w/v ALK  0.5 ml  Russian Thistle 1:20 w/v, HS 0.5 ml  Sorrel, Sheep 1:20 w/v, HS 0.5 ml  Diluent: HSA qs to 5ml 5 mL PRN     VENTOLIN  (90 Base) MCG/ACT inhaler Inhale 1-2 puffs into the lungs every 4 hours as needed for shortness of breath / dyspnea or wheezing 18 g 1     vitamin B6 (PYRIDOXINE) 50 MG TABS Take 1 tablet (50 mg) by mouth 3 times daily as needed (nausea) 90 tablet 1       No Known Allergies     Social History     Tobacco Use     Smoking status: Never Smoker     Smokeless tobacco: Never Used   Substance Use Topics     Alcohol use: Not Currently     Comment: Socially     Family History   Problem Relation Age of Onset     Diabetes Paternal Grandmother      Hypertension Paternal Grandmother      Respiratory  Paternal Grandmother         asthma     Heart Disease Father      History   Drug Use No         Objective     /65 (BP Location: Right arm, Patient Position: Chair, Cuff Size: Adult Regular)   Pulse 67   Temp 98.4  F (36.9  C) (Oral)   Wt 64 kg (141 lb 3.2 oz)   LMP 03/26/2022   SpO2 97%   Breastfeeding No   BMI 27.18 kg/m      Physical Exam    GENERAL APPEARANCE: healthy, alert and no distress     EYES: EOMI, PERRL     HENT: ear canals and TM's normal and nose with clear rhinorrhea and mouth without ulcers or lesions     NECK: no adenopathy, no asymmetry, masses, or scars and thyroid normal to palpation     RESP: lungs clear to auscultation - no rales, rhonchi or wheezes     CV: regular rates and rhythm, normal S1 S2, no S3 or S4 and no murmur, click or rub     ABDOMEN:  soft, nontender, no HSM or masses and bowel sounds normal     MS: extremities normal- no gross deformities noted, no evidence of inflammation in joints, FROM in all extremities.     SKIN: no suspicious lesions or rashes     NEURO: Normal strength and tone, sensory exam grossly normal, mentation intact and speech normal     PSYCH: mentation appears normal. and affect normal/bright     LYMPHATICS: No cervical adenopathy    No results for input(s): HGB, PLT, INR, NA, POTASSIUM, CR, A1C in the last 24135 hours.     Diagnostics:  Recent CBC-care everywhere- normal hemoglobin.    No EKG required, no history of coronary heart disease, significant arrhythmia, peripheral arterial disease or other structural heart disease.    Revised Cardiac Risk Index (RCRI):  The patient has the following serious cardiovascular risks for perioperative complications:   - No serious cardiac risks = 0 points     RCRI Interpretation: 0 points: Class I (very low risk - 0.4% complication rate)           Signed Electronically by: Cally Rucker PA-C  Copy of this evaluation report is provided to requesting physician.

## 2022-03-31 NOTE — PATIENT INSTRUCTIONS
You should stop using any Ibuprofen (Advil), Naproxen (Aleve) or Aspirin containing products 7 days before your procedure. You may use Tylenol (Acetaminophen) as needed.        You can take all your medications normally the morning of the procedure.   Preparing for Your Surgery  Getting started  A nurse will call you to review your health history and instructions. They will give you an arrival time based on your scheduled surgery time. Please be ready to share:    Your doctor's clinic name and phone number    Your medical, surgical and anesthesia history    A list of allergies and sensitivities    A list of medicines, including herbal treatments and over-the-counter drugs    Whether the patient has a legal guardian (ask how to send us the papers in advance)  Please tell us if you're pregnant--or if there's any chance you might be pregnant. Some surgeries may injure a fetus (unborn baby), so they require a pregnancy test. Surgeries that are safe for a fetus don't always need a test, and you can choose whether to have one.   If you have a child who's having surgery, please ask for a copy of Preparing for Your Child's Surgery.    Preparing for surgery    Within 30 days of surgery: Have a pre-op exam (sometimes called an H&P, or History and Physical). This can be done at a clinic or pre-operative center.  ? If you're having a , you may not need this exam. Talk to your care team.    At your pre-op exam, talk to your care team about all medicines you take. If you need to stop any medicines before surgery, ask when to start taking them again.  ? We do this for your safety. Many medicines can make you bleed too much during surgery. Some change how well surgery (anesthesia) drugs work.    Call your insurance company to let them know you're having surgery. (If you don't have insurance, call [phone].)    Call your clinic if there's any change in your health. This includes signs of a cold or flu (sore throat,  runny nose, cough, rash, fever). It also includes a scrape or scratch near the surgery site.    If you have questions on the day of surgery, call your hospital or surgery center.  COVID testing  You may need to be tested for COVID-19 before having surgery. If so, your surgical team will give you instructions for scheduling this test, separate from your preoperative history and physical.  Eating and drinking guidelines  For your safety: Unless your surgeon tells you otherwise, follow the guidelines below.    Eat and drink as usual until 8 hours before surgery. After that, no food or milk.    Drink clear liquids until 2 hours before surgery. These are liquids you can see through, like water, Gatorade and Propel Water. You may also have black coffee and tea (no cream or milk).    Nothing by mouth within 2 hours of surgery. This includes gum, candy and breath mints.    If you drink alcohol: Stop drinking it the night before surgery.    If your care team tells you to take medicine on the morning of surgery, it's okay to take it with a sip of water.  Preventing infection    Shower or bathe the night before and morning of your surgery. Follow the instructions your clinic gave you. (If no instructions, use regular soap.)    Don't shave or clip hair near your surgery site. We'll remove the hair if needed.    Don't smoke or vape the morning of surgery. You may chew nicotine gum up to 2 hours before surgery. A nicotine patch is okay.  ? Note: Some surgeries require you to completely quit smoking and nicotine. Check with your surgeon.    Your care team will make every effort to keep you safe from infection. We will:  ? Clean our hands often with soap and water (or an alcohol-based hand rub).  ? Clean the skin at your surgery site with a special soap that kills germs.  ? Give you a special gown to keep you warm. (Cold raises the risk of infection.)  ? Wear special hair covers, masks, gowns and gloves during surgery.  ? Give  antibiotic medicine, if prescribed. Not all surgeries need antibiotics.  What to bring on the day of surgery    Photo ID and insurance card    Copy of your health care directive, if you have one    Glasses and hearing aides (bring cases)  ? You can't wear contacts during surgery    Inhaler and eye drops, if you use them (tell us about these when you arrive)    CPAP machine or breathing device, if you use them    A few personal items, if spending the night    If you have . . .  ? A pacemaker, ICD (cardiac defibrillator) or other implant: Bring the ID card.  ? An implanted stimulator: Bring the remote control.  ? A legal guardian: Bring a copy of the certified (court-stamped) guardianship papers.  Please remove any jewelry, including body piercings. Leave jewelry and other valuables at home.  If you're going home the day of surgery    You must have a responsible adult drive you home. They should stay with you overnight as well.    If you don't have someone to stay with you, and you aren't safe to go home alone, we may keep you overnight. Insurance often won't pay for this.  After surgery  If it's hard to control your pain or you need more pain medicine, please call your surgeon's office.  Questions?   If you have any questions for your care team, list them here: _________________________________________________________________________________________________________________________________________________________________________ ____________________________________ ____________________________________ ____________________________________  For informational purposes only. Not to replace the advice of your health care provider. Copyright   2003, 2019 [facility]. All rights reserved. Clinically reviewed by Kayla Ware MD. [employer] [id number] - REV 07/21.

## 2022-04-01 ENCOUNTER — ANESTHESIA EVENT (OUTPATIENT)
Dept: SURGERY | Facility: AMBULATORY SURGERY CENTER | Age: 32
End: 2022-04-01
Payer: COMMERCIAL

## 2022-04-04 ENCOUNTER — ANESTHESIA (OUTPATIENT)
Dept: SURGERY | Facility: AMBULATORY SURGERY CENTER | Age: 32
End: 2022-04-04
Payer: COMMERCIAL

## 2022-04-04 ENCOUNTER — HOSPITAL ENCOUNTER (OUTPATIENT)
Facility: AMBULATORY SURGERY CENTER | Age: 32
Discharge: HOME | End: 2022-04-04
Attending: OTOLARYNGOLOGY | Admitting: OTOLARYNGOLOGY
Payer: COMMERCIAL

## 2022-04-04 ENCOUNTER — TELEPHONE (OUTPATIENT)
Dept: ALLERGY | Facility: CLINIC | Age: 32
End: 2022-04-04

## 2022-04-04 VITALS
HEART RATE: 91 BPM | BODY MASS INDEX: 27.18 KG/M2 | DIASTOLIC BLOOD PRESSURE: 66 MMHG | SYSTOLIC BLOOD PRESSURE: 103 MMHG | WEIGHT: 141.2 LBS | TEMPERATURE: 99 F | RESPIRATION RATE: 16 BRPM | OXYGEN SATURATION: 95 %

## 2022-04-04 DIAGNOSIS — J34.3 NASAL TURBINATE HYPERTROPHY: ICD-10-CM

## 2022-04-04 DIAGNOSIS — J32.0 CHRONIC MAXILLARY SINUSITIS: ICD-10-CM

## 2022-04-04 DIAGNOSIS — J34.89 NASAL OBSTRUCTION: ICD-10-CM

## 2022-04-04 DIAGNOSIS — J34.2 NASAL SEPTAL DEVIATION: ICD-10-CM

## 2022-04-04 LAB — HCG UR QL: NEGATIVE

## 2022-04-04 PROCEDURE — G8907 PT DOC NO EVENTS ON DISCHARG: HCPCS

## 2022-04-04 PROCEDURE — 81025 URINE PREGNANCY TEST: CPT | Performed by: ANESTHESIOLOGY

## 2022-04-04 PROCEDURE — 30520 REPAIR OF NASAL SEPTUM: CPT | Performed by: OTOLARYNGOLOGY

## 2022-04-04 PROCEDURE — 30140 RESECT INFERIOR TURBINATE: CPT | Mod: 50 | Performed by: OTOLARYNGOLOGY

## 2022-04-04 PROCEDURE — 31240 NSL/SNS NDSC CNCH BULL RESCJ: CPT | Mod: 50 | Performed by: OTOLARYNGOLOGY

## 2022-04-04 PROCEDURE — 31256 EXPLORATION MAXILLARY SINUS: CPT | Mod: 50

## 2022-04-04 PROCEDURE — 30520 REPAIR OF NASAL SEPTUM: CPT

## 2022-04-04 PROCEDURE — 30140 RESECT INFERIOR TURBINATE: CPT | Mod: 50

## 2022-04-04 PROCEDURE — G8918 PT W/O PREOP ORDER IV AB PRO: HCPCS

## 2022-04-04 PROCEDURE — 31240 NSL/SNS NDSC CNCH BULL RESCJ: CPT | Mod: 50

## 2022-04-04 PROCEDURE — 31256 EXPLORATION MAXILLARY SINUS: CPT | Mod: 50 | Performed by: OTOLARYNGOLOGY

## 2022-04-04 RX ORDER — ONDANSETRON 4 MG/1
4 TABLET, ORALLY DISINTEGRATING ORAL EVERY 30 MIN PRN
Status: DISCONTINUED | OUTPATIENT
Start: 2022-04-04 | End: 2022-04-05 | Stop reason: HOSPADM

## 2022-04-04 RX ORDER — FENTANYL CITRATE 50 UG/ML
INJECTION, SOLUTION INTRAMUSCULAR; INTRAVENOUS PRN
Status: DISCONTINUED | OUTPATIENT
Start: 2022-04-04 | End: 2022-04-04

## 2022-04-04 RX ORDER — ONDANSETRON 2 MG/ML
INJECTION INTRAMUSCULAR; INTRAVENOUS PRN
Status: DISCONTINUED | OUTPATIENT
Start: 2022-04-04 | End: 2022-04-04

## 2022-04-04 RX ORDER — CEFAZOLIN SODIUM 2 G/100ML
2 INJECTION, SOLUTION INTRAVENOUS
Status: COMPLETED | OUTPATIENT
Start: 2022-04-04 | End: 2022-04-04

## 2022-04-04 RX ORDER — PROPOFOL 10 MG/ML
INJECTION, EMULSION INTRAVENOUS PRN
Status: DISCONTINUED | OUTPATIENT
Start: 2022-04-04 | End: 2022-04-04

## 2022-04-04 RX ORDER — ACETAMINOPHEN 325 MG/1
975 TABLET ORAL ONCE
Status: COMPLETED | OUTPATIENT
Start: 2022-04-04 | End: 2022-04-04

## 2022-04-04 RX ORDER — LIDOCAINE HYDROCHLORIDE AND EPINEPHRINE 10; 10 MG/ML; UG/ML
INJECTION, SOLUTION INFILTRATION; PERINEURAL PRN
Status: DISCONTINUED | OUTPATIENT
Start: 2022-04-04 | End: 2022-04-04 | Stop reason: HOSPADM

## 2022-04-04 RX ORDER — OXYCODONE HYDROCHLORIDE 5 MG/1
5 TABLET ORAL EVERY 4 HOURS PRN
Status: DISCONTINUED | OUTPATIENT
Start: 2022-04-04 | End: 2022-04-05 | Stop reason: HOSPADM

## 2022-04-04 RX ORDER — CEPHALEXIN 500 MG/1
500 CAPSULE ORAL 3 TIMES DAILY
Qty: 24 CAPSULE | Refills: 0 | Status: SHIPPED | OUTPATIENT
Start: 2022-04-04 | End: 2022-04-12

## 2022-04-04 RX ORDER — DEXAMETHASONE SODIUM PHOSPHATE 4 MG/ML
INJECTION, SOLUTION INTRA-ARTICULAR; INTRALESIONAL; INTRAMUSCULAR; INTRAVENOUS; SOFT TISSUE PRN
Status: DISCONTINUED | OUTPATIENT
Start: 2022-04-04 | End: 2022-04-04

## 2022-04-04 RX ORDER — LIDOCAINE 40 MG/G
CREAM TOPICAL
Status: DISCONTINUED | OUTPATIENT
Start: 2022-04-04 | End: 2022-04-05 | Stop reason: HOSPADM

## 2022-04-04 RX ORDER — CEFAZOLIN SODIUM 2 G/100ML
2 INJECTION, SOLUTION INTRAVENOUS SEE ADMIN INSTRUCTIONS
Status: DISCONTINUED | OUTPATIENT
Start: 2022-04-04 | End: 2022-04-05 | Stop reason: HOSPADM

## 2022-04-04 RX ORDER — SODIUM CHLORIDE, SODIUM LACTATE, POTASSIUM CHLORIDE, CALCIUM CHLORIDE 600; 310; 30; 20 MG/100ML; MG/100ML; MG/100ML; MG/100ML
INJECTION, SOLUTION INTRAVENOUS CONTINUOUS
Status: DISCONTINUED | OUTPATIENT
Start: 2022-04-04 | End: 2022-04-05 | Stop reason: HOSPADM

## 2022-04-04 RX ORDER — HYDROCODONE BITARTRATE AND ACETAMINOPHEN 5; 325 MG/1; MG/1
1-2 TABLET ORAL EVERY 6 HOURS PRN
Qty: 15 TABLET | Refills: 0 | Status: SHIPPED | OUTPATIENT
Start: 2022-04-04 | End: 2022-05-12

## 2022-04-04 RX ORDER — PROPOFOL 10 MG/ML
INJECTION, EMULSION INTRAVENOUS CONTINUOUS PRN
Status: DISCONTINUED | OUTPATIENT
Start: 2022-04-04 | End: 2022-04-04

## 2022-04-04 RX ORDER — FENTANYL CITRATE 50 UG/ML
25 INJECTION, SOLUTION INTRAMUSCULAR; INTRAVENOUS
Status: DISCONTINUED | OUTPATIENT
Start: 2022-04-04 | End: 2022-04-05 | Stop reason: HOSPADM

## 2022-04-04 RX ORDER — MEPERIDINE HYDROCHLORIDE 25 MG/ML
12.5 INJECTION INTRAMUSCULAR; INTRAVENOUS; SUBCUTANEOUS
Status: DISCONTINUED | OUTPATIENT
Start: 2022-04-04 | End: 2022-04-05 | Stop reason: HOSPADM

## 2022-04-04 RX ORDER — ONDANSETRON 2 MG/ML
4 INJECTION INTRAMUSCULAR; INTRAVENOUS EVERY 30 MIN PRN
Status: DISCONTINUED | OUTPATIENT
Start: 2022-04-04 | End: 2022-04-05 | Stop reason: HOSPADM

## 2022-04-04 RX ORDER — ALBUTEROL SULFATE 0.83 MG/ML
2.5 SOLUTION RESPIRATORY (INHALATION) EVERY 4 HOURS PRN
Status: DISCONTINUED | OUTPATIENT
Start: 2022-04-04 | End: 2022-04-05 | Stop reason: HOSPADM

## 2022-04-04 RX ORDER — BACITRACIN ZINC 500 [USP'U]/G
OINTMENT TOPICAL PRN
Status: DISCONTINUED | OUTPATIENT
Start: 2022-04-04 | End: 2022-04-04 | Stop reason: HOSPADM

## 2022-04-04 RX ORDER — DEXAMETHASONE SODIUM PHOSPHATE 10 MG/ML
10 INJECTION, SOLUTION INTRAMUSCULAR; INTRAVENOUS ONCE
Status: DISCONTINUED | OUTPATIENT
Start: 2022-04-04 | End: 2022-04-05 | Stop reason: HOSPADM

## 2022-04-04 RX ORDER — LABETALOL HYDROCHLORIDE 5 MG/ML
10 INJECTION, SOLUTION INTRAVENOUS
Status: DISCONTINUED | OUTPATIENT
Start: 2022-04-04 | End: 2022-04-05 | Stop reason: HOSPADM

## 2022-04-04 RX ORDER — FENTANYL CITRATE 50 UG/ML
25 INJECTION, SOLUTION INTRAMUSCULAR; INTRAVENOUS EVERY 5 MIN PRN
Status: DISCONTINUED | OUTPATIENT
Start: 2022-04-04 | End: 2022-04-05 | Stop reason: HOSPADM

## 2022-04-04 RX ORDER — COCAINE HYDROCHLORIDE 40 MG/ML
SOLUTION NASAL PRN
Status: DISCONTINUED | OUTPATIENT
Start: 2022-04-04 | End: 2022-04-04 | Stop reason: HOSPADM

## 2022-04-04 RX ORDER — LIDOCAINE HYDROCHLORIDE 20 MG/ML
INJECTION, SOLUTION INFILTRATION; PERINEURAL PRN
Status: DISCONTINUED | OUTPATIENT
Start: 2022-04-04 | End: 2022-04-04

## 2022-04-04 RX ORDER — DIAZEPAM 10 MG/2ML
2.5 INJECTION, SOLUTION INTRAMUSCULAR; INTRAVENOUS
Status: DISCONTINUED | OUTPATIENT
Start: 2022-04-04 | End: 2022-04-05 | Stop reason: HOSPADM

## 2022-04-04 RX ADMIN — ONDANSETRON 4 MG: 2 INJECTION INTRAMUSCULAR; INTRAVENOUS at 13:08

## 2022-04-04 RX ADMIN — PROPOFOL 200 MCG/KG/MIN: 10 INJECTION, EMULSION INTRAVENOUS at 11:05

## 2022-04-04 RX ADMIN — PROPOFOL 200 MG: 10 INJECTION, EMULSION INTRAVENOUS at 11:05

## 2022-04-04 RX ADMIN — Medication 40 MG: at 11:05

## 2022-04-04 RX ADMIN — DEXAMETHASONE SODIUM PHOSPHATE 10 MG: 4 INJECTION, SOLUTION INTRA-ARTICULAR; INTRALESIONAL; INTRAMUSCULAR; INTRAVENOUS; SOFT TISSUE at 11:10

## 2022-04-04 RX ADMIN — LIDOCAINE HYDROCHLORIDE 60 MG: 20 INJECTION, SOLUTION INFILTRATION; PERINEURAL at 11:05

## 2022-04-04 RX ADMIN — FENTANYL CITRATE 50 MCG: 50 INJECTION, SOLUTION INTRAMUSCULAR; INTRAVENOUS at 11:05

## 2022-04-04 RX ADMIN — CEFAZOLIN SODIUM 2 G: 2 INJECTION, SOLUTION INTRAVENOUS at 10:57

## 2022-04-04 RX ADMIN — ACETAMINOPHEN 975 MG: 325 TABLET ORAL at 09:57

## 2022-04-04 RX ADMIN — SODIUM CHLORIDE, SODIUM LACTATE, POTASSIUM CHLORIDE, CALCIUM CHLORIDE: 600; 310; 30; 20 INJECTION, SOLUTION INTRAVENOUS at 11:00

## 2022-04-04 RX ADMIN — PROPOFOL 50 MG: 10 INJECTION, EMULSION INTRAVENOUS at 12:42

## 2022-04-04 RX ADMIN — SODIUM CHLORIDE, SODIUM LACTATE, POTASSIUM CHLORIDE, CALCIUM CHLORIDE: 600; 310; 30; 20 INJECTION, SOLUTION INTRAVENOUS at 13:01

## 2022-04-04 RX ADMIN — OXYCODONE HYDROCHLORIDE 5 MG: 5 TABLET ORAL at 13:51

## 2022-04-04 RX ADMIN — FENTANYL CITRATE 25 MCG: 50 INJECTION, SOLUTION INTRAMUSCULAR; INTRAVENOUS at 14:08

## 2022-04-04 NOTE — ANESTHESIA PROCEDURE NOTES
Airway       Patient location during procedure: OR       Procedure Start/Stop Times: 4/4/2022 11:08 AM  Staff -        Performed By: CRNAIndications and Patient Condition       Indications for airway management: clark-procedural       Induction type:intravenous       Mask difficulty assessment: 1 - vent by mask    Final Airway Details       Final airway type: endotracheal airway       Successful airway: MINDY and Oral  Endotracheal Airway Details        ETT size (mm): 6.5       Cuffed: yes       Successful intubation technique: direct laryngoscopy       DL Blade Type: Merida 2       Grade View of Cords: 1       Adjucts: tooth guard and stylet    Post intubation assessment        Placement verified by: capnometry, equal breath sounds and chest rise        Number of attempts at approach: 1       Number of other approaches attempted: 0       Secured with: cloth tape       Ease of procedure: easy       Dentition: Intact

## 2022-04-04 NOTE — DISCHARGE INSTRUCTIONS
Instructions following Septoplasty and Sinus Surgery    Recovery - Everyone recovers differently from a general anesthetic.  Symptoms such as fatigue, nausea, lightheadedness, and sometimes a low grade fever (up to 101 degrees) are not unusual.  As your body removes the anesthetic drugs from circulation, these symptoms will resolve.  Your nose will be sore after surgery, and you may even have symptoms similar to a sinus infection with headache, congestion, and pressure.  These will resolve with healing.  For several days you may experience bloody drainage from the nose, please use the drip pad as necessary for this. Call the office if the bleeding persists after 3 days or is perfuse. There are no diet restrictions after septoplasty, and you can resume your home medications except for blood thinners.  Please do not blow your nose for two weeks after surgery. You may gently dab your nose with Kleenex. Limit your activity to no strenuous activities until I see you for the first follow up visit, and sleep with your head elevated.    Medications - You were sent home with narcotic pain medication.  If you can tolerate the discomfort during your recovery by using just plain Tylenol or ibuprofen (advil), please do so.  However, do not hesitate to use the stronger pain medication if needed.  If you were sent home with an antibiotic, it is primarily used to help the healing process.  If it causes loose bowel movements or other signs of intolerance, it is appropriate to discontinue it.      Complications - Problems related to septoplasty almost always are detected during the operation, and special instruction will be given in that situation.  However, unexpected things can happen.  If you experience persistent bleeding, fevers, changes in vision, severe headache or nasal pain, this may be the sign of an infection.  Any of these symptoms should be called into my office or to the on call ENT if after hours. There may be small  plastic pieces placed inside your nose during surgery (splints). These help to promote the septum into healing in its straightened position, and will be removed at the follow up visit.    Follow up - Splints will be removed at the follow up visit. This is simple and takes just a few seconds afterwards, the improvement you will expereince in breathing from the septoplasty is usually dramatic and immediate.  I will then see you 4 to 6 weeks after that visit to make sure that everything has healed appropriately.    If there are any questions or issues with the above, or if there are other issues that concern you, always feel free to call the clinic and I am happy to speak with you as soon as I can.    Miquel Rutledge MD  Otolaryngology  [facility]  [phone] or [phone] After hours, [facility] option      [facility]   Adult Discharge Orders & Instructions     For 24 hours after surgery    1. Get plenty of rest.  A responsible adult must stay with you for at least 24 hours after you leave the hospital.   2. Do not drive or use heavy equipment.  If you have weakness or tingling, don't drive or use heavy equipment until this feeling goes away.  3. Do not drink alcohol.  4. Avoid strenuous or risky activities.  Ask for help when climbing stairs.   5. You may feel lightheaded.  IF so, sit for a few minutes before standing.  Have someone help you get up.   6. If you have nausea (feel sick to your stomach): Drink only clear liquids such as apple juice, ginger ale, broth or 7-Up.  Rest may also help.  Be sure to drink enough fluids.  Move to a regular diet as you feel able.  7. You may have a slight fever. Call the doctor if your fever is over 100 F (37.7 C) (taken under the tongue) or lasts longer than 24 hours.  8. You may have a dry mouth, a sore throat, muscle aches or trouble sleeping.  These should go away after 24 hours.  9. Do not make important or legal decisions.      Call your doctor for any of the followin.  Signs of infection (fever, growing tenderness at the surgery site, a large amount of drainage or bleeding, severe pain, foul-smelling drainage, redness, swelling).    2. It has been over 8 to 10 hours since surgery and you are still not able to urinate (pass water).    3.  Headache for over 24 hours.                  4. Signs of Covid-19 infection (temperature over 100 degrees, shortness of breath, cough, loss of taste/smell, generalized body aches, persistent headache,                  chills, sore throat, nausea/vomiting/diarrhea).    To contact Dr Rutledge call:    [phone] - Day  [phone] - After hours/weekends      You had 975 mg of Tylenol at 10:00am. You may repeat this after 6 hours (4pm) . Maximum amount of Tylenol/Acetaminophen in a 24 hour period is 4,000 mg.

## 2022-04-04 NOTE — ANESTHESIA POSTPROCEDURE EVALUATION
Patient: Reginaldo Ferraro    Procedure: Procedure(s):  FUNCTIONAL ENDOSCOPIC SINUS SURGERY, with bilateral maxillary antrostomies and bilateral michael bullosa reduction,  WITH NASAL SEPTOPLASTY and bilateral inferior turbinate reduction       Anesthesia Type:  General    Note:  Disposition: Outpatient   Postop Pain Control: Uneventful            Sign Out: Well controlled pain   PONV: No   Neuro/Psych: Uneventful            Sign Out: Acceptable/Baseline neuro status   Airway/Respiratory: Uneventful            Sign Out: AIRWAY IN SITU/Resp. Support   CV/Hemodynamics: Uneventful            Sign Out: Acceptable CV status   Other NRE: NONE   DID A NON-ROUTINE EVENT OCCUR? No           Last vitals:  Vitals Value Taken Time   /73 04/04/22 1345   Temp 37.2  C (99  F) 04/04/22 1318   Pulse 97 04/04/22 1355   Resp 20 04/04/22 1355   SpO2 93 % 04/04/22 1355   Vitals shown include unvalidated device data.    Electronically Signed By: Jagdish Fermin MD  April 4, 2022  3:31 PM

## 2022-04-04 NOTE — TELEPHONE ENCOUNTER
RN left message for patient to return call to [phone].    Patient's allergy serums arrived at the [facility].  Patient needs to schedule allergy shot appointments.  Patient must bring an unexpired epi pen to every visit and be feeling perfectly healthy.    Ileana JORGENSEN RN

## 2022-04-04 NOTE — ANESTHESIA CARE TRANSFER NOTE
Patient: Reginaldo Ferraro    Procedure: Procedure(s):  FUNCTIONAL ENDOSCOPIC SINUS SURGERY, with bilateral maxillary antrostomies and bilateral michael bullosa reduction,  WITH NASAL SEPTOPLASTY and bilateral inferior turbinate reduction       Diagnosis: Nasal obstruction [J34.89]  Nasal septal deviation [J34.2]  Nasal turbinate hypertrophy [J34.3]  Chronic maxillary sinusitis [J32.0]  Diagnosis Additional Information: No value filed.    Anesthesia Type:   General     Note:    Oropharynx: oropharynx clear of all foreign objects  Level of Consciousness: awake  Oxygen Supplementation: face mask    Independent Airway: airway patency satisfactory and stable  Dentition: dentition unchanged  Vital Signs Stable: post-procedure vital signs reviewed and stable  Report to RN Given: handoff report given  Patient transferred to: PACU    Handoff Report: Identifed the Patient, Identified the Reponsible Provider, Reviewed the pertinent medical history, Discussed the surgical course, Reviewed Intra-OP anesthesia mangement and issues during anesthesia, Set expectations for post-procedure period and Allowed opportunity for questions and acknowledgement of understanding      Vitals:  Vitals Value Taken Time   BP     Temp     Pulse     Resp     SpO2         Electronically Signed By: VIANEY Lamb CRNA  April 4, 2022  1:22 PM

## 2022-04-04 NOTE — OP NOTE
PREOPERATIVE DIAGNOSIS  1. Nasal obstruction.   2. Septal deviation.   3. Bilateral turbinate hypertrophy  4. Chronic maxillary sinusitis  5. Bilateral choncha bullosa    POSTOPERATIVE DIAGNOSIS  1. Nasal obstruction.   2. Septal deviation.   3. Bilateral turbinate hypertrophy  4. Chronic maxillary sinusitis  5. Bilateral choncha bullosa    PROCEDURES PERFORMED:   1. Septoplasty  2. Bilateral submucous resection of inferior turbinates.   3. Bilateral inferior turbinate out-fracture  4. Bilateral endoscopic maxillary antrostomies  5. Bilateral endoscopic choncha bullosa reduction    SURGEON: Miquel Rutledge MD   ASSISTANTS: none  BLOOD LOSS: 20 mL.   COMPLICATIONS: None.   SPECIMENS: None.   ANESTHESIA: GETA.   DRAINS, IMPLANTS, and DEVICES: bilateral Espinosa splints, nasopore dissolvable packing    INDICATIONS: Reginaldo Ferraro presented to me with a history of chronic nasal obstruction. On evaluation, the patient had a deviated septum to the left and bilateral inferior turbinate hypertrophy. She also had a history of chronic sinusitis. CT showed osteomeatal complex narrowing and bilateral michael bullosa. Therefore, my recommendation was for the above-named procedures. Preoperatively, risks discussed included the risks of infection, bleeding, the risks of general anesthesia, possible injury to the eyes, base of skull and tear duct system, and possible failure of the surgery to achieve the desired result, septal perforation, and need for revision, persistent recurrent sinusitis, the need for medical therapy. The patient understood these risks and possible outcomes and wished to proceed.   OPERATIVE PROCEDURE: After being taken to the operating room and induction of general endotracheal tube anesthesia, the bed was rotated 90 degrees. A procedural pause was performed to identify the patient by name, birthday, and procedure. After that, I began by applying topical anesthetic in the form of 2 cottonoids on each side of the  nose which had been soaked with a total of 4 mL of 4% liquid cocaine. In addition, I injected 1% lidocaine with 1:100,000 epinephrine into the right hemitransfixion incision area, right intercartilagenous incision area, bilateral anterior septum, and overlying any spurs. She was prepped and draped in the normal clean fashion.  I removed the cottonoids from the nose and entered the right nasal cavity. I brought in a zero degree rigid endoscope and took photos of both sides showing the nasal obstruction and septal deviation and turbinate hypertrophy and michael bullosa. I used a spinal needle to inject 1% lidocaine, 1:100,000 epinephrine into the right sphenopalatine area, right middle turbinate, right axilla of the middle turbinate and lateral nasal wall. Then using the rigid endoscope I incised the head of the right middle turbinate with a 15 blade. I used the shaver to remove the lateral portion of the middle turbinate michael bullosa to open this into the middle meatus. This helped improve the airway and drainage for the maxillary sinus. I then used a 45 degree rigid endoscope. I took down the uncinate with a backbiter, then shaver. I saw an accessory os and used the back biter to connect it to the maxillary os that had been scarred shut. I used the shaver to widen the right maxillary antrostomy. Photos were taken.   I could not access the left middle middle meatus, so I then proceeded with the septoplasty. I made a right hemitransfixion septoplasty incision in the right nasal vestibule in a traditional open fashion. I then dissected down onto the left side of the cartilaginous septum through the right-sided incision. I then was able to start a submucoperichondrial pocket directly on the left side of the cartilaginous septum. The patient had a deviated maxillary crest as well as the cartilage deflected to the left off the maxillary crest behind the caudal deflection creating a large spur posteriorly impacting the  "left inferior turbinate. After I completely elevated the mucoperichondrium off the left side of the septum and the bony spur, I continued posterior raising a flap off the bone. I then made a chondrotomy incision through the cartilage in a \"C\" shape fashion approximately 1 cm back from the anterior edge and 1.5 cm from the dorsum. I broke over to the right side and raised a submucoperiosteal flap on the entire right side of the nasal septum. After this was done, I freed the cartilage from the bony septum, and I removed it in one large piece. It was brought to the back table and carved free of the defelection for later reinserted back into the mucoperichondrial pocket. I then used a osteotome to remove the deflected maxillary crest on the left side. I also used a double-action scissors and removed a portion of the posterior bony septum by cutting superiorly leaving an adequate strut. Some of the bony septum was removed and discarded including the left bony spur. I placed the cartilage back in to the flaps. I laid the flaps back together and this significantly improved the nasal airway. I irrigated the septum in between the flaps. I connected my right hemitransfixion incision with a right intercartilaginous incision. I used a fine iris to dissect along the right upper lateral cartilage and then advance the dissection overlying the nasal cartilaginous and then bony dorsum. I used a Casco to elevate the periosteum over the bony dorsum. I then used a pull rasp to reduce the bony dorsal hump. When this was adequately straight and smooth I irrigated the pocket with saline. I then closed my septoplasty and intercartilaginous incisions with 5 simple interrupted 4-0 chromic gut sutures.   Next I performed submucous resection of inferior turbinates with out-fracture.  I injected both inferior turbinates along their entire length with 1% lidocaine, 1:100,000 epinephrine using a spinal needle. I used the 15 blade to make a stab " incision at the head of the right inferior turbinate. I then used a caudal to free the mucoperiosteum from the inferior turbinate bone along the medial aspect of the bone all the way posteriorly. Using a 2.0 mm medtronic shaver blade, I slowly entered the pocket and ran the shaver function to perform my submucous resection of the right inferior turbinate. I used the shaver along the entire length of the inferior turbinate from anterior to posterior.   I then performed the same procedure on the left side. Once again using the 15 blade, I made a stab incision at the head of the left inferior turbinate.  I then used a caudal to free the mucoperiosteum from the inferior turbinate bone along the medial aspect of the bone all the way posteriorly. Using a 2.0 mm medtronic shaver blade, I slowly entered the pocket and ran the shaver function to perform my submucous resection of the left inferior turbinate. I used the shaver along the entire length of the inferior turbinate from anterior to posterior. I closed each stab incision with a simple interrupted 5-0 chromic suture.   Again I performed out-fracture by using the back end of the caudal to fracture both inferior turbinate bones laterally. I did this at multiple spots along each bone. I then used the longest nasal speculum to fracture the entire inferior turbinates laterally on both sides. The airway was now wide open on both sides.   I brought in a zero degree rigid endoscope on the left side. I used a spinal needle to inject 1% lidocaine, 1:100,000 epinephrine into the left sphenopalatine area, left middle turbinate, left axilla of the middle turbinate and lateral nasal wall. Then using the rigid endoscope I incised the head of the left middle turbinate with a 15 blade. I used the shaver to remove the lateral portion of the middle turbinate michael bullosa to open this into the left middle meatus. This helped improve the airway and drainage for the maxillary sinus. I  then used a 45 degree rigid endoscope. I took down the left uncinate with a backbiter, then shaver up to the attachment of the uncinate to the lateral side wall. I saw an accessory os and used the back biter to connect it to the maxillary os. I used the shaver to widen the right maxillary antrostomy. Photos were taken of both sides and the much improved airway. I placed nasopore in each middle meatus to medialize the middle turbinates.  Lastly, I placed a Espinosa stent on each side and stitched them together with a 3-0 nylon suture.  At this point, the entire procedure was now complete. I reinspected both sides of the nose and there was good hemostasis with a greatly improved airway bilaterally.  All instruments were accounted for and all counts were correct. The patient's bed was rotated 90 degrees back to the care of anesthesia. They were awakened, extubated and sent to the recovery room in good condition.

## 2022-04-04 NOTE — ANESTHESIA PREPROCEDURE EVALUATION
Anesthesia Pre-Procedure Evaluation    Patient: Reginaldo Ferraro   MRN: [id number] : 1990        Procedure : Procedure(s):  FUNCTIONAL ENDOSCOPIC SINUS SURGERY, with bilateral maxillary antrostomies and bilateral michael bullosa reduction,  WITH NASAL SEPTOPLASTY and bilateral inferior turbinate reduction          Past Medical History:   Diagnosis Date     Abnormal Pap smear of cervix 2011 [facility]     Cervical high risk HPV (human papillomavirus) test positive 2013    10/31/14, 18     Depressive disorder 2011    possibly chronic     Environmental allergies      Migraine      Migraines     headaches with allergies     Moderate major depression 2011     Moderate major depression (H) 2011     Moderate persistent asthma 3/17/2022     NO ACTIVE PROBLEMS      Seasonal allergic rhinitis       Past Surgical History:   Procedure Laterality Date     DILATION AND EVACUATION N/A 2022    Procedure: DILATION AND EVACUATION, UTERUS;  Surgeon: Vianey Domingo MD;  Location: [facility]     EYE SURGERY  2013    Lasic surgery     NO HISTORY OF SURGERY       VT BREAST AUGMENTATION        No Known Allergies   Social History     Tobacco Use     Smoking status: Never Smoker     Smokeless tobacco: Never Used   Substance Use Topics     Alcohol use: Not Currently     Comment: Socially      Wt Readings from Last 1 Encounters:   22 64 kg (141 lb 3.2 oz)        Anesthesia Evaluation            ROS/MED HX  ENT/Pulmonary:     (+) allergic rhinitis, Moderate Persistent, asthma     Neurologic:     (+) migraines,     Cardiovascular:       METS/Exercise Tolerance:     Hematologic:       Musculoskeletal:       GI/Hepatic:       Renal/Genitourinary:       Endo:       Psychiatric/Substance Use:     (+) psychiatric history depression     Infectious Disease:       Malignancy:       Other:            Physical Exam    Airway  airway exam normal      Mallampati: II   TM distance: > 3 FB   Neck  ROM: full   Mouth opening: > 3 cm    Respiratory Devices and Support         Dental  no notable dental history         Cardiovascular          Rhythm and rate: regular and normal     Pulmonary   pulmonary exam normal        breath sounds clear to auscultation           OUTSIDE LABS:  CBC:   Lab Results   Component Value Date    WBC 7.1 11/16/2018    WBC 8.3 11/02/2015    HGB 12.4 11/16/2018    HGB 11.5 (L) 11/02/2015    HCT 37.6 11/16/2018    HCT 34.2 (L) 11/02/2015     11/16/2018     11/02/2015     BMP:   Lab Results   Component Value Date    GLC 91 02/16/2022     COAGS: No results found for: PTT, INR, FIBR  POC:   Lab Results   Component Value Date    HCG Positive (A) 12/22/2021    HCGS Negative 11/16/2018     HEPATIC: No results found for: ALBUMIN, PROTTOTAL, ALT, AST, GGT, ALKPHOS, BILITOTAL, BILIDIRECT, ZHANG  OTHER:   Lab Results   Component Value Date    TSH 1.13 10/31/2014       Anesthesia Plan    ASA Status:  2   NPO Status:  NPO Appropriate    Anesthesia Type: General.     - Airway: ETT   Induction: Intravenous.   Maintenance: Balanced.        Consents    Anesthesia Plan(s) and associated risks, benefits, and realistic alternatives discussed. Questions answered and patient/representative(s) expressed understanding.    - Discussed:     - Discussed with:  Patient      - Extended Intubation/Ventilatory Support Discussed: No.      - Patient is DNR/DNI Status: No    Use of blood products discussed: No .     Postoperative Care    Pain management: IV analgesics, Oral pain medications, Multi-modal analgesia.   PONV prophylaxis: Ondansetron (or other 5HT-3), Background Propofol Infusion     Comments:                Jagdish Fermin MD

## 2022-04-05 NOTE — TELEPHONE ENCOUNTER
Patient returned call to clinic. Patient scheduled for allergy injections on 2022. Patient advised to bring un- EpiPen at time of all injection appointments. Patient expressed understanding. No further questions.  Chandrika العراقي MA

## 2022-04-11 NOTE — PROGRESS NOTES
History of Present Illness - Reginaldo Ferraro is a 31 year old female who is status post septoplasty and inferior turbinate reduction, endoscopic bilateral michael bullosa reduction and maxillary antrostomies on 4/4/22.  They had some bleeding for a few days, but that has stopped, and was minimal. They have congestion. Some bruising or a small nasal dorsum scab.    Exam -   General - The patient is in no distress.  Alert and oriented to person and place, answers questions and cooperates with examination appropriately.   Nose - nasal dorsum has a small 5mm crust, superficial. Some edema of dorsum. After removing the splints and debris with a suction, everything looks great.  The incision is well healed and the turbinates are well lateralized. The airway is wide open bilaterally. No sign of synechiae or infection. No hematoma or septal perforation.    PROCEDURE - ENDOSCOPIC SINUS DEBRIDEMENT    Nasal exam performed with a zero degree rigid endoscope for purposes of endoscopic sinus debridement. I began by spraying both sides with lidocaine and neosynephrine. I began on the right side.  The middle meatus was noted to obstructed with a dark crust, this was gently dislodged from the lateral wall with a number 7 suction, I was then able to visualize the right maxillary sinusotomy, it is healing well and open.  No purulence noted. I turned my attention to the left side.  Once again some dark crust was dislodged from the middle meatus and removed from the nose.  I was then able to pass the scope into the left middle meatus.  The maxillary sinusotomy is healing well, no abnormal secretions noted.        A/P - Reginaldo Ferraro has had a nice result from septoplasty and inferior turbinate reduction and maxillary antrostomies with michael bullosa reduction.  The position of the septum and nasal tip look good. Use vaseline on dorsum crust and right septum and vestibular incisions. These should heal just fine. I will see the  patient in two weeks.  I cautioned them to avoid any trauma to the nose, hard blowing, or twisting. She should gently irrigate with nasal saline 1-2 times daily.

## 2022-04-12 ENCOUNTER — OFFICE VISIT (OUTPATIENT)
Dept: OTOLARYNGOLOGY | Facility: CLINIC | Age: 32
End: 2022-04-12
Payer: COMMERCIAL

## 2022-04-12 VITALS — HEART RATE: 85 BPM | OXYGEN SATURATION: 97 % | RESPIRATION RATE: 18 BRPM

## 2022-04-12 DIAGNOSIS — Z98.890 S/P FESS (FUNCTIONAL ENDOSCOPIC SINUS SURGERY): ICD-10-CM

## 2022-04-12 DIAGNOSIS — Z98.890 S/P NASAL SEPTOPLASTY: Primary | ICD-10-CM

## 2022-04-12 DIAGNOSIS — J32.0 CHRONIC MAXILLARY SINUSITIS: ICD-10-CM

## 2022-04-12 PROCEDURE — 99024 POSTOP FOLLOW-UP VISIT: CPT | Performed by: OTOLARYNGOLOGY

## 2022-04-12 PROCEDURE — 31237 NSL/SINS NDSC SURG BX POLYPC: CPT | Mod: 50 | Performed by: OTOLARYNGOLOGY

## 2022-04-12 NOTE — LETTER
4/12/2022         RE: Reginaldo Ferraro  [address] [facility] [address]        Dear Colleague,    Thank you for referring your patient, Reginaldo Ferraro, to the [facility]. Please see a copy of my visit note below.    History of Present Illness - Reginaldo Ferraro is a 31 year old female who is status post septoplasty and inferior turbinate reduction, endoscopic bilateral michael bullosa reduction and maxillary antrostomies on 4/4/22.  They had some bleeding for a few days, but that has stopped, and was minimal. They have congestion. Some bruising or a small nasal dorsum scab.    Exam -   General - The patient is in no distress.  Alert and oriented to person and place, answers questions and cooperates with examination appropriately.   Nose - nasal dorsum has a small 5mm crust, superficial. Some edema of dorsum. After removing the splints and debris with a suction, everything looks great.  The incision is well healed and the turbinates are well lateralized. The airway is wide open bilaterally. No sign of synechiae or infection. No hematoma or septal perforation.    PROCEDURE - ENDOSCOPIC SINUS DEBRIDEMENT    Nasal exam performed with a zero degree rigid endoscope for purposes of endoscopic sinus debridement. I began by spraying both sides with lidocaine and neosynephrine. I began on the right side.  The middle meatus was noted to obstructed with a dark crust, this was gently dislodged from the lateral wall with a number 7 suction, I was then able to visualize the right maxillary sinusotomy, it is healing well and open.  No purulence noted. I turned my attention to the left side.  Once again some dark crust was dislodged from the middle meatus and removed from the nose.  I was then able to pass the scope into the left middle meatus.  The maxillary sinusotomy is healing well, no abnormal secretions noted.        A/P - Reginaldo Ferraro has had a nice result from septoplasty and  inferior turbinate reduction and maxillary antrostomies with michael bullosa reduction.  The position of the septum and nasal tip look good. Use vaseline on dorsum crust and right septum and vestibular incisions. These should heal just fine. I will see the patient in two weeks.  I cautioned them to avoid any trauma to the nose, hard blowing, or twisting. She should gently irrigate with nasal saline 1-2 times daily.        Again, thank you for allowing me to participate in the care of your patient.        Sincerely,        Miquel Rutledge MD

## 2022-04-18 ENCOUNTER — MYC MEDICAL ADVICE (OUTPATIENT)
Dept: ALLERGY | Facility: CLINIC | Age: 32
End: 2022-04-18
Payer: COMMERCIAL

## 2022-04-19 ENCOUNTER — TELEPHONE (OUTPATIENT)
Dept: FAMILY MEDICINE | Facility: CLINIC | Age: 32
End: 2022-04-19
Payer: COMMERCIAL

## 2022-04-19 NOTE — TELEPHONE ENCOUNTER
Reason for Call:  Form, our goal is to have forms completed with 72 hours, however, some forms may require a visit or additional information.    Type of letter, form or note:  FMLA    Who is the form from?: Patient    Where did the form come from: Patient or family brought in       What clinic location was the form placed at?: [facility]    Where the form was placed: 's box behind 2nd floor reception Box/Folder    What number is listed as a contact on the form?: [phone]       Additional comments: Please complete the attached form and then fax it directly to [employer] at [phone].  Thanks.    Call taken on 4/19/2022 at 11:23 AM by Elke Yi

## 2022-04-20 NOTE — TELEPHONE ENCOUNTER
Spoke to Josefina on Dr. Mccain's team. Dr. Mccain does not fill out FMLA forms for allergy shots. Adán will send patient a [employer] message     Brock-

## 2022-04-20 NOTE — TELEPHONE ENCOUNTER
Can we please call allergy and find out why they are unwilling to fill out FMLA for her allergy shots?  I am not managing these so it does not seem appropriate for me to fill this paperwork out. It appears patient is requesting the FMLA as she will be late to work even with the earliest appointment that we have.   Cally Rucker PA-C

## 2022-04-24 NOTE — PROGRESS NOTES
History of Present Illness - Reginaldo Ferraro is a 31 year old female who is status post septoplasty, inferior turbinate reduction, bilateral endoscopic michael bullosa reduction, and maxillary antrostomies on 4/4/22. She returns and notes no purulent drainage. No bleeding. Breathing is much better in the nose. Her nasal dorsum still has some swelling, but the skin has healed.    Exam -   General - The patient is in no distress.  Alert and oriented to person and place, answers questions and cooperates with examination appropriately.   Nose - Nasal dorsum has intact skin, no scar. Some edema of dorsum. The intranasal incisions are well healed and the turbinates are well lateralized. The airway is wide open bilaterally. No sign of synechiae or infection. No hematoma or septal perforation.    PROCEDURE - ENDOSCOPIC SINUS Debridement    Nasal exam performed with a zero degree rigid endoscope for purposes of endoscopic sinus debridement. I began by spraying both sides with lidocaine and neosynephrine. I began on the right side.  The middle meatus was noted to obstructed with a dark crust, this was gently dislodged from the lateral wall with a number 7 suction. I couldn't get it out of the middle meatus so I grabbed it with a cups. Lastly I used a #10 suction to remove all of the debri. I was then able to visualize the right maxillary sinusotomy, it is healing well and open.  No purulence noted. I turned my attention to the left side.  Once again some dark crust was dislodged from the middle meatus and removed from the nose.  I was then able to pass the scope into the left middle meatus.  The maxillary sinusotomy is healing well, no abnormal secretions noted. I took photos. Nice open airway.       A/P - Reginaldo Ferraro has had a nice result from septoplasty and inferior turbinate reduction and maxillary antrostomies with michael bullosa reduction.  The position of the septum and nasal tip look good. Okay to blow nose.  Finish saline irrigations this week. Return prn.

## 2022-04-26 ENCOUNTER — OFFICE VISIT (OUTPATIENT)
Dept: OTOLARYNGOLOGY | Facility: CLINIC | Age: 32
End: 2022-04-26
Payer: COMMERCIAL

## 2022-04-26 VITALS
RESPIRATION RATE: 16 BRPM | DIASTOLIC BLOOD PRESSURE: 56 MMHG | SYSTOLIC BLOOD PRESSURE: 99 MMHG | OXYGEN SATURATION: 100 % | HEART RATE: 71 BPM

## 2022-04-26 DIAGNOSIS — Z98.890 S/P FESS (FUNCTIONAL ENDOSCOPIC SINUS SURGERY): Primary | ICD-10-CM

## 2022-04-26 PROCEDURE — 31237 NSL/SINS NDSC SURG BX POLYPC: CPT | Mod: 50 | Performed by: OTOLARYNGOLOGY

## 2022-04-26 PROCEDURE — 99024 POSTOP FOLLOW-UP VISIT: CPT | Performed by: OTOLARYNGOLOGY

## 2022-04-26 NOTE — LETTER
4/26/2022         RE: Reginaldo Ferraro  [address] [facility] [address]        Dear Colleague,    Thank you for referring your patient, Reginaldo Ferraro, to the [facility]. Please see a copy of my visit note below.    History of Present Illness - Reginaldo Ferraro is a 31 year old female who is status post septoplasty, inferior turbinate reduction, bilateral endoscopic michael bullosa reduction, and maxillary antrostomies on 4/4/22. She returns and notes no purulent drainage. No bleeding. Breathing is much better in the nose. Her nasal dorsum still has some swelling, but the skin has healed.    Exam -   General - The patient is in no distress.  Alert and oriented to person and place, answers questions and cooperates with examination appropriately.   Nose - Nasal dorsum has intact skin, no scar. Some edema of dorsum. The intranasal incisions are well healed and the turbinates are well lateralized. The airway is wide open bilaterally. No sign of synechiae or infection. No hematoma or septal perforation.    PROCEDURE - ENDOSCOPIC SINUS Debridement    Nasal exam performed with a zero degree rigid endoscope for purposes of endoscopic sinus debridement. I began by spraying both sides with lidocaine and neosynephrine. I began on the right side.  The middle meatus was noted to obstructed with a dark crust, this was gently dislodged from the lateral wall with a number 7 suction. I couldn't get it out of the middle meatus so I grabbed it with a cups. Lastly I used a #10 suction to remove all of the debri. I was then able to visualize the right maxillary sinusotomy, it is healing well and open.  No purulence noted. I turned my attention to the left side.  Once again some dark crust was dislodged from the middle meatus and removed from the nose.  I was then able to pass the scope into the left middle meatus.  The maxillary sinusotomy is healing well, no abnormal secretions noted. I took  photos. Nice open airway.       A/P - Reginaldo AGGIE Ferraro has had a nice result from septoplasty and inferior turbinate reduction and maxillary antrostomies with michael bullosa reduction.  The position of the septum and nasal tip look good. Okay to blow nose. Finish saline irrigations this week. Return prn.        Again, thank you for allowing me to participate in the care of your patient.        Sincerely,        Miquel Rutledge MD

## 2022-04-28 ENCOUNTER — OFFICE VISIT (OUTPATIENT)
Dept: ALLERGY | Facility: CLINIC | Age: 32
End: 2022-04-28
Payer: COMMERCIAL

## 2022-04-28 VITALS — DIASTOLIC BLOOD PRESSURE: 63 MMHG | SYSTOLIC BLOOD PRESSURE: 97 MMHG | HEART RATE: 71 BPM | OXYGEN SATURATION: 99 %

## 2022-04-28 DIAGNOSIS — J30.89 ALLERGIC RHINITIS DUE TO DUST MITE: ICD-10-CM

## 2022-04-28 DIAGNOSIS — J30.1 SEASONAL ALLERGIC RHINITIS DUE TO POLLEN: Primary | ICD-10-CM

## 2022-04-28 DIAGNOSIS — J30.81 ALLERGIC RHINITIS DUE TO ANIMALS: ICD-10-CM

## 2022-04-28 PROCEDURE — 95180 RAPID DESENSITIZATION: CPT | Performed by: ALLERGY & IMMUNOLOGY

## 2022-04-28 PROCEDURE — 99207 PR DROP WITH A PROCEDURE: CPT | Performed by: ALLERGY & IMMUNOLOGY

## 2022-04-28 NOTE — PROGRESS NOTES
Prior to initiation of cluster immunotherapy RN ensured that patient was feeling healthy, has premedicated with Zyrtec or Allegra yesterday twice daily and this morning. RN also ensured that patient has unexpired Epi-Pen, had no new medication changes, and did not have a reaction after the last allergy shots were given. If the patient has asthma it is well-controlled. The patient has not been ill in the past 7 days. Patient was given allergy injections per cluster immunotherapy protocol 30 minutes apart and vital signs were monitored. Patient was monitored in clinic for 30 minutes after last injection was given and assessed by provider before discharging.     Fernanda Terry RN

## 2022-04-28 NOTE — PROGRESS NOTES
Writer demonstrated how to use an EpiPen auto-injector.  Patient instructed to form a fist around the auto-injector, remove blue safety release by pulling straight up, then firmly push orange tip against outer thigh so it clicks, holding for 3 seconds.  Patient advised that once used, needle will not be exposed, as orange tip extends.  Patient advised to call 911 or go to emergency department after epi-pen use for further monitoring.       RN reviewed Anaphylaxis Action Plan with patient. Educated on the symptoms and treatment of anaphylaxis. Went through the different ways that a reaction can present, and the body systems that it can affect. Patient verbalized understanding. Copy given to patient.     Fernanda Terry RN

## 2022-04-28 NOTE — PROGRESS NOTES
Reginaldo Ferraro was seen in the Allergy Clinic at [facility].      Reginaldo Ferraro is a 31 year old Not  or  female who is seen today for her initial cluster immunotherapy visit. She has been feeling well and has not had any recent fevers or illness. She reports continuing to take Advair twice daily and states he asthma has been well controlled. Reginaldo pre-medicated with cetirizine as directed prior to today's visit.      Past Medical History:   Diagnosis Date     Abnormal Pap smear of cervix 12/23/2011 9/21 LSIL     Cervical high risk HPV (human papillomavirus) test positive 2013    10/31/14, 5/21/18     Depressive disorder 12/23/2011    possibly chronic     Environmental allergies      Migraine      Migraines     headaches with allergies     Moderate major depression 12/23/2011     Moderate major depression (H) 12/23/2011     Moderate persistent asthma 3/17/2022     NO ACTIVE PROBLEMS      Seasonal allergic rhinitis      Family History   Problem Relation Age of Onset     Diabetes Paternal Grandmother      Hypertension Paternal Grandmother      Respiratory Paternal Grandmother         asthma     Heart Disease Father      Social History     Tobacco Use     Smoking status: Never Smoker     Smokeless tobacco: Never Used   Vaping Use     Vaping Use: Never used   Substance Use Topics     Alcohol use: Not Currently     Comment: Socially     Drug use: No     Social History     Social History Narrative     Not on file       Past medical, family, and social history were reviewed.    REVIEW OF SYSTEMS:  General: negative for weight gain. negative for weight loss. negative for changes in sleep.   Eyes: negative for itching. negative for redness. negative for tearing/watering. negative for vision changes  Ears: negative for fullness. negative for hearing loss. negative for dizziness.   Nose: negative for snoring.negative for changes in smell. negative for drainage. Positive for  congestion.  Throat: negative for hoarseness. negative for sore throat. negative for trouble swallowing.   Lungs: negative for cough. negative for shortness of breath.negative for wheezing. negative for sputum production.   Cardiovascular: negative for chest pain. negative for swelling of ankles. negative for fast or irregular heartbeat.   Gastrointestinal: negative for nausea. negative for heartburn. negative for acid reflux.   Musculoskeletal: negative for joint pain. negative for joint stiffness. negative for joint swelling.   Neurologic: negative for seizures. negative for fainting. negative for weakness.   Psychiatric: negative for changes in mood. negative for anxiety.   Endocrine: negative for cold intolerance. negative for heat intolerance. negative for tremors.   Hematologic: negative for easy bruising. negative for easy bleeding.  Integumentary: negative for rash. negative for scaling. negative for nail changes.       Current Outpatient Medications:      benzonatate (TESSALON) 200 MG capsule, Take 1 capsule (200 mg) by mouth 3 times daily as needed for cough, Disp: 30 capsule, Rfl: 0     fluticasone-salmeterol (ADVAIR) 500-50 MCG/DOSE inhaler, Inhale 1 puff into the lungs 2 times daily, Disp: , Rfl:      HYDROcodone-acetaminophen (NORCO) 5-325 MG tablet, Take 1-2 tablets by mouth every 6 hours as needed for moderate to severe pain, Disp: 15 tablet, Rfl: 0     ORDER FOR ALLERGEN IMMUNOTHERAPY, Name of Mix: Mix #1  Dust Mite, Cat, Dog Cat Hair, Standardized 10,000 BAU/mL, ALK  2.0 ml Dog Hair-Dander, A. P.  1:100 w/v, HS  1.0 ml Dust Mites DF. 10,000 AU/mL, HS  1.0 ml Dust Mites DP. 10,000 AU/mL, HS  1.0 ml  Diluent: HSA qs to 5ml, Disp: 5 mL, Rfl: PRN     ORDER FOR ALLERGEN IMMUNOTHERAPY, Name of Mix: Mix #2  Tree  Sylvain, White 1:20 w/v, HS  0.5 ml Birch Mix PRW 1:20 w/v, HS  0.5 ml Boxelder-Maple Mix BHR (Boxelder Hard Red) 1:20 w/v, HS  0.5 ml [place], Common 1:20 w/v, HS  0.5 ml Oak Mix RVW 1:20 w/v, HS  0.5 ml Pine, White 1:20 w/v, ALK 0.5 ml [place] Tree, Black 1:20 w/v, HS 0.5 ml Diluent: HSA qs to 5ml, Disp: 5 mL, Rfl: PRN     ORDER FOR ALLERGEN IMMUNOTHERAPY, Name of Mix: Mix #3  Grass, Weeds Prabhu Grass 1:20 w/v, HS 0.5 ml Steve Grass (Std) 100,000 BAU/mL, HS 0.4 ml Lamb's Quarters 1:20 w/v, HS 0.5 ml Nettle 1:20 w/v, HS 0.5 ml Plantain, English 1:20 w/v, HS 0.5 ml Ragweed Mixed 1:20 w/v ALK  0.5 ml Russian Thistle 1:20 w/v, HS 0.5 ml Sorrel, Sheep 1:20 w/v, HS 0.5 ml Diluent: HSA qs to 5ml, Disp: 5 mL, Rfl: PRN     vitamin B6 (PYRIDOXINE) 50 MG TABS, Take 1 tablet (50 mg) by mouth 3 times daily as needed (nausea), Disp: 90 tablet, Rfl: 1     albuterol (PROVENTIL) (2.5 MG/3ML) 0.083% neb solution, Take 1 vial (2.5 mg) by nebulization every 6 hours as needed for shortness of breath / dyspnea or wheezing, Disp: 90 mL, Rfl: 1     EPINEPHrine (ANY BX GENERIC EQUIV) 0.3 MG/0.3ML injection 2-pack, Inject into the muscle as directed for anaphylaxis (Patient not taking: Reported on 4/28/2022), Disp: 2 each, Rfl: 2     VENTOLIN  (90 Base) MCG/ACT inhaler, Inhale 1-2 puffs into the lungs every 4 hours as needed for shortness of breath / dyspnea or wheezing, Disp: 18 g, Rfl: 1  No Known Allergies    EXAM:   BP 97/63 (Patient Position: Sitting)   Pulse 71   LMP 03/26/2022   SpO2 99%   GENERAL APPEARANCE: alert, cooperative and not in distress  SKIN: no rashes, no lesions  HEAD: atraumatic, normocephalic  EYES: lids and lashes normal, conjunctivae and sclerae clear  ENT: no scars or lesions, tongue midline and normal, soft palate, uvula, and tonsils normal  NECK: no asymmetry, masses, or scars  LUNGS: unlabored respirations, no intercostal retractions or accessory muscle use, clear to auscultation without rales or wheezes  HEART: regular rate and rhythm without murmurs and normal S1 and S2  MUSCULOSKELETAL: no musculoskeletal defects are noted  NEURO: no focal deficits noted  PSYCH: does not appear depressed  or anxious      WORKUP:  Cluster Immunotherapy    Cluster Allergen Immunotherapy:    After explaining risks and benefits, and obtaining verbal and written consent, we proceeded with cluster immunotherapy.     VISIT  VIAL COLOR/STRENGTH  DOSES TO BE GIVEN    1  GREEN (1:1000), BLUE (1:100)  GREEN 0.1, GREEN 0.2, GREEN 0.4, BLUE 0.1    2  BLUE (1:100), YELLOW (1:10)  BLUE 0.2, BLUE 0.4, YELLOW 0.05    3  YELLOW (1:10)  YELLOW 0.1, YELLOW 0.15, YELLOW 0.25    4  YELLOW (1:10)  YELLOW 0.35, YELLOW 0.5    5  RED (1:1)  RED 0.05, RED 0.1    6  RED (1:1)  RED 0.15, RED 0.2    7  RED (1:1)  RED 0.3, RED 0.4    8  RED (1:1)  RED 0.5        VISIT 1    Time Injection Given: 07:45  Green 1:1,000   Trees      0.1 mL  Green 1:1,000   Grass, Weeds     0.1 mL  Green 1:1,000   Cat, Dog, Dust Mite    0.1 mL          Time Injection Given: 08:22  Green 1:1,000   Trees      0.2 mL  Green 1:1,000   Grass, Weeds     0.2 mL  Green 1:1,000   Cat, Dog, Dust Mite    0.2 mL      Time Injection Given: 09:01  Green 1:1,000   Trees      0.4 mL  Green 1:1,000   Grass, Weeds     0.4 mL  Green 1:1,000   Cat, Dog, Dust Mite    0.4 mL      Time Injection Given: 09:42  Blue 1:100   Trees      0.1 mL  Blue 1:100   Grass, Weeds     0.1 mL  Blue 1:100   Cat, Dog, Dust Mite    0.1 mL        Start Time: 07:45 End Time: 10:12        VITALS   Time BP Pulse pOx Reaction Treatment   08:20 97/66 76 97 none n/a   08:58 103/64 74 98 none n/a   09:40 108/71 71 99 none n/a   10:12 97/63 71 99 none n/a       ASSESSMENT/PLAN:  Reginaldo Ferraro is a 31 year old female here for cluster immunotherapy.    1. Seasonal allergic rhinitis due to pollen - Reginaldo tolerated today's procedure well without developing any signs or symptoms of an adverse reaction.    - return in 7-14 days to continue cluster protocol  - continue pre-medicating with cetirizine as directed prior to injection visits  - RAPID DESENSITIZATION    2. Allergic rhinitis due to animals    - RAPID  DESENSITIZATION    3. Allergic rhinitis due to dust mite    - RAPID DESENSITIZATION      Thank you for allowing me to participate in the care of Reginaldo Ferraro.      Polo Mccain MD, FAAAAI  Allergy/Immunology  [facility]      Chart documentation done in part with Dragon Voice Recognition Software. Although reviewed after completion, some word and grammatical errors may remain.

## 2022-04-28 NOTE — LETTER
4/28/2022         RE: Reginaldo Ferraro  [address] [facility] [place] [facility] [place] [facility] [place] [address]        Dear Colleague,    Thank you for referring your patient, Reginaldo Ferraro, to the [facility]. Please see a copy of my visit note below.    Reginaldo Ferraro was seen in the Allergy Clinic at [facility].      Reginaldo Ferraro is a 31 year old Not  or  female who is seen today for her initial cluster immunotherapy visit. She has been feeling well and has not had any recent fevers or illness. She reports continuing to take Advair twice daily and states he asthma has been well controlled. Obedella pre-medicated with cetirizine as directed prior to today's visit.      Past Medical History:   Diagnosis Date     Abnormal Pap smear of cervix 12/23/2011 9/21 LSIL     Cervical high risk HPV (human papillomavirus) test positive 2013    10/31/14, 5/21/18     Depressive disorder 12/23/2011    possibly chronic     Environmental allergies      Migraine      Migraines     headaches with allergies     Moderate major depression 12/23/2011     Moderate major depression (H) 12/23/2011     Moderate persistent asthma 3/17/2022     NO ACTIVE PROBLEMS      Seasonal allergic rhinitis      Family History   Problem Relation Age of Onset     Diabetes Paternal Grandmother      Hypertension Paternal Grandmother      Respiratory Paternal Grandmother         asthma     Heart Disease Father      Social History     Tobacco Use     Smoking status: Never Smoker     Smokeless tobacco: Never Used   Vaping Use     Vaping Use: Never used   Substance Use Topics     Alcohol use: Not Currently     Comment: Socially     Drug use: No     Social History     Social History Narrative     Not on file       Past medical, family, and social history were reviewed.    REVIEW OF SYSTEMS:  General: negative for weight gain. negative for weight loss. negative for changes in sleep.   Eyes: negative for itching.  negative for redness. negative for tearing/watering. negative for vision changes  Ears: negative for fullness. negative for hearing loss. negative for dizziness.   Nose: negative for snoring.negative for changes in smell. negative for drainage. Positive for congestion.  Throat: negative for hoarseness. negative for sore throat. negative for trouble swallowing.   Lungs: negative for cough. negative for shortness of breath.negative for wheezing. negative for sputum production.   Cardiovascular: negative for chest pain. negative for swelling of ankles. negative for fast or irregular heartbeat.   Gastrointestinal: negative for nausea. negative for heartburn. negative for acid reflux.   Musculoskeletal: negative for joint pain. negative for joint stiffness. negative for joint swelling.   Neurologic: negative for seizures. negative for fainting. negative for weakness.   Psychiatric: negative for changes in mood. negative for anxiety.   Endocrine: negative for cold intolerance. negative for heat intolerance. negative for tremors.   Hematologic: negative for easy bruising. negative for easy bleeding.  Integumentary: negative for rash. negative for scaling. negative for nail changes.       Current Outpatient Medications:      benzonatate (TESSALON) 200 MG capsule, Take 1 capsule (200 mg) by mouth 3 times daily as needed for cough, Disp: 30 capsule, Rfl: 0     fluticasone-salmeterol (ADVAIR) 500-50 MCG/DOSE inhaler, Inhale 1 puff into the lungs 2 times daily, Disp: , Rfl:      HYDROcodone-acetaminophen (NORCO) 5-325 MG tablet, Take 1-2 tablets by mouth every 6 hours as needed for moderate to severe pain, Disp: 15 tablet, Rfl: 0     ORDER FOR ALLERGEN IMMUNOTHERAPY, Name of Mix: Mix #1  Dust Mite, Cat, Dog Cat Hair, Standardized 10,000 BAU/mL, ALK  2.0 ml Dog Hair-Dander, A. P.  1:100 w/v, HS  1.0 ml Dust Mites DF. 10,000 AU/mL, HS  1.0 ml Dust Mites DP. 10,000 AU/mL, HS  1.0 ml  Diluent: HSA qs to 5ml, Disp: 5 mL, Rfl: PRN      ORDER FOR ALLERGEN IMMUNOTHERAPY, Name of Mix: Mix #2  Tree  Sylvain, White 1:20 w/v, HS  0.5 ml Birch Mix PRW 1:20 w/v, HS  0.5 ml Boxelder-Maple Mix BHR (Boxelder Hard Red) 1:20 w/v, HS  0.5 ml [place], Common 1:20 w/v, HS  0.5 ml Oak Mix RVW 1:20 w/v, HS 0.5 ml Pine, White 1:20 w/v, ALK 0.5 ml [place] Tree, Black 1:20 w/v, HS 0.5 ml Diluent: HSA qs to 5ml, Disp: 5 mL, Rfl: PRN     ORDER FOR ALLERGEN IMMUNOTHERAPY, Name of Mix: Mix #3  Grass, Weeds Prabhu Grass 1:20 w/v, HS 0.5 ml Steve Grass (Std) 100,000 BAU/mL, HS 0.4 ml Lamb's Quarters 1:20 w/v, HS 0.5 ml Nettle 1:20 w/v, HS 0.5 ml Plantain, English 1:20 w/v, HS 0.5 ml Ragweed Mixed 1:20 w/v ALK  0.5 ml Russian Thistle 1:20 w/v, HS 0.5 ml Sorrel, Sheep 1:20 w/v, HS 0.5 ml Diluent: HSA qs to 5ml, Disp: 5 mL, Rfl: PRN     vitamin B6 (PYRIDOXINE) 50 MG TABS, Take 1 tablet (50 mg) by mouth 3 times daily as needed (nausea), Disp: 90 tablet, Rfl: 1     albuterol (PROVENTIL) (2.5 MG/3ML) 0.083% neb solution, Take 1 vial (2.5 mg) by nebulization every 6 hours as needed for shortness of breath / dyspnea or wheezing, Disp: 90 mL, Rfl: 1     EPINEPHrine (ANY BX GENERIC EQUIV) 0.3 MG/0.3ML injection 2-pack, Inject into the muscle as directed for anaphylaxis (Patient not taking: Reported on 4/28/2022), Disp: 2 each, Rfl: 2     VENTOLIN  (90 Base) MCG/ACT inhaler, Inhale 1-2 puffs into the lungs every 4 hours as needed for shortness of breath / dyspnea or wheezing, Disp: 18 g, Rfl: 1  No Known Allergies    EXAM:   BP 97/63 (Patient Position: Sitting)   Pulse 71   LMP 03/26/2022   SpO2 99%   GENERAL APPEARANCE: alert, cooperative and not in distress  SKIN: no rashes, no lesions  HEAD: atraumatic, normocephalic  EYES: lids and lashes normal, conjunctivae and sclerae clear  ENT: no scars or lesions, tongue midline and normal, soft palate, uvula, and tonsils normal  NECK: no asymmetry, masses, or scars  LUNGS: unlabored respirations, no intercostal retractions or  accessory muscle use, clear to auscultation without rales or wheezes  HEART: regular rate and rhythm without murmurs and normal S1 and S2  MUSCULOSKELETAL: no musculoskeletal defects are noted  NEURO: no focal deficits noted  PSYCH: does not appear depressed or anxious      WORKUP:  Cluster Immunotherapy    Cluster Allergen Immunotherapy:    After explaining risks and benefits, and obtaining verbal and written consent, we proceeded with cluster immunotherapy.     VISIT  VIAL COLOR/STRENGTH  DOSES TO BE GIVEN    1  GREEN (1:1000), BLUE (1:100)  GREEN 0.1, GREEN 0.2, GREEN 0.4, BLUE 0.1    2  BLUE (1:100), YELLOW (1:10)  BLUE 0.2, BLUE 0.4, YELLOW 0.05    3  YELLOW (1:10)  YELLOW 0.1, YELLOW 0.15, YELLOW 0.25    4  YELLOW (1:10)  YELLOW 0.35, YELLOW 0.5    5  RED (1:1)  RED 0.05, RED 0.1    6  RED (1:1)  RED 0.15, RED 0.2    7  RED (1:1)  RED 0.3, RED 0.4    8  RED (1:1)  RED 0.5        VISIT 1    Time Injection Given: 07:45  Green 1:1,000   Trees      0.1 mL  Green 1:1,000   Grass, Weeds     0.1 mL  Green 1:1,000   Cat, Dog, Dust Mite    0.1 mL          Time Injection Given: 08:22  Green 1:1,000   Trees      0.2 mL  Green 1:1,000   Grass, Weeds     0.2 mL  Green 1:1,000   Cat, Dog, Dust Mite    0.2 mL      Time Injection Given: 09:01  Green 1:1,000   Trees      0.4 mL  Green 1:1,000   Grass, Weeds     0.4 mL  Green 1:1,000   Cat, Dog, Dust Mite    0.4 mL      Time Injection Given: 09:42  Blue 1:100   Trees      0.1 mL  Blue 1:100   Grass, Weeds     0.1 mL  Blue 1:100   Cat, Dog, Dust Mite    0.1 mL        Start Time: 07:45 End Time: 10:12        VITALS   Time BP Pulse pOx Reaction Treatment   08:20 97/66 76 97 none n/a   08:58 103/64 74 98 none n/a   09:40 108/71 71 99 none n/a   10:12 97/63 71 99 none n/a       ASSESSMENT/PLAN:  Reginaldo Ferraro is a 31 year old female here for cluster immunotherapy.    1. Seasonal allergic rhinitis due to pollen - Obedella tolerated today's procedure well without developing any  signs or symptoms of an adverse reaction.    - return in 7-14 days to continue cluster protocol  - continue pre-medicating with cetirizine as directed prior to injection visits  - RAPID DESENSITIZATION    2. Allergic rhinitis due to animals    - RAPID DESENSITIZATION    3. Allergic rhinitis due to dust mite    - RAPID DESENSITIZATION      Thank you for allowing me to participate in the care of Reginaldo Ferraro.      Polo Mccain MD, FAAAAI  Allergy/Immunology  [facility]      Chart documentation done in part with Dragon Voice Recognition Software. Although reviewed after completion, some word and grammatical errors may remain.    Prior to initiation of cluster immunotherapy RN ensured that patient was feeling healthy, has premedicated with Zyrtec or Allegra yesterday twice daily and this morning. RN also ensured that patient has unexpired Epi-Pen, had no new medication changes, and did not have a reaction after the last allergy shots were given. If the patient has asthma it is well-controlled. The patient has not been ill in the past 7 days. Patient was given allergy injections per cluster immunotherapy protocol 30 minutes apart and vital signs were monitored. Patient was monitored in clinic for 30 minutes after last injection was given and assessed by provider before discharging.     Fernanda Terry, DANAE      Again, thank you for allowing me to participate in the care of your patient.        Sincerely,        Polo Mccain MD

## 2022-04-29 DIAGNOSIS — Z98.890 S/P FESS (FUNCTIONAL ENDOSCOPIC SINUS SURGERY): Primary | ICD-10-CM

## 2022-04-29 DIAGNOSIS — J32.0 CHRONIC MAXILLARY SINUSITIS: ICD-10-CM

## 2022-04-29 RX ORDER — CEFDINIR 300 MG/1
300 CAPSULE ORAL 2 TIMES DAILY
Qty: 20 CAPSULE | Refills: 0 | Status: SHIPPED | OUTPATIENT
Start: 2022-04-29 | End: 2022-05-09

## 2022-05-10 ENCOUNTER — PRE VISIT (OUTPATIENT)
Dept: PULMONOLOGY | Facility: CLINIC | Age: 32
End: 2022-05-10

## 2022-05-12 ENCOUNTER — OFFICE VISIT (OUTPATIENT)
Dept: ALLERGY | Facility: CLINIC | Age: 32
End: 2022-05-12
Payer: COMMERCIAL

## 2022-05-12 VITALS — SYSTOLIC BLOOD PRESSURE: 107 MMHG | OXYGEN SATURATION: 99 % | DIASTOLIC BLOOD PRESSURE: 72 MMHG | HEART RATE: 67 BPM

## 2022-05-12 DIAGNOSIS — J30.81 ALLERGIC RHINITIS DUE TO ANIMALS: Primary | ICD-10-CM

## 2022-05-12 DIAGNOSIS — R09.81 CHRONIC NASAL CONGESTION: ICD-10-CM

## 2022-05-12 DIAGNOSIS — J30.1 SEASONAL ALLERGIC RHINITIS DUE TO POLLEN: ICD-10-CM

## 2022-05-12 PROCEDURE — 95180 RAPID DESENSITIZATION: CPT | Performed by: ALLERGY & IMMUNOLOGY

## 2022-05-12 PROCEDURE — 99214 OFFICE O/P EST MOD 30 MIN: CPT | Mod: 25 | Performed by: ALLERGY & IMMUNOLOGY

## 2022-05-12 RX ORDER — MOMETASONE FUROATE MONOHYDRATE 50 UG/1
2 SPRAY, METERED NASAL DAILY
Qty: 17 G | Refills: 3 | Status: SHIPPED | OUTPATIENT
Start: 2022-05-12 | End: 2022-10-12

## 2022-05-12 RX ORDER — CEFDINIR 300 MG/1
300 CAPSULE ORAL 2 TIMES DAILY
COMMUNITY
Start: 2022-05-05 | End: 2022-05-27

## 2022-05-12 NOTE — PATIENT INSTRUCTIONS
If you have any questions regarding your allergies, asthma, or what we discussed during your visit today please call the allergy clinic or contact us via V[employer].    [facility] Allergy RN Line: [phone] - call this number with any questions during or after business/clinic hours  [facility] Scheduling Line: [phone]  [facility] Scheduling Line: [phone] - this number is ONLY for scheduling at the [facility] and should not be used to get in touch with the allergy team    All visits for food challenges, medication/drug allergy testing, and drug challenges MUST be scheduled through the allergy clinic nurse. Please call the nurse at [phone] or send a Vidcaster message for scheduling. Appointments for these visits that are made through the schedulers or via V[employer] may be cancelled or rescheduled.    Clinic Schedule:   [place] - Monday, Tuesday, and Thursday  [address] [place] [id number]    [facility] - Wednesday  [facility] [place] [address]      Please take your fexofenadine (Allegra) as directed - 2 tablets twice a day the day before injection visits, and 2 tablets at least 1 hour before your injection visit    Stop using all over the counter nasal decongestant sprays or nasal inhalers    Start the mometasone (Nasonex) nasal spray - 2 sprays in each nostril daily

## 2022-05-12 NOTE — PROGRESS NOTES
Reginaldo Ferraro was seen in the Allergy Clinic at [facility].      Reginaldo Ferraro is a 31 year old Not  or  female who is seen today for cluster immunotherapy. She had no adverse reactions after her last visit. She has been feeling well and has not had any recent fevers. She was diagnosed with a sinus infection since she was last seen and prescribed cefdinir. Her sinus symptoms have improved. She is also using a decongestant nasal inhaler OTC to help with her congestion. She reports continuing to take Advair twice daily and states he asthma has been well controlled. Reginaldo pre-medicated with fexofenadine prior to today's visit.    Past Medical History:   Diagnosis Date     Abnormal Pap smear of cervix 12/23/2011 9/21 LSIL     Cervical high risk HPV (human papillomavirus) test positive 2013    10/31/14, 5/21/18     Depressive disorder 12/23/2011    possibly chronic     Environmental allergies      Migraine      Migraines     headaches with allergies     Moderate major depression 12/23/2011     Moderate major depression (H) 12/23/2011     Moderate persistent asthma 3/17/2022     NO ACTIVE PROBLEMS      Seasonal allergic rhinitis      Family History   Problem Relation Age of Onset     Diabetes Paternal Grandmother      Hypertension Paternal Grandmother      Respiratory Paternal Grandmother         asthma     Heart Disease Father      Social History     Tobacco Use     Smoking status: Never Smoker     Smokeless tobacco: Never Used   Vaping Use     Vaping Use: Never used   Substance Use Topics     Alcohol use: Not Currently     Comment: Socially     Drug use: No     Social History     Social History Narrative     Not on file       Past medical, family, and social history were reviewed.    REVIEW OF SYSTEMS:  General: negative for weight gain. negative for weight loss. negative for changes in sleep.   Eyes: negative for itching. negative for redness. negative for  tearing/watering. negative for vision changes  Ears: negative for fullness. negative for hearing loss. negative for dizziness.   Nose: negative for snoring.negative for changes in smell. negative for drainage.   Throat: negative for hoarseness. negative for sore throat. negative for trouble swallowing.   Lungs: negative for cough. negative for shortness of breath.negative for wheezing. negative for sputum production.   Cardiovascular: negative for chest pain. negative for swelling of ankles. negative for fast or irregular heartbeat.   Gastrointestinal: negative for nausea. negative for heartburn. negative for acid reflux.   Musculoskeletal: negative for joint pain. negative for joint stiffness. negative for joint swelling.   Neurologic: negative for seizures. negative for fainting. negative for weakness.   Psychiatric: negative for changes in mood. negative for anxiety.   Endocrine: negative for cold intolerance. negative for heat intolerance. negative for tremors.   Hematologic: negative for easy bruising. negative for easy bleeding.  Integumentary: negative for rash. negative for scaling. negative for nail changes.       Current Outpatient Medications:      fluticasone-salmeterol (ADVAIR) 500-50 MCG/DOSE inhaler, Inhale 1 puff into the lungs 2 times daily, Disp: , Rfl:      ORDER FOR ALLERGEN IMMUNOTHERAPY, Name of Mix: Mix #1  Dust Mite, Cat, Dog Cat Hair, Standardized 10,000 BAU/mL, ALK  2.0 ml Dog Hair-Dander, A. P.  1:100 w/v, HS  1.0 ml Dust Mites DF. 10,000 AU/mL, HS  1.0 ml Dust Mites DP. 10,000 AU/mL, HS  1.0 ml  Diluent: HSA qs to 5ml, Disp: 5 mL, Rfl: PRN     ORDER FOR ALLERGEN IMMUNOTHERAPY, Name of Mix: Mix #2  Tree  Sylvain, White 1:20 w/v, HS  0.5 ml Birch Mix PRW 1:20 w/v, HS  0.5 ml Boxelder-Maple Mix BHR (Boxelder Hard Red) 1:20 w/v, HS  0.5 ml [place], Common 1:20 w/v, HS  0.5 ml Oak Mix RVW 1:20 w/v, HS 0.5 ml Pine, White 1:20 w/v, ALK 0.5 ml [place] Tree, Black 1:20 w/v, HS 0.5 ml Diluent: HSA qs  to 5ml, Disp: 5 mL, Rfl: PRN     ORDER FOR ALLERGEN IMMUNOTHERAPY, Name of Mix: Mix #3  Grass, Weeds Prabhu Grass 1:20 w/v, HS 0.5 ml Steve Grass (Std) 100,000 BAU/mL, HS 0.4 ml Lamb's Quarters 1:20 w/v, HS 0.5 ml Nettle 1:20 w/v, HS 0.5 ml Plantain, English 1:20 w/v, HS 0.5 ml Ragweed Mixed 1:20 w/v ALK  0.5 ml Russian Thistle 1:20 w/v, HS 0.5 ml Sorrel, Sheep 1:20 w/v, HS 0.5 ml Diluent: HSA qs to 5ml, Disp: 5 mL, Rfl: PRN     VENTOLIN  (90 Base) MCG/ACT inhaler, Inhale 1-2 puffs into the lungs every 4 hours as needed for shortness of breath / dyspnea or wheezing, Disp: 18 g, Rfl: 1     vitamin B6 (PYRIDOXINE) 50 MG TABS, Take 1 tablet (50 mg) by mouth 3 times daily as needed (nausea), Disp: 90 tablet, Rfl: 1     benzonatate (TESSALON) 200 MG capsule, Take 1 capsule (200 mg) by mouth 3 times daily as needed for cough (Patient not taking: Reported on 5/12/2022), Disp: 30 capsule, Rfl: 0     EPINEPHrine (ANY BX GENERIC EQUIV) 0.3 MG/0.3ML injection 2-pack, Inject into the muscle as directed for anaphylaxis (Patient not taking: No sig reported), Disp: 2 each, Rfl: 2     HYDROcodone-acetaminophen (NORCO) 5-325 MG tablet, Take 1-2 tablets by mouth every 6 hours as needed for moderate to severe pain, Disp: 15 tablet, Rfl: 0  No Known Allergies    EXAM:   /79 (BP Location: Right arm, Patient Position: Sitting, Cuff Size: Adult Large)   Pulse 76   LMP 03/26/2022   SpO2 98%   GENERAL APPEARANCE: alert, cooperative and not in distress  SKIN: no rashes, no lesions  HEAD: atraumatic, normocephalic  EYES: lids and lashes normal, conjunctivae and sclerae clear  ENT: no scars or lesions, tongue midline and normal, soft palate, uvula, and tonsils normal  NECK: no asymmetry, masses, or scars  LUNGS: unlabored respirations, no intercostal retractions or accessory muscle use, clear to auscultation without rales or wheezes  HEART: regular rate and rhythm without murmurs and normal S1 and S2  MUSCULOSKELETAL: no  musculoskeletal defects are noted  NEURO: no focal deficits noted  PSYCH: does not appear depressed or anxious      WORKUP:  Cluster Immunotherapy    Cluster Allergen Immunotherapy:    After explaining risks and benefits, and obtaining verbal and written consent, we proceeded with cluster immunotherapy.     VISIT  VIAL COLOR/STRENGTH  DOSES TO BE GIVEN    1  GREEN (1:1000), BLUE (1:100)  GREEN 0.1, GREEN 0.2, GREEN 0.4, BLUE 0.1    2  BLUE (1:100), YELLOW (1:10)  BLUE 0.2, BLUE 0.4, YELLOW 0.05    3  YELLOW (1:10)  YELLOW 0.1, YELLOW 0.15, YELLOW 0.25    4  YELLOW (1:10)  YELLOW 0.35, YELLOW 0.5    5  RED (1:1)  RED 0.05, RED 0.1    6  RED (1:1)  RED 0.15, RED 0.2    7  RED (1:1)  RED 0.3, RED 0.4    8  RED (1:1)  RED 0.5        VISIT 2    After explaining risks and benefits, and obtaining verbal and written consent, we proceeded with cluster immunotherapy.    Time Injection Given: 0737  Blue 1:100   Trees      0.2 mL  Blue 1:100   Grass, Weeds     0.2 mL  Blue 1:100   Cat, Dog, Dust Mite    0.2 mL      Time Injection Given: 0821  Blue 1:100   Trees      0.4 mL  Blue 1:100   Grass, Weeds     0.4 mL  Blue 1:100   Cat, Dog, Dust Mite    0.4 mL          Start Time: 0737 End Time: 09:30        VITALS   Time BP Pulse pOx Reaction Treatment   08:21 116/80 72 99% none n/a   09:00 100/69 72 100% hives hydrocoritzone cream and ice for 15 minutes   09:20 107/72 67 99 hives none       ASSESSMENT/PLAN:  Reginaldo Ferraro is a 31 year old female here for cluster immunotherapy.    1. Allergic rhinitis due to animals - Reginaldo developed a large hive on her left arm following her second injection. She did not report associated itching or discomfort. She did not develop any other signs or symptoms of an allergic reaction.    - return in 7-14 days to continue cluster protocol  - continue pre-medicating with cetirizine as directed prior to injection visits  - mometasone (NASONEX) 50 MCG/ACT nasal spray; Spray 2 sprays into both  nostrils daily  Dispense: 17 g; Refill: 3  - RAPID DESENSITIZATION    2. Seasonal allergic rhinitis due to pollen    - mometasone (NASONEX) 50 MCG/ACT nasal spray; Spray 2 sprays into both nostrils daily  Dispense: 17 g; Refill: 3  - RAPID DESENSITIZATION    3. Chronic nasal congestion - Reginlado was counseled that chronic/frequent use of nasal decongestants may further exacerbate her symptoms and worsen her congestion. She was advised to discontinue use of all nasal decongestant medications and start using nasal steroid.    - mometasone (NASONEX) 50 MCG/ACT nasal spray; Spray 2 sprays into both nostrils daily  Dispense: 17 g; Refill: 3      Thank you for allowing me to participate in the care of Reginaldo Ferraro.      Polo Mccain MD, FAAAAI  Allergy/Immunology  [facility]      Chart documentation done in part with Dragon Voice Recognition Software. Although reviewed after completion, some word and grammatical errors may remain.

## 2022-05-12 NOTE — PROGRESS NOTES
30 minutes after blue 0.4mL injections, writer entered the room to assess patient and take vitals. Writer noticed that there was a large local hive on the left upper arm. Huddled with Dr. Mccain, who came to assess and advised using hydrocortisone cream and ice for 15 minutes. Hydrocortisone cream and ice applied at 0905. After 15 minutes, ice was removed and another set of vitals were collected at 0920. BP: 107/72, P: 67, and SpO2: 99% at 0920. Measured the hive after ice was removed and dimensions were 125mm x 35mm. Dr. Mccain was notified of vitals and hive size. No further treatment given at that time. AVS was printed and instructions were gone over with patient. Patient was instructed to carry her EpiPen with her for the rest of the day. Writer brought a copy of the anaphylaxis action plan to patient and offered to go over plan, but patient declined at this time.       Fernanda MELARA RN

## 2022-05-12 NOTE — LETTER
5/12/2022         RE: Reginaldo Ferraro  [address] [facility] [address]        Dear Colleague,    Thank you for referring your patient, Reginaldo Ferraro, to the [facility]. Please see a copy of my visit note below.    Reginaldo Ferraro was seen in the Allergy Clinic at [facility].      Reginaldo Ferraro is a 31 year old Not  or  female who is seen today for cluster immunotherapy. She had no adverse reactions after her last visit. She has been feeling well and has not had any recent fevers. She was diagnosed with a sinus infection since she was last seen and prescribed cefdinir. Her sinus symptoms have improved. She is also using a decongestant nasal inhaler OTC to help with her congestion. She reports continuing to take Advair twice daily and states he asthma has been well controlled. Reginaldo pre-medicated with fexofenadine prior to today's visit.    Past Medical History:   Diagnosis Date     Abnormal Pap smear of cervix 12/23/2011 9/21 LSIL     Cervical high risk HPV (human papillomavirus) test positive 2013    10/31/14, 5/21/18     Depressive disorder 12/23/2011    possibly chronic     Environmental allergies      Migraine      Migraines     headaches with allergies     Moderate major depression 12/23/2011     Moderate major depression (H) 12/23/2011     Moderate persistent asthma 3/17/2022     NO ACTIVE PROBLEMS      Seasonal allergic rhinitis      Family History   Problem Relation Age of Onset     Diabetes Paternal Grandmother      Hypertension Paternal Grandmother      Respiratory Paternal Grandmother         asthma     Heart Disease Father      Social History     Tobacco Use     Smoking status: Never Smoker     Smokeless tobacco: Never Used   Vaping Use     Vaping Use: Never used   Substance Use Topics     Alcohol use: Not Currently     Comment: Socially     Drug use: No     Social History     Social History Narrative     Not  on file       Past medical, family, and social history were reviewed.    REVIEW OF SYSTEMS:  General: negative for weight gain. negative for weight loss. negative for changes in sleep.   Eyes: negative for itching. negative for redness. negative for tearing/watering. negative for vision changes  Ears: negative for fullness. negative for hearing loss. negative for dizziness.   Nose: negative for snoring.negative for changes in smell. negative for drainage.   Throat: negative for hoarseness. negative for sore throat. negative for trouble swallowing.   Lungs: negative for cough. negative for shortness of breath.negative for wheezing. negative for sputum production.   Cardiovascular: negative for chest pain. negative for swelling of ankles. negative for fast or irregular heartbeat.   Gastrointestinal: negative for nausea. negative for heartburn. negative for acid reflux.   Musculoskeletal: negative for joint pain. negative for joint stiffness. negative for joint swelling.   Neurologic: negative for seizures. negative for fainting. negative for weakness.   Psychiatric: negative for changes in mood. negative for anxiety.   Endocrine: negative for cold intolerance. negative for heat intolerance. negative for tremors.   Hematologic: negative for easy bruising. negative for easy bleeding.  Integumentary: negative for rash. negative for scaling. negative for nail changes.       Current Outpatient Medications:      fluticasone-salmeterol (ADVAIR) 500-50 MCG/DOSE inhaler, Inhale 1 puff into the lungs 2 times daily, Disp: , Rfl:      ORDER FOR ALLERGEN IMMUNOTHERAPY, Name of Mix: Mix #1  Dust Mite, Cat, Dog Cat Hair, Standardized 10,000 BAU/mL, ALK  2.0 ml Dog Hair-Dander, A. P.  1:100 w/v, HS  1.0 ml Dust Mites DF. 10,000 AU/mL, HS  1.0 ml Dust Mites DP. 10,000 AU/mL, HS  1.0 ml  Diluent: HSA qs to 5ml, Disp: 5 mL, Rfl: PRN     ORDER FOR ALLERGEN IMMUNOTHERAPY, Name of Mix: Mix #2  Tree  Sylvain, White 1:20 w/v, HS  0.5 ml Birch  Mix PRW 1:20 w/v, HS  0.5 ml Boxelder-Maple Mix BHR (Boxelder Hard Red) 1:20 w/v, HS  0.5 ml [place], Common 1:20 w/v, HS  0.5 ml Oak Mix RVW 1:20 w/v, HS 0.5 ml Pine, White 1:20 w/v, ALK 0.5 ml [place] Tree, Black 1:20 w/v, HS 0.5 ml Diluent: HSA qs to 5ml, Disp: 5 mL, Rfl: PRN     ORDER FOR ALLERGEN IMMUNOTHERAPY, Name of Mix: Mix #3  Grass, Weeds Prabhu Grass 1:20 w/v, HS 0.5 ml Steve Grass (Std) 100,000 BAU/mL, HS 0.4 ml Lamb's Quarters 1:20 w/v, HS 0.5 ml Nettle 1:20 w/v, HS 0.5 ml Plantain, English 1:20 w/v, HS 0.5 ml Ragweed Mixed 1:20 w/v ALK  0.5 ml Russian Thistle 1:20 w/v, HS 0.5 ml Sorrel, Sheep 1:20 w/v, HS 0.5 ml Diluent: HSA qs to 5ml, Disp: 5 mL, Rfl: PRN     VENTOLIN  (90 Base) MCG/ACT inhaler, Inhale 1-2 puffs into the lungs every 4 hours as needed for shortness of breath / dyspnea or wheezing, Disp: 18 g, Rfl: 1     vitamin B6 (PYRIDOXINE) 50 MG TABS, Take 1 tablet (50 mg) by mouth 3 times daily as needed (nausea), Disp: 90 tablet, Rfl: 1     benzonatate (TESSALON) 200 MG capsule, Take 1 capsule (200 mg) by mouth 3 times daily as needed for cough (Patient not taking: Reported on 5/12/2022), Disp: 30 capsule, Rfl: 0     EPINEPHrine (ANY BX GENERIC EQUIV) 0.3 MG/0.3ML injection 2-pack, Inject into the muscle as directed for anaphylaxis (Patient not taking: No sig reported), Disp: 2 each, Rfl: 2     HYDROcodone-acetaminophen (NORCO) 5-325 MG tablet, Take 1-2 tablets by mouth every 6 hours as needed for moderate to severe pain, Disp: 15 tablet, Rfl: 0  No Known Allergies    EXAM:   /79 (BP Location: Right arm, Patient Position: Sitting, Cuff Size: Adult Large)   Pulse 76   LMP 03/26/2022   SpO2 98%   GENERAL APPEARANCE: alert, cooperative and not in distress  SKIN: no rashes, no lesions  HEAD: atraumatic, normocephalic  EYES: lids and lashes normal, conjunctivae and sclerae clear  ENT: no scars or lesions, tongue midline and normal, soft palate, uvula, and tonsils normal  NECK: no  asymmetry, masses, or scars  LUNGS: unlabored respirations, no intercostal retractions or accessory muscle use, clear to auscultation without rales or wheezes  HEART: regular rate and rhythm without murmurs and normal S1 and S2  MUSCULOSKELETAL: no musculoskeletal defects are noted  NEURO: no focal deficits noted  PSYCH: does not appear depressed or anxious      WORKUP:  Cluster Immunotherapy    Cluster Allergen Immunotherapy:    After explaining risks and benefits, and obtaining verbal and written consent, we proceeded with cluster immunotherapy.     VISIT  VIAL COLOR/STRENGTH  DOSES TO BE GIVEN    1  GREEN (1:1000), BLUE (1:100)  GREEN 0.1, GREEN 0.2, GREEN 0.4, BLUE 0.1    2  BLUE (1:100), YELLOW (1:10)  BLUE 0.2, BLUE 0.4, YELLOW 0.05    3  YELLOW (1:10)  YELLOW 0.1, YELLOW 0.15, YELLOW 0.25    4  YELLOW (1:10)  YELLOW 0.35, YELLOW 0.5    5  RED (1:1)  RED 0.05, RED 0.1    6  RED (1:1)  RED 0.15, RED 0.2    7  RED (1:1)  RED 0.3, RED 0.4    8  RED (1:1)  RED 0.5        VISIT 2    After explaining risks and benefits, and obtaining verbal and written consent, we proceeded with cluster immunotherapy.    Time Injection Given: 0737  Blue 1:100   Trees      0.2 mL  Blue 1:100   Grass, Weeds     0.2 mL  Blue 1:100   Cat, Dog, Dust Mite    0.2 mL      Time Injection Given: 0821  Blue 1:100   Trees      0.4 mL  Blue 1:100   Grass, Weeds     0.4 mL  Blue 1:100   Cat, Dog, Dust Mite    0.4 mL          Start Time: 0737 End Time: 09:30        VITALS   Time BP Pulse pOx Reaction Treatment   08:21 116/80 72 99% none n/a   09:00 100/69 72 100% hives hydrocoritzone cream and ice for 15 minutes   09:20 107/72 67 99 hives none       ASSESSMENT/PLAN:  Reginaldo Ferraro is a 31 year old female here for cluster immunotherapy.    1. Allergic rhinitis due to animals - Reginaldo developed a large hive on her left arm following her second injection. She did not report associated itching or discomfort. She did not develop any other  signs or symptoms of an allergic reaction.    - return in 7-14 days to continue cluster protocol  - continue pre-medicating with cetirizine as directed prior to injection visits  - mometasone (NASONEX) 50 MCG/ACT nasal spray; Spray 2 sprays into both nostrils daily  Dispense: 17 g; Refill: 3  - RAPID DESENSITIZATION    2. Seasonal allergic rhinitis due to pollen    - mometasone (NASONEX) 50 MCG/ACT nasal spray; Spray 2 sprays into both nostrils daily  Dispense: 17 g; Refill: 3  - RAPID DESENSITIZATION    3. Chronic nasal congestion - Reginaldo was counseled that chronic/frequent use of nasal decongestants may further exacerbate her symptoms and worsen her congestion. She was advised to discontinue use of all nasal decongestant medications and start using nasal steroid.    - mometasone (NASONEX) 50 MCG/ACT nasal spray; Spray 2 sprays into both nostrils daily  Dispense: 17 g; Refill: 3      Thank you for allowing me to participate in the care of Reginaldo Ferraro.      Polo Mccain MD, [facility]  Allergy/Immunology  [facility]      Chart documentation done in part with Dragon Voice Recognition Software. Although reviewed after completion, some word and grammatical errors may remain.    30 minutes after blue 0.4mL injections, writer entered the room to assess patient and take vitals. Writer noticed that there was a large local hive on the left upper arm. Huddled with Dr. Mccain, who came to assess and advised using hydrocortisone cream and ice for 15 minutes. Hydrocortisone cream and ice applied at 0905. After 15 minutes, ice was removed and another set of vitals were collected at 0920. BP: 107/72, P: 67, and SpO2: 99% at 0920. Measured the hive after ice was removed and dimensions were 125mm x 35mm. Dr. Mccain was notified of vitals and hive size. No further treatment given at that time. AVS was printed and instructions were gone over with  patient. Patient was instructed to carry her EpiPen with her for the rest of the day. Writer brought a copy of the anaphylaxis action plan to patient and offered to go over plan, but patient declined at this time.       Fernanda MELARA RN      Again, thank you for allowing me to participate in the care of your patient.        Sincerely,        Polo Mccain MD

## 2022-05-13 NOTE — PROGRESS NOTES
Prior to initiation of cluster immunotherapy RN ensured that patient was feeling healthy, has premedicated with Zyrtec or Allegra yesterday twice daily and this morning. RN also ensured that patient has unexpired Epi-Pen, had no new medication changes, and did not have a reaction after the last allergy shots were given. If the patient has asthma it is well-controlled. The patient has not been ill in the past 7 days. Patient was given allergy injections per cluster immunotherapy protocol 30 minutes apart and vital signs were monitored. Patient was monitored in clinic for 30 minutes after last injection was given and assessed by provider before discharging.     BENJAMIN ChappellN, RN

## 2022-05-17 NOTE — LETTER
Regarding Reginaldo Ferraro BD 1990  Today's date: 10/11/17      Reginaldo's mantoux test that was done on 10/9/17, and read on 10/11/17 was negative.    Divina Moreno RN [facility] Triage.                   no

## 2022-05-19 ENCOUNTER — OFFICE VISIT (OUTPATIENT)
Dept: ALLERGY | Facility: CLINIC | Age: 32
End: 2022-05-19
Payer: COMMERCIAL

## 2022-05-19 VITALS
HEIGHT: 60 IN | OXYGEN SATURATION: 99 % | RESPIRATION RATE: 12 BRPM | WEIGHT: 141 LBS | DIASTOLIC BLOOD PRESSURE: 66 MMHG | BODY MASS INDEX: 27.68 KG/M2 | SYSTOLIC BLOOD PRESSURE: 105 MMHG | HEART RATE: 64 BPM

## 2022-05-19 DIAGNOSIS — J30.1 SEASONAL ALLERGIC RHINITIS DUE TO POLLEN: Primary | ICD-10-CM

## 2022-05-19 DIAGNOSIS — J30.89 ALLERGIC RHINITIS DUE TO DUST MITE: ICD-10-CM

## 2022-05-19 DIAGNOSIS — J30.81 ALLERGIC RHINITIS DUE TO ANIMALS: ICD-10-CM

## 2022-05-19 PROCEDURE — 95180 RAPID DESENSITIZATION: CPT | Performed by: ALLERGY & IMMUNOLOGY

## 2022-05-19 ASSESSMENT — PAIN SCALES - GENERAL: PAINLEVEL: NO PAIN (0)

## 2022-05-19 NOTE — LETTER
5/19/2022         RE: Reginaldo Ferraro  [address] [facility] [id number]        Dear Colleague,    Thank you for referring your patient, Reginaldo Ferraro, to the [facility]. Please see a copy of my visit note below.    Reginaldo Ferraro was seen in the Allergy Clinic at [facility].      Reginaldo Ferraro is a 31 year old Not  or  female who is seen today for cluster immunotherapy. She had developed a large hive on her left arm during her last visit but had no other additional symptoms. She has been feeling well and has not had any recent fevers. She reports continuing to take Advair twice daily and states her asthma has been well controlled. Reginaldo pre-medicated with fexofenadine prior to today's visit.      Past Medical History:   Diagnosis Date     Abnormal Pap smear of cervix 12/23/2011 9/21 LSIL     Cervical high risk HPV (human papillomavirus) test positive 2013    10/31/14, 5/21/18     Depressive disorder 12/23/2011    possibly chronic     Environmental allergies      Migraine      Migraines     headaches with allergies     Moderate major depression 12/23/2011     Moderate major depression (H) 12/23/2011     Moderate persistent asthma 3/17/2022     NO ACTIVE PROBLEMS      Seasonal allergic rhinitis      Family History   Problem Relation Age of Onset     Diabetes Paternal Grandmother      Hypertension Paternal Grandmother      Respiratory Paternal Grandmother         asthma     Heart Disease Father      Social History     Tobacco Use     Smoking status: Never Smoker     Smokeless tobacco: Never Used   Vaping Use     Vaping Use: Never used   Substance Use Topics     Alcohol use: Not Currently     Comment: Socially     Drug use: No     Social History     Social History Narrative     Not on file       Past medical, family, and social history were reviewed.    REVIEW OF SYSTEMS:  General: negative for weight gain. negative for  weight loss. negative for changes in sleep.   Eyes: negative for itching. negative for redness. negative for tearing/watering. negative for vision changes  Ears: negative for fullness. negative for hearing loss. negative for dizziness.   Nose: negative for snoring.negative for changes in smell. negative for drainage.   Throat: negative for hoarseness. negative for sore throat. negative for trouble swallowing.   Lungs: negative for cough. negative for shortness of breath.negative for wheezing. negative for sputum production.   Cardiovascular: negative for chest pain. negative for swelling of ankles. negative for fast or irregular heartbeat.   Gastrointestinal: negative for nausea. negative for heartburn. negative for acid reflux.   Musculoskeletal: negative for joint pain. negative for joint stiffness. negative for joint swelling.   Neurologic: negative for seizures. negative for fainting. negative for weakness.   Psychiatric: negative for changes in mood. negative for anxiety.   Endocrine: negative for cold intolerance. negative for heat intolerance. negative for tremors.   Hematologic: negative for easy bruising. negative for easy bleeding.  Integumentary: negative for rash. negative for scaling. negative for nail changes.       Current Outpatient Medications:      fluticasone-salmeterol (ADVAIR) 500-50 MCG/DOSE inhaler, Inhale 1 puff into the lungs 2 times daily, Disp: , Rfl:      mometasone (NASONEX) 50 MCG/ACT nasal spray, Spray 2 sprays into both nostrils daily, Disp: 17 g, Rfl: 3     ORDER FOR ALLERGEN IMMUNOTHERAPY, Name of Mix: Mix #1  Dust Mite, Cat, Dog Cat Hair, Standardized 10,000 BAU/mL, ALK  2.0 ml Dog Hair-Dander, A. P.  1:100 w/v, HS  1.0 ml Dust Mites DF. 10,000 AU/mL, HS  1.0 ml Dust Mites DP. 10,000 AU/mL, HS  1.0 ml  Diluent: HSA qs to 5ml, Disp: 5 mL, Rfl: PRN     ORDER FOR ALLERGEN IMMUNOTHERAPY, Name of Mix: Mix #2  Tree  Sylvain, White 1:20 w/v, HS  0.5 ml Birch Mix PRW 1:20 w/v, HS  0.5 ml  Boxelder-Maple Mix BHR (Boxelder Hard Red) 1:20 w/v, HS  0.5 ml [place], Common 1:20 w/v, HS  0.5 ml Oak Mix RVW 1:20 w/v, HS 0.5 ml Pine, White 1:20 w/v, ALK 0.5 ml [place] Tree, Black 1:20 w/v, HS 0.5 ml Diluent: HSA qs to 5ml, Disp: 5 mL, Rfl: PRN     ORDER FOR ALLERGEN IMMUNOTHERAPY, Name of Mix: Mix #3  Grass, Weeds Prabhu Grass 1:20 w/v, HS 0.5 ml Steve Grass (Std) 100,000 BAU/mL, HS 0.4 ml Lamb's Quarters 1:20 w/v, HS 0.5 ml Nettle 1:20 w/v, HS 0.5 ml Plantain, English 1:20 w/v, HS 0.5 ml Ragweed Mixed 1:20 w/v ALK  0.5 ml Russian Thistle 1:20 w/v, HS 0.5 ml Sorrel, Sheep 1:20 w/v, HS 0.5 ml Diluent: HSA qs to 5ml, Disp: 5 mL, Rfl: PRN     VENTOLIN  (90 Base) MCG/ACT inhaler, Inhale 1-2 puffs into the lungs every 4 hours as needed for shortness of breath / dyspnea or wheezing, Disp: 18 g, Rfl: 1     vitamin B6 (PYRIDOXINE) 50 MG TABS, Take 1 tablet (50 mg) by mouth 3 times daily as needed (nausea), Disp: 90 tablet, Rfl: 1     benzonatate (TESSALON) 200 MG capsule, Take 1 capsule (200 mg) by mouth 3 times daily as needed for cough (Patient not taking: Reported on 5/19/2022), Disp: 30 capsule, Rfl: 0     EPINEPHrine (ANY BX GENERIC EQUIV) 0.3 MG/0.3ML injection 2-pack, Inject into the muscle as directed for anaphylaxis (Patient not taking: Reported on 5/19/2022), Disp: 2 each, Rfl: 2  No Known Allergies    EXAM:   /64   Pulse 71   Resp 12   Ht 1.524 m (5')   Wt 64 kg (141 lb)   LMP 03/26/2022   SpO2 100%   BMI 27.54 kg/m    GENERAL APPEARANCE: alert, cooperative and not in distress  SKIN: no rashes, no lesions  HEAD: atraumatic, normocephalic  EYES: lids and lashes normal, conjunctivae and sclerae clear  ENT: no scars or lesions, tongue midline and normal, soft palate, uvula, and tonsils normal  NECK: no asymmetry, masses, or scars  LUNGS: unlabored respirations, no intercostal retractions or accessory muscle use, clear to auscultation without rales or wheezes  HEART: regular rate and  rhythm without murmurs and normal S1 and S2  MUSCULOSKELETAL: no musculoskeletal defects are noted  NEURO: no focal deficits noted  PSYCH: does not appear depressed or anxious      WORKUP:  Cluster Immunotherapy    Cluster Allergen Immunotherapy:    After explaining risks and benefits, and obtaining verbal and written consent, we proceeded with cluster immunotherapy.     VISIT  VIAL COLOR/STRENGTH  DOSES TO BE GIVEN    1  GREEN (1:1000), BLUE (1:100)  GREEN 0.1, GREEN 0.2, GREEN 0.4, BLUE 0.1    2  BLUE (1:100), YELLOW (1:10)  BLUE 0.2, BLUE 0.4, YELLOW 0.05    3  YELLOW (1:10)  YELLOW 0.1, YELLOW 0.15, YELLOW 0.25    4  YELLOW (1:10)  YELLOW 0.35, YELLOW 0.5    5  RED (1:1)  RED 0.05, RED 0.1    6  RED (1:1)  RED 0.15, RED 0.2    7  RED (1:1)  RED 0.3, RED 0.4    8  RED (1:1)  RED 0.5        VISIT 3    Time Injection Given: 0740  Yellow 1:10   Trees      0.05 mL  Yellow 1:10   Grass, Weeds     0.05 mL  Yellow 1:10   Cat, Dog, Dust Mite    0.05 mL      Time Injection Given: 0816  Yellow 1:10   Trees      0.1 mL  Yellow 1:10   Grass, Weeds     0.1 mL  Yellow 1:10   Cat, Dog, Dust Mite    0.1 mL      Time Injection Given: 0854  Yellow 1:10   Trees      0.15 mL  Yellow 1:10   Grass, Weeds     0.15 mL  Yellow 1:10   Cat, Dog, Dust Mite    0.15 mL      Start Time: 0740  End Time: 0927      VITALS   Time BP Pulse pOx Reaction Treatment   0814 97/64 73 100% none N/A   0851 103/58 64 100% none N/A   0927 105/66 64 99% none N/A       ASSESSMENT/PLAN:  Reginaldo Ferraro is a 31 year old female here for cluster immunotherapy.    1. Seasonal allergic rhinitis due to pollen - Reginaldo tolerated today's procedure well without developing any signs or symptoms of an adverse reaction.    - return in 7-14 days to continue cluster protocol  - continue pre-medicating with fexofenadine as directed prior to injection visits  - RAPID DESENSITIZATION    2. Allergic rhinitis due to dust mite    - RAPID DESENSITIZATION    3. Allergic  rhinitis due to animals    - RAPID DESENSITIZATION      Thank you for allowing me to participate in the care of Reginaldo Ferraro.      Polo Mccain MD, FAAAAI  Allergy/Immunology  [facility]      Chart documentation done in part with Dragon Voice Recognition Software. Although reviewed after completion, some word and grammatical errors may remain.    Patient presented for her allergy cluster appointment.  Dr. Mccain assessed the patient before proceeding with the allergy shots.  Patient advised Dr. cMcain that she took 180mg of Allegra the morning prior to her allergy shots and 360mg of Allegra the evening prior and 360mg of Allegra the morning of her allergy cluster appointment.    Prior to initiation of cluster immunotherapy RN ensured that patient was feeling healthy.  RN also ensured that patient has unexpired Epi-Pen, had no new medication changes, and did not have a reaction after the last allergy shots were given. If the patient has asthma it is well-controlled. The patient has not been ill in the past 7 days. Patient was given allergy injections per cluster immunotherapy protocol 30 minutes apart and vital signs were monitored. Patient was monitored in clinic for 30 minutes after last injection was given and assessed by provider before discharging.     Ileana JORGENSEN RN 5/19/2022 9:37 AM              Again, thank you for allowing me to participate in the care of your patient.        Sincerely,        Polo Mccain MD

## 2022-05-19 NOTE — PROGRESS NOTES
Patient presented for her allergy cluster appointment.  Dr. Mccain assessed the patient before proceeding with the allergy shots.  Patient advised Dr. Mccain that she took 180mg of Allegra the morning prior to her allergy shots and 360mg of Allegra the evening prior and 360mg of Allegra the morning of her allergy cluster appointment.    Prior to initiation of cluster immunotherapy RN ensured that patient was feeling healthy.  RN also ensured that patient has unexpired Epi-Pen, had no new medication changes, and did not have a reaction after the last allergy shots were given. If the patient has asthma it is well-controlled. The patient has not been ill in the past 7 days. Patient was given allergy injections per cluster immunotherapy protocol 30 minutes apart and vital signs were monitored. Patient was monitored in clinic for 30 minutes after last injection was given and assessed by provider before discharging.     Ileana JORGENSEN RN 5/19/2022 9:37 AM

## 2022-05-19 NOTE — PROGRESS NOTES
Reginaldo Ferraro was seen in the Allergy Clinic at [facility].      Reginaldo Ferraro is a 31 year old Not  or  female who is seen today for cluster immunotherapy. She had developed a large hive on her left arm during her last visit but had no other additional symptoms. She has been feeling well and has not had any recent fevers. She reports continuing to take Advair twice daily and states her asthma has been well controlled. Reginaldo pre-medicated with fexofenadine prior to today's visit.      Past Medical History:   Diagnosis Date     Abnormal Pap smear of cervix 12/23/2011 9/21 LSIL     Cervical high risk HPV (human papillomavirus) test positive 2013    10/31/14, 5/21/18     Depressive disorder 12/23/2011    possibly chronic     Environmental allergies      Migraine      Migraines     headaches with allergies     Moderate major depression 12/23/2011     Moderate major depression (H) 12/23/2011     Moderate persistent asthma 3/17/2022     NO ACTIVE PROBLEMS      Seasonal allergic rhinitis      Family History   Problem Relation Age of Onset     Diabetes Paternal Grandmother      Hypertension Paternal Grandmother      Respiratory Paternal Grandmother         asthma     Heart Disease Father      Social History     Tobacco Use     Smoking status: Never Smoker     Smokeless tobacco: Never Used   Vaping Use     Vaping Use: Never used   Substance Use Topics     Alcohol use: Not Currently     Comment: Socially     Drug use: No     Social History     Social History Narrative     Not on file       Past medical, family, and social history were reviewed.    REVIEW OF SYSTEMS:  General: negative for weight gain. negative for weight loss. negative for changes in sleep.   Eyes: negative for itching. negative for redness. negative for tearing/watering. negative for vision changes  Ears: negative for fullness. negative for hearing loss. negative for dizziness.   Nose: negative for  snoring.negative for changes in smell. negative for drainage.   Throat: negative for hoarseness. negative for sore throat. negative for trouble swallowing.   Lungs: negative for cough. negative for shortness of breath.negative for wheezing. negative for sputum production.   Cardiovascular: negative for chest pain. negative for swelling of ankles. negative for fast or irregular heartbeat.   Gastrointestinal: negative for nausea. negative for heartburn. negative for acid reflux.   Musculoskeletal: negative for joint pain. negative for joint stiffness. negative for joint swelling.   Neurologic: negative for seizures. negative for fainting. negative for weakness.   Psychiatric: negative for changes in mood. negative for anxiety.   Endocrine: negative for cold intolerance. negative for heat intolerance. negative for tremors.   Hematologic: negative for easy bruising. negative for easy bleeding.  Integumentary: negative for rash. negative for scaling. negative for nail changes.       Current Outpatient Medications:      fluticasone-salmeterol (ADVAIR) 500-50 MCG/DOSE inhaler, Inhale 1 puff into the lungs 2 times daily, Disp: , Rfl:      mometasone (NASONEX) 50 MCG/ACT nasal spray, Spray 2 sprays into both nostrils daily, Disp: 17 g, Rfl: 3     ORDER FOR ALLERGEN IMMUNOTHERAPY, Name of Mix: Mix #1  Dust Mite, Cat, Dog Cat Hair, Standardized 10,000 BAU/mL, ALK  2.0 ml Dog Hair-Dander, A. P.  1:100 w/v, HS  1.0 ml Dust Mites DF. 10,000 AU/mL, HS  1.0 ml Dust Mites DP. 10,000 AU/mL, HS  1.0 ml  Diluent: HSA qs to 5ml, Disp: 5 mL, Rfl: PRN     ORDER FOR ALLERGEN IMMUNOTHERAPY, Name of Mix: Mix #2  Tree  Sylvain, White 1:20 w/v, HS  0.5 ml Birch Mix PRW 1:20 w/v, HS  0.5 ml Boxelder-Maple Mix BHR (Boxelder Hard Red) 1:20 w/v, HS  0.5 ml [place], Common 1:20 w/v, HS  0.5 ml Oak Mix RVW 1:20 w/v, HS 0.5 ml Pine, White 1:20 w/v, ALK 0.5 ml [place] Tree, Black 1:20 w/v, HS 0.5 ml Diluent: HSA qs to 5ml, Disp: 5 mL, Rfl: PRN     ORDER  FOR ALLERGEN IMMUNOTHERAPY, Name of Mix: Mix #3  Grass, Weeds Prabhu Grass 1:20 w/v, HS 0.5 ml Steve Grass (Std) 100,000 BAU/mL, HS 0.4 ml Lamb's Quarters 1:20 w/v, HS 0.5 ml Nettle 1:20 w/v, HS 0.5 ml Plantain, English 1:20 w/v, HS 0.5 ml Ragweed Mixed 1:20 w/v ALK  0.5 ml Russian Thistle 1:20 w/v, HS 0.5 ml Sorrel, Sheep 1:20 w/v, HS 0.5 ml Diluent: HSA qs to 5ml, Disp: 5 mL, Rfl: PRN     VENTOLIN  (90 Base) MCG/ACT inhaler, Inhale 1-2 puffs into the lungs every 4 hours as needed for shortness of breath / dyspnea or wheezing, Disp: 18 g, Rfl: 1     vitamin B6 (PYRIDOXINE) 50 MG TABS, Take 1 tablet (50 mg) by mouth 3 times daily as needed (nausea), Disp: 90 tablet, Rfl: 1     benzonatate (TESSALON) 200 MG capsule, Take 1 capsule (200 mg) by mouth 3 times daily as needed for cough (Patient not taking: Reported on 5/19/2022), Disp: 30 capsule, Rfl: 0     EPINEPHrine (ANY BX GENERIC EQUIV) 0.3 MG/0.3ML injection 2-pack, Inject into the muscle as directed for anaphylaxis (Patient not taking: Reported on 5/19/2022), Disp: 2 each, Rfl: 2  No Known Allergies    EXAM:   /64   Pulse 71   Resp 12   Ht 1.524 m (5')   Wt 64 kg (141 lb)   LMP 03/26/2022   SpO2 100%   BMI 27.54 kg/m    GENERAL APPEARANCE: alert, cooperative and not in distress  SKIN: no rashes, no lesions  HEAD: atraumatic, normocephalic  EYES: lids and lashes normal, conjunctivae and sclerae clear  ENT: no scars or lesions, tongue midline and normal, soft palate, uvula, and tonsils normal  NECK: no asymmetry, masses, or scars  LUNGS: unlabored respirations, no intercostal retractions or accessory muscle use, clear to auscultation without rales or wheezes  HEART: regular rate and rhythm without murmurs and normal S1 and S2  MUSCULOSKELETAL: no musculoskeletal defects are noted  NEURO: no focal deficits noted  PSYCH: does not appear depressed or anxious      WORKUP:  Cluster Immunotherapy    Cluster Allergen Immunotherapy:    After  explaining risks and benefits, and obtaining verbal and written consent, we proceeded with cluster immunotherapy.     VISIT  VIAL COLOR/STRENGTH  DOSES TO BE GIVEN    1  GREEN (1:1000), BLUE (1:100)  GREEN 0.1, GREEN 0.2, GREEN 0.4, BLUE 0.1    2  BLUE (1:100), YELLOW (1:10)  BLUE 0.2, BLUE 0.4, YELLOW 0.05    3  YELLOW (1:10)  YELLOW 0.1, YELLOW 0.15, YELLOW 0.25    4  YELLOW (1:10)  YELLOW 0.35, YELLOW 0.5    5  RED (1:1)  RED 0.05, RED 0.1    6  RED (1:1)  RED 0.15, RED 0.2    7  RED (1:1)  RED 0.3, RED 0.4    8  RED (1:1)  RED 0.5        VISIT 3    Time Injection Given: 0740  Yellow 1:10   Trees      0.05 mL  Yellow 1:10   Grass, Weeds     0.05 mL  Yellow 1:10   Cat, Dog, Dust Mite    0.05 mL      Time Injection Given: 0816  Yellow 1:10   Trees      0.1 mL  Yellow 1:10   Grass, Weeds     0.1 mL  Yellow 1:10   Cat, Dog, Dust Mite    0.1 mL      Time Injection Given: 0854  Yellow 1:10   Trees      0.15 mL  Yellow 1:10   Grass, Weeds     0.15 mL  Yellow 1:10   Cat, Dog, Dust Mite    0.15 mL      Start Time: 0740  End Time: 0927      VITALS   Time BP Pulse pOx Reaction Treatment   0814 97/64 73 100% none N/A   0851 103/58 64 100% none N/A   0927 105/66 64 99% none N/A       ASSESSMENT/PLAN:  Reginaldo Ferraro is a 31 year old female here for cluster immunotherapy.    1. Seasonal allergic rhinitis due to pollen - Reginaldo tolerated today's procedure well without developing any signs or symptoms of an adverse reaction.    - return in 7-14 days to continue cluster protocol  - continue pre-medicating with fexofenadine as directed prior to injection visits  - RAPID DESENSITIZATION    2. Allergic rhinitis due to dust mite    - RAPID DESENSITIZATION    3. Allergic rhinitis due to animals    - RAPID DESENSITIZATION      Thank you for allowing me to participate in the care of Reginaldo Ferraro.      Polo Mccain MD, FAAAAI  Allergy/Immunology  [facility]      Chart documentation done in part with Dragon Voice Recognition Software. Although reviewed after completion, some word and grammatical errors may remain.

## 2022-05-24 ASSESSMENT — PATIENT HEALTH QUESTIONNAIRE - PHQ9
SUM OF ALL RESPONSES TO PHQ QUESTIONS 1-9: 0
SUM OF ALL RESPONSES TO PHQ QUESTIONS 1-9: 0
10. IF YOU CHECKED OFF ANY PROBLEMS, HOW DIFFICULT HAVE THESE PROBLEMS MADE IT FOR YOU TO DO YOUR WORK, TAKE CARE OF THINGS AT HOME, OR GET ALONG WITH OTHER PEOPLE: NOT DIFFICULT AT ALL

## 2022-05-27 ENCOUNTER — OFFICE VISIT (OUTPATIENT)
Dept: FAMILY MEDICINE | Facility: CLINIC | Age: 32
End: 2022-05-27
Payer: COMMERCIAL

## 2022-05-27 VITALS
TEMPERATURE: 97.6 F | WEIGHT: 151.2 LBS | DIASTOLIC BLOOD PRESSURE: 62 MMHG | BODY MASS INDEX: 29.53 KG/M2 | SYSTOLIC BLOOD PRESSURE: 110 MMHG | HEART RATE: 75 BPM | OXYGEN SATURATION: 98 %

## 2022-05-27 DIAGNOSIS — B96.89 BACTERIAL CONJUNCTIVITIS OF BOTH EYES: ICD-10-CM

## 2022-05-27 DIAGNOSIS — R21 RASH: ICD-10-CM

## 2022-05-27 DIAGNOSIS — L01.00 IMPETIGO: ICD-10-CM

## 2022-05-27 DIAGNOSIS — H10.9 BACTERIAL CONJUNCTIVITIS OF BOTH EYES: ICD-10-CM

## 2022-05-27 DIAGNOSIS — N76.0 VAGINITIS AND VULVOVAGINITIS: Primary | ICD-10-CM

## 2022-05-27 LAB
CLUE CELLS: ABNORMAL
TRICHOMONAS, WET PREP: ABNORMAL
WBC'S/HIGH POWER FIELD, WET PREP: ABNORMAL
YEAST, WET PREP: ABNORMAL

## 2022-05-27 PROCEDURE — 87491 CHLMYD TRACH DNA AMP PROBE: CPT | Performed by: PHYSICIAN ASSISTANT

## 2022-05-27 PROCEDURE — 87210 SMEAR WET MOUNT SALINE/INK: CPT | Performed by: PHYSICIAN ASSISTANT

## 2022-05-27 PROCEDURE — 87591 N.GONORRHOEAE DNA AMP PROB: CPT | Performed by: PHYSICIAN ASSISTANT

## 2022-05-27 PROCEDURE — 99214 OFFICE O/P EST MOD 30 MIN: CPT | Performed by: PHYSICIAN ASSISTANT

## 2022-05-27 RX ORDER — CEPHALEXIN 500 MG/1
500 CAPSULE ORAL 3 TIMES DAILY
Qty: 21 CAPSULE | Refills: 0 | Status: SHIPPED | OUTPATIENT
Start: 2022-05-27 | End: 2022-06-03

## 2022-05-27 RX ORDER — PREDNISONE 20 MG/1
20 TABLET ORAL 2 TIMES DAILY
Qty: 10 TABLET | Refills: 0 | Status: SHIPPED | OUTPATIENT
Start: 2022-05-27 | End: 2022-06-01

## 2022-05-27 RX ORDER — POLYMYXIN B SULFATE AND TRIMETHOPRIM 1; 10000 MG/ML; [USP'U]/ML
1-2 SOLUTION OPHTHALMIC EVERY 6 HOURS
Qty: 10 ML | Refills: 0 | Status: SHIPPED | OUTPATIENT
Start: 2022-05-27 | End: 2022-11-01

## 2022-05-27 ASSESSMENT — PATIENT HEALTH QUESTIONNAIRE - PHQ9
SUM OF ALL RESPONSES TO PHQ QUESTIONS 1-9: 0
10. IF YOU CHECKED OFF ANY PROBLEMS, HOW DIFFICULT HAVE THESE PROBLEMS MADE IT FOR YOU TO DO YOUR WORK, TAKE CARE OF THINGS AT HOME, OR GET ALONG WITH OTHER PEOPLE: NOT DIFFICULT AT ALL

## 2022-05-27 ASSESSMENT — ASTHMA QUESTIONNAIRES: ACT_TOTALSCORE: 25

## 2022-05-27 NOTE — PROGRESS NOTES
Assessment & Plan     Vaginitis and vulvovaginitis  Uncertain etiology. Wet prep negative. Avoid condoms. See if prednisone helpful.   - Wet prep - Clinic Collect  - NEISSERIA GONORRHOEA PCR  - CHLAMYDIA TRACHOMATIS PCR  - predniSONE (DELTASONE) 20 MG tablet; Take 1 tablet (20 mg) by mouth 2 times daily for 5 days    Impetigo  Crusting lesions along nose consistent with impetigo.   - predniSONE (DELTASONE) 20 MG tablet; Take 1 tablet (20 mg) by mouth 2 times daily for 5 days  - cephALEXin (KEFLEX) 500 MG capsule; Take 1 capsule (500 mg) by mouth 3 times daily for 7 days    Bacterial conjunctivitis of both eyes  Will treat.   - trimethoprim-polymyxin b (POLYTRIM) 71829-2.1 UNIT/ML-% ophthalmic solution; Place 1-2 drops into both eyes every 6 hours    Rash  Wondering if an exacerbation of her allergies, fairly significant reaction. If not improving next week message.   - predniSONE (DELTASONE) 20 MG tablet; Take 1 tablet (20 mg) by mouth 2 times daily for 5 days             BMI:   Estimated body mass index is 29.53 kg/m  as calculated from the following:    Height as of 5/19/22: 1.524 m (5').    Weight as of this encounter: 68.6 kg (151 lb 3.2 oz).           No follow-ups on file.    BALDO [facility] NEGRITA Hair is a 31 year old who presents for the following health issues     Vaginal Problem     History of Present Illness       Reason for visit:  Pain private areas  Symptom onset:  3-4 weeks ago  Symptoms include:  On and off again pains in labia, burning sensation and pain when urinating, itch, feels hot at times in the area, irritated  Symptom intensity:  Moderate  Symptom progression:  Staying the same  Had these symptoms before:  Yes  Has tried/received treatment for these symptoms:  Yes  Previous treatment was successful:  No  What makes it worse:  Begining and After intercourse pains begin and can last for up to a week  What makes it better:  No    She eats  2-3 servings of fruits and vegetables daily.She consumes 1 sweetened beverage(s) daily.She exercises with enough effort to increase her heart rate 20 to 29 minutes per day.  She exercises with enough effort to increase her heart rate 3 or less days per week.     Today's PHQ-9         PHQ-9 Total Score: 0    PHQ-9 Q9 Thoughts of better off dead/self-harm past 2 weeks :   Not at all    How difficult have these problems made it for you to do your work, take care of things at home, or get along with other people: Not difficult at all         Was seen in March at - notes was given antibiotics for BV    On and off- now times where she can't wear underwear.   Itching, then pain and burning- inside and on the lips.   LMP-4/30/22- normal. No pains when she has her period.   Notes a discharge when wiping.   No new lubricants or soaps. Does use unscented wipes to clean.   Has been using condoms. Has not tried intercourse without them.   Too painful for intercourse.     Also Monday started with a red swollen eye, red swollen rash on bilateral eyelids.   Rash under the nose with yellow crust.     Review of Systems   Genitourinary: Positive for vaginal discharge.            Objective    /62 (BP Location: Left arm, Patient Position: Chair, Cuff Size: Adult Regular)   Pulse 75   Temp 97.6  F (36.4  C) (Oral)   Wt 68.6 kg (151 lb 3.2 oz)   SpO2 98%   BMI 29.53 kg/m    Body mass index is 29.53 kg/m .  Physical Exam   GENERAL: healthy, alert and no distress  EYES: Right eye is slightly swollen with injected sclera and beefy red conjunctiva. Left conjunctiva red with minimal swelling.   HENT: ear canals and TM's normal, nose and mouth without ulcers or lesions  NECK: no adenopathy,  RESP: lungs clear to auscultation - no rales, rhonchi or wheezes  CV: regular rate and rhythm, normal S1 S2, no S3 or S4, no murmur,    (female): normal female external genitalia, normal urethral meatus, vaginal mucosa, white discharge noted.  Pain with speculum exam. No lesions or rash noted.   SKIN: Red raised rash along the medial borders of the upper eyelids. Rash and yellow crust noted under the bilateral nares.   2 non blanchable skin lesions on the chest.

## 2022-05-28 LAB
C TRACH DNA SPEC QL NAA+PROBE: NEGATIVE
N GONORRHOEA DNA SPEC QL NAA+PROBE: NEGATIVE

## 2022-06-05 ENCOUNTER — MYC MEDICAL ADVICE (OUTPATIENT)
Dept: FAMILY MEDICINE | Facility: CLINIC | Age: 32
End: 2022-06-05
Payer: MEDICAID

## 2022-06-05 DIAGNOSIS — L01.00 IMPETIGO: Primary | ICD-10-CM

## 2022-06-06 RX ORDER — MUPIROCIN 20 MG/G
OINTMENT TOPICAL 3 TIMES DAILY
Qty: 30 G | Refills: 1 | Status: SHIPPED | OUTPATIENT
Start: 2022-06-06 | End: 2022-06-16

## 2022-06-09 ENCOUNTER — ALLIED HEALTH/NURSE VISIT (OUTPATIENT)
Dept: ALLERGY | Facility: CLINIC | Age: 32
End: 2022-06-09
Payer: COMMERCIAL

## 2022-06-09 DIAGNOSIS — J30.1 SEASONAL ALLERGIC RHINITIS DUE TO POLLEN: Primary | ICD-10-CM

## 2022-06-09 PROCEDURE — 95117 IMMUNOTHERAPY INJECTIONS: CPT

## 2022-06-09 NOTE — PROGRESS NOTES
Patient presented after waiting 30 minutes with no reaction to allergy injections. Discharged from clinic.    Estela Medeiros RN

## 2022-06-23 ENCOUNTER — ALLIED HEALTH/NURSE VISIT (OUTPATIENT)
Dept: ALLERGY | Facility: CLINIC | Age: 32
End: 2022-06-23
Payer: COMMERCIAL

## 2022-06-23 DIAGNOSIS — J30.1 SEASONAL ALLERGIC RHINITIS DUE TO POLLEN: Primary | ICD-10-CM

## 2022-06-23 PROCEDURE — 95117 IMMUNOTHERAPY INJECTIONS: CPT

## 2022-06-23 NOTE — PROGRESS NOTES
Patient presented after waiting 30 minutes with no reaction to allergy injections. Discharged from clinic.    Ileana JORGENSEN RN 6/23/2022 2:52 PM

## 2022-07-07 ENCOUNTER — ALLIED HEALTH/NURSE VISIT (OUTPATIENT)
Dept: ALLERGY | Facility: CLINIC | Age: 32
End: 2022-07-07
Payer: COMMERCIAL

## 2022-07-07 DIAGNOSIS — J30.1 SEASONAL ALLERGIC RHINITIS DUE TO POLLEN: Primary | ICD-10-CM

## 2022-07-07 DIAGNOSIS — J30.9 ALLERGIC RHINITIS: ICD-10-CM

## 2022-07-07 DIAGNOSIS — J30.89 ALLERGIC RHINITIS DUE TO DUST MITE: ICD-10-CM

## 2022-07-07 DIAGNOSIS — J30.81 ALLERGIC RHINITIS DUE TO ANIMALS: ICD-10-CM

## 2022-07-07 PROCEDURE — 99207 PR DROP WITH A PROCEDURE: CPT

## 2022-07-07 PROCEDURE — 95117 IMMUNOTHERAPY INJECTIONS: CPT

## 2022-07-07 NOTE — PROGRESS NOTES
Patient presented after waiting 30 minutes with normal reaction to allergy injections. Discharged from clinic.      Estela Medeiros RN

## 2022-07-21 ENCOUNTER — ALLIED HEALTH/NURSE VISIT (OUTPATIENT)
Dept: ALLERGY | Facility: CLINIC | Age: 32
End: 2022-07-21
Payer: COMMERCIAL

## 2022-07-21 DIAGNOSIS — J30.89 ALLERGIC RHINITIS DUE TO DUST MITE: ICD-10-CM

## 2022-07-21 DIAGNOSIS — J30.81 ALLERGIC RHINITIS DUE TO ANIMALS: ICD-10-CM

## 2022-07-21 DIAGNOSIS — J30.1 SEASONAL ALLERGIC RHINITIS DUE TO POLLEN: Primary | ICD-10-CM

## 2022-07-21 PROCEDURE — 95117 IMMUNOTHERAPY INJECTIONS: CPT

## 2022-07-21 NOTE — PROGRESS NOTES
Patient presented after waiting 30 minutes with no reaction to allergy injections. Discharged from clinic.    Ileana JORGENSEN RN 7/21/2022 2:08 PM

## 2022-08-02 ENCOUNTER — ALLIED HEALTH/NURSE VISIT (OUTPATIENT)
Dept: ALLERGY | Facility: CLINIC | Age: 32
End: 2022-08-02
Payer: COMMERCIAL

## 2022-08-02 DIAGNOSIS — J30.89 ALLERGIC RHINITIS DUE TO DUST MITE: ICD-10-CM

## 2022-08-02 DIAGNOSIS — J30.1 SEASONAL ALLERGIC RHINITIS DUE TO POLLEN: Primary | ICD-10-CM

## 2022-08-02 DIAGNOSIS — J30.9 ALLERGIC RHINITIS: ICD-10-CM

## 2022-08-02 DIAGNOSIS — J30.81 ALLERGIC RHINITIS DUE TO ANIMALS: ICD-10-CM

## 2022-08-02 PROCEDURE — 95117 IMMUNOTHERAPY INJECTIONS: CPT

## 2022-08-02 NOTE — PROGRESS NOTES
Patient presented after waiting 30 minutes with no reaction to allergy injections. Discharged from clinic.    Ileana JORGENSEN RN 8/2/2022 12:44 PM

## 2022-08-03 NOTE — TELEPHONE ENCOUNTER
Patient Education       Well Child Exam 15 to 18 Years   About this topic   Your teen's well child exam is a visit with the doctor to check your child's health. The doctor measures your teen's weight and height, and may measure your teen's body mass index (BMI). The doctor plots these numbers on a growth curve. The growth curve gives a picture of your teen's growth at each visit. The doctor may listen to your teen's heart, lungs, and belly. Your doctor will do a full exam of your teen from the head to the toes.  Your teen may also need shots or blood tests during this visit.  General   Growth and Development   Your doctor will ask you how your teen is developing. The doctor will focus on the skills that most teens your child's age are expected to do. During this time of your teen's life, here are some things you can expect.  · Physical development ? Your teen may:  ? Look physically older than actual age  ? Need reminders about drinking water when active  ? Not want to do physical activity if your teen does not feel good at sports  · Hearing, seeing, and talking ? Your teen may:  ? Be able to see the long-term effects of actions  ? Have more ability to think and reason logically  ? Understand many viewpoints  ? Spend more time using interactive media, rather than face-to-face communication  · Feelings and behavior ? Your teen may:  ? Be very independent  ? Spend a great deal of time with friends  ? Have an interest in dating  ? Value the opinions of friends over parents' thoughts or ideas  ? Want to push the limits of what is allowed  ? Believe bad things wont happen to them  ? Feel very sad or have a low mood at times  · Feeding ? Your teen needs:  ? To learn to make healthy choices when eating. Serve healthy foods like lean meats, fruits, vegetables, and whole grains. Help your teen choose healthy foods when out to eat.  ? To start each day with a healthy breakfast  ? To limit soda, chips, candy, and foods that  Confirmed with RN that patient was informed that covid test would have to be done today.    Pre-op H&P to be done on DOS with Dr. Farias.    RN will attempt to call again as writer is done working for the day.    Telma Rothman  Clinical Services Assistant     are high in fats  ? Healthy snacks available like fruit, cheese and crackers, or peanut butter  ? To eat meals as a part of the family. Turn the TV and cell phones off while eating. Talk about your day, rather than focusing on what your teen is eating.  · Sleep ? Your teen:  ? Needs 8 to 9 hours of sleep each night  ? Should be allowed to read each night before bed. Have your teen brush and floss the teeth before going to bed as well.  ? Should limit TV, phone, and computers for an hour before bedtime  ? Keep cell phones, tablets, televisions, and other electronic devices out of bedrooms overnight. They interfere with sleep.  ? Needs a routine to make week nights easier. Encourage your teen to get up at a normal time on weekends instead of sleeping late.  · Shots or vaccines ? It is important for your teen to get shots on time. This protects your teen from very serious illnesses like pneumonia, blood and brain infections, tetanus, flu, or cancer. Your teen may need:  ? HPV or human papillomavirus vaccine  ? Influenza vaccine  ? Meningococcal vaccine  Help for Parents   · Activities.  ? Encourage your teen to spend at least 30 to 60 minutes each day being physically active.  ? Offer your teen a variety of activities to take part in. Include music, sports, arts and crafts, and other things your teen is interested in. Take care not to over schedule your teen. One to 2 activities a week outside of school is often a good number for your teen.  ? Make sure your teen wears a helmet when using anything with wheels like skates, skateboard, bike, etc.  ? Encourage time spent with friends. Provide a safe area for this.  ? Know where and who your teen is with at all times. Get to know your teen's friends and families.  · Here are some things you can do to help keep your teen safe and healthy.  ? Teach your teen about safe driving. Remind your teen never to ride with someone who has been drinking or using drugs. Talk about  distracted driving. Teach your teen never to text or use a cell phone while driving.  ? Make sure your teen uses a seat belt when driving or riding in a car. Talk with your teen about how many passengers are allowed in the car.  ? Talk to your teen about the dangers of smoking, drinking alcohol, and using drugs. Do not allow anyone to smoke in your home or around your teen.  ? Talk with your teen about peer pressure. Help your teen learn how to handle risky things friends may want to do.  ? Talk about sexually responsible behavior and delaying sexual intercourse. Discuss birth control and sexually-transmitted diseases. Talk about how alcohol or drugs can influence the ability to make good decisions.  ? Remind your teen to use headphones responsibly. Limit how loud the volume is turned up. Never wear headphones, text, or use a cell phone while riding a bike or crossing the street.  ? Protect your teen from gun injuries. If you have a gun, use a trigger lock. Keep the gun locked up and the bullets kept in a separate place.  ? Limit screen time for teens to 1 to 2 hours per day. This includes TV, phones, computers, and video games.  · Parents need to think about:  ? Monitoring your teen's computer and phone use, especially when on the Internet  ? How to keep open lines of communication about sex and dating  ? College and work plans for your teen  ? Finding an adult doctor to care for your teen  ? Turning responsibilities of health care over to your teen  ? Having your teen help with some family chores to encourage responsibility within the family  · The next well teen visit will most likely be in 1 year. At this visit, your doctor may:  ? Do a full check up on your teen  ? Talk about college and work  ? Talk about sexuality and sexually-transmitted diseases  ? Talk about driving and safety  When do I need to call the doctor?   · Fever of 100.4°F (38°C) or higher  · Low mood, suddenly getting poor grades, or missing  school  · You are worried about alcohol or drug use  · You are worried about your teen's development  Where can I learn more?   Centers for Disease Control and Prevention  https://www.cdc.gov/ncbddd/childdevelopment/positiveparenting/adolescence2.html   Centers for Disease Control and Prevention  https://www.cdc.gov/vaccines/parents/diseases/teen/index.html   KidsHealth  http://kidshealth.org/parent/growth/medical/checkup-15yrs.html#ouj905   KidsHealth  http://kidshealth.org/parent/growth/medical/checkup_16yrs.html#frp664   KidsHealth  http://kidshealth.org/parent/growth/medical/checkup_17yrs.html#hkr233   KidsHealth  http://kidshealth.org/parent/growth/medical/checkup_18yrs.html#   Last Reviewed Date   2019-10-14  Consumer Information Use and Disclaimer   This information is not specific medical advice and does not replace information you receive from your health care provider. This is only a brief summary of general information. It does NOT include all information about conditions, illnesses, injuries, tests, procedures, treatments, therapies, discharge instructions or life-style choices that may apply to you. You must talk with your health care provider for complete information about your health and treatment options. This information should not be used to decide whether or not to accept your health care providers advice, instructions or recommendations. Only your health care provider has the knowledge and training to provide advice that is right for you.  Copyright   Copyright © 2021 UpToDate, Inc. and its affiliates and/or licensors. All rights reserved.    If you have an active [facility] account, please look for your well child questionnaire to come to your [facility] account before your next well child visit.  Children younger than 13 must be in the rear seat of a vehicle when available and properly restrained.

## 2022-08-04 ENCOUNTER — PATIENT OUTREACH (OUTPATIENT)
Dept: FAMILY MEDICINE | Facility: CLINIC | Age: 32
End: 2022-08-04

## 2022-08-16 ENCOUNTER — OFFICE VISIT (OUTPATIENT)
Dept: ALLERGY | Facility: CLINIC | Age: 32
End: 2022-08-16

## 2022-08-16 ENCOUNTER — ALLIED HEALTH/NURSE VISIT (OUTPATIENT)
Dept: ALLERGY | Facility: CLINIC | Age: 32
End: 2022-08-16
Payer: COMMERCIAL

## 2022-08-16 VITALS — DIASTOLIC BLOOD PRESSURE: 63 MMHG | SYSTOLIC BLOOD PRESSURE: 101 MMHG | OXYGEN SATURATION: 97 % | HEART RATE: 66 BPM

## 2022-08-16 DIAGNOSIS — J30.89 ALLERGIC RHINITIS DUE TO DUST MITE: ICD-10-CM

## 2022-08-16 DIAGNOSIS — J30.1 SEASONAL ALLERGIC RHINITIS DUE TO POLLEN: ICD-10-CM

## 2022-08-16 DIAGNOSIS — J30.81 ALLERGIC RHINITIS DUE TO ANIMALS: Primary | ICD-10-CM

## 2022-08-16 DIAGNOSIS — J30.81 ALLERGIC RHINITIS DUE TO ANIMALS: ICD-10-CM

## 2022-08-16 DIAGNOSIS — J45.40 MODERATE PERSISTENT ASTHMA WITHOUT COMPLICATION: Primary | ICD-10-CM

## 2022-08-16 DIAGNOSIS — J30.9 ALLERGIC RHINITIS: ICD-10-CM

## 2022-08-16 PROCEDURE — 99214 OFFICE O/P EST MOD 30 MIN: CPT | Mod: 25 | Performed by: ALLERGY & IMMUNOLOGY

## 2022-08-16 PROCEDURE — 95117 IMMUNOTHERAPY INJECTIONS: CPT

## 2022-08-16 RX ORDER — FLUTICASONE PROPIONATE AND SALMETEROL 500; 50 UG/1; UG/1
1 POWDER RESPIRATORY (INHALATION) EVERY 12 HOURS
Qty: 1 EACH | Refills: 5 | Status: SHIPPED | OUTPATIENT
Start: 2022-08-16 | End: 2023-01-19

## 2022-08-16 ASSESSMENT — ENCOUNTER SYMPTOMS
COUGH: 0
WHEEZING: 0
ACTIVITY CHANGE: 0
ARTHRALGIAS: 0
CHEST TIGHTNESS: 0
VOMITING: 0
EYE REDNESS: 0
FEVER: 0
SINUS PRESSURE: 0
HEADACHES: 0
JOINT SWELLING: 0
ADENOPATHY: 0
EYE DISCHARGE: 0
FACIAL SWELLING: 0
DIARRHEA: 0
SHORTNESS OF BREATH: 0
CHILLS: 0
MYALGIAS: 0
NAUSEA: 0
EYE ITCHING: 0
RHINORRHEA: 0

## 2022-08-16 ASSESSMENT — ASTHMA QUESTIONNAIRES: ACT_TOTALSCORE: 23

## 2022-08-16 NOTE — LETTER
8/16/2022         RE: Reginaldo Ferraro  [address] [facility] [address]        Dear Colleague,    Thank you for referring your patient, Reginaldo Ferraro, to the [facility]. Please see a copy of my visit note below.    Reginaldo Ferraro was seen in the Allergy Clinic at [facility].      Reginaldo Ferraro is a 31 year old Not  or  female who is seen today for a follow-up visit.    Reginaldo reports that her nasal congestion and sinus symptoms have significantly improved since her surgery and starting immunotherapy. She is able to breathe through her nose and is taking medication much less frequently. On average she takes fexofenadine once or twice per week. She began immunotherapy treatment in 4/2022 and continues to build toward her maintenance dose. She had a systemic reaction earlier during her building phase but has otherwise done well with treatment.    Reginaldo reports that her asthma remains well controlled. She continues to take Advair twice daily and has rarely needed to use her albuterol inhaler. She takes albuterol prior to cleaning or being in tawana environments. She reports no exacerbations, urgent care/ED visits, or oral steroid use since her last clinic visit. She has not had any side effects from the Advair.      Past Medical History:   Diagnosis Date     Abnormal Pap smear of cervix 12/23/2011 9/21 LSIL     Cervical high risk HPV (human papillomavirus) test positive 2013    10/31/14, 5/21/18     Depressive disorder 12/23/2011    possibly chronic     Environmental allergies      Migraine      Migraines     headaches with allergies     Moderate major depression 12/23/2011     Moderate major depression (H) 12/23/2011     Moderate persistent asthma 3/17/2022     NO ACTIVE PROBLEMS      Seasonal allergic rhinitis      Family History   Problem Relation Age of Onset     Diabetes Paternal Grandmother      Hypertension  Paternal Grandmother      Respiratory Paternal Grandmother         asthma     Heart Disease Father      Social History     Tobacco Use     Smoking status: Never Smoker     Smokeless tobacco: Never Used   Vaping Use     Vaping Use: Never used   Substance Use Topics     Alcohol use: Not Currently     Comment: Socially     Drug use: No     Social History     Social History Narrative     Not on file       Past medical, family, and social history were reviewed.    Review of Systems   Constitutional: Negative for activity change, chills and fever.   HENT: Negative for congestion, dental problem, ear pain, facial swelling, nosebleeds, postnasal drip, rhinorrhea, sinus pressure and sneezing.    Eyes: Negative for discharge, redness and itching.   Respiratory: Negative for cough, chest tightness, shortness of breath and wheezing.    Cardiovascular: Negative for chest pain.   Gastrointestinal: Negative for diarrhea, nausea and vomiting.   Musculoskeletal: Negative for arthralgias, joint swelling and myalgias.   Skin: Negative for rash.   Allergic/Immunologic: Positive for environmental allergies.   Neurological: Negative for headaches.   Hematological: Negative for adenopathy.   Psychiatric/Behavioral: Negative for behavioral problems and self-injury.         Current Outpatient Medications:      fluticasone-salmeterol (ADVAIR) 500-50 MCG/ACT inhaler, Inhale 1 puff into the lungs every 12 hours, Disp: 1 each, Rfl: 5     mometasone (NASONEX) 50 MCG/ACT nasal spray, Spray 2 sprays into both nostrils daily, Disp: 17 g, Rfl: 3     ORDER FOR ALLERGEN IMMUNOTHERAPY, Name of Mix: Mix #1  Dust Mite, Cat, Dog Cat Hair, Standardized 10,000 BAU/mL, ALK  2.0 ml Dog Hair-Dander, A. P.  1:100 w/v, HS  1.0 ml Dust Mites DF. 10,000 AU/mL, HS  1.0 ml Dust Mites DP. 10,000 AU/mL, HS  1.0 ml  Diluent: HSA qs to 5ml, Disp: 5 mL, Rfl: PRN     ORDER FOR ALLERGEN IMMUNOTHERAPY, Name of Mix: Mix #2  Tree  Sylvain, White 1:20 w/v, HS  0.5 ml Birch Mix PRW  1:20 w/v, HS  0.5 ml Boxelder-Maple Mix BHR (Boxelder Hard Red) 1:20 w/v, HS  0.5 ml [place], Common 1:20 w/v, HS  0.5 ml Oak Mix RVW 1:20 w/v, HS 0.5 ml Pine, White 1:20 w/v, ALK 0.5 ml [place] Tree, Black 1:20 w/v, HS 0.5 ml Diluent: HSA qs to 5ml, Disp: 5 mL, Rfl: PRN     ORDER FOR ALLERGEN IMMUNOTHERAPY, Name of Mix: Mix #3  Grass, Weeds Prabhu Grass 1:20 w/v, HS 0.5 ml Steve Grass (Std) 100,000 BAU/mL, HS 0.4 ml Lamb's Quarters 1:20 w/v, HS 0.5 ml Nettle 1:20 w/v, HS 0.5 ml Plantain, English 1:20 w/v, HS 0.5 ml Ragweed Mixed 1:20 w/v ALK  0.5 ml Russian Thistle 1:20 w/v, HS 0.5 ml Sorrel, Sheep 1:20 w/v, HS 0.5 ml Diluent: HSA qs to 5ml, Disp: 5 mL, Rfl: PRN     trimethoprim-polymyxin b (POLYTRIM) 56268-3.1 UNIT/ML-% ophthalmic solution, Place 1-2 drops into both eyes every 6 hours, Disp: 10 mL, Rfl: 0     VENTOLIN  (90 Base) MCG/ACT inhaler, Inhale 1-2 puffs into the lungs every 4 hours as needed for shortness of breath / dyspnea or wheezing, Disp: 18 g, Rfl: 1     EPINEPHrine (ANY BX GENERIC EQUIV) 0.3 MG/0.3ML injection 2-pack, Inject into the muscle as directed for anaphylaxis (Patient not taking: No sig reported), Disp: 2 each, Rfl: 2  No Known Allergies    EXAM:   /63   Pulse 66   SpO2 97%   Physical Exam  Vitals and nursing note reviewed.   Constitutional:       Appearance: Normal appearance.   HENT:      Head: Normocephalic and atraumatic.      Right Ear: External ear normal.      Left Ear: External ear normal.      Nose: No mucosal edema or rhinorrhea.      Mouth/Throat:      Mouth: Mucous membranes are moist. No oral lesions.      Pharynx: Oropharynx is clear. Uvula midline. No posterior oropharyngeal erythema.   Eyes:      General: Lids are normal.      Extraocular Movements: Extraocular movements intact.      Conjunctiva/sclera: Conjunctivae normal.   Neck:      Comments: No asymmetry, masses, or scars  Cardiovascular:      Rate and Rhythm: Normal rate and regular rhythm.       Heart sounds: Normal heart sounds, S1 normal and S2 normal.   Pulmonary:      Effort: Pulmonary effort is normal. No respiratory distress.      Breath sounds: Normal breath sounds and air entry.   Musculoskeletal:      Comments: No musculoskeletal defects appreciated   Skin:     General: Skin is warm and dry.      Findings: No lesion or rash.   Neurological:      General: No focal deficit present.      Mental Status: She is alert.   Psychiatric:         Mood and Affect: Mood and affect normal.       WORKUP:  None    ASSESSMENT/PLAN:  Reginaldo Ferraro is a 31 year old female here for a follow-up visit.    1. Moderate persistent asthma without complication - Well controlled with ICS/LABA therapy. She has rarely needed albuterol in the last few months. Will plan to continue medications at her current dose and re-evaluate at her next visit.    - take 2 to 4 puffs of albuterol HFA every 4 hours as needed  - fluticasone-salmeterol (ADVAIR) 500-50 MCG/ACT inhaler; Inhale 1 puff into the lungs every 12 hours  Dispense: 1 each; Refill: 5    2. Seasonal allergic rhinitis due to pollen - Reginaldo continues to build toward her maintenance immunotherapy dose. She reports significant improvement in her rhinitis symptoms since beginning treatment as well as her recent sinus surgery.     - continue allergen immunotherapy per protocol  - epinephrine auto-injector required per protocol  - continue pre-medication with 360mg of fexofenadine twice daily the day prior to injections as well as the day of injections  - continue to take fexofenadine daily as needed    3. Allergic rhinitis due to animals - see above    4. Allergic rhinitis due to dust mite - see above      Follow-up in 6 months, sooner if needed      Thank you for allowing me to participate in the care of Reginaldo Ferraro.      Polo Mccain MD, FAAAAI  Allergy/Immunology  [facility]  [facility]      Chart documentation done in part with Dragon Voice Recognition Software. Although reviewed after completion, some word and grammatical errors may remain.      Again, thank you for allowing me to participate in the care of your patient.        Sincerely,        Polo Mccain MD

## 2022-08-16 NOTE — PROGRESS NOTES
Patient presented after waiting 30 minutes with no reaction to allergy injections. Discharged from clinic.    Ileana JORGENSEN RN 8/16/2022 10:39 AM

## 2022-08-16 NOTE — PROGRESS NOTES
Reginaldo Ferraro was seen in the Allergy Clinic at [facility].      Reginaldo Ferraro is a 31 year old Not  or  female who is seen today for a follow-up visit.    Reginaldo reports that her nasal congestion and sinus symptoms have significantly improved since her surgery and starting immunotherapy. She is able to breathe through her nose and is taking medication much less frequently. On average she takes fexofenadine once or twice per week. She began immunotherapy treatment in 4/2022 and continues to build toward her maintenance dose. She had a systemic reaction earlier during her building phase but has otherwise done well with treatment.    Reginaldo reports that her asthma remains well controlled. She continues to take Advair twice daily and has rarely needed to use her albuterol inhaler. She takes albuterol prior to cleaning or being in tawana environments. She reports no exacerbations, urgent care/ED visits, or oral steroid use since her last clinic visit. She has not had any side effects from the Advair.      Past Medical History:   Diagnosis Date     Abnormal Pap smear of cervix 12/23/2011 9/21 LSIL     Cervical high risk HPV (human papillomavirus) test positive 2013    10/31/14, 5/21/18     Depressive disorder 12/23/2011    possibly chronic     Environmental allergies      Migraine      Migraines     headaches with allergies     Moderate major depression 12/23/2011     Moderate major depression (H) 12/23/2011     Moderate persistent asthma 3/17/2022     NO ACTIVE PROBLEMS      Seasonal allergic rhinitis      Family History   Problem Relation Age of Onset     Diabetes Paternal Grandmother      Hypertension Paternal Grandmother      Respiratory Paternal Grandmother         asthma     Heart Disease Father      Social History     Tobacco Use     Smoking status: Never Smoker     Smokeless tobacco: Never Used   Vaping Use     Vaping Use: Never used   Substance Use Topics      Alcohol use: Not Currently     Comment: Socially     Drug use: No     Social History     Social History Narrative     Not on file       Past medical, family, and social history were reviewed.    Review of Systems   Constitutional: Negative for activity change, chills and fever.   HENT: Negative for congestion, dental problem, ear pain, facial swelling, nosebleeds, postnasal drip, rhinorrhea, sinus pressure and sneezing.    Eyes: Negative for discharge, redness and itching.   Respiratory: Negative for cough, chest tightness, shortness of breath and wheezing.    Cardiovascular: Negative for chest pain.   Gastrointestinal: Negative for diarrhea, nausea and vomiting.   Musculoskeletal: Negative for arthralgias, joint swelling and myalgias.   Skin: Negative for rash.   Allergic/Immunologic: Positive for environmental allergies.   Neurological: Negative for headaches.   Hematological: Negative for adenopathy.   Psychiatric/Behavioral: Negative for behavioral problems and self-injury.         Current Outpatient Medications:      fluticasone-salmeterol (ADVAIR) 500-50 MCG/ACT inhaler, Inhale 1 puff into the lungs every 12 hours, Disp: 1 each, Rfl: 5     mometasone (NASONEX) 50 MCG/ACT nasal spray, Spray 2 sprays into both nostrils daily, Disp: 17 g, Rfl: 3     ORDER FOR ALLERGEN IMMUNOTHERAPY, Name of Mix: Mix #1  Dust Mite, Cat, Dog Cat Hair, Standardized 10,000 BAU/mL, ALK  2.0 ml Dog Hair-Dander, A. P.  1:100 w/v, HS  1.0 ml Dust Mites DF. 10,000 AU/mL, HS  1.0 ml Dust Mites DP. 10,000 AU/mL, HS  1.0 ml  Diluent: HSA qs to 5ml, Disp: 5 mL, Rfl: PRN     ORDER FOR ALLERGEN IMMUNOTHERAPY, Name of Mix: Mix #2  Tree  Sylvain, White 1:20 w/v, HS  0.5 ml Birch Mix PRW 1:20 w/v, HS  0.5 ml Boxelder-Maple Mix BHR (Boxelder Hard Red) 1:20 w/v, HS  0.5 ml [place], Common 1:20 w/v, HS  0.5 ml Oak Mix RVW 1:20 w/v, HS 0.5 ml Pine, White 1:20 w/v, ALK 0.5 ml [place] Tree, Black 1:20 w/v, HS 0.5 ml Diluent: HSA qs to 5ml, Disp: 5 mL,  Rfl: PRN     ORDER FOR ALLERGEN IMMUNOTHERAPY, Name of Mix: Mix #3  Grass, Weeds Prabhu Grass 1:20 w/v, HS 0.5 ml Steve Grass (Std) 100,000 BAU/mL, HS 0.4 ml Lamb's Quarters 1:20 w/v, HS 0.5 ml Nettle 1:20 w/v, HS 0.5 ml Plantain, English 1:20 w/v, HS 0.5 ml Ragweed Mixed 1:20 w/v ALK  0.5 ml Russian Thistle 1:20 w/v, HS 0.5 ml Sorrel, Sheep 1:20 w/v, HS 0.5 ml Diluent: HSA qs to 5ml, Disp: 5 mL, Rfl: PRN     trimethoprim-polymyxin b (POLYTRIM) 40689-2.1 UNIT/ML-% ophthalmic solution, Place 1-2 drops into both eyes every 6 hours, Disp: 10 mL, Rfl: 0     VENTOLIN  (90 Base) MCG/ACT inhaler, Inhale 1-2 puffs into the lungs every 4 hours as needed for shortness of breath / dyspnea or wheezing, Disp: 18 g, Rfl: 1     EPINEPHrine (ANY BX GENERIC EQUIV) 0.3 MG/0.3ML injection 2-pack, Inject into the muscle as directed for anaphylaxis (Patient not taking: No sig reported), Disp: 2 each, Rfl: 2  No Known Allergies    EXAM:   /63   Pulse 66   SpO2 97%   Physical Exam  Vitals and nursing note reviewed.   Constitutional:       Appearance: Normal appearance.   HENT:      Head: Normocephalic and atraumatic.      Right Ear: External ear normal.      Left Ear: External ear normal.      Nose: No mucosal edema or rhinorrhea.      Mouth/Throat:      Mouth: Mucous membranes are moist. No oral lesions.      Pharynx: Oropharynx is clear. Uvula midline. No posterior oropharyngeal erythema.   Eyes:      General: Lids are normal.      Extraocular Movements: Extraocular movements intact.      Conjunctiva/sclera: Conjunctivae normal.   Neck:      Comments: No asymmetry, masses, or scars  Cardiovascular:      Rate and Rhythm: Normal rate and regular rhythm.      Heart sounds: Normal heart sounds, S1 normal and S2 normal.   Pulmonary:      Effort: Pulmonary effort is normal. No respiratory distress.      Breath sounds: Normal breath sounds and air entry.   Musculoskeletal:      Comments: No musculoskeletal defects appreciated    Skin:     General: Skin is warm and dry.      Findings: No lesion or rash.   Neurological:      General: No focal deficit present.      Mental Status: She is alert.   Psychiatric:         Mood and Affect: Mood and affect normal.       WORKUP:  None    ASSESSMENT/PLAN:  Reginaldo Ferraro is a 31 year old female here for a follow-up visit.    1. Moderate persistent asthma without complication - Well controlled with ICS/LABA therapy. She has rarely needed albuterol in the last few months. Will plan to continue medications at her current dose and re-evaluate at her next visit.    - take 2 to 4 puffs of albuterol HFA every 4 hours as needed  - fluticasone-salmeterol (ADVAIR) 500-50 MCG/ACT inhaler; Inhale 1 puff into the lungs every 12 hours  Dispense: 1 each; Refill: 5    2. Seasonal allergic rhinitis due to pollen - Reginaldo continues to build toward her maintenance immunotherapy dose. She reports significant improvement in her rhinitis symptoms since beginning treatment as well as her recent sinus surgery.     - continue allergen immunotherapy per protocol  - epinephrine auto-injector required per protocol  - continue pre-medication with 360mg of fexofenadine twice daily the day prior to injections as well as the day of injections  - continue to take fexofenadine daily as needed    3. Allergic rhinitis due to animals - see above    4. Allergic rhinitis due to dust mite - see above      Follow-up in 6 months, sooner if needed      Thank you for allowing me to participate in the care of Reginaldo Ferraro.      Polo Mccain MD, FAAAAI  Allergy/Immunology  [facility]      Chart documentation done in part with Dragon Voice Recognition Software. Although reviewed after completion, some word and grammatical errors may remain.

## 2022-08-17 ENCOUNTER — MYC MEDICAL ADVICE (OUTPATIENT)
Dept: ALLERGY | Facility: CLINIC | Age: 32
End: 2022-08-17

## 2022-08-18 NOTE — TELEPHONE ENCOUNTER
Called patient.    Advised patient that the soonest appointment we have within her 14 day time period is her current scheduled appointment on 8-.  Advised patient that the time she is currently scheduled is the only appointment time we have available that day.  Also advised patient that due to her previous systemic reaction she is only able to receive allergy shots between the hours of 8am-4pm.  Patient verbalized good understanding.  All appointments that patient had scheduled before 8am were cancelled.  Patient advised.    Patient was scheduled for allergy shot appointments.    Ileana JORGENSEN RN 8/18/2022 2:27 PM

## 2022-08-29 ASSESSMENT — ENCOUNTER SYMPTOMS
SHORTNESS OF BREATH: 0
BREAST MASS: 0
EYE PAIN: 0
NERVOUS/ANXIOUS: 0
HEARTBURN: 0
NAUSEA: 1
CONSTIPATION: 0
MYALGIAS: 1
WEAKNESS: 0
HEMATOCHEZIA: 0
DIARRHEA: 0
PARESTHESIAS: 0
COUGH: 0
JOINT SWELLING: 0
DIZZINESS: 0
PALPITATIONS: 0
HEMATURIA: 0
ARTHRALGIAS: 1
SORE THROAT: 0
FEVER: 0
HEADACHES: 0
DYSURIA: 0
CHILLS: 0
ABDOMINAL PAIN: 1
FREQUENCY: 1

## 2022-08-30 ENCOUNTER — ALLIED HEALTH/NURSE VISIT (OUTPATIENT)
Dept: ALLERGY | Facility: CLINIC | Age: 32
End: 2022-08-30
Payer: COMMERCIAL

## 2022-08-30 DIAGNOSIS — J30.81 ALLERGIC RHINITIS DUE TO ANIMALS: ICD-10-CM

## 2022-08-30 DIAGNOSIS — J30.1 SEASONAL ALLERGIC RHINITIS DUE TO POLLEN: Primary | ICD-10-CM

## 2022-08-30 DIAGNOSIS — J30.89 ALLERGIC RHINITIS DUE TO DUST MITE: ICD-10-CM

## 2022-08-30 PROCEDURE — 95117 IMMUNOTHERAPY INJECTIONS: CPT

## 2022-08-30 NOTE — PROGRESS NOTES
Patient presented after waiting 30 minutes with no reaction to allergy injections. Discharged from clinic.    Ileana JORGENSEN RN 8/30/2022 12:58 PM

## 2022-08-31 ASSESSMENT — ASTHMA QUESTIONNAIRES
QUESTION_2 LAST FOUR WEEKS HOW OFTEN HAVE YOU HAD SHORTNESS OF BREATH: NOT AT ALL
QUESTION_4 LAST FOUR WEEKS HOW OFTEN HAVE YOU USED YOUR RESCUE INHALER OR NEBULIZER MEDICATION (SUCH AS ALBUTEROL): NOT AT ALL
QUESTION_5 LAST FOUR WEEKS HOW WOULD YOU RATE YOUR ASTHMA CONTROL: COMPLETELY CONTROLLED
QUESTION_7 LAST FOUR WEEKS HOW MANY DAYS DID YOUR CHILD WAKE UP DURING THE NIGHT BECAUSE OF ASTHMA: NOT AT ALL
QUESTION_3 LAST FOUR WEEKS HOW OFTEN DID YOUR ASTHMA SYMPTOMS (WHEEZING, COUGHING, SHORTNESS OF BREATH, CHEST TIGHTNESS OR PAIN) WAKE YOU UP AT NIGHT OR EARLIER THAN USUAL IN THE MORNING: NOT AT ALL
QUESTION_5 LAST FOUR WEEKS HOW MANY DAYS DID YOUR CHILD HAVE ANY DAYTIME ASTHMA SYMPTOMS: NOT AT ALL
ACT_TOTALSCORE_PEDS: 26
ACT_TOTALSCORE: 25
QUESTION_1 LAST FOUR WEEKS HOW MUCH OF THE TIME DID YOUR ASTHMA KEEP YOU FROM GETTING AS MUCH DONE AT WORK, SCHOOL OR AT HOME: NONE OF THE TIME
QUESTION_3 DO YOU COUGH BECAUSE OF YOUR ASTHMA: YES, SOME OF THE TIME.
QUESTION_2 HOW MUCH OF A PROBLEM IS YOUR ASTHMA WHEN YOU RUN, EXCERCISE OR PLAY SPORTS: IT'S NOT A PROBLEM.
QUESTION_1 HOW IS YOUR ASTHMA TODAY: VERY GOOD
QUESTION_4 DO YOU WAKE UP DURING THE NIGHT BECAUSE OF YOUR ASTHMA: NO, NONE OF THE TIME.
QUESTION_6 LAST FOUR WEEKS HOW MANY DAYS DID YOUR CHILD WHEEZE DURING THE DAY BECAUSE OF ASTHMA: NOT AT ALL
ACT_TOTALSCORE_PEDS: 26
ACT_TOTALSCORE: 25

## 2022-09-02 ENCOUNTER — OFFICE VISIT (OUTPATIENT)
Dept: FAMILY MEDICINE | Facility: CLINIC | Age: 32
End: 2022-09-02
Payer: COMMERCIAL

## 2022-09-02 VITALS
SYSTOLIC BLOOD PRESSURE: 104 MMHG | DIASTOLIC BLOOD PRESSURE: 64 MMHG | OXYGEN SATURATION: 97 % | WEIGHT: 157.6 LBS | HEART RATE: 83 BPM | BODY MASS INDEX: 29.76 KG/M2 | TEMPERATURE: 97.9 F | HEIGHT: 61 IN

## 2022-09-02 DIAGNOSIS — N94.10 DYSPAREUNIA, FEMALE: ICD-10-CM

## 2022-09-02 DIAGNOSIS — M79.662 PAIN IN BOTH LOWER LEGS: ICD-10-CM

## 2022-09-02 DIAGNOSIS — M79.661 PAIN IN BOTH LOWER LEGS: ICD-10-CM

## 2022-09-02 DIAGNOSIS — R11.0 NAUSEA: ICD-10-CM

## 2022-09-02 DIAGNOSIS — Z12.4 CERVICAL CANCER SCREENING: ICD-10-CM

## 2022-09-02 DIAGNOSIS — F33.0 MILD EPISODE OF RECURRENT MAJOR DEPRESSIVE DISORDER (H): ICD-10-CM

## 2022-09-02 DIAGNOSIS — Z00.00 ROUTINE GENERAL MEDICAL EXAMINATION AT A HEALTH CARE FACILITY: Primary | ICD-10-CM

## 2022-09-02 PROCEDURE — 99213 OFFICE O/P EST LOW 20 MIN: CPT | Mod: 25 | Performed by: PHYSICIAN ASSISTANT

## 2022-09-02 PROCEDURE — 99395 PREV VISIT EST AGE 18-39: CPT | Performed by: PHYSICIAN ASSISTANT

## 2022-09-02 PROCEDURE — 87624 HPV HI-RISK TYP POOLED RSLT: CPT | Performed by: PHYSICIAN ASSISTANT

## 2022-09-02 PROCEDURE — G0145 SCR C/V CYTO,THINLAYER,RESCR: HCPCS | Performed by: PHYSICIAN ASSISTANT

## 2022-09-02 ASSESSMENT — ENCOUNTER SYMPTOMS
EYE PAIN: 0
JOINT SWELLING: 0
HEARTBURN: 0
CHILLS: 0
WEAKNESS: 0
FEVER: 0
CONSTIPATION: 0
SHORTNESS OF BREATH: 0
NERVOUS/ANXIOUS: 0
COUGH: 0
ARTHRALGIAS: 1
HEMATOCHEZIA: 0
ABDOMINAL PAIN: 1
SORE THROAT: 0
HEMATURIA: 0
NAUSEA: 1
DIZZINESS: 0
DIARRHEA: 0
BREAST MASS: 0
PARESTHESIAS: 0
DYSURIA: 0
PALPITATIONS: 0
MYALGIAS: 1
FREQUENCY: 1
HEADACHES: 0

## 2022-09-02 NOTE — PROGRESS NOTES
ism   SUBJECTIVE:   CC: Reginaldo Ferraro is an 31 year old woman who presents for preventive health visit.       Patient has been advised of split billing requirements and indicates understanding: Yes  Healthy Habits:     Getting at least 3 servings of Calcium per day:  Yes    Bi-annual eye exam:  Yes    Dental care twice a year:  Yes    Sleep apnea or symptoms of sleep apnea:  Daytime drowsiness    Diet:  Regular (no restrictions)    Frequency of exercise:  2-3 days/week    Duration of exercise:  30-45 minutes    Taking medications regularly:  Yes    Medication side effects:  Muscle aches, Lightheadedness and Other    PHQ-2 Total Score: 0    Additional concerns today:  Yes      Painful intercourse. Has tried many things. Not really sexually active.   High stress. Muscle spasms in neck.   Legs hurt when she walks and plays soccer.       Today's PHQ-2 Score:   PHQ-2 ( 1999 Pfizer) 8/29/2022   Q1: Little interest or pleasure in doing things 0   Q2: Feeling down, depressed or hopeless 0   PHQ-2 Score 0   PHQ-2 Total Score (12-17 Years)- Positive if 3 or more points; Administer PHQ-A if positive -   Q1: Little interest or pleasure in doing things Not at all   Q2: Feeling down, depressed or hopeless Not at all   PHQ-2 Score 0       Abuse: Current or Past (Physical, Sexual or Emotional) - No  Do you feel safe in your environment? Yes        Social History     Tobacco Use     Smoking status: Never Smoker     Smokeless tobacco: Never Used   Substance Use Topics     Alcohol use: Yes     Comment: Socially     If you drink alcohol do you typically have >3 drinks per day or >7 drinks per week? No    Alcohol Use 9/2/2022   Prescreen: >3 drinks/day or >7 drinks/week? -   Prescreen: >3 drinks/day or >7 drinks/week? No       Reviewed orders with patient.  Reviewed health maintenance and updated orders accordingly - Yes  Lab work is in process    Breast Cancer Screening:    Breast CA Risk Assessment (FHS-7) 8/16/2021   Do you have  a family history of breast, colon, or ovarian cancer? No / Unknown       click delete button to remove this line now  Patient under 40 years of age: Routine Mammogram Screening not recommended.   Pertinent mammograms are reviewed under the imaging tab.    History of abnormal Pap smear: YES - updated in Problem List and Health Maintenance accordingly  PAP / HPV Latest Ref Rng & Units 8/19/2021 9/17/2020 5/21/2018   PAP   Low-grade squamous intraepithelial lesion (LSIL) encompassing HPV/mild dysplasia/CIN1(A) - -   PAP (Historical) - - NIL NIL   HPV16 Negative Negative - Negative   HPV18 Negative Negative - Negative   HRHPV Negative Negative - Positive(A)     Reviewed and updated as needed this visit by clinical staff   Tobacco  Allergies  Meds  Problems  Med Hx  Surg Hx  Fam Hx            Reviewed and updated as needed this visit by Provider   Tobacco  Allergies  Meds  Problems  Med Hx  Surg Hx  Fam Hx               Review of Systems   Constitutional: Negative for chills and fever.   HENT: Negative for congestion, ear pain, hearing loss and sore throat.    Eyes: Negative for pain and visual disturbance.   Respiratory: Negative for cough and shortness of breath.    Cardiovascular: Negative for chest pain, palpitations and peripheral edema.   Gastrointestinal: Positive for abdominal pain and nausea. Negative for constipation, diarrhea, heartburn and hematochezia.   Breasts:  Positive for tenderness. Negative for breast mass and discharge.   Genitourinary: Positive for frequency and urgency. Negative for dysuria, genital sores, hematuria, pelvic pain, vaginal bleeding and vaginal discharge.   Musculoskeletal: Positive for arthralgias and myalgias. Negative for joint swelling.   Skin: Negative for rash.   Neurological: Negative for dizziness, weakness, headaches and paresthesias.   Psychiatric/Behavioral: Negative for mood changes. The patient is not nervous/anxious.           OBJECTIVE:   /64 (BP  "Location: Left arm, Patient Position: Sitting, Cuff Size: Adult Regular)   Pulse 83   Temp 97.9  F (36.6  C) (Oral)   Ht 1.543 m (5' 0.75\")   Wt 71.5 kg (157 lb 9.6 oz)   LMP 08/07/2022 (Exact Date)   SpO2 97%   Breastfeeding No   BMI 30.02 kg/m    Physical Exam  GENERAL: healthy, alert and no distress  EYES: Eyes grossly normal to inspection, PERRL and conjunctivae and sclerae normal  HENT: ear canals and TM's normal, nose and mouth without ulcers or lesions  NECK: no adenopathy, no asymmetry, masses, or scars and thyroid normal to palpation- muscle spasm noted on trapezius  RESP: lungs clear to auscultation - no rales, rhonchi or wheezes  CV: regular rate and rhythm, normal S1 S2, no S3 or S4, no murmur, click or rub, no peripheral edema   ABDOMEN: soft, nontender, no hepatosplenomegaly, no masses and bowel sounds normal   (female): normal female external genitalia, normal urethral meatus, vaginal mucosa pink, moist, well rugated, and normal cervix/adnexa/uterus without masses or discharge  MS: no gross musculoskeletal defects noted, no edema- full range of motion of the legs.   SKIN: no suspicious lesions or rashes  NEURO: Normal strength and tone, mentation intact and speech normal  PSYCH: mentation appears normal, affect depressed and tearful.     Diagnostic Test Results:  Labs reviewed in Epic    ASSESSMENT/PLAN:   (Z00.00) Routine general medical examination at a health care facility  (primary encounter diagnosis)  Comment:   Plan:     (Z12.4) Cervical cancer screening  Comment:   Plan: Pap Screen with HPV - recommended age 30 - 65         years            (N94.10) Dyspareunia, female  Comment:   Plan: Physical Therapy Referral            (M79.661,  M79.662) Pain in both lower legs  Comment:   Plan: Physical Therapy Referral        Trial of physical therapy.     (R11.0) Nausea  Comment:   Plan: omeprazole (PRILOSEC) 20 MG DR capsule        New but has a history of this with increased stress. Will " "try omeprazole. Offered counseling. Patient declined.     (F33.0) Mild episode of recurrent major depressive disorder (H)  Comment:   Plan: Patient declined counseling at this time.         COUNSELING:  Reviewed preventive health counseling, as reflected in patient instructions       Regular exercise       Healthy diet/nutrition    Estimated body mass index is 30.02 kg/m  as calculated from the following:    Height as of this encounter: 1.543 m (5' 0.75\").    Weight as of this encounter: 71.5 kg (157 lb 9.6 oz).    Weight management plan: Discussed healthy diet and exercise guidelines    She reports that she has never smoked. She has never used smokeless tobacco.      Counseling Resources:  ATP IV Guidelines  Pooled Cohorts Equation Calculator  Breast Cancer Risk Calculator  BRCA-Related Cancer Risk Assessment: FHS-7 Tool  FRAX Risk Assessment  ICSI Preventive Guidelines  Dietary Guidelines for Americans, 2010  USDA's MyPlate  ASA Prophylaxis  Lung CA Screening    BALDO [facility]  "

## 2022-09-02 NOTE — LETTER
My Asthma Action Plan    Name: Reginaldo Ferraro   YOB: 1990  Date: 9/2/2022   My doctor: Cally Rucker PA-C   My clinic: [facility]        My Control Medicine: Fluticasone propionate + salmeterol (Advair Diskus or Wixela Inhub) -  500/50 mcg 1 puff 2 times per day  My Rescue Medicine: Albuterol (Proair/Ventolin/Proventil HFA) 2-4 puffs EVERY 4 HOURS as needed. Use a spacer if recommended by your provider.   My Asthma Severity:   Severe Persistent  Know your asthma triggers: upper respiratory infections and animal dander               GREEN ZONE   Good Control    I feel good    No cough or wheeze    Can work, sleep and play without asthma symptoms       Take your asthma control medicine every day.     1. If exercise triggers your asthma, take your rescue medication    15 minutes before exercise or sports, and    During exercise if you have asthma symptoms  2. Spacer to use with inhaler: If you have a spacer, make sure to use it with your inhaler             YELLOW ZONE Getting Worse  I have ANY of these:    I do not feel good    Cough or wheeze    Chest feels tight    Wake up at night   1. Keep taking your Green Zone medications  2. Start taking your rescue medicine:    every 20 minutes for up to 1 hour. Then every 4 hours for 24-48 hours.  3. If you stay in the Yellow Zone for more than 12-24 hours, contact your doctor.  4. If you do not return to the Green Zone in 12-24 hours or you get worse, start taking your oral steroid medicine if prescribed by your provider.           RED ZONE Medical Alert - Get Help  I have ANY of these:    I feel awful    Medicine is not helping    Breathing getting harder    Trouble walking or talking    Nose opens wide to breathe       1. Take your rescue medicine NOW  2. If your provider has prescribed an oral steroid medicine, start taking it NOW  3. Call your doctor NOW  4. If you are still in the Red Zone after 20 minutes and you have not  reached your doctor:    Take your rescue medicine again and    Call 911 or go to the emergency room right away    See your regular doctor within 2 weeks of an Emergency Room or Urgent Care visit for follow-up treatment.          Annual Reminders:  Meet with Asthma Educator,  Flu Shot in the Fall, consider Pneumonia Vaccination for patients with asthma (aged 19 and older).    Pharmacy: [id number] - [place], [place] - [address] AT [address] [facility]    Electronically signed by Cally Rucker PA-C   Date: 09/02/22                      Asthma Triggers  How To Control Things That Make Your Asthma Worse    Triggers are things that make your asthma worse.  Look at the list below to help you find your triggers and what you can do about them.  You can help prevent asthma flare-ups by staying away from your triggers.      Trigger                                                          What you can do   Cigarette Smoke  Tobacco smoke can make asthma worse. Do not allow smoking in your home, car or around you.  Be sure no one smokes at a child s day care or school.  If you smoke, ask your health care provider for ways to help you quit.  Ask family members to quit too.  Ask your health care provider for a referral to Quit Plan to help you quit smoking, or call 0-808-069-PLAN.     Colds, Flu, Bronchitis  These are common triggers of asthma. Wash your hands often.  Don t touch your eyes, nose or mouth.  Get a flu shot every year.     Dust Mites  These are tiny bugs that live in cloth or carpet. They are too small to see. Wash sheets and blankets in hot water every week.   Encase pillows and mattress in dust mite proof covers.  Avoid having carpet if you can. If you have carpet, vacuum weekly.   Use a dust mask and HEPA vacuum.   Pollen and Outdoor Mold  Some people are allergic to trees, grass, or weed pollen, or molds. Try to keep your windows closed.  Limit time out doors when pollen count is high.   Ask you health  care provider about taking medicine during allergy season.     Animal Dander  Some people are allergic to skin flakes, urine or saliva from pets with fur or feathers. Keep pets with fur or feathers out of your home.    If you can t keep the pet outdoors, then keep the pet out of your bedroom.  Keep the bedroom door closed.  Keep pets off cloth furniture and away from stuffed toys.     Mice, Rats, and Cockroaches   Some people are allergic to the waste from these pests.   Cover food and garbage.  Clean up spills and food crumbs.  Store grease in the refrigerator.   Keep food out of the bedroom.   Indoor Mold  This can be a trigger if your home has high moisture. Fix leaking faucets, pipes, or other sources of water.   Clean moldy surfaces.  Dehumidify basement if it is damp and smelly.   Smoke, Strong Odors, and Sprays  These can reduce air quality. Stay away from strong odors and sprays, such as perfume, powder, hair spray, paints, smoke incense, paint, cleaning products, candles and new carpet.   Exercise or Sports  Some people with asthma have this trigger. Be active!  Ask your doctor about taking medicine before sports or exercise to prevent symptoms.    Warm up for 5-10 minutes before and after sports or exercise.     Other Triggers of Asthma  Cold air:  Cover your nose and mouth with a scarf.  Sometimes laughing or crying can be a trigger.  Some medicines and food can trigger asthma.

## 2022-09-07 LAB
BKR LAB AP GYN ADEQUACY: NORMAL
BKR LAB AP GYN INTERPRETATION: NORMAL
BKR LAB AP HPV REFLEX: NORMAL
BKR LAB AP PREVIOUS ABNL DX: NORMAL
BKR LAB AP PREVIOUS ABNORMAL: NORMAL
PATH REPORT.COMMENTS IMP SPEC: NORMAL
PATH REPORT.COMMENTS IMP SPEC: NORMAL
PATH REPORT.RELEVANT HX SPEC: NORMAL

## 2022-09-08 ENCOUNTER — ALLIED HEALTH/NURSE VISIT (OUTPATIENT)
Dept: ALLERGY | Facility: CLINIC | Age: 32
End: 2022-09-08
Payer: COMMERCIAL

## 2022-09-08 DIAGNOSIS — J30.1 SEASONAL ALLERGIC RHINITIS DUE TO POLLEN: Primary | ICD-10-CM

## 2022-09-08 DIAGNOSIS — J30.89 ALLERGIC RHINITIS DUE TO DUST MITE: ICD-10-CM

## 2022-09-08 DIAGNOSIS — J30.81 ALLERGIC RHINITIS DUE TO ANIMALS: ICD-10-CM

## 2022-09-08 PROCEDURE — 95117 IMMUNOTHERAPY INJECTIONS: CPT

## 2022-09-09 ENCOUNTER — PATIENT OUTREACH (OUTPATIENT)
Dept: FAMILY MEDICINE | Facility: CLINIC | Age: 32
End: 2022-09-09

## 2022-09-15 ENCOUNTER — ALLIED HEALTH/NURSE VISIT (OUTPATIENT)
Dept: ALLERGY | Facility: CLINIC | Age: 32
End: 2022-09-15
Payer: COMMERCIAL

## 2022-09-15 ENCOUNTER — MYC MEDICAL ADVICE (OUTPATIENT)
Dept: ALLERGY | Facility: CLINIC | Age: 32
End: 2022-09-15

## 2022-09-15 DIAGNOSIS — J30.1 SEASONAL ALLERGIC RHINITIS DUE TO POLLEN: Primary | ICD-10-CM

## 2022-09-15 DIAGNOSIS — J30.89 ALLERGIC RHINITIS DUE TO DUST MITE: ICD-10-CM

## 2022-09-15 DIAGNOSIS — J30.9 ALLERGIC RHINITIS: ICD-10-CM

## 2022-09-15 DIAGNOSIS — J30.81 ALLERGIC RHINITIS DUE TO ANIMALS: ICD-10-CM

## 2022-09-15 PROCEDURE — 95117 IMMUNOTHERAPY INJECTIONS: CPT

## 2022-09-19 NOTE — TELEPHONE ENCOUNTER
"Patient received her allergy shots on 9-.  Dose was 0.25ml red.  Her top dose is 0.5ml red.  She has not reached her top dose yet.  My chart message on 5- stated \"persistent allergy/sinus symptoms after injections\" and LLR. Patient is required to premedicate by taking 360mg of Allegra BID the day prior and 360mg the morning of her allergy shots.  Patient uses ice immediately after her allergy shots.  This is the patient's 2nd LLR.    Please advise on future dosing.    Ileana JORGENSEN RN           "

## 2022-09-19 NOTE — TELEPHONE ENCOUNTER
Recommend repeating 0.25 of Red with next injection, if no LLR, may continue to build. Continue pre-medications and ice.

## 2022-09-29 ENCOUNTER — ALLIED HEALTH/NURSE VISIT (OUTPATIENT)
Dept: ALLERGY | Facility: CLINIC | Age: 32
End: 2022-09-29
Payer: COMMERCIAL

## 2022-09-29 DIAGNOSIS — J30.1 SEASONAL ALLERGIC RHINITIS DUE TO POLLEN: Primary | ICD-10-CM

## 2022-09-29 DIAGNOSIS — J30.9 ALLERGIC RHINITIS: ICD-10-CM

## 2022-09-29 DIAGNOSIS — J30.89 ALLERGIC RHINITIS DUE TO DUST MITE: ICD-10-CM

## 2022-09-29 DIAGNOSIS — J30.81 ALLERGIC RHINITIS DUE TO ANIMALS: ICD-10-CM

## 2022-09-29 PROCEDURE — 95117 IMMUNOTHERAPY INJECTIONS: CPT

## 2022-09-30 ENCOUNTER — MYC MEDICAL ADVICE (OUTPATIENT)
Dept: ALLERGY | Facility: CLINIC | Age: 32
End: 2022-09-30

## 2022-10-03 NOTE — TELEPHONE ENCOUNTER
MA called patient to discuss, she is at work and prefers my-chart to discuss. Will send my-chart encounter.      Chanda Monsalve MA

## 2022-10-03 NOTE — TELEPHONE ENCOUNTER
There should be a face-to-face discussion between the patient and provider what to do next.  I would recommend repeating 0.25mL until the patient is seen.    Turner Verdin MD

## 2022-10-03 NOTE — TELEPHONE ENCOUNTER
This is the patient's 4th LLR.  Patient denied any other symptoms.  Patient premedicated as required below.  Patient uses ice immediately after her allergy shots.  Patient repeated her last dose of 0.25ml red on 9-.  Patient has not reached top dose.      José Ross MD advised on 9-  Note     Recommend repeating 0.25 of Red with next injection, if no LLR, may continue to build. Continue pre-medications and ice.              Polo Mccain MD advised on 5-  Note  Due to the recurrent large local reactions (2nd one after her most recent visit) and persistent allergy/sinus symptoms after injections, recommend she transition to the traditional build schedule. Continue pre-medication with fexofenadine as directed per cluster protocol (360mg/2 tablets twice a day the day before injection visits, another 360mg at least 1 hour prior to injection visits, and 360mg the afternoon/evening after injection visits.)          Please advise.    Ileana JORGENSEN RN

## 2022-10-12 ENCOUNTER — VIRTUAL VISIT (OUTPATIENT)
Dept: FAMILY MEDICINE | Facility: CLINIC | Age: 32
End: 2022-10-12
Payer: COMMERCIAL

## 2022-10-12 DIAGNOSIS — F43.21 SITUATIONAL DEPRESSION: ICD-10-CM

## 2022-10-12 DIAGNOSIS — F41.0 PANIC ATTACK: Primary | ICD-10-CM

## 2022-10-12 PROCEDURE — 99213 OFFICE O/P EST LOW 20 MIN: CPT | Mod: 95 | Performed by: INTERNAL MEDICINE

## 2022-10-12 RX ORDER — DULOXETIN HYDROCHLORIDE 30 MG/1
30 CAPSULE, DELAYED RELEASE ORAL 2 TIMES DAILY
Qty: 30 CAPSULE | Refills: 0 | Status: SHIPPED | OUTPATIENT
Start: 2022-10-12 | End: 2022-11-28 | Stop reason: SINTOL

## 2022-10-12 NOTE — PROGRESS NOTES
Reginaldo is a 31 year old who is being evaluated via a billable video visit.      How would you like to obtain your AVS? MyChart  If the video visit is dropped, the invitation should be resent by:   Will anyone else be joining your video visit? No        Assessment & Plan     Panic attack  Recently exacerbated her's.  Would recommend counseling/psychotherapy.  This is been recommended in the past to the patient.  In the meanwhile we will initiate Cymbalta.  Try to avoid benzodiazepines given her history of depression    - DULoxetine (CYMBALTA) 30 MG capsule; Take 1 capsule (30 mg) by mouth 2 times daily    Situational depression  Situational depression exacerbated by an elective .   was performed in February secondary to fetal demise/illness.    Patient is emotional when discussing this today.    No suicidal ideation.  No hopelessness or helplessness.         See Patient Instructions    No follow-ups on file.    Isiah Bhagat MD  [facility] is a 31 year old, presenting for the following health issues:  No chief complaint on file.      HPI 31-year-old presents clinic today upset for ongoing issues of anxiety, panic, depression.  She states her symptoms have been going on since 2022.  The patient had an elective  at this time secondary to febrile illness.    She states that she is trying to get through the days.  Continues to care for her 11-year-old daughter.  Does become emotional when discussing the .    No hopelessness or helplessness noted.        Review of Systems   Constitutional, HEENT, cardiovascular, pulmonary, gi and gu systems are negative, except as otherwise noted.      Objective           Vitals:  No vitals were obtained today due to virtual visit.    Physical Exam   GENERAL: Healthy, alert and no distress  EYES: Eyes grossly normal to inspection.  No discharge or erythema, or obvious scleral/conjunctival  abnormalities.  RESP: No audible wheeze, cough, or visible cyanosis.  No visible retractions or increased work of breathing.    SKIN: Visible skin clear. No significant rash, abnormal pigmentation or lesions.  NEURO: Cranial nerves grossly intact.  Mentation and speech appropriate for age.  PSYCH: Mentation appears normal, affect normal/bright, judgement and insight intact, normal speech and appearance well-groomed.    Office Visit on 09/02/2022   Component Date Value Ref Range Status     Interpretation 09/02/2022 Negative for Intraepithelial Lesion or Malignancy (NILM)    Final     Comment 09/02/2022    Final                    Value:This result contains rich text formatting which cannot be displayed here.     Specimen Adequacy 09/02/2022 Satisfactory for evaluation, endocervical/transformation zone component absent   Final     Clinical Information 09/02/2022    Final                    Value:This result contains rich text formatting which cannot be displayed here.     Reflex Testing 09/02/2022 Yes regardless of result   Final     Previous Abnormal? 09/02/2022    Final                    Value:This result contains rich text formatting which cannot be displayed here.     Previous Abnormal Diagnosis 09/02/2022    Final                    Value:This result contains rich text formatting which cannot be displayed here.     Performing Labs 09/02/2022    Final                    Value:This result contains rich text formatting which cannot be displayed here.     Other HR HPV 09/02/2022 Negative  Negative Final     HPV16 DNA 09/02/2022 Negative  Negative Final     HPV18 DNA 09/02/2022 Negative  Negative Final     FINAL DIAGNOSIS 09/02/2022    Final                    Value:This result contains rich text formatting which cannot be displayed here.               Video-Visit Details    Video Start Time: 1:24 PM    Type of service:  Video Visit    Video End Time:    Originating Location (pt. Location): Home    Distant Location  (provider location):  [facility]     Platform used for Video Visit: Jere

## 2022-10-20 ENCOUNTER — MYC MEDICAL ADVICE (OUTPATIENT)
Dept: FAMILY MEDICINE | Facility: CLINIC | Age: 32
End: 2022-10-20

## 2022-10-20 DIAGNOSIS — J45.41 MODERATE PERSISTENT ASTHMA WITH ACUTE EXACERBATION: Primary | ICD-10-CM

## 2022-10-24 RX ORDER — PREDNISONE 20 MG/1
TABLET ORAL
Qty: 20 TABLET | Refills: 0 | Status: SHIPPED | OUTPATIENT
Start: 2022-10-24 | End: 2022-11-01

## 2022-10-28 ENCOUNTER — TELEPHONE (OUTPATIENT)
Dept: FAMILY MEDICINE | Facility: CLINIC | Age: 32
End: 2022-10-28

## 2022-10-28 NOTE — TELEPHONE ENCOUNTER
Patient seen in ER yesterday.   Symptoms not better nor worse  She needs forms filled out for work.  Does she need a visit to have forms filled out? Virtual holley in person for reassessment     Clarisa Mackey RN  [facility]

## 2022-10-28 NOTE — TELEPHONE ENCOUNTER
Patient should have a virtual appointment with any provider. I am out of clinic for the next 1.5 weeks, so likely will need to be seen with a colleague.   Cally Rucker PA-C

## 2022-10-28 NOTE — TELEPHONE ENCOUNTER
Received call from Ashley with [employer].    She is calling to ask if patient was seen for Acute Bronchitis in clinic on or after 10/24/2022.     Writer does not note any OV scheduled on or after 10/24/2022 for this diagnosis. However, it is noted that she went to the ER yesterday, 10/27/2022.     She asked if patient has been prescribed any medications for this diagnosis by PCP, or if patient was given any 'work restrictions/limitations', or return to work date recently by PCP.     Notified her that medications were prescribed for asthma exacerbation, not acute bronchitis.     No further questions.    JALIL Roldan RN  [facility]

## 2022-11-01 ENCOUNTER — TELEPHONE (OUTPATIENT)
Dept: FAMILY MEDICINE | Facility: CLINIC | Age: 32
End: 2022-11-01

## 2022-11-01 NOTE — TELEPHONE ENCOUNTER
Attempted to contact patient prior to virtual appointment today.    No answer. LVM @ 12:55    Carmen Merida RN  11/01/22

## 2022-11-02 ENCOUNTER — VIRTUAL VISIT (OUTPATIENT)
Dept: FAMILY MEDICINE | Facility: CLINIC | Age: 32
End: 2022-11-02
Payer: COMMERCIAL

## 2022-11-02 DIAGNOSIS — J20.9 ACUTE BRONCHITIS, UNSPECIFIED ORGANISM: Primary | ICD-10-CM

## 2022-11-02 PROCEDURE — 99213 OFFICE O/P EST LOW 20 MIN: CPT | Mod: 95 | Performed by: PHYSICIAN ASSISTANT

## 2022-11-02 NOTE — PROGRESS NOTES
Reginaldo is a 32 year old who is being evaluated via a billable video visit.      How would you like to obtain your AVS? MyChart  If the video visit is dropped, the invitation should be resent by: Text to cell phone: [phone]  Will anyone else be joining your video visit? No          Assessment & Plan     Acute bronchitis, unspecified organism  Improved and back to work.  Will complete FMLA forms once received.        Review of prior external note(s) from - CareEverywhere information from [facility] reviewed         Patient Instructions   Drop off FMLA forms or have faxed to us at [phone]  Make sure you have completed your portion of form.  Return urgently if any change in symptoms.    Continue medications as you have been taking.  Get your flu shot once you are feeling better.  Please call or MyChart my office with any questions or concerns.         Return in about 10 months (around 9/3/2023), or if symptoms worsen or fail to improve, for Routine preventive, in person.    Melba Gray PA-C  [facility]   Reginaldo is a 32 year old, presenting for the following health issues:  ER F/U      HPI     ED/UC Followup:    Facility:  [facility]  Date of visit: 10/27/22  Reason for visit: Bronchitis  Current Status: getting better    Patient with history of asthma and unknown to me presents with history of bronchitis for which she was evaluated in emergency department.  Reports she is Finally feeling better  Works at a call center.  Lost her voice completely so unable to work.  Seen in emergency department 10/27/22 (one week ago).  Had a Normal cxr , negative covid and strep testing Was treated with course of prednisone, tessalon perles, duoneb and codeine cough syrup  Stopped working last tuesday left- back to work on Monday October 31  4 days off of work.    Work is going ok  Needs FMLA forms completed.   Missed work Tuesday tuesdy 25 until Monday 10/31/22    Omeprazole improving GERD symptoms   Has not yet been vaccinated for influennza   Review of Systems   Constitutional, HEENT, cardiovascular, pulmonary, gi and gu systems are negative, except as otherwise noted.      Objective           Vitals:  No vitals were obtained today due to virtual visit.    Physical Exam   GENERAL: Healthy, alert and no distress  EYES: Eyes grossly normal to inspection.  No discharge or erythema, or obvious scleral/conjunctival abnormalities.  RESP: has a cough, able to talk in complete sentences, does not appear dyspneic  SKIN: Visible skin clear. No significant rash, abnormal pigmentation or lesions.  NEURO: Cranial nerves grossly intact.  Mentation and speech appropriate for age.  PSYCH: Mentation appears normal, affect normal/bright, judgement and insight intact, normal speech and appearance well-groomed.                Video-Visit Details    Video Start Time: 5:29 PM    Type of service:  Video Visit    Video End Time:5:33 PM    Originating Location (pt. Location): Home        Distant Location (provider location):  On-site    Platform used for Video Visit: Jere

## 2022-11-03 NOTE — PATIENT INSTRUCTIONS
Drop off FMLA forms or have faxed to us at [phone]  Make sure you have completed your portion of form.  Return urgently if any change in symptoms.    Continue medications as you have been taking.  Get your flu shot once you are feeling better.  Please call or MyChart my office with any questions or concerns.

## 2022-11-04 ENCOUNTER — MYC MEDICAL ADVICE (OUTPATIENT)
Dept: FAMILY MEDICINE | Facility: CLINIC | Age: 32
End: 2022-11-04

## 2022-11-07 ENCOUNTER — TELEPHONE (OUTPATIENT)
Dept: FAMILY MEDICINE | Facility: CLINIC | Age: 32
End: 2022-11-07

## 2022-11-07 NOTE — TELEPHONE ENCOUNTER
Forms/Letter Request    Type of form/letter: forms recieved from [employer]   placed on providers desk for response     Have you been seen for this request:  Do we have the form/letter:  When is form/letter needed by:    How would you like the form/letter returned: fax to [phone]  Patient Notified form requests are processed in 3-5 business days    Could we send this information to you in [facility] [place] or would you prefer to receive a phone call?:

## 2022-11-09 ENCOUNTER — OFFICE VISIT (OUTPATIENT)
Dept: ALLERGY | Facility: OTHER | Age: 32
End: 2022-11-09
Payer: COMMERCIAL

## 2022-11-09 VITALS
HEIGHT: 60 IN | DIASTOLIC BLOOD PRESSURE: 70 MMHG | WEIGHT: 149 LBS | OXYGEN SATURATION: 98 % | SYSTOLIC BLOOD PRESSURE: 109 MMHG | HEART RATE: 76 BPM | BODY MASS INDEX: 29.25 KG/M2

## 2022-11-09 DIAGNOSIS — H10.13 ALLERGIC CONJUNCTIVITIS OF BOTH EYES: ICD-10-CM

## 2022-11-09 DIAGNOSIS — J45.40 MODERATE PERSISTENT ASTHMA, UNSPECIFIED WHETHER COMPLICATED: Primary | ICD-10-CM

## 2022-11-09 DIAGNOSIS — J30.81 ALLERGIC RHINITIS DUE TO ANIMALS: ICD-10-CM

## 2022-11-09 DIAGNOSIS — J30.1 SEASONAL ALLERGIC RHINITIS DUE TO POLLEN: ICD-10-CM

## 2022-11-09 DIAGNOSIS — J30.89 ALLERGIC RHINITIS DUE TO DUST MITE: ICD-10-CM

## 2022-11-09 PROCEDURE — 99214 OFFICE O/P EST MOD 30 MIN: CPT | Performed by: ALLERGY & IMMUNOLOGY

## 2022-11-09 RX ORDER — IPRATROPIUM BROMIDE AND ALBUTEROL SULFATE 2.5; .5 MG/3ML; MG/3ML
3 SOLUTION RESPIRATORY (INHALATION) EVERY 6 HOURS PRN
Qty: 90 ML | Refills: 3 | Status: SHIPPED | OUTPATIENT
Start: 2022-11-09 | End: 2022-12-14

## 2022-11-09 RX ORDER — AZELASTINE HYDROCHLORIDE 0.5 MG/ML
1 SOLUTION/ DROPS OPHTHALMIC 2 TIMES DAILY
Qty: 6 ML | Refills: 3 | Status: SHIPPED | OUTPATIENT
Start: 2022-11-09 | End: 2024-03-04

## 2022-11-09 RX ORDER — ALBUTEROL SULFATE 90 UG/1
1-2 AEROSOL, METERED RESPIRATORY (INHALATION) EVERY 4 HOURS PRN
Qty: 18 G | Refills: 1 | Status: SHIPPED | OUTPATIENT
Start: 2022-11-09 | End: 2023-01-26

## 2022-11-09 ASSESSMENT — ENCOUNTER SYMPTOMS
FEVER: 0
FACIAL SWELLING: 0
HEADACHES: 0
COUGH: 1
EYE REDNESS: 0
ACTIVITY CHANGE: 0
VOMITING: 0
ADENOPATHY: 0
RHINORRHEA: 1
WHEEZING: 1
SHORTNESS OF BREATH: 1
MYALGIAS: 0
SINUS PRESSURE: 0
CHEST TIGHTNESS: 0
CHILLS: 0
EYE DISCHARGE: 1
DIARRHEA: 0
ARTHRALGIAS: 0
EYE ITCHING: 1
NAUSEA: 0

## 2022-11-09 ASSESSMENT — ASTHMA QUESTIONNAIRES: ACT_TOTALSCORE: 21

## 2022-11-09 NOTE — PROGRESS NOTES
SUBJECTIVE:                                                                   Reginaldo Ferraro presents today to our Allergy Clinic at [facility] for a follow-up visit. She is a 32-year-old female with a history of allergic rhinitis and asthma.  She is a patient of Dr. Mccain. However, considering some issues with allergen immunotherapy, she was scheduled with me today.   Asthma symptoms since 2018. She uses Advair 500/50 mcg 1 puff twice daily.   She recently had an exacerbation of cough and hoarse voice. Was diagnosed with bronchitis.  Was seen in the ED at the end of October. Was diagnosed with bronchitis and recommended to use Tessalon and guaifenesin with codeine. Was prescribed prednisone by PCP at the end of October as well. She is way better these days. She still needs to use her rescue bronchodilator 2-3 times a week, primarily in the evening, before going to sleep. No nocturnal symptoms. Prior to this episode, she did not have any exacerbation for at least 6 months.      Allergy Immunotherapy (started on 4/28/22)  Date/time of injection(s): 9/29/22     Vial Color Content  Dose   Red 1:1 Trees  0.25 mL   Red 1:1 Grass, Weeds  0.25 mL   Red 1:1 Cat, Dog, Dust Mite  0.25 mL     She had a systemic reaction at the beginning of allergen immunotherapy.  She had several significant local reactions lasting about 28 hours, with the last dose of Red 1:1, 0.25 mL, involving arm areas only. It happens despite icing or taking antihistamines. LLRs are 6-7/10 sore and pruritic  The patient finds allergen immunotherapy helpful. It improved her symptoms by 50%.     Currently, she takes fexofenadine several times a week, plus she has a pre-treatment with a high dose of fexofenadine for days of allergy shots. She doesn't use any nasal sprays. She uses Refresh eye drops, recommended by Ophthalmology. She does have itchy eyes and doesn't feel that  Refresh is helpful. She doesn't use any antihistamine  eye drops.       Patient Active Problem List   Diagnosis     CARDIOVASCULAR SCREENING; LDL GOAL LESS THAN 160     Cervical high risk HPV (human papillomavirus) test positive     Insomnia, unspecified insomnia     Vitamin D deficiency     Mild episode of recurrent major depressive disorder (H)     Adjustment disorder with depressed mood     Hypokalemia     Influenza A with pneumonia     Mixed personality disorder (H)     Sepsis (H)     Nasal obstruction     Nasal septal deviation     Nasal turbinate hypertrophy     Chronic maxillary sinusitis     Moderate persistent asthma     Allergic rhinitis due to animals     Allergic rhinitis due to dust mite     Seasonal allergic rhinitis due to pollen       Past Medical History:   Diagnosis Date     Abnormal Pap smear of cervix 12/23/2011 9/21 [facility]     Cervical high risk HPV (human papillomavirus) test positive 2013    10/31/14, 5/21/18     Depressive disorder 12/23/2011    possibly chronic     Environmental allergies      Migraine      Migraines     headaches with allergies     Moderate major depression 12/23/2011     Moderate major depression (H) 12/23/2011     Moderate persistent asthma 3/17/2022     NO ACTIVE PROBLEMS      Seasonal allergic rhinitis       Problem (# of Occurrences) Relation (Name,Age of Onset)    Arthritis (1) Mother    Diabetes (1) Paternal Grandmother (Divina)    Heart Disease (1) Father    Hypertension (1) Paternal Grandmother (Divina)    Respiratory (1) Paternal Grandmother (Divina): asthma        Past Surgical History:   Procedure Laterality Date     DILATION AND EVACUATION N/A 2/16/2022    Procedure: DILATION AND EVACUATION, UTERUS;  Surgeon: Vianey Domingo MD;  Location: [facility] OR     ENDOSCOPIC SINUS SURGERY Bilateral 4/4/2022    Procedure: FUNCTIONAL ENDOSCOPIC SINUS SURGERY, with bilateral maxillary antrostomies and bilateral michael bullosa reduction,;  Surgeon: Miquel Rutledge MD;  Location:  OR     EYE SURGERY  01/2013    [facility]     NO HISTORY OF SURGERY       [place] BREAST AUGMENTATION  2018     SEPTOPLASTY, TURBINOPLASTY, COMBINED Bilateral 4/4/2022    Procedure: WITH NASAL SEPTOPLASTY and bilateral inferior turbinate reduction;  Surgeon: Miquel Rutledge MD;  Location:  OR     Social History     Socioeconomic History     Marital status: Single     Spouse name: partner- Pastor     Number of children: 0     Years of education: None     Highest education level: None   Occupational History     Occupation:      Employer: UNKNOWN     Comment: [employer]     Occupation: karaoke entertainer   Tobacco Use     Smoking status: Never     Passive exposure: Never     Smokeless tobacco: Never   Vaping Use     Vaping Use: Never used   Substance and Sexual Activity     Alcohol use: Yes     Comment: Socially     Drug use: No     Sexual activity: Yes     Partners: Male     Birth control/protection: Pull-out method, Condom   Other Topics Concern     Parent/sibling w/ CABG, MI or angioplasty before 65F 55M? No   Social History Narrative    ENVIRONMENTAL HISTORY: [place] The home is heated with a forced air. They does have central air conditioning. The patient's bedroom is furnished with feather/wool bedding or pillows and carpeting in bedroom.  Pets inside the house include 2 cat(s) and 1 dog(s). There is no history of cockroach or mice infestation. There is/are 0 smokers living in the house.  There is/are 0 who smoke ecigarettes/vape living in the house.The house does not have a damp basement.                 Review of Systems   Constitutional: Negative for activity change, chills and fever.   HENT: Positive for congestion, postnasal drip and rhinorrhea. Negative for ear discharge, facial swelling, nosebleeds, sinus pressure and sneezing.    Eyes: Positive for discharge and itching. Negative for redness.   Respiratory: Positive for cough, shortness of breath and wheezing. Negative for chest  tightness.    Cardiovascular: Negative for chest pain.   Gastrointestinal: Negative for diarrhea, nausea and vomiting.   Musculoskeletal: Negative for arthralgias and myalgias.   Skin: Negative for rash.   Allergic/Immunologic: Positive for environmental allergies.   Neurological: Negative for headaches.   Hematological: Negative for adenopathy.   Psychiatric/Behavioral: Negative for self-injury.           Current Outpatient Medications:      ALPRAZolam (XANAX) 0.25 MG tablet, TAKE 1 TABLET (0.25 MG) BY MOUTH THREE TIMES A DAY AS NEEDED (PANIC ATTACK)., Disp: , Rfl:      azelastine (OPTIVAR) 0.05 % ophthalmic solution, Apply 1 drop to eye 2 times daily, Disp: 6 mL, Rfl: 3     benzonatate (TESSALON) 100 MG capsule, , Disp: , Rfl:      DULoxetine (CYMBALTA) 30 MG capsule, Take 1 capsule (30 mg) by mouth 2 times daily, Disp: 30 capsule, Rfl: 0     Fexofenadine HCl (ALLEGRA ALLERGY PO), , Disp: , Rfl:      fluticasone-salmeterol (ADVAIR) 500-50 MCG/ACT inhaler, Inhale 1 puff into the lungs every 12 hours, Disp: 1 each, Rfl: 5     ipratropium - albuterol 0.5 mg/2.5 mg/3 mL (DUONEB) 0.5-2.5 (3) MG/3ML neb solution, Inhale 1 vial (3 mLs) into the lungs every 6 hours as needed for shortness of breath / dyspnea or wheezing, Disp: 90 mL, Rfl: 3     omeprazole (PRILOSEC) 20 MG DR capsule, Take 1 capsule (20 mg) by mouth daily, Disp: 90 capsule, Rfl: 0     ORDER FOR ALLERGEN IMMUNOTHERAPY, Name of Mix: Mix #1  Dust Mite, Cat, Dog Cat Hair, Standardized 10,000 BAU/mL, ALK  2.0 ml Dog Hair-Dander, A. P.  1:100 w/v, HS  1.0 ml Dust Mites DF. 10,000 AU/mL, HS  1.0 ml Dust Mites DP. 10,000 AU/mL, HS  1.0 ml  Diluent: HSA qs to 5ml, Disp: 5 mL, Rfl: PRN     ORDER FOR ALLERGEN IMMUNOTHERAPY, Name of Mix: Mix #2  Tree  Sylvain, White 1:20 w/v, HS  0.5 ml Birch Mix PRW 1:20 w/v, HS  0.5 ml Boxelder-Maple Mix BHR (Boxelder Hard Red) 1:20 w/v, HS  0.5 ml [place], Common 1:20 w/v, HS  0.5 ml Oak Mix RVW 1:20 w/v, HS 0.5 ml Pine, White 1:20  w/v, ALK 0.5 ml [place] Tree, Black 1:20 w/v, HS 0.5 ml Diluent: HSA qs to 5ml, Disp: 5 mL, Rfl: PRN     ORDER FOR ALLERGEN IMMUNOTHERAPY, Name of Mix: Mix #3  Grass, Weeds Prabhu Grass 1:20 w/v, HS 0.5 ml Steve Grass (Std) 100,000 BAU/mL, HS 0.4 ml Lamb's Quarters 1:20 w/v, HS 0.5 ml Nettle 1:20 w/v, HS 0.5 ml Plantain, English 1:20 w/v, HS 0.5 ml Ragweed Mixed 1:20 w/v ALK  0.5 ml Russian Thistle 1:20 w/v, HS 0.5 ml Sorrel, Sheep 1:20 w/v, HS 0.5 ml Diluent: HSA qs to 5ml, Disp: 5 mL, Rfl: PRN     VENTOLIN  (90 Base) MCG/ACT inhaler, Inhale 1-2 puffs into the lungs every 4 hours as needed for shortness of breath / dyspnea or wheezing, Disp: 18 g, Rfl: 1     COMBIVENT RESPIMAT  MCG/ACT inhaler, INHALE 1 PUFF EVERY 4 (FOUR) HOURS AS NEEDED (COUGH/SOB)., Disp: , Rfl:      EPINEPHrine (ANY BX GENERIC EQUIV) 0.3 MG/0.3ML injection 2-pack, Inject into the muscle as directed for anaphylaxis, Disp: 2 each, Rfl: 2  Immunization History   Administered Date(s) Administered     COVID-19,PF,Pfizer 12+ Yrs (2022 and After) 02/03/2022, 02/25/2022     DTAP (<7y) 1990, 03/20/1991, 08/20/1991, 12/11/1992, 09/21/1999     Flu, Unspecified 09/21/2016     HPV 04/09/2007, 03/10/2008, 09/21/2016     HepB 03/07/1996, 04/10/1996, 10/02/1996     Hib (PRP-T) 1990, 03/20/1991, 08/20/1991     Influenza (IIV3) PF 10/26/2002     Influenza Vaccine IM > 6 months Valent IIV4 (Alfuria,Fluzone) 09/21/2016, 10/09/2017     Influenza Vaccine, 6+MO IM (QUADRIVALENT W/PRESERVATIVES) 11/02/2015     MMR 01/28/1992, 09/21/1994, 09/20/2004     Mantoux Tuberculin Skin Test 06/15/2015, 10/09/2017     OPV, trivalent, live 1990, 03/20/1991, 08/20/1991, 12/11/1992     Rotavirus, pentavalent 04/09/2007     TD (ADULT, 7+) 06/17/2003     TDAP Vaccine (Boostrix) 10/15/2013     Td (Adult), Adsorbed 06/17/2003     Tdap (Adacel,Boostrix) 08/29/2010     Varicella Immunity: Titer/MD Dx 09/21/2016     No Known Allergies  OBJECTIVE:                                                                  /70   Pulse 76   Ht 1.524 m (5')   Wt 67.6 kg (149 lb)   LMP 10/17/2022 (Exact Date)   SpO2 98%   BMI 29.10 kg/m          Physical Exam  Vitals and nursing note reviewed.   Constitutional:       General: She is not in acute distress.     Appearance: She is not ill-appearing, toxic-appearing or diaphoretic.   HENT:      Head: Normocephalic and atraumatic.      Right Ear: Tympanic membrane, ear canal and external ear normal.      Left Ear: Tympanic membrane, ear canal and external ear normal.      Nose: No mucosal edema, congestion or rhinorrhea.      Right Turbinates: Not enlarged, swollen or pale.      Left Turbinates: Not enlarged, swollen or pale.      Mouth/Throat:      Lips: Pink.      Mouth: Mucous membranes are moist.      Pharynx: Oropharynx is clear. No pharyngeal swelling, oropharyngeal exudate, posterior oropharyngeal erythema or uvula swelling.   Eyes:      General:         Right eye: No discharge.         Left eye: No discharge.      Conjunctiva/sclera: Conjunctivae normal.      Comments: Erythematous conjunctiva bilaterally, but right side is worse than left.   Cardiovascular:      Rate and Rhythm: Normal rate and regular rhythm.      Heart sounds: Normal heart sounds. No murmur heard.  Pulmonary:      Effort: Pulmonary effort is normal. No respiratory distress.      Breath sounds: Normal breath sounds and air entry. No stridor, decreased air movement or transmitted upper airway sounds. No decreased breath sounds, wheezing, rhonchi or rales.   Musculoskeletal:         General: Normal range of motion.   Skin:     General: Skin is warm.   Neurological:      Mental Status: She is alert and oriented to person, place, and time.   Psychiatric:         Mood and Affect: Mood normal.         Behavior: Behavior normal.         WORKUP: ACT 21    ASSESSMENT/PLAN:    Moderate persistent asthma, unspecified whether complicated    She had a recent  flare.  Not sure whether triggered by respiratory infection or her allergies.  Improved after she completed prednisone.  She still needs to use albuterol 2-3 times a week.  - Continue Advair 500/50 mcg 1 puff twice daily and DuoNebs/albuterol as needed.  - Hold on allergen immunotherapy until needing albuterol is twice a week or less.    - VENTOLIN  (90 Base) MCG/ACT inhaler  Dispense: 18 g; Refill: 1  - ipratropium - albuterol 0.5 mg/2.5 mg/3 mL (DUONEB) 0.5-2.5 (3) MG/3ML neb solution  Dispense: 90 mL; Refill: 3    Allergic rhinitis due to animals  Allergic rhinitis due to dust mite  Seasonal allergic rhinitis due to pollen  Allergic conjunctivitis of both eyes     For ocular symptoms, I recommend starting Optivar 1 drop in each eye twice daily as needed.  If her symptoms do not improve, dry eye syndrome may play a significant role in her current symptoms.  She may need to see Ophthalmology for ocular steroids.    She is satisfied with the allergen immunotherapy results.  She would like to continue further.  Considering recent large local reactions and a history of systemic reaction in the past, we agreed to make the dose of Red 1: 1, 0.25 mL as her maintenance dose.  We can always try to advance if her allergy symptoms are suboptimally controlled with this dose or large local reactions significantly improve.  - Continue pretreatment protocol with fexofenadine 360 mg by mouth twice daily prior to the day of allergen immunotherapy and in the morning of allergen immunotherapy.  - Continue with fexofenadine 180 mg by mouth once daily as needed for the rest of the days.  She is not interested in starting any nasal sprays.    Return in about 3 months (around 2/9/2023), or if symptoms worsen or fail to improve.    Thank you for allowing us to participate in the care of this patient. Please feel free to contact us if there are any questions or concerns about the patient.    Disclaimer: This note consists of  symbols derived from keyboarding, dictation and/or voice recognition software. As a result, there may be errors in the script that have gone undetected. Please consider this when interpreting information found in this chart.    Turner Verdin MD, FAAAAI, FACAAI  Allergy, Asthma and Immunology     [facility]

## 2022-11-09 NOTE — PATIENT INSTRUCTIONS
Start Optivar 1 drop in each eye twice daily as needed.  If eyes do not get better, see the eye doctor.  You may need a steroid for your eyes.     Continue with Advair 500/50 mcg 1 puff twice daily.  Continue duo nebs or albuterol nebs as needed.   Wait until you get completely better before you resume the shots.     Continue current pretreatment with Allegra before allergy shots.   Will make a maintenance dose Red vial 0.25 mL.  Lets see how you feel with it.  We can always try to push the dose further in case if your allergy symptoms are not optimally controlled or local reactions get better.     Allergy Staff Appt Hours Shot Hours Locations    Physician     Turner Verdin MD       Support Staff     DANAE Hughes CMA  Tuesday:   [place] :  [place]: :         [place]:  [facility] [place]        Tuesday: : : : : :      [place]       Tues & Wed: : & Thurs:        Fri: :         [facility]  [address] [place] [address]  Appt Line: [phone]    [facility]  [address] [place] [address]  Appt Line: [phone]    Pulmonary Function Scheduling:  [place]: [phone]  [place]: [phone]  [phone]     Prescription Assistance    If you need assistance with your prescriptions (cost, coverage, etc) please contact: [place] [facility] [phone]    Important Scheduling Information    Appointments for skin testing: Appointment will last approximately 45 minutes.  Please call the appointment line for your clinic to schedule.  Discontinue oral antihistamines 7 days prior to the appointment.  Discontinue nasal and ocular antihistamines 4 days prior to appointment.    Appointments for challenges (oral food challenge, penicillin testing, aspirin  desensitization) If your provider instructs you to that this additional testing is indicated, it will need to be scheduled directly through the allergy department.  Please contact them via [employer] or by calling the clinic and asking to speak with the allergy team.  They will provide additional information and instructions for the appointment.  Discontinue oral antihistamines 7 days prior to the appointment.  Discontinue nasal and ocular antihistamines 4 days prior to the appointment.    Thank you for trusting us with your care. Please feel free to contact us with any questions or concerns you may have.

## 2022-11-09 NOTE — LETTER
11/9/2022         RE: Reginaldo Ferraro  [address] [facility] [address]        Dear Colleague,    Thank you for referring your patient, Reginaldo Ferraro, to the [facility]. Please see a copy of my visit note below.    SUBJECTIVE:                                                                   Reginaldo Ferraro presents today to our Allergy Clinic at [facility] for a follow-up visit. She is a 32-year-old female with a history of allergic rhinitis and asthma.  She is a patient of Dr. Mccain. However, considering some issues with allergen immunotherapy, she was scheduled with me today.   Asthma symptoms since 2018. She uses Advair 500/50 mcg 1 puff twice daily.   She recently had an exacerbation of cough and hoarse voice. Was diagnosed with bronchitis.  Was seen in the ED at the end of October. Was diagnosed with bronchitis and recommended to use Tessalon and guaifenesin with codeine. Was prescribed prednisone by PCP at the end of October as well. She is way better these days. She still needs to use her rescue bronchodilator 2-3 times a week, primarily in the evening, before going to sleep. No nocturnal symptoms. Prior to this episode, she did not have any exacerbation for at least 6 months.      Allergy Immunotherapy (started on 4/28/22)  Date/time of injection(s): 9/29/22     Vial Color Content  Dose   Red 1:1 Trees  0.25 mL   Red 1:1 Grass, Weeds  0.25 mL   Red 1:1 Cat, Dog, Dust Mite  0.25 mL     She had a systemic reaction at the beginning of allergen immunotherapy.  She had several significant local reactions lasting about 28 hours, with the last dose of Red 1:1, 0.25 mL, involving arm areas only. It happens despite icing or taking antihistamines. LLRs are 6-7/10 sore and pruritic  The patient finds allergen immunotherapy helpful. It improved her symptoms by 50%.     Currently, she takes fexofenadine several times a week, plus she has a  pre-treatment with a high dose of fexofenadine for days of allergy shots. She doesn't use any nasal sprays. She uses Refresh eye drops, recommended by Ophthalmology. She does have itchy eyes and doesn't feel that  Refresh is helpful. She doesn't use any antihistamine eye drops.       Patient Active Problem List   Diagnosis     CARDIOVASCULAR SCREENING; LDL GOAL LESS THAN 160     Cervical high risk HPV (human papillomavirus) test positive     Insomnia, unspecified insomnia     Vitamin D deficiency     Mild episode of recurrent major depressive disorder (H)     Adjustment disorder with depressed mood     Hypokalemia     Influenza A with pneumonia     Mixed personality disorder (H)     Sepsis (H)     Nasal obstruction     Nasal septal deviation     Nasal turbinate hypertrophy     Chronic maxillary sinusitis     Moderate persistent asthma     Allergic rhinitis due to animals     Allergic rhinitis due to dust mite     Seasonal allergic rhinitis due to pollen       Past Medical History:   Diagnosis Date     Abnormal Pap smear of cervix 12/23/2011 9/21 [facility]     Cervical high risk HPV (human papillomavirus) test positive 2013    10/31/14, 5/21/18     Depressive disorder 12/23/2011    possibly chronic     Environmental allergies      Migraine      Migraines     headaches with allergies     Moderate major depression 12/23/2011     Moderate major depression (H) 12/23/2011     Moderate persistent asthma 3/17/2022     NO ACTIVE PROBLEMS      Seasonal allergic rhinitis       Problem (# of Occurrences) Relation (Name,Age of Onset)    Arthritis (1) Mother    Diabetes (1) Paternal Grandmother (Divina)    Heart Disease (1) Father    Hypertension (1) Paternal Grandmother (Divina)    Respiratory (1) Paternal Grandmother (Divina): asthma        Past Surgical History:   Procedure Laterality Date     DILATION AND EVACUATION N/A 2/16/2022    Procedure: DILATION AND EVACUATION, UTERUS;  Surgeon: Vianey Domingo MD;  Location:  OR      ENDOSCOPIC SINUS SURGERY Bilateral 4/4/2022    Procedure: FUNCTIONAL ENDOSCOPIC SINUS SURGERY, with bilateral maxillary antrostomies and bilateral michael bullosa reduction,;  Surgeon: Miquel Rutledge MD;  Location:  OR     EYE SURGERY  01/2013    Lasic surgery     NO HISTORY OF SURGERY       RI BREAST AUGMENTATION  2018     SEPTOPLASTY, TURBINOPLASTY, COMBINED Bilateral 4/4/2022    Procedure: WITH NASAL SEPTOPLASTY and bilateral inferior turbinate reduction;  Surgeon: Miquel Rutledge MD;  Location:  OR     Social History     Socioeconomic History     Marital status: Single     Spouse name: partner- Pastor     Number of children: 0     Years of education: None     Highest education level: None   Occupational History     Occupation:      Employer: UNKNOWN     Comment: [place]     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 No   Social History Narrative    ENVIRONMENTAL HISTORY: [place] The home is heated with a forced air. They does have central air conditioning. The patient's bedroom is furnished with feather/wool bedding or pillows and carpeting in bedroom.  Pets inside the house include 2 cat(s) and 1 dog(s). There is no history of cockroach or mice infestation. There is/are 0 smokers living in the house.  There is/are 0 who smoke ecigarettes/vape living in the house.The house does not have a damp basement.                 Review of Systems   Constitutional: Negative for activity change, chills and fever.   HENT: Positive for congestion, postnasal  drip and rhinorrhea. Negative for ear discharge, facial swelling, nosebleeds, sinus pressure and sneezing.    Eyes: Positive for discharge and itching. Negative for redness.   Respiratory: Positive for cough, shortness of breath and wheezing. Negative for chest tightness.    Cardiovascular: Negative for chest pain.   Gastrointestinal: Negative for diarrhea, nausea and vomiting.   Musculoskeletal: Negative for arthralgias and myalgias.   Skin: Negative for rash.   Allergic/Immunologic: Positive for environmental allergies.   Neurological: Negative for headaches.   Hematological: Negative for adenopathy.   Psychiatric/Behavioral: Negative for self-injury.           Current Outpatient Medications:      ALPRAZolam (XANAX) 0.25 MG tablet, TAKE 1 TABLET (0.25 MG) BY MOUTH THREE TIMES A DAY AS NEEDED (PANIC ATTACK)., Disp: , Rfl:      azelastine (OPTIVAR) 0.05 % ophthalmic solution, Apply 1 drop to eye 2 times daily, Disp: 6 mL, Rfl: 3     benzonatate (TESSALON) 100 MG capsule, , Disp: , Rfl:      DULoxetine (CYMBALTA) 30 MG capsule, Take 1 capsule (30 mg) by mouth 2 times daily, Disp: 30 capsule, Rfl: 0     Fexofenadine HCl (ALLEGRA ALLERGY PO), , Disp: , Rfl:      fluticasone-salmeterol (ADVAIR) 500-50 MCG/ACT inhaler, Inhale 1 puff into the lungs every 12 hours, Disp: 1 each, Rfl: 5     ipratropium - albuterol 0.5 mg/2.5 mg/3 mL (DUONEB) 0.5-2.5 (3) MG/3ML neb solution, Inhale 1 vial (3 mLs) into the lungs every 6 hours as needed for shortness of breath / dyspnea or wheezing, Disp: 90 mL, Rfl: 3     omeprazole (PRILOSEC) 20 MG DR capsule, Take 1 capsule (20 mg) by mouth daily, Disp: 90 capsule, Rfl: 0     ORDER FOR ALLERGEN IMMUNOTHERAPY, Name of Mix: Mix #1  Dust Mite, Cat, Dog Cat Hair, Standardized 10,000 BAU/mL, ALK  2.0 ml Dog Hair-Dander, A. P.  1:100 w/v, HS  1.0 ml Dust Mites DF. 10,000 AU/mL, HS  1.0 ml Dust Mites DP. 10,000 AU/mL, HS  1.0 ml  Diluent: HSA qs to 5ml, Disp: 5 mL, Rfl: PRN     ORDER FOR ALLERGEN  IMMUNOTHERAPY, Name of Mix: Mix #2  Tree  Sylvain, White 1:20 w/v, HS  0.5 ml Birch Mix PRW 1:20 w/v, HS  0.5 ml Boxelder-Maple Mix BHR (Boxelder Hard Red) 1:20 w/v, HS  0.5 ml Creighton, Common 1:20 w/v, HS  0.5 ml Oak Mix RVW 1:20 w/v, HS 0.5 ml Pine, White 1:20 w/v, ALK 0.5 ml [place] Tree, Black 1:20 w/v, HS 0.5 ml Diluent: HSA qs to 5ml, Disp: 5 mL, Rfl: PRN     ORDER FOR ALLERGEN IMMUNOTHERAPY, Name of Mix: Mix #3  Grass, Weeds Prabhu Grass 1:20 w/v, HS 0.5 ml Steve Grass (Std) 100,000 BAU/mL, HS 0.4 ml Lamb's Quarters 1:20 w/v, HS 0.5 ml Nettle 1:20 w/v, HS 0.5 ml Plantain, English 1:20 w/v, HS 0.5 ml Ragweed Mixed 1:20 w/v ALK  0.5 ml Russian Thistle 1:20 w/v, HS 0.5 ml Sorrel, Sheep 1:20 w/v, HS 0.5 ml Diluent: HSA qs to 5ml, Disp: 5 mL, Rfl: PRN     VENTOLIN  (90 Base) MCG/ACT inhaler, Inhale 1-2 puffs into the lungs every 4 hours as needed for shortness of breath / dyspnea or wheezing, Disp: 18 g, Rfl: 1     COMBIVENT RESPIMAT  MCG/ACT inhaler, INHALE 1 PUFF EVERY 4 (FOUR) HOURS AS NEEDED (COUGH/SOB)., Disp: , Rfl:      EPINEPHrine (ANY BX GENERIC EQUIV) 0.3 MG/0.3ML injection 2-pack, Inject into the muscle as directed for anaphylaxis, Disp: 2 each, Rfl: 2  Immunization History   Administered Date(s) Administered     COVID-19,PF,Pfizer 12+ Yrs (2022 and After) 02/03/2022, 02/25/2022     DTAP (<7y) 1990, 03/20/1991, 08/20/1991, 12/11/1992, 09/21/1999     Flu, Unspecified 09/21/2016     HPV 04/09/2007, 03/10/2008, 09/21/2016     HepB 03/07/1996, 04/10/1996, 10/02/1996     Hib (PRP-T) 1990, 03/20/1991, 08/20/1991     Influenza (IIV3) PF 10/26/2002     Influenza Vaccine IM > 6 months Valent IIV4 (Alfuria,Fluzone) 09/21/2016, 10/09/2017     Influenza Vaccine, 6+MO IM (QUADRIVALENT W/PRESERVATIVES) 11/02/2015     MMR 01/28/1992, 09/21/1994, 09/20/2004     Mantoux Tuberculin Skin Test 06/15/2015, 10/09/2017     OPV, trivalent, live 1990, 03/20/1991, 08/20/1991, 12/11/1992      Rotavirus, pentavalent 04/09/2007     TD (ADULT, 7+) 06/17/2003     TDAP Vaccine (Boostrix) 10/15/2013     Td (Adult), Adsorbed 06/17/2003     Tdap (Adacel,Boostrix) 08/29/2010     Varicella Immunity: Titer/MD Dx 09/21/2016     No Known Allergies  OBJECTIVE:                                                                 /70   Pulse 76   Ht 1.524 m (5')   Wt 67.6 kg (149 lb)   LMP 10/17/2022 (Exact Date)   SpO2 98%   BMI 29.10 kg/m          Physical Exam  Vitals and nursing note reviewed.   Constitutional:       General: She is not in acute distress.     Appearance: She is not ill-appearing, toxic-appearing or diaphoretic.   HENT:      Head: Normocephalic and atraumatic.      Right Ear: Tympanic membrane, ear canal and external ear normal.      Left Ear: Tympanic membrane, ear canal and external ear normal.      Nose: No mucosal edema, congestion or rhinorrhea.      Right Turbinates: Not enlarged, swollen or pale.      Left Turbinates: Not enlarged, swollen or pale.      Mouth/Throat:      Lips: Pink.      Mouth: Mucous membranes are moist.      Pharynx: Oropharynx is clear. No pharyngeal swelling, oropharyngeal exudate, posterior oropharyngeal erythema or uvula swelling.   Eyes:      General:         Right eye: No discharge.         Left eye: No discharge.      Conjunctiva/sclera: Conjunctivae normal.      Comments: Erythematous conjunctiva bilaterally, but right side is worse than left.   Cardiovascular:      Rate and Rhythm: Normal rate and regular rhythm.      Heart sounds: Normal heart sounds. No murmur heard.  Pulmonary:      Effort: Pulmonary effort is normal. No respiratory distress.      Breath sounds: Normal breath sounds and air entry. No stridor, decreased air movement or transmitted upper airway sounds. No decreased breath sounds, wheezing, rhonchi or rales.   Musculoskeletal:         General: Normal range of motion.   Skin:     General: Skin is warm.   Neurological:      Mental Status: She  is alert and oriented to person, place, and time.   Psychiatric:         Mood and Affect: Mood normal.         Behavior: Behavior normal.         WORKUP: ACT 21    ASSESSMENT/PLAN:    Moderate persistent asthma, unspecified whether complicated    She had a recent flare.  Not sure whether triggered by respiratory infection or her allergies.  Improved after she completed prednisone.  She still needs to use albuterol 2-3 times a week.  - Continue Advair 500/50 mcg 1 puff twice daily and DuoNebs/albuterol as needed.  - Hold on allergen immunotherapy until needing albuterol is twice a week or less.    - VENTOLIN  (90 Base) MCG/ACT inhaler  Dispense: 18 g; Refill: 1  - ipratropium - albuterol 0.5 mg/2.5 mg/3 mL (DUONEB) 0.5-2.5 (3) MG/3ML neb solution  Dispense: 90 mL; Refill: 3    Allergic rhinitis due to animals  Allergic rhinitis due to dust mite  Seasonal allergic rhinitis due to pollen  Allergic conjunctivitis of both eyes     For ocular symptoms, I recommend starting Optivar 1 drop in each eye twice daily as needed.  If her symptoms do not improve, dry eye syndrome may play a significant role in her current symptoms.  She may need to see Ophthalmology for ocular steroids.    She is satisfied with the allergen immunotherapy results.  She would like to continue further.  Considering recent large local reactions and a history of systemic reaction in the past, we agreed to make the dose of Red 1: 1, 0.25 mL as her maintenance dose.  We can always try to advance if her allergy symptoms are suboptimally controlled with this dose or large local reactions significantly improve.  - Continue pretreatment protocol with fexofenadine 360 mg by mouth twice daily prior to the day of allergen immunotherapy and in the morning of allergen immunotherapy.  - Continue with fexofenadine 180 mg by mouth once daily as needed for the rest of the days.  She is not interested in starting any nasal sprays.    Return in about 3 months  (around 2/9/2023), or if symptoms worsen or fail to improve.    Thank you for allowing us to participate in the care of this patient. Please feel free to contact us if there are any questions or concerns about the patient.    Disclaimer: This note consists of symbols derived from keyboarding, dictation and/or voice recognition software. As a result, there may be errors in the script that have gone undetected. Please consider this when interpreting information found in this chart.    Turner Verdin MD, FAAAAI, FACAAI  Allergy, Asthma and Immunology     [facility]       Again, thank you for allowing me to participate in the care of your patient.        Sincerely,        Turner Verdin MD

## 2022-11-10 ENCOUNTER — THERAPY VISIT (OUTPATIENT)
Dept: PHYSICAL THERAPY | Facility: CLINIC | Age: 32
End: 2022-11-10
Attending: PHYSICIAN ASSISTANT
Payer: COMMERCIAL

## 2022-11-10 DIAGNOSIS — M79.662 PAIN IN BOTH LOWER LEGS: ICD-10-CM

## 2022-11-10 DIAGNOSIS — M79.661 PAIN IN BOTH LOWER LEGS: ICD-10-CM

## 2022-11-10 PROCEDURE — 97161 PT EVAL LOW COMPLEX 20 MIN: CPT | Mod: GP | Performed by: PHYSICAL THERAPIST

## 2022-11-10 NOTE — PROGRESS NOTES
"                                                                           [facility]    OUTPATIENT Physical Therapy ORTHOPEDIC EVALUATION  PLAN OF TREATMENT FOR OUTPATIENT REHABILITATION  (COMPLETE FOR INITIAL CLAIMS ONLY)  Patient's Last Name, First Name, M.I.  YOB: 1990  Reginaldo Ferraro    Provider s Name:  [facility]   Medical Record No.  [id number]   Start of Care Date:  11/10/22   Onset Date:   09/02/22 (provider referral)   Treatment Diagnosis:  medial tibial stress syndrome Medical Diagnosis:  Pain in both lower legs       Goals:     11/10/22 0500   Body Part   Goals listed below are for bilateral LE   Goal #1   Goal #1 ambulation   Previous Functional Level No restrictions   Performance Level plays soccer 1x/week in [employer]   Current Functional Level [place] patient can walk   Performance Level 1 mi with mod difficulty per LEFS; soccer always provokes sx (weekly)   STG Target Performance [place] patient will be able to  walk   Performance Level 1 mile with \"a little bit of difficulty\" per LEFS   Rationale for safe community ambulation   Due Date 12/01/22    LTG Target Performance [place] patient will be able to  walk   Performance Level play soccer 1x/week without provocation of sx   Rationale for safe community ambulation   Due Date 01/10/23       Therapy Frequency:  2-3x/month  Predicted Duration of Therapy Intervention:  2 months    ROE BOSTON, PT                 I CERTIFY THE NEED FOR THESE SERVICES FURNISHED UNDER        THIS PLAN OF TREATMENT AND WHILE UNDER MY CARE     (Physician attestation of this document indicates review and certification of the therapy plan).                     Certification Date From:  11/10/22   Certification Date To:  01/10/23    Referring Provider:  Cally Rucker    Initial Assessment        See Epic Evaluation SOC Date: 11/10/22                                                       "

## 2022-11-10 NOTE — PROGRESS NOTES
[facility] Physical Therapy Initial Evaluation  11/10/2022     Patient's Name: Reginaldo Ferraro  Referring Provider: Cally Rucker  Visit Diagnosis:   1. Pain in both lower legs      Payor: BCBS / Plan: [place] / Product Type: Indemnity /     Precautions/Restrictions/MD instructions: 9/2/22 evaluate and treat    Therapist ASSESSMENT  Reginaldo Ferraro is a 32 year old female patient presenting to Physical Therapy with bilateral lower leg pain with physical activity. Patient demonstrates decreased calf raise strength, which provoked her sx. No provocation of sx with lumbar screen and no evidence of DVT. Signs and symptoms are consistent with medial tibial stress syndrome; I do not think this a compartment syndrome in that her sx last several days rather than minutes after the provocative activity. These impairments limit their ability to exercise, do stairs, ambulate when sx are present. Skilled PT services are necessary in order to reduce impairments and improve independent function.      SUBJECTIVE   Reginaldo Ferraro is a 32 year old female who presents to outpatient physical therapy for evaluation. Her symptoms consist of:  1. Bilateral leg pain - primary concern  2. Back pain        Patient Comments (HPI): She reports that her symptoms began in the summer when she was playing soccer. No injury, but all the sudden she had a cramping-type pain in her medial tibia that spread to the gastroc at the muscle belly. Stretching seemed to make it worse. Says it started with just the left LE but has since spread to both legs. It lasts a couple days and happened every time she played soccer (1x/week in the summer/fall). Of note, she has had a challenging year health-wise and returned to playing soccer this summer after not being very active. She does not do any sort of dynamic warmup for soccer, just some stretches.     Denies temperature changes except that she sometimes gets chilled now. Also denies skin  color changes, tenderness in the calf suspicious of DVT. Reports she occasionally gets paresthesias in the legs when she sits with her leg folded underneath her. She rates pain as 7/10 on average. When pain is really bad she cannot walk and is tearful. Overall, she reports her status remains unchanged.    She denies red flags, including Sudden changes in bowel/bladder function, Saddle anaesthesia and knee buckling.  She reports the following red flags: none.    Aggravating Factors: soccer, going up stairs the other day  Relieving Factors: rest and massaging it out    Previous Treatments: None    General health as reported by patient: fair.    PMH/Review of Systems: I briefly reviewed the review of systems as noted on the health history form.  I am only responding to those symptoms which are directly relevant the specific indication for my consultation. I recommended the patient follow up with their primary care referring provider to pursue any other symptoms which may be of concern.     Surgical history/trauma: See EMR. She denies any significant current illness or recent hospital admissions. She denies any regional implanted devices.  Imaging: see EMR, none for legs  Medications: See EMR    PERTINENT MEDICAL / SURGICAL HISTORY:   Patient Active Problem List   Diagnosis     CARDIOVASCULAR SCREENING; LDL GOAL LESS THAN 160     Cervical high risk HPV (human papillomavirus) test positive     Insomnia, unspecified insomnia     Vitamin D deficiency     Mild episode of recurrent major depressive disorder (H)     Adjustment disorder with depressed mood     Hypokalemia     Influenza A with pneumonia     Mixed personality disorder (H)     Sepsis (H)     Nasal obstruction     Nasal septal deviation     Nasal turbinate hypertrophy     Chronic maxillary sinusitis     Moderate persistent asthma     Allergic rhinitis due to animals     Allergic rhinitis due to dust mite     Seasonal allergic rhinitis due to pollen     Past  Surgical History:   Procedure Laterality Date     DILATION AND EVACUATION N/A 2/16/2022    Procedure: DILATION AND EVACUATION, UTERUS;  Surgeon: Vianey Domingo MD;  Location: [facility] OR     ENDOSCOPIC SINUS SURGERY Bilateral 4/4/2022    Procedure: FUNCTIONAL ENDOSCOPIC SINUS SURGERY, with bilateral maxillary antrostomies and bilateral michael bullosa reduction,;  Surgeon: Miquel Rutledge MD;  Location:  OR     EYE SURGERY  01/2013    Lasic surgery     NO HISTORY OF SURGERY       [place] BREAST AUGMENTATION  2018     SEPTOPLASTY, TURBINOPLASTY, COMBINED Bilateral 4/4/2022    Procedure: WITH NASAL SEPTOPLASTY and bilateral inferior turbinate reduction;  Surgeon: Miquel Rutledge MD;  Location:  OR       Occupation: call center, 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        OBJECTIVE  Observation: no rubor, tumor, palpable dolor at calves and knees suggestive of DVT. No hx of this. Not on anticoagulation. Foot posture with normal pronation in stance and no [id number] sign   Anterior tibialis bilaterally quite hypertrophied.  Gait: normal, no asymmetries or obvious deviations, Able to heel and toe walk without difficulty and non antalgic  Transfers: normal, no assist required  Screening (regional interdependence): Hip screen negative (ROM, overpressure, SL stance, TARIQ) - Some bilateral tightness with TARIQ    Knee screen negative (ROM, overpressure, heel/toe walk, SL squat) - full ROM bilateral with no pain    Ankle screen negative (ROM, overpressure, heel and toe walk) - increased plantarflexion and inversion mobility, no pain.    Lumbar ROM is full with minor discomfort with overpressure flexion. No provocation of distal  sx.    Beighton is 1/9. Patient cracks her L shoulder a lot  Range of Motion: affected side: bilateral  Hip flexion, abduction is full and symmetric. Hip ER on R is limited 25% compared to left. Knee flexion/extension full and symmetric bilaterally. Ankle DF/PF full and symmetric bilaterally.  Neuro Screen:    Myotomes: L3-S1 intact   Dermatomes: not assessed   Reflexes: not tested  Other: n/a  Strength: Intact along LE myotomes (L2-S1)  hip abductors, adductors, internal rotators, external rotators 5/5  see myotomes/functional strength  Functional movement: Squat: above parallel and symptoms - no pain but feels weak to her  Single leg calf raise: L 7 and provoked familiar sx, R 12 and minorly provoked familiar sx  Palpation: Tender to palpation at: none  Segmental/Joint Mobility Assessment: not assessed  Special Tests:  Positive: TARIQ  none  none   Negative: FADIR, Scour and Log Roll  anterior drawer, posterior drawer, varus opening at 0 deg , varus opening at 30 deg , valgus opening at 0 deg  and valgus opening at 30 deg   talar tilt, anterior drawer and Windlass maneuver       ASSESSMENT/PLAN  Patient is a 32 year old female with lower leg complaints.    Patient has the following significant findings with corresponding treatment plan.                Diagnosis 1:  Lower leg pain - medial tibial stress syndrome    Pain -  hot/cold therapy, manual therapy, splint/taping/bracing/orthotics, self management and education  Decreased strength - therapeutic exercise, therapeutic activities and home program  Impaired muscle performance - neuro re-education and home program    Therapy Evaluation Codes:   Cumulative Therapy Evaluation is: Low complexity.   Previous and current functional limitations:  (See Goal Flow Sheet for this information)    Short term and Long term goals: (See Goal Flow Sheet for this information)     Communication ability:  Patient appears to be able to clearly communicate and understand verbal and  written communication and follow directions correctly.    Treatment Explanation - The following has been discussed with the patient:   RX ordered/plan of care  Anticipated outcomes  Possible risks and side effects    This patient would benefit from PT intervention to resume normal activities.   Rehab potential is good.    Frequency:  2-3 X a month, once daily  Duration:  for 2 months  Discharge Plan:  Achieve all LTG.  Independent in home treatment program.  Reach maximal therapeutic benefit.    Please refer to the daily flowsheet for treatment today, total treatment time and time spent performing 1:1 timed codes.     *Text entry may be via Dragon Dictation software in real-time. Efforts are made to edit for clarity.     Bonnie Otoole, PT, DPT, OCS  [facility], [place]

## 2022-11-21 ENCOUNTER — MYC MEDICAL ADVICE (OUTPATIENT)
Dept: FAMILY MEDICINE | Facility: CLINIC | Age: 32
End: 2022-11-21

## 2022-11-21 ASSESSMENT — ANXIETY QUESTIONNAIRES
7. FEELING AFRAID AS IF SOMETHING AWFUL MIGHT HAPPEN: MORE THAN HALF THE DAYS
7. FEELING AFRAID AS IF SOMETHING AWFUL MIGHT HAPPEN: MORE THAN HALF THE DAYS
8. IF YOU CHECKED OFF ANY PROBLEMS, HOW DIFFICULT HAVE THESE MADE IT FOR YOU TO DO YOUR WORK, TAKE CARE OF THINGS AT HOME, OR GET ALONG WITH OTHER PEOPLE?: VERY DIFFICULT
4. TROUBLE RELAXING: SEVERAL DAYS
1. FEELING NERVOUS, ANXIOUS, OR ON EDGE: SEVERAL DAYS
6. BECOMING EASILY ANNOYED OR IRRITABLE: SEVERAL DAYS
GAD7 TOTAL SCORE: 8
IF YOU CHECKED OFF ANY PROBLEMS ON THIS QUESTIONNAIRE, HOW DIFFICULT HAVE THESE PROBLEMS MADE IT FOR YOU TO DO YOUR WORK, TAKE CARE OF THINGS AT HOME, OR GET ALONG WITH OTHER PEOPLE: VERY DIFFICULT
3. WORRYING TOO MUCH ABOUT DIFFERENT THINGS: SEVERAL DAYS
5. BEING SO RESTLESS THAT IT IS HARD TO SIT STILL: SEVERAL DAYS
2. NOT BEING ABLE TO STOP OR CONTROL WORRYING: SEVERAL DAYS

## 2022-11-21 ASSESSMENT — PATIENT HEALTH QUESTIONNAIRE - PHQ9
SUM OF ALL RESPONSES TO PHQ QUESTIONS 1-9: 8
10. IF YOU CHECKED OFF ANY PROBLEMS, HOW DIFFICULT HAVE THESE PROBLEMS MADE IT FOR YOU TO DO YOUR WORK, TAKE CARE OF THINGS AT HOME, OR GET ALONG WITH OTHER PEOPLE: VERY DIFFICULT
SUM OF ALL RESPONSES TO PHQ QUESTIONS 1-9: 8

## 2022-11-28 ENCOUNTER — VIRTUAL VISIT (OUTPATIENT)
Dept: FAMILY MEDICINE | Facility: CLINIC | Age: 32
End: 2022-11-28
Payer: COMMERCIAL

## 2022-11-28 DIAGNOSIS — F41.0 PANIC ATTACK: Primary | ICD-10-CM

## 2022-11-28 PROBLEM — J34.3 NASAL TURBINATE HYPERTROPHY: Status: RESOLVED | Noted: 2022-02-15 | Resolved: 2022-11-28

## 2022-11-28 PROBLEM — E87.6 HYPOKALEMIA: Status: RESOLVED | Noted: 2021-12-24 | Resolved: 2022-11-28

## 2022-11-28 PROBLEM — J34.89 NASAL OBSTRUCTION: Status: RESOLVED | Noted: 2022-02-15 | Resolved: 2022-11-28

## 2022-11-28 PROBLEM — J09.X1 INFLUENZA A WITH PNEUMONIA: Status: RESOLVED | Noted: 2021-12-24 | Resolved: 2022-11-28

## 2022-11-28 PROBLEM — J34.2 NASAL SEPTAL DEVIATION: Status: RESOLVED | Noted: 2022-02-15 | Resolved: 2022-11-28

## 2022-11-28 PROBLEM — A41.9 SEPSIS (H): Status: RESOLVED | Noted: 2021-12-24 | Resolved: 2022-11-28

## 2022-11-28 PROCEDURE — 99213 OFFICE O/P EST LOW 20 MIN: CPT | Mod: 95 | Performed by: FAMILY MEDICINE

## 2022-11-28 RX ORDER — ALPRAZOLAM 0.25 MG
0.25 TABLET ORAL
Qty: 30 TABLET | Refills: 0 | Status: SHIPPED | OUTPATIENT
Start: 2022-11-28 | End: 2022-12-14

## 2022-11-28 ASSESSMENT — PATIENT HEALTH QUESTIONNAIRE - PHQ9
SUM OF ALL RESPONSES TO PHQ QUESTIONS 1-9: 8
10. IF YOU CHECKED OFF ANY PROBLEMS, HOW DIFFICULT HAVE THESE PROBLEMS MADE IT FOR YOU TO DO YOUR WORK, TAKE CARE OF THINGS AT HOME, OR GET ALONG WITH OTHER PEOPLE: VERY DIFFICULT

## 2022-11-28 ASSESSMENT — ANXIETY QUESTIONNAIRES: GAD7 TOTAL SCORE: 8

## 2022-11-28 NOTE — PROGRESS NOTES
Reginaldo is a 32 year old who is being evaluated via a billable video visit.      How would you like to obtain your AVS? MyChart  If the video visit is dropped, the invitation should be resent by: Text to cell phone: [phone]  Will anyone else be joining your video visit? No        Assessment & Plan     Panic attack  Patient has a PCP and a visit scheduled with them, as well as an allergy provider with whom she is working to control her asthma.  I will refill her alprazolam today, and she will follow up with her PCP to start a new medication.  - ALPRAZolam (XANAX) 0.25 MG tablet; Take 1 tablet (0.25 mg) by mouth at bedtime as needed, may repeat once for anxiety      28 minutes spent on the date of the encounter doing chart review, history and exam, documentation and further activities per the note       See Patient Instructions    No follow-ups on file.    Anny Landeros MD  [facility]    Subjective   Reginaldo is a 32 year old, presenting for the following health issues:  Anxiety and Depression      History of Present Illness       Mental Health Follow-up:  Patient presents to follow-up on Depression & Anxiety.Patient's depression since last visit has been:  Worse  The patient is having other symptoms associated with depression.  Patient's anxiety since last visit has been:  Worse  The patient is having other symptoms associated with anxiety.  Any significant life events: job concerns, financial concerns and health concerns  Patient is feeling anxious or having panic attacks.  Patient has no concerns about alcohol or drug use.    She eats 0-1 servings of fruits and vegetables daily.She consumes 0 sweetened beverage(s) daily.She exercises with enough effort to increase her heart rate 10 to 19 minutes per day.  She exercises with enough effort to increase her heart rate 3 or less days per week.   She is taking medications regularly.    Today's PHQ-9         PHQ-9 Total Score:  8    PHQ-9 Q9 Thoughts of better off dead/self-harm past 2 weeks :   Not at all    How difficult have these problems made it for you to do your work, take care of things at home, or get along with other people: Very difficult  Today's MOHAN-7 Score: 8     Wants to change the medication she was taking. Has been having respiratory problems, primarily at night, she is getting panic attacks and making her anxiety worse.  Was on alprazolam, which made her feel numb and knocked her out, so she was switched to Duloxetine, which caused side effects also.  She was not sleeping well, and her depression symptoms were present again.  The coughing and breathing problems happen at night and she panics.  It sounds like she is trying to get her asthma under control but is struggling.  Had to be taken off her breathing medications for asthma, doing nebulizers and inhalers, but since she got sick her doses have had to be increased, cough is always present. Will be meeting with her primary in a couple wees    Today patient is just wanting to get back on the alprazolam until she can see her provider on Dec 9, 2022.    Current Outpatient Medications   Medication Sig Dispense Refill     ALPRAZolam (XANAX) 0.25 MG tablet TAKE 1 TABLET (0.25 MG) BY MOUTH THREE TIMES A DAY AS NEEDED (PANIC ATTACK).       azelastine (OPTIVAR) 0.05 % ophthalmic solution Apply 1 drop to eye 2 times daily 6 mL 3     benzonatate (TESSALON) 100 MG capsule        EPINEPHrine (ANY BX GENERIC EQUIV) 0.3 MG/0.3ML injection 2-pack Inject into the muscle as directed for anaphylaxis 2 each 2     Fexofenadine HCl (ALLEGRA ALLERGY PO)        fluticasone-salmeterol (ADVAIR) 500-50 MCG/ACT inhaler Inhale 1 puff into the lungs every 12 hours 1 each 5     ipratropium - albuterol 0.5 mg/2.5 mg/3 mL (DUONEB) 0.5-2.5 (3) MG/3ML neb solution Inhale 1 vial (3 mLs) into the lungs every 6 hours as needed for shortness of breath / dyspnea or wheezing 90 mL 3     omeprazole (PRILOSEC)  20 MG DR capsule Take 1 capsule (20 mg) by mouth daily 90 capsule 0     ORDER FOR ALLERGEN IMMUNOTHERAPY Name of Mix: Mix #1  Dust Mite, Cat, Dog  Cat Hair, Standardized 10,000 BAU/mL, ALK  2.0 ml  Dog Hair-Dander, A. P.  1:100 w/v, HS  1.0 ml  Dust Mites DF. 10,000 AU/mL, HS  1.0 ml  Dust Mites DP. 10,000 AU/mL, HS  1.0 ml   Diluent: HSA qs to 5ml 5 mL PRN     ORDER FOR ALLERGEN IMMUNOTHERAPY Name of Mix: Mix #2  Tree   Sylvain, White 1:20 w/v, HS  0.5 ml  Birch Mix PRW 1:20 w/v, HS  0.5 ml  Boxelder-Maple Mix BHR (Boxelder Hard Red) 1:20 w/v, HS  0.5 ml  Brookings, Common 1:20 w/v, HS  0.5 ml  Oak Mix RVW 1:20 w/v, HS 0.5 ml  Pine, White 1:20 w/v, ALK 0.5 ml  Neche Tree, Black 1:20 w/v, HS 0.5 ml  Diluent: HSA qs to 5ml 5 mL PRN     ORDER FOR ALLERGEN IMMUNOTHERAPY Name of Mix: Mix #3  Grass, Weeds  Prabhu Grass 1:20 w/v, HS 0.5 ml  Steve Grass (Std) 100,000 BAU/mL, HS 0.4 ml  Lamb's Quarters 1:20 w/v, HS 0.5 ml  Nettle 1:20 w/v, HS 0.5 ml  Plantain, English 1:20 w/v, HS 0.5 ml  Ragweed Mixed 1:20 w/v ALK  0.5 ml  Russian Thistle 1:20 w/v, HS 0.5 ml  Sorrel, Sheep 1:20 w/v, HS 0.5 ml  Diluent: HSA qs to 5ml 5 mL PRN     VENTOLIN  (90 Base) MCG/ACT inhaler Inhale 1-2 puffs into the lungs every 4 hours as needed for shortness of breath / dyspnea or wheezing 18 g 1     COMBIVENT RESPIMAT  MCG/ACT inhaler INHALE 1 PUFF EVERY 4 (FOUR) HOURS AS NEEDED (COUGH/SOB). (Patient not taking: Reported on 11/28/2022)       DULoxetine (CYMBALTA) 30 MG capsule Take 1 capsule (30 mg) by mouth 2 times daily (Patient not taking: Reported on 11/28/2022) 30 capsule 0     Patient Active Problem List   Diagnosis     CARDIOVASCULAR SCREENING; LDL GOAL LESS THAN 160     Cervical high risk HPV (human papillomavirus) test positive     Insomnia, unspecified insomnia     Vitamin D deficiency     Mild episode of recurrent major depressive disorder (H)     Adjustment disorder with depressed mood     Hypokalemia     Influenza A with  pneumonia     Mixed personality disorder (H)     Sepsis (H)     Nasal obstruction     Nasal septal deviation     Nasal turbinate hypertrophy     Chronic maxillary sinusitis     Moderate persistent asthma     Allergic rhinitis due to animals     Allergic rhinitis due to dust mite     Seasonal allergic rhinitis due to pollen     Pain in both lower legs         Review of Systems   Constitutional, HEENT, cardiovascular, pulmonary, gi and gu systems are negative, except as otherwise noted.      Objective           Vitals:  No vitals were obtained today due to virtual visit.    Physical Exam   GENERAL: Healthy, alert and no distress  EYES: Eyes grossly normal to inspection.  No discharge or erythema, or obvious scleral/conjunctival abnormalities.  RESP: No audible wheeze, cough, or visible cyanosis.  No visible retractions or increased work of breathing.    SKIN: Visible skin clear. No significant rash, abnormal pigmentation or lesions.  NEURO: Cranial nerves grossly intact.  Mentation and speech appropriate for age.  PSYCH: Mentation appears normal, affect normal/bright, judgement and insight intact, normal speech and appearance well-groomed.            Video-Visit Details    Video Start Time: 548 pm    Type of service:  Video Visit    Video End Time:605 pm    Originating Location (pt. Location): Home    Distant Location (provider location):  On-site    Platform used for Video Visit: [employer]

## 2022-12-09 ENCOUNTER — OFFICE VISIT (OUTPATIENT)
Dept: FAMILY MEDICINE | Facility: CLINIC | Age: 32
End: 2022-12-09
Payer: COMMERCIAL

## 2022-12-09 VITALS
HEART RATE: 70 BPM | TEMPERATURE: 98 F | SYSTOLIC BLOOD PRESSURE: 113 MMHG | BODY MASS INDEX: 28.83 KG/M2 | WEIGHT: 147.6 LBS | OXYGEN SATURATION: 95 % | DIASTOLIC BLOOD PRESSURE: 65 MMHG

## 2022-12-09 DIAGNOSIS — F41.0 PANIC ATTACK: ICD-10-CM

## 2022-12-09 DIAGNOSIS — J45.41 MODERATE PERSISTENT ASTHMA WITH ACUTE EXACERBATION: Primary | ICD-10-CM

## 2022-12-09 PROCEDURE — 99215 OFFICE O/P EST HI 40 MIN: CPT | Performed by: PHYSICIAN ASSISTANT

## 2022-12-09 RX ORDER — AZITHROMYCIN 250 MG/1
TABLET, FILM COATED ORAL
Qty: 6 TABLET | Refills: 0 | Status: SHIPPED | OUTPATIENT
Start: 2022-12-09 | End: 2022-12-14

## 2022-12-09 RX ORDER — METHYLPREDNISOLONE 4 MG
TABLET, DOSE PACK ORAL
Qty: 21 TABLET | Refills: 0 | Status: SHIPPED | OUTPATIENT
Start: 2022-12-09 | End: 2022-12-14

## 2022-12-09 NOTE — LETTER
December 9, 2022      Reginaldo Ferraro  [address] [place] [id number]        To Whom It May Concern,     Please excuse Reginaldo from work until 2/1/22 due to ongoing medical issues. Please forward any required paperwork to our office.       Sincerely,        Cally Rucker PA-C

## 2022-12-09 NOTE — PROGRESS NOTES
"  Assessment & Plan     1. Moderate persistent asthma with acute exacerbation    2. Panic attack      Patient with significant anxiety that is also influencing her asthma control. She has been struggling with her asthma control since mid October, she notes it was worse, but still not well controlled. She has been under the impression she should not use her nebulizer or albuterol because \"then I will need it forever\" So often dangerously going without it.   He anxiety is poorly controlled. Has a therapist but has not been able to see regularly due to her work schedule and she is out of sick time. 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 Has xanax which does help her panic but has only taken it at night as it makes he drowsy and she can't work on it.     At this time 2 separate issues are influencing each other.   Will treat asthma exacerbation with a medrol dose pack, zithromax due to legnth of illness, advair BID and her duoneb 4 times per day. Advised patient to not wait to use her albuterol inhaler but rather when she is short of breath use promptly.     Patient provided a note for work, would benefit from FMLA due to her anxiety and asthma. Continue with therapist. Use xanax as needed. See me in 1 week and will add Lexapro at that time.     Patient given advise on when she should consider the ED.       42 minutes spent on the date of the encounter doing chart review, history and exam, documentation and further activities per the note           No follow-ups on file.    Cally Rucker PA-C  [employer] NEGRITA Hair is a 32 year old, presenting for the following health issues:  RECHECK      History of Present Illness     Asthma:  She presents for follow up of asthma.  She has some cough, no wheezing, and some shortness of breath. She is using a relief medication a few times a week. She does not miss any doses of her controller medication throughout the " week.Patient is aware of the following triggers: unaware of any triggers, cold air and emotions. The patient has had a visit to the Emergency Room, Urgent Care or Hospital due to asthma since the last clinic visit. She has been to the Emergency Room or Urgent Care 1 time.She has had a Hospitalization 0 times.    Reason for visit:  Per therapist review with pcp mediactions as well as depression/anxiety    She eats 0-1 servings of fruits and vegetables daily.She consumes 0 sweetened beverage(s) daily.She exercises with enough effort to increase her heart rate 10 to 19 minutes per day.  She exercises with enough effort to increase her heart rate 3 or less days per week.   She is taking medications regularly.     Prednisone helped in the beinging but not resolved.   Advair BID  Using Duoneb every 3 hours.   Relief is 30 minutes to 1 hour.   Cough fluctuates- improves.         Review of Systems         Objective    /65 (BP Location: Right arm, Patient Position: Chair, Cuff Size: Adult Regular)   Pulse 70   Temp 98  F (36.7  C) (Oral)   Wt 67 kg (147 lb 9.6 oz)   LMP 10/17/2022 (Exact Date)   SpO2 95%   BMI 28.83 kg/m    Body mass index is 28.83 kg/m .  Physical Exam   GENERAL:alert with shallow small breaths  RESP:Pt only able to say 3 word sentence before large breath, mild retractions noted. Inspiratory and expiratory wheezing throughout. Hard coughing fits with barky nature.   CV: tachycardia, no murmur,   MS: no gross musculoskeletal defects noted, no edema  PSYCH: mentation appears normal, affect normal/bright    Patient used her albuterol inhaler and had almost immediate resolution of the wheezing. Retractions diminished and she was able to speak in full sentences.     Patient then with crying and sobbing contributing to her shortness of breath. Leg is bouncing and a small tremor in her hands. SpO2 improved to 97 at this time and lungs remained clear.

## 2022-12-12 ENCOUNTER — TELEPHONE (OUTPATIENT)
Dept: FAMILY MEDICINE | Facility: CLINIC | Age: 32
End: 2022-12-12

## 2022-12-12 NOTE — TELEPHONE ENCOUNTER
Reason for Call:  Form, our goal is to have forms completed with 72 hours, however, some forms may require a visit or additional information.    Type of letter, form or note:  FMLA    Who is the form from?: Insurance comp    Where did the form come from: form was faxed in    What clinic location was the form placed at?: [facility]    Where the form was placed: At  desk until appointment 12/14 Box/Folder    What number is listed as a contact on the form?:  [phone]  Fax [phone]  Call taken on 12/12/2022 at 10:53 AM by Lashonda Guzman

## 2022-12-14 ENCOUNTER — OFFICE VISIT (OUTPATIENT)
Dept: FAMILY MEDICINE | Facility: CLINIC | Age: 32
End: 2022-12-14
Payer: COMMERCIAL

## 2022-12-14 VITALS
TEMPERATURE: 98.5 F | WEIGHT: 149.2 LBS | BODY MASS INDEX: 29.29 KG/M2 | OXYGEN SATURATION: 97 % | SYSTOLIC BLOOD PRESSURE: 103 MMHG | HEART RATE: 75 BPM | HEIGHT: 60 IN | DIASTOLIC BLOOD PRESSURE: 67 MMHG

## 2022-12-14 DIAGNOSIS — F41.0 PANIC ATTACK: ICD-10-CM

## 2022-12-14 DIAGNOSIS — F33.0 MILD EPISODE OF RECURRENT MAJOR DEPRESSIVE DISORDER (H): ICD-10-CM

## 2022-12-14 DIAGNOSIS — J45.40 MODERATE PERSISTENT ASTHMA, UNSPECIFIED WHETHER COMPLICATED: Primary | ICD-10-CM

## 2022-12-14 DIAGNOSIS — R11.0 NAUSEA: ICD-10-CM

## 2022-12-14 PROCEDURE — 99214 OFFICE O/P EST MOD 30 MIN: CPT | Performed by: PHYSICIAN ASSISTANT

## 2022-12-14 RX ORDER — ESCITALOPRAM OXALATE 10 MG/1
10 TABLET ORAL DAILY
Qty: 90 TABLET | Refills: 0 | Status: SHIPPED | OUTPATIENT
Start: 2022-12-14 | End: 2023-06-09 | Stop reason: SINTOL

## 2022-12-14 RX ORDER — ALPRAZOLAM 0.25 MG
0.25 TABLET ORAL
Qty: 30 TABLET | Refills: 0 | Status: SHIPPED | OUTPATIENT
Start: 2022-12-23 | End: 2023-10-13

## 2022-12-14 RX ORDER — IPRATROPIUM BROMIDE AND ALBUTEROL SULFATE 2.5; .5 MG/3ML; MG/3ML
3 SOLUTION RESPIRATORY (INHALATION) EVERY 6 HOURS PRN
Qty: 360 ML | Refills: 3 | Status: SHIPPED | OUTPATIENT
Start: 2022-12-14 | End: 2023-11-02

## 2022-12-14 ASSESSMENT — ASTHMA QUESTIONNAIRES
EMERGENCY_ROOM_LAST_YEAR_TOTAL: ONE
QUESTION_5 LAST FOUR WEEKS HOW WOULD YOU RATE YOUR ASTHMA CONTROL: POORLY CONTROLLED
ACT_TOTALSCORE: 10
QUESTION_2 LAST FOUR WEEKS HOW OFTEN HAVE YOU HAD SHORTNESS OF BREATH: THREE TO SIX TIMES A WEEK
ACT_TOTALSCORE: 10
QUESTION_4 LAST FOUR WEEKS HOW OFTEN HAVE YOU USED YOUR RESCUE INHALER OR NEBULIZER MEDICATION (SUCH AS ALBUTEROL): THREE OR MORE TIMES PER DAY
QUESTION_1 LAST FOUR WEEKS HOW MUCH OF THE TIME DID YOUR ASTHMA KEEP YOU FROM GETTING AS MUCH DONE AT WORK, SCHOOL OR AT HOME: MOST OF THE TIME
QUESTION_3 LAST FOUR WEEKS HOW OFTEN DID YOUR ASTHMA SYMPTOMS (WHEEZING, COUGHING, SHORTNESS OF BREATH, CHEST TIGHTNESS OR PAIN) WAKE YOU UP AT NIGHT OR EARLIER THAN USUAL IN THE MORNING: TWO OR THREE NIGHTS A WEEK

## 2022-12-14 ASSESSMENT — ANXIETY QUESTIONNAIRES
GAD7 TOTAL SCORE: 13
8. IF YOU CHECKED OFF ANY PROBLEMS, HOW DIFFICULT HAVE THESE MADE IT FOR YOU TO DO YOUR WORK, TAKE CARE OF THINGS AT HOME, OR GET ALONG WITH OTHER PEOPLE?: VERY DIFFICULT
GAD7 TOTAL SCORE: 13
2. NOT BEING ABLE TO STOP OR CONTROL WORRYING: MORE THAN HALF THE DAYS
3. WORRYING TOO MUCH ABOUT DIFFERENT THINGS: MORE THAN HALF THE DAYS
7. FEELING AFRAID AS IF SOMETHING AWFUL MIGHT HAPPEN: NEARLY EVERY DAY
6. BECOMING EASILY ANNOYED OR IRRITABLE: MORE THAN HALF THE DAYS
1. FEELING NERVOUS, ANXIOUS, OR ON EDGE: MORE THAN HALF THE DAYS
5. BEING SO RESTLESS THAT IT IS HARD TO SIT STILL: SEVERAL DAYS
GAD7 TOTAL SCORE: 13
7. FEELING AFRAID AS IF SOMETHING AWFUL MIGHT HAPPEN: NEARLY EVERY DAY
IF YOU CHECKED OFF ANY PROBLEMS ON THIS QUESTIONNAIRE, HOW DIFFICULT HAVE THESE PROBLEMS MADE IT FOR YOU TO DO YOUR WORK, TAKE CARE OF THINGS AT HOME, OR GET ALONG WITH OTHER PEOPLE: VERY DIFFICULT
4. TROUBLE RELAXING: SEVERAL DAYS

## 2022-12-14 ASSESSMENT — PATIENT HEALTH QUESTIONNAIRE - PHQ9
SUM OF ALL RESPONSES TO PHQ QUESTIONS 1-9: 5
SUM OF ALL RESPONSES TO PHQ QUESTIONS 1-9: 5
10. IF YOU CHECKED OFF ANY PROBLEMS, HOW DIFFICULT HAVE THESE PROBLEMS MADE IT FOR YOU TO DO YOUR WORK, TAKE CARE OF THINGS AT HOME, OR GET ALONG WITH OTHER PEOPLE: VERY DIFFICULT

## 2022-12-14 ASSESSMENT — ENCOUNTER SYMPTOMS: NERVOUS/ANXIOUS: 1

## 2022-12-14 NOTE — PROGRESS NOTES
Assessment & Plan     1. Moderate persistent asthma, unspecified whether complicated    2. Panic attack    3. Nausea    4. Mild episode of recurrent major depressive disorder (H)      Suspect that frequent albuterol and steroids is causing increased anxiety and panic attacks. Patient with underlying mental health conditions which are being exacerbated. Will start lexapro once per day. Prn xanax ok at this time. Follow up in 1 month.   Work on decreasing nebulizer use over the next 1 month. continue on maintenance medications for asthma.   Filled out short term disability/FMLA                 Return in about 4 weeks (around 1/11/2023) for Mood check.    Cally Rucker PA-C  [facility] NEGRITA Hair is a 32 year old, presenting for the following health issues:  Depression, Anxiety, Asthma, and Health Maintenance      Anxiety    History of Present Illness       Mental Health Follow-up:  Patient presents to follow-up on Depression & Anxiety.Patient's depression since last visit has been:  No change  The patient is having other symptoms associated with depression.  Patient's anxiety since last visit has been:  No change  The patient is not having other symptoms associated with anxiety.  Any significant life events: job concerns and health concerns  Patient is feeling anxious or having panic attacks.  Patient has no concerns about alcohol or drug use.     Today's PHQ-9         PHQ-9 Total Score: 5    PHQ-9 Q9 Thoughts of better off dead/self-harm past 2 weeks :   Not at all    How difficult have these problems made it for you to do your work, take care of things at home, or get along with other people: Very difficult  Today's MOHAN-7 Score: 13       Asthma Follow-Up    Was ACT completed today?    Yes    ACT Total Scores 12/14/2022   ACT TOTAL SCORE (Goal Greater than or Equal to 20) 10   In the past 12 months, how many times did you visit the emergency room for your asthma without being  "admitted to the hospital? 1   In the past 12 months, how many times were you hospitalized overnight because of your asthma? 0   C-ACT Total Score -   In the past 12 months, how many times did you visit the emergency room for your asthma without being admitted to the hospital? -   In the past 12 months, how many times were you hospitalized overnight because of your asthma? -         How many days per week do you miss taking your asthma controller medication?  0    Please describe any recent triggers for your asthma: allergies, dry air, smokey     Have you had any Emergency Room Visits, Urgent Care Visits, or Hospital Admissions since your last office visit?  Yes  Number of ER or Urgent Care visits for asthma: 1      How many servings of fruits and vegetables do you eat daily?  2-3    On average, how many sweetened beverages do you drink each day (Examples: soda, juice, sweet tea, etc.  Do NOT count diet or artificially sweetened beverages)?   0    How many days per week do you exercise enough to make your heart beat faster? 3 or less    How many minutes a day do you exercise enough to make your heart beat faster? 9 or less    How many days per week do you miss taking your medication? 0    3rd day started to feel better. Small cough. Sounds like a \"normal flu cough\"  Not waking as often at night short of breath. No rescue inhaler at night.   Using the neb more frequently- holding 4-5 hours.     Has needed the xanax every night. Tries to control it during the day but can't.     Did use albuterol just prior to appointment.     Review of Systems   Psychiatric/Behavioral: The patient is nervous/anxious.             Objective    /67 (BP Location: Right arm, Patient Position: Chair, Cuff Size: Adult Regular)   Pulse 75   Temp 98.5  F (36.9  C) (Oral)   Ht 1.524 m (5')   Wt 67.7 kg (149 lb 3.2 oz)   LMP 10/17/2022 (Exact Date)   SpO2 97%   BMI 29.14 kg/m    Body mass index is 29.14 kg/m .  Physical Exam "   GENERAL: healthy, alert and no distress  RESP: lungs clear to auscultation - no rales, rhonchi or wheezes- dry cough, able to finish short sentences without coughing and shortness of breath.   CV: regular rate and rhythm, normal S1 S2, no S3 or S4, no murmur, click or rub, no peripheral edema and peripheral pulses strong  PSYCH: mentation appears normal, anxious with tremors, slightly dishevled appearance

## 2023-01-11 ENCOUNTER — TELEPHONE (OUTPATIENT)
Dept: FAMILY MEDICINE | Facility: CLINIC | Age: 33
End: 2023-01-11
Payer: COMMERCIAL

## 2023-01-11 NOTE — TELEPHONE ENCOUNTER
Patient Quality Outreach    Patient is due for the following:   Asthma  -  Asthma follow-up visit  Depression  -  Depression follow-up visit      Topic Date Due     Pneumococcal Vaccine (1 - PCV) Never done     COVID-19 Vaccine (3 - Booster for Pfizer series) 04/22/2022     Flu Vaccine (1) 09/01/2022       Next Steps:   Schedule a office visit for asthma and depression    Type of outreach:    Sent [employer] message.      Questions for provider review:    None     Stephie Figueroa, CMA

## 2023-01-18 ASSESSMENT — ASTHMA QUESTIONNAIRES
QUESTION_1 LAST FOUR WEEKS HOW MUCH OF THE TIME DID YOUR ASTHMA KEEP YOU FROM GETTING AS MUCH DONE AT WORK, SCHOOL OR AT HOME: SOME OF THE TIME
QUESTION_4 LAST FOUR WEEKS HOW OFTEN HAVE YOU USED YOUR RESCUE INHALER OR NEBULIZER MEDICATION (SUCH AS ALBUTEROL): ONE OR TWO TIMES PER DAY
QUESTION_3 LAST FOUR WEEKS HOW OFTEN DID YOUR ASTHMA SYMPTOMS (WHEEZING, COUGHING, SHORTNESS OF BREATH, CHEST TIGHTNESS OR PAIN) WAKE YOU UP AT NIGHT OR EARLIER THAN USUAL IN THE MORNING: ONCE OR TWICE
ACT_TOTALSCORE: 15
ACT_TOTALSCORE: 15
QUESTION_2 LAST FOUR WEEKS HOW OFTEN HAVE YOU HAD SHORTNESS OF BREATH: THREE TO SIX TIMES A WEEK
QUESTION_5 LAST FOUR WEEKS HOW WOULD YOU RATE YOUR ASTHMA CONTROL: SOMEWHAT CONTROLLED

## 2023-01-18 ASSESSMENT — ANXIETY QUESTIONNAIRES
5. BEING SO RESTLESS THAT IT IS HARD TO SIT STILL: NOT AT ALL
4. TROUBLE RELAXING: SEVERAL DAYS
GAD7 TOTAL SCORE: 5
2. NOT BEING ABLE TO STOP OR CONTROL WORRYING: SEVERAL DAYS
6. BECOMING EASILY ANNOYED OR IRRITABLE: SEVERAL DAYS
8. IF YOU CHECKED OFF ANY PROBLEMS, HOW DIFFICULT HAVE THESE MADE IT FOR YOU TO DO YOUR WORK, TAKE CARE OF THINGS AT HOME, OR GET ALONG WITH OTHER PEOPLE?: SOMEWHAT DIFFICULT
7. FEELING AFRAID AS IF SOMETHING AWFUL MIGHT HAPPEN: SEVERAL DAYS
7. FEELING AFRAID AS IF SOMETHING AWFUL MIGHT HAPPEN: SEVERAL DAYS
IF YOU CHECKED OFF ANY PROBLEMS ON THIS QUESTIONNAIRE, HOW DIFFICULT HAVE THESE PROBLEMS MADE IT FOR YOU TO DO YOUR WORK, TAKE CARE OF THINGS AT HOME, OR GET ALONG WITH OTHER PEOPLE: SOMEWHAT DIFFICULT
1. FEELING NERVOUS, ANXIOUS, OR ON EDGE: SEVERAL DAYS
3. WORRYING TOO MUCH ABOUT DIFFERENT THINGS: NOT AT ALL
GAD7 TOTAL SCORE: 5
GAD7 TOTAL SCORE: 5

## 2023-01-18 ASSESSMENT — PATIENT HEALTH QUESTIONNAIRE - PHQ9
SUM OF ALL RESPONSES TO PHQ QUESTIONS 1-9: 7
10. IF YOU CHECKED OFF ANY PROBLEMS, HOW DIFFICULT HAVE THESE PROBLEMS MADE IT FOR YOU TO DO YOUR WORK, TAKE CARE OF THINGS AT HOME, OR GET ALONG WITH OTHER PEOPLE: SOMEWHAT DIFFICULT
SUM OF ALL RESPONSES TO PHQ QUESTIONS 1-9: 7

## 2023-01-19 ENCOUNTER — OFFICE VISIT (OUTPATIENT)
Dept: FAMILY MEDICINE | Facility: CLINIC | Age: 33
End: 2023-01-19
Payer: COMMERCIAL

## 2023-01-19 VITALS
HEIGHT: 60 IN | WEIGHT: 152.2 LBS | HEART RATE: 107 BPM | BODY MASS INDEX: 29.88 KG/M2 | OXYGEN SATURATION: 94 % | DIASTOLIC BLOOD PRESSURE: 61 MMHG | SYSTOLIC BLOOD PRESSURE: 102 MMHG | TEMPERATURE: 98.5 F

## 2023-01-19 DIAGNOSIS — F33.0 MILD EPISODE OF RECURRENT MAJOR DEPRESSIVE DISORDER (H): ICD-10-CM

## 2023-01-19 DIAGNOSIS — J45.40 MODERATE PERSISTENT ASTHMA WITHOUT COMPLICATION: ICD-10-CM

## 2023-01-19 PROCEDURE — 90471 IMMUNIZATION ADMIN: CPT | Performed by: PHYSICIAN ASSISTANT

## 2023-01-19 PROCEDURE — 99214 OFFICE O/P EST MOD 30 MIN: CPT | Mod: 25 | Performed by: PHYSICIAN ASSISTANT

## 2023-01-19 PROCEDURE — 90686 IIV4 VACC NO PRSV 0.5 ML IM: CPT | Performed by: PHYSICIAN ASSISTANT

## 2023-01-19 RX ORDER — FLUTICASONE PROPIONATE AND SALMETEROL 500; 50 UG/1; UG/1
1 POWDER RESPIRATORY (INHALATION) EVERY 12 HOURS
Qty: 1 EACH | Refills: 5 | Status: SHIPPED | OUTPATIENT
Start: 2023-01-19 | End: 2023-07-31

## 2023-01-19 ASSESSMENT — ENCOUNTER SYMPTOMS: NERVOUS/ANXIOUS: 1

## 2023-01-19 ASSESSMENT — PATIENT HEALTH QUESTIONNAIRE - PHQ9
10. IF YOU CHECKED OFF ANY PROBLEMS, HOW DIFFICULT HAVE THESE PROBLEMS MADE IT FOR YOU TO DO YOUR WORK, TAKE CARE OF THINGS AT HOME, OR GET ALONG WITH OTHER PEOPLE: SOMEWHAT DIFFICULT
SUM OF ALL RESPONSES TO PHQ QUESTIONS 1-9: 7

## 2023-01-19 ASSESSMENT — ANXIETY QUESTIONNAIRES: GAD7 TOTAL SCORE: 5

## 2023-01-19 NOTE — PROGRESS NOTES
Assessment & Plan     Moderate persistent asthma without complication  Improving. Refilled meds, monitor for flares.   - fluticasone-salmeterol (ADVAIR) 500-50 MCG/ACT inhaler; Inhale 1 puff into the lungs every 12 hours    Mild episode of recurrent major depressive disorder (H)  Improving, has only been on lexapro for 3 weeks, can switch to night time dosing to see if it was causing the sedation. Follow up in 6 months if doing well. If not able to tolerate the sedation will contact clinic.                    Return in about 3 months (around 4/19/2023) for Mood check.    Cally Rucker PA-C  [facility] NEGRITA Hair is a 32 year old, presenting for the following health issues:  Depression, Anxiety, and Asthma      Anxiety    History of Present Illness     Asthma:  She presents for follow up of asthma.  She has some cough, some wheezing, and some shortness of breath. She is using a relief medication a few times a week. She does not miss any doses of her controller medication throughout the week.Patient is aware of the following triggers: unaware of any triggers, same as previous visit, cold air, emotions and exercise or sports. The patient has not had a visit to the Emergency Room, Urgent Care or Hospital due to asthma since the last clinic visit.      Today's PHQ-9         PHQ-9 Total Score: 7    PHQ-9 Q9 Thoughts of better off dead/self-harm past 2 weeks :   Not at all    How difficult have these problems made it for you to do your work, take care of things at home, or get along with other people: Somewhat difficult  Today's MOHAN-7 Score: 5       Depression and Anxiety Follow-Up    How are you doing with your depression since your last visit? Slightly improved    How are you doing with your anxiety since your last visit?  Slightly improved    Are you having other symptoms that might be associated with depression or anxiety? No    Have you had a significant life event? No     Do  you have any concerns with your use of alcohol or other drugs? No    Social History     Tobacco Use     Smoking status: Never     Passive exposure: Never     Smokeless tobacco: Never   Vaping Use     Vaping Use: Never used   Substance Use Topics     Alcohol use: Yes     Comment: Socially     Drug use: No     PHQ 11/21/2022 12/14/2022 1/18/2023   PHQ-9 Total Score 8 5 7   Q9: Thoughts of better off dead/self-harm past 2 weeks Not at all Not at all Not at all     MOHAN-7 SCORE 11/21/2022 12/14/2022 1/18/2023   Total Score 8 (mild anxiety) 13 (moderate anxiety) 5 (mild anxiety)   Total Score 8 13 5         Suicide Assessment Five-step Evaluation and Treatment (SAFE-T)      Breathing has been better, now tolerable.    Noted side effects the first 2 weeks of taking the lexapro. Had diarrhea, drowsiness and nausea.   Notes she has only been on meds for 3 weeks. She had been super sick prior to starting.   Decreased appetite.    No suicidal thoughts.   Still doing counseling. Working on relaxing.     Review of Systems   Psychiatric/Behavioral: The patient is nervous/anxious.             Objective    /61 (BP Location: Right arm, Patient Position: Chair, Cuff Size: Adult Regular)   Pulse 107   Temp 98.5  F (36.9  C) (Oral)   Ht 1.524 m (5')   Wt 69 kg (152 lb 3.2 oz)   SpO2 94%   BMI 29.72 kg/m    Body mass index is 29.72 kg/m .  Physical Exam   GENERAL: healthy, alert and no distress  RESP: faint expiratory wheezing in the bilateral lower lobes.   CV: regular rate and rhythm, normal S1 S2, no S3 or S4, no murmur,  PSYCH: mentation appears normal, affect normal/bright

## 2023-01-25 ENCOUNTER — MYC MEDICAL ADVICE (OUTPATIENT)
Dept: FAMILY MEDICINE | Facility: CLINIC | Age: 33
End: 2023-01-25
Payer: COMMERCIAL

## 2023-01-25 DIAGNOSIS — J45.40 MODERATE PERSISTENT ASTHMA, UNSPECIFIED WHETHER COMPLICATED: ICD-10-CM

## 2023-01-26 RX ORDER — ALBUTEROL SULFATE 90 UG/1
1-2 AEROSOL, METERED RESPIRATORY (INHALATION) EVERY 4 HOURS PRN
Qty: 18 G | Refills: 1 | Status: SHIPPED | OUTPATIENT
Start: 2023-01-26 | End: 2023-07-31

## 2023-01-27 NOTE — TELEPHONE ENCOUNTER
Patient Quality Outreach    Patient is due for the following:       Topic Date Due     Pneumococcal Vaccine (1 - PCV) Never done     COVID-19 Vaccine (3 - Booster for Pfizer series) 04/22/2022       Next Steps:   No follow up needed at this time.    Type of outreach:    Chart review performed, no outreach needed.    Next Steps:  Reach out within 90 days via [employer].    Max number of attempts reached: Yes. Will try again in 90 days if patient still on fail list.    Questions for provider review:    None     Stephie Figueroa, CMA

## 2023-02-10 ENCOUNTER — TELEPHONE (OUTPATIENT)
Dept: ALLERGY | Facility: CLINIC | Age: 33
End: 2023-02-10
Payer: COMMERCIAL

## 2023-02-10 NOTE — TELEPHONE ENCOUNTER
Patient requests to restart allergen immunotherapy due to using her rescue inhaler less frequent or not at all. Her last allergy injection was 9/29/2022, dose of Red 1:1, 0.25 mL. Patient did see Dr. Brennanerea 11/09/2022 and discussed changing her maintenance dose. Please review A/P below.     A/P:  She is satisfied with the allergen immunotherapy results.  She would like to continue further.  Considering recent large local reactions and a history of systemic reaction in the past, we agreed to make the dose of Red 1: 1, 0.25 mL as her maintenance dose.  We can always try to advance if her allergy symptoms are suboptimally controlled with this dose or large local reactions significantly improve.  - Continue pretreatment protocol with fexofenadine 360 mg by mouth twice daily prior to the day of allergen immunotherapy and in the morning of allergen immunotherapy.  - Continue with fexofenadine 180 mg by mouth once daily as needed for the rest of the days.  She is not interested in starting any nasal sprays    Would you like patient to schedule a follow up appointment prior to restarting allergen immunotherapy?    Please review and advise.     Thank you,     Matthew Pelletier RN on 2/10/2023 at 11:17 AM

## 2023-02-13 ENCOUNTER — MYC MEDICAL ADVICE (OUTPATIENT)
Dept: ALLERGY | Facility: CLINIC | Age: 33
End: 2023-02-13
Payer: COMMERCIAL

## 2023-02-13 NOTE — TELEPHONE ENCOUNTER
Please have patient schedule follow-up visit before resuming immunotherapy. OK to put in open 1PM slot.

## 2023-03-01 ENCOUNTER — MYC MEDICAL ADVICE (OUTPATIENT)
Dept: ALLERGY | Facility: CLINIC | Age: 33
End: 2023-03-01
Payer: COMMERCIAL

## 2023-03-16 ENCOUNTER — OFFICE VISIT (OUTPATIENT)
Dept: OTOLARYNGOLOGY | Facility: CLINIC | Age: 33
End: 2023-03-16
Payer: COMMERCIAL

## 2023-03-16 ENCOUNTER — OFFICE VISIT (OUTPATIENT)
Dept: ALLERGY | Facility: CLINIC | Age: 33
End: 2023-03-16
Payer: COMMERCIAL

## 2023-03-16 VITALS
BODY MASS INDEX: 28.9 KG/M2 | HEART RATE: 91 BPM | SYSTOLIC BLOOD PRESSURE: 123 MMHG | DIASTOLIC BLOOD PRESSURE: 75 MMHG | WEIGHT: 148 LBS

## 2023-03-16 VITALS — OXYGEN SATURATION: 96 % | HEART RATE: 68 BPM | SYSTOLIC BLOOD PRESSURE: 107 MMHG | DIASTOLIC BLOOD PRESSURE: 71 MMHG

## 2023-03-16 DIAGNOSIS — Z98.890 S/P FESS (FUNCTIONAL ENDOSCOPIC SINUS SURGERY): ICD-10-CM

## 2023-03-16 DIAGNOSIS — J30.1 SEASONAL ALLERGIC RHINITIS DUE TO POLLEN: ICD-10-CM

## 2023-03-16 DIAGNOSIS — J32.0 CHRONIC MAXILLARY SINUSITIS: Primary | ICD-10-CM

## 2023-03-16 DIAGNOSIS — J30.81 ALLERGIC RHINITIS DUE TO ANIMALS: ICD-10-CM

## 2023-03-16 DIAGNOSIS — Z98.890 S/P NASAL SEPTOPLASTY: ICD-10-CM

## 2023-03-16 DIAGNOSIS — J45.40 MODERATE PERSISTENT ASTHMA WITHOUT COMPLICATION: Primary | ICD-10-CM

## 2023-03-16 DIAGNOSIS — J30.89 ALLERGIC RHINITIS DUE TO DUST MITE: ICD-10-CM

## 2023-03-16 LAB
FEF 25/75: NORMAL
FEV-1: NORMAL
FEV1/FVC: NORMAL
FVC: NORMAL

## 2023-03-16 PROCEDURE — 87070 CULTURE OTHR SPECIMN AEROBIC: CPT | Performed by: OTOLARYNGOLOGY

## 2023-03-16 PROCEDURE — 99213 OFFICE O/P EST LOW 20 MIN: CPT | Performed by: OTOLARYNGOLOGY

## 2023-03-16 PROCEDURE — 99214 OFFICE O/P EST MOD 30 MIN: CPT | Mod: 25 | Performed by: ALLERGY & IMMUNOLOGY

## 2023-03-16 PROCEDURE — 94010 BREATHING CAPACITY TEST: CPT | Performed by: ALLERGY & IMMUNOLOGY

## 2023-03-16 PROCEDURE — 87075 CULTR BACTERIA EXCEPT BLOOD: CPT | Performed by: OTOLARYNGOLOGY

## 2023-03-16 PROCEDURE — 87186 SC STD MICRODIL/AGAR DIL: CPT | Performed by: OTOLARYNGOLOGY

## 2023-03-16 PROCEDURE — 87077 CULTURE AEROBIC IDENTIFY: CPT | Performed by: OTOLARYNGOLOGY

## 2023-03-16 PROCEDURE — 87147 CULTURE TYPE IMMUNOLOGIC: CPT | Performed by: OTOLARYNGOLOGY

## 2023-03-16 PROCEDURE — 87102 FUNGUS ISOLATION CULTURE: CPT | Performed by: OTOLARYNGOLOGY

## 2023-03-16 ASSESSMENT — ENCOUNTER SYMPTOMS
NAUSEA: 0
JOINT SWELLING: 0
MYALGIAS: 0
VOMITING: 0
DIARRHEA: 0
ACTIVITY CHANGE: 0
COUGH: 0
ARTHRALGIAS: 0
SHORTNESS OF BREATH: 0
EYE DISCHARGE: 0
CHEST TIGHTNESS: 0
FEVER: 0
EYE ITCHING: 0
ADENOPATHY: 0
WHEEZING: 0
HEADACHES: 1
RHINORRHEA: 1
CHILLS: 0
FACIAL SWELLING: 0
SINUS PRESSURE: 1
EYE REDNESS: 0

## 2023-03-16 ASSESSMENT — ASTHMA QUESTIONNAIRES: ACT_TOTALSCORE: 20

## 2023-03-16 NOTE — LETTER
3/16/2023         RE: Reginaldo Ferraro  [address] [facility] [address]        Dear Colleague,    Thank you for referring your patient, Reginaldo Ferraro, to the [facility]. Please see a copy of my visit note below.    Reginaldo Ferraro was seen in the Allergy Clinic at [facility].      Reginaldo Ferraro is a 32 year old Not  or  female who is seen today for a follow-up visit.    Asthma has improved in the last 6 weeks. She was ill starting in October and remained ill through January. No specific diagnosis given during this period. Over this time was treated with numerous medications including antibiotics, steroids, and inhaled medications. Continues to take Advair twice daily and using albuterol and Duonebs as needed. Has used rescue medications once in the past month. Feels she has now returned to her usual state of health.    Allergy symptoms are waxing and waning - some days she feels well and sometimes her symptoms are flared. Continues to take fexofenadine daily and is also using fluticasone nasal spray - 1 spray in each nostril daily. Also taking an oral decongestant to help with her nasal congestion.    Past Medical History:   Diagnosis Date     Abnormal Pap smear of cervix 12/23/2011 9/21 LSIL     Cervical high risk HPV (human papillomavirus) test positive 2013    10/31/14, 5/21/18     Depressive disorder 12/23/2011    possibly chronic     Environmental allergies      Influenza A with pneumonia 12/24/2021     Migraine      Migraines     headaches with allergies     Moderate major depression 12/23/2011     Moderate major depression (H) 12/23/2011     Moderate persistent asthma 3/17/2022     Nasal obstruction 2/15/2022    Added automatically from request for surgery [id number]     Nasal septal deviation 2/15/2022    Added automatically from request for surgery [id number]     Nasal turbinate hypertrophy 2/15/2022    Added automatically  from request for surgery [id number]     NO ACTIVE PROBLEMS      Seasonal allergic rhinitis      Sepsis (H) 12/24/2021     Family History   Problem Relation Age of Onset     Arthritis Mother      Heart Disease Father      Diabetes Paternal Grandmother      Hypertension Paternal Grandmother      Respiratory Paternal Grandmother         asthma     Social History     Tobacco Use     Smoking status: Never     Passive exposure: Never     Smokeless tobacco: Never   Vaping Use     Vaping Use: Never used   Substance Use Topics     Alcohol use: Yes     Comment: Socially     Drug use: No     Social History     Social History Narrative    ENVIRONMENTAL HISTORY: [place] The home is heated with a forced air. They does have central air conditioning. The patient's bedroom is furnished with feather/wool bedding or pillows and carpeting in bedroom.  Pets inside the house include 2 cat(s) and 1 dog(s). There is no history of cockroach or mice infestation. There is/are 0 smokers living in the house.  There is/are 0 who smoke ecigarettes/vape living in the house.The house does not have a damp basement.             Past medical, family, and social history were reviewed.    Review of Systems   Constitutional: Negative for activity change, chills and fever.   HENT: Positive for rhinorrhea and sinus pressure. Negative for congestion, dental problem, ear pain, facial swelling, nosebleeds, postnasal drip and sneezing.    Eyes: Negative for discharge, redness and itching.   Respiratory: Negative for cough, chest tightness, shortness of breath and wheezing.    Cardiovascular: Negative for chest pain.   Gastrointestinal: Negative for diarrhea, nausea and vomiting.   Musculoskeletal: Negative for arthralgias, joint swelling and myalgias.   Skin: Negative for rash.   Neurological: Positive for headaches.   Hematological: Negative for adenopathy.   Psychiatric/Behavioral: Negative for behavioral problems  and self-injury.         Current Outpatient Medications:      ALPRAZolam (XANAX) 0.25 MG tablet, Take 1 tablet (0.25 mg) by mouth at bedtime as needed, may repeat once for anxiety, Disp: 30 tablet, Rfl: 0     azelastine (OPTIVAR) 0.05 % ophthalmic solution, Apply 1 drop to eye 2 times daily, Disp: 6 mL, Rfl: 3     escitalopram (LEXAPRO) 10 MG tablet, Take 1 tablet (10 mg) by mouth daily, Disp: 90 tablet, Rfl: 0     Fexofenadine HCl (ALLEGRA ALLERGY PO), , Disp: , Rfl:      fluticasone-salmeterol (ADVAIR) 500-50 MCG/ACT inhaler, Inhale 1 puff into the lungs every 12 hours, Disp: 1 each, Rfl: 5     ipratropium - albuterol 0.5 mg/2.5 mg/3 mL (DUONEB) 0.5-2.5 (3) MG/3ML neb solution, Inhale 1 vial (3 mLs) into the lungs every 6 hours as needed for shortness of breath or wheezing, Disp: 360 mL, Rfl: 3     omeprazole (PRILOSEC) 20 MG DR capsule, Take 1 capsule (20 mg) by mouth daily, Disp: 90 capsule, Rfl: 1     VENTOLIN  (90 Base) MCG/ACT inhaler, Inhale 1-2 puffs into the lungs every 4 hours as needed for shortness of breath or wheezing, Disp: 18 g, Rfl: 1     COMBIVENT RESPIMAT  MCG/ACT inhaler, , Disp: , Rfl:      COMPOUNDED NON-CONTROLLED SUBSTANCE (CMPD RX) - PHARMACY TO MIX COMPOUNDED MEDICATION, Apply 20 mLs into each nare 2 times daily Mupirocin 22grams/Liter for nasal irrigation, Disp: 2000 mL, Rfl: 4     EPINEPHrine (ANY BX GENERIC EQUIV) 0.3 MG/0.3ML injection 2-pack, Inject into the muscle as directed for anaphylaxis (Patient not taking: Reported on 3/16/2023), Disp: 2 each, Rfl: 2     ORDER FOR ALLERGEN IMMUNOTHERAPY, Name of Mix: Mix #1  Dust Mite, Cat, Dog Cat Hair, Standardized 10,000 BAU/mL, ALK  2.0 ml Dog Hair-Dander, A. P.  1:100 w/v, HS  1.0 ml Dust Mites DF. 10,000 AU/mL, HS  1.0 ml Dust Mites DP. 10,000 AU/mL, HS  1.0 ml  Diluent: HSA qs to 5ml (Patient not taking: Reported on 3/16/2023), Disp: 5 mL, Rfl: PRN     ORDER FOR ALLERGEN IMMUNOTHERAPY, Name of Mix: Mix #2  Tree  Sylvain, White  1:20 w/v, HS  0.5 ml Birch Mix PRW 1:20 w/v, HS  0.5 ml Boxelder-Maple Mix BHR (Boxelder Hard Red) 1:20 w/v, HS  0.5 ml [place], Common 1:20 w/v, HS  0.5 ml Oak Mix RVW 1:20 w/v, HS 0.5 ml Pine, White 1:20 w/v, ALK 0.5 ml [place] Tree, Black 1:20 w/v, HS 0.5 ml Diluent: HSA qs to 5ml (Patient not taking: Reported on 3/16/2023), Disp: 5 mL, Rfl: PRN     ORDER FOR ALLERGEN IMMUNOTHERAPY, Name of Mix: Mix #3  Grass, Weeds Prabhu Grass 1:20 w/v, HS 0.5 ml Steve Grass (Std) 100,000 BAU/mL, HS 0.4 ml Lamb's Quarters 1:20 w/v, HS 0.5 ml Nettle 1:20 w/v, HS 0.5 ml Plantain, English 1:20 w/v, HS 0.5 ml Ragweed Mixed 1:20 w/v ALK  0.5 ml Russian Thistle 1:20 w/v, HS 0.5 ml Sorrel, Sheep 1:20 w/v, HS 0.5 ml Diluent: HSA qs to 5ml (Patient not taking: Reported on 3/16/2023), Disp: 5 mL, Rfl: PRN     sulfamethoxazole-trimethoprim (BACTRIM DS) 800-160 MG tablet, Take 1 tablet by mouth 2 times daily for 14 days, Disp: 28 tablet, Rfl: 1  No Known Allergies    EXAM:   /71 (BP Location: Right arm, Patient Position: Sitting, Cuff Size: Adult Regular)   Pulse 68   SpO2 96%   Physical Exam  Vitals and nursing note reviewed.   Constitutional:       Appearance: Normal appearance.   HENT:      Head: Normocephalic and atraumatic.      Right Ear: External ear normal.      Left Ear: External ear normal.      Nose: No mucosal edema or rhinorrhea.      Mouth/Throat:      Mouth: Mucous membranes are moist. No oral lesions.      Pharynx: Oropharynx is clear. Uvula midline. No posterior oropharyngeal erythema.   Eyes:      General: Lids are normal.      Extraocular Movements: Extraocular movements intact.      Conjunctiva/sclera: Conjunctivae normal.   Neck:      Comments: No asymmetry, masses, or scars  Cardiovascular:      Rate and Rhythm: Normal rate and regular rhythm.      Heart sounds: Normal heart sounds, S1 normal and S2 normal.   Pulmonary:      Effort: Pulmonary effort is normal. No respiratory distress.      Breath sounds:  Normal breath sounds and air entry.   Musculoskeletal:      Comments: No musculoskeletal defects appreciated   Skin:     General: Skin is warm and dry.      Findings: No lesion or rash.   Neurological:      General: No focal deficit present.      Mental Status: She is alert.   Psychiatric:         Mood and Affect: Mood and affect normal.           WORKUP:  Spirometry    SPIROMETRY       FVC 2.85L (86% of predicted).     FEV1 1.92L (69% of predicted).     FEV1/FVC 67%      I have reviewed and interpreted these results. Testing meets criteria for acceptability and reproducibility. Values are consistent with mild airflow obstruction. Values have decreased when compared to those obtained on 2/25/22.    Asthma Control Test (ACT) total score: 20       ASSESSMENT/PLAN:  Reginaldo Ferraro is a 32 year old female here for a follow-up visit.    1. Moderate persistent asthma without complication - Reports she is currently doing well though she had a respiratory illness last fall with lingering symptoms through January. Now feels she has returned to her usual state of health and has rarely used rescue medications in the past month. Spirometry demonstrates airflow obstruction with FEV1 less than 70%. Given her clinical improvement her lung function may be lagging behind in recovery. Will continue to hold allergen immunotherapy treatment for now given the increased risk of anaphylaxis in the setting of low FEV1 particularly as this is a change from her baseline.    - continue Advair 500-50mcg - 1 puff twice daily  - take 2 to 4 puffs of albuterol HFA every 4 hours as needed  - Spirometry, Breathing Capacity: Normal Order, Clinic Performed    2. Seasonal allergic rhinitis due to pollen    - hold allergen immunotherapy at this time  - continue fexofenadine - 180mg once to twice daily as needed  - increased fluticasone nasal spray to 2 sprays in each nostril daily    3. Allergic rhinitis due to animals - see above    4. Allergic  rhinitis due to dust mite - see above      Follow-up in 6-8 weeks, sooner if needed      Thank you for allowing me to participate in the care of Reginaldo Ferraro.      Polo Mccain MD, FAAAAI  Allergy/Immunology  [facility]      Chart documentation done in part with Dragon Voice Recognition Software. Although reviewed after completion, some word and grammatical errors may remain.      Again, thank you for allowing me to participate in the care of your patient.        Sincerely,        Polo Mccain MD

## 2023-03-16 NOTE — LETTER
3/16/2023         RE: Reginaldo Ferraro  [address] [facility] [address]        Dear Colleague,    Thank you for referring your patient, Reginaldo Ferraro, to the [facility]. Please see a copy of my visit note below.    History of Present Illness - Reginaldo Ferraro is a 32 year old female here to see me for the first time, but is a long time patient of Dr Rutledge and she is status post septoplasty, inferior turbinate reduction, bilateral endoscopic michael bullosa reduction, and maxillary antrostomies on 4/4/22.  She has not had any ENT follow up since 4/29/2022    The reason she is here to see me is that she has again started to have dripping and drainage from the nose as well as her lacrimal ducts.  She shows me a picture of light green slimy opaque material that came from her nose.    This started in October 2022 with a cold, and things have never been the same.    Past medical history -   Patient Active Problem List   Diagnosis     Cervical high risk HPV (human papillomavirus) test positive     Insomnia, unspecified insomnia     Vitamin D deficiency     Mild episode of recurrent major depressive disorder (H)     Adjustment disorder with depressed mood     Mixed personality disorder (H)     Chronic maxillary sinusitis     Moderate persistent asthma     Allergic rhinitis due to animals     Allergic rhinitis due to dust mite     Seasonal allergic rhinitis due to pollen     Pain in both lower legs       /75   Pulse 91   Wt 67.1 kg (148 lb)   BMI 28.90 kg/m      General - The patient is well nourished and well developed, and appears to have good nutritional status.  Alert and oriented to person and place, answers questions and cooperates with examination appropriately.   Head and Face - Normocephalic and atraumatic, with no gross asymmetry noted of the contour of the facial features.  The facial nerve is intact, with strong symmetric movements.  Eyes - Extraocular  movements intact, and the pupils were reactive to light.  Sclera were not icteric or injected, conjunctiva were pink and moist.  Mouth - Examination of the oral cavity shows pink, healthy, moist mucosa.  No lesions or ulceration noted.  The dentition are in good repair.  The tongue is mobile and midline.  Nose - The nose was examined with nasal speculum. On the LEFT, I was able to look up into the middle meatus all the way to the roof of the ethmoid.  The mucosa is healthy and flat. No abnormal secretions.  The RIGHT was examined and also open.  The middle meatus is clear, and the mucosa of the ethmoid roof is healthy.  No synechiae noted bilaterally.    A/P - Maristella E Ronan  (J32.0) Chronic maxillary sinusitis  (primary encounter diagnosis)  (Z98.890) S/P FESS (functional endoscopic sinus surgery)  (Z98.890) S/P nasal septoplasty    I have taken cultures today and will call her with results and treatment plan.  Likely we will use antibiotic nasal irrigations.  If all else fails, then we will get a new CT sinus scan.      Again, thank you for allowing me to participate in the care of your patient.        Sincerely,        Atilio Murry MD

## 2023-03-16 NOTE — PROGRESS NOTES
History of Present Illness - Reginaldo Ferraro is a 32 year old female here to see me for the first time, but is a long time patient of Dr Rutledge and she is status post septoplasty, inferior turbinate reduction, bilateral endoscopic michael bullosa reduction, and maxillary antrostomies on 4/4/22.  She has not had any ENT follow up since 4/29/2022    The reason she is here to see me is that she has again started to have dripping and drainage from the nose as well as her lacrimal ducts.  She shows me a picture of light green slimy opaque material that came from her nose.    This started in October 2022 with a cold, and things have never been the same.    Past medical history -   Patient Active Problem List   Diagnosis     Cervical high risk HPV (human papillomavirus) test positive     Insomnia, unspecified insomnia     Vitamin D deficiency     Mild episode of recurrent major depressive disorder (H)     Adjustment disorder with depressed mood     Mixed personality disorder (H)     Chronic maxillary sinusitis     Moderate persistent asthma     Allergic rhinitis due to animals     Allergic rhinitis due to dust mite     Seasonal allergic rhinitis due to pollen     Pain in both lower legs       /75   Pulse 91   Wt 67.1 kg (148 lb)   BMI 28.90 kg/m      General - The patient is well nourished and well developed, and appears to have good nutritional status.  Alert and oriented to person and place, answers questions and cooperates with examination appropriately.   Head and Face - Normocephalic and atraumatic, with no gross asymmetry noted of the contour of the facial features.  The facial nerve is intact, with strong symmetric movements.  Eyes - Extraocular movements intact, and the pupils were reactive to light.  Sclera were not icteric or injected, conjunctiva were pink and moist.  Mouth - Examination of the oral cavity shows pink, healthy, moist mucosa.  No lesions or ulceration noted.  The dentition are in good  repair.  The tongue is mobile and midline.  Nose - The nose was examined with nasal speculum. On the LEFT, I was able to look up into the middle meatus all the way to the roof of the ethmoid.  The mucosa is healthy and flat. No abnormal secretions.  The RIGHT was examined and also open.  The middle meatus is clear, and the mucosa of the ethmoid roof is healthy.  No synechiae noted bilaterally.    A/P - Reginaldo Ferraro  (J32.0) Chronic maxillary sinusitis  (primary encounter diagnosis)  (Z98.890) S/P FESS (functional endoscopic sinus surgery)  (Z98.890) S/P nasal septoplasty    I have taken cultures today and will call her with results and treatment plan.  Likely we will use antibiotic nasal irrigations.  If all else fails, then we will get a new CT sinus scan.

## 2023-03-16 NOTE — PROGRESS NOTES
Reginaldo Ferraro was seen in the Allergy Clinic at [facility].      Reginaldo Ferraro is a 32 year old Not  or  female who is seen today for a follow-up visit.    Asthma has improved in the last 6 weeks. She was ill starting in October and remained ill through January. No specific diagnosis given during this period. Over this time was treated with numerous medications including antibiotics, steroids, and inhaled medications. Continues to take Advair twice daily and using albuterol and Duonebs as needed. Has used rescue medications once in the past month. Feels she has now returned to her usual state of health.    Allergy symptoms are waxing and waning - some days she feels well and sometimes her symptoms are flared. Continues to take fexofenadine daily and is also using fluticasone nasal spray - 1 spray in each nostril daily. Also taking an oral decongestant to help with her nasal congestion.    Past Medical History:   Diagnosis Date     Abnormal Pap smear of cervix 12/23/2011 9/21 [facility]     Cervical high risk HPV (human papillomavirus) test positive 2013    10/31/14, 5/21/18     Depressive disorder 12/23/2011    possibly chronic     Environmental allergies      Influenza A with pneumonia 12/24/2021     Migraine      Migraines     headaches with allergies     Moderate major depression 12/23/2011     Moderate major depression (H) 12/23/2011     Moderate persistent asthma 3/17/2022     Nasal obstruction 2/15/2022    Added automatically from request for surgery [id number]     Nasal septal deviation 2/15/2022    Added automatically from request for surgery [id number]     Nasal turbinate hypertrophy 2/15/2022    Added automatically from request for surgery [id number]     NO ACTIVE PROBLEMS      Seasonal allergic rhinitis      Sepsis (H) 12/24/2021     Family History   Problem Relation Age of Onset     Arthritis Mother      Heart Disease Father      Diabetes Paternal Grandmother       Hypertension Paternal Grandmother      Respiratory Paternal Grandmother         asthma     Social History     Tobacco Use     Smoking status: Never     Passive exposure: Never     Smokeless tobacco: Never   Vaping Use     Vaping Use: Never used   Substance Use Topics     Alcohol use: Yes     Comment: Socially     Drug use: No     Social History     Social History Narrative    ENVIRONMENTAL HISTORY: [place] The home is heated with a forced air. They does have central air conditioning. The patient's bedroom is furnished with feather/wool bedding or pillows and carpeting in bedroom.  Pets inside the house include 2 cat(s) and 1 dog(s). There is no history of cockroach or mice infestation. There is/are 0 smokers living in the house.  There is/are 0 who smoke ecigarettes/vape living in the house.The house does not have a damp basement.             Past medical, family, and social history were reviewed.    Review of Systems   Constitutional: Negative for activity change, chills and fever.   HENT: Positive for rhinorrhea and sinus pressure. Negative for congestion, dental problem, ear pain, facial swelling, nosebleeds, postnasal drip and sneezing.    Eyes: Negative for discharge, redness and itching.   Respiratory: Negative for cough, chest tightness, shortness of breath and wheezing.    Cardiovascular: Negative for chest pain.   Gastrointestinal: Negative for diarrhea, nausea and vomiting.   Musculoskeletal: Negative for arthralgias, joint swelling and myalgias.   Skin: Negative for rash.   Neurological: Positive for headaches.   Hematological: Negative for adenopathy.   Psychiatric/Behavioral: Negative for behavioral problems and self-injury.         Current Outpatient Medications:      ALPRAZolam (XANAX) 0.25 MG tablet, Take 1 tablet (0.25 mg) by mouth at bedtime as needed, may repeat once for anxiety, Disp: 30 tablet, Rfl: 0     azelastine (OPTIVAR) 0.05 % ophthalmic  solution, Apply 1 drop to eye 2 times daily, Disp: 6 mL, Rfl: 3     escitalopram (LEXAPRO) 10 MG tablet, Take 1 tablet (10 mg) by mouth daily, Disp: 90 tablet, Rfl: 0     Fexofenadine HCl (ALLEGRA ALLERGY PO), , Disp: , Rfl:      fluticasone-salmeterol (ADVAIR) 500-50 MCG/ACT inhaler, Inhale 1 puff into the lungs every 12 hours, Disp: 1 each, Rfl: 5     ipratropium - albuterol 0.5 mg/2.5 mg/3 mL (DUONEB) 0.5-2.5 (3) MG/3ML neb solution, Inhale 1 vial (3 mLs) into the lungs every 6 hours as needed for shortness of breath or wheezing, Disp: 360 mL, Rfl: 3     omeprazole (PRILOSEC) 20 MG DR capsule, Take 1 capsule (20 mg) by mouth daily, Disp: 90 capsule, Rfl: 1     VENTOLIN  (90 Base) MCG/ACT inhaler, Inhale 1-2 puffs into the lungs every 4 hours as needed for shortness of breath or wheezing, Disp: 18 g, Rfl: 1     COMBIVENT RESPIMAT  MCG/ACT inhaler, , Disp: , Rfl:      COMPOUNDED NON-CONTROLLED SUBSTANCE (CMPD RX) - PHARMACY TO MIX COMPOUNDED MEDICATION, Apply 20 mLs into each nare 2 times daily Mupirocin 22grams/Liter for nasal irrigation, Disp: 2000 mL, Rfl: 4     EPINEPHrine (ANY BX GENERIC EQUIV) 0.3 MG/0.3ML injection 2-pack, Inject into the muscle as directed for anaphylaxis (Patient not taking: Reported on 3/16/2023), Disp: 2 each, Rfl: 2     ORDER FOR ALLERGEN IMMUNOTHERAPY, Name of Mix: Mix #1  Dust Mite, Cat, Dog Cat Hair, Standardized 10,000 BAU/mL, ALK  2.0 ml Dog Hair-Dander, A. P.  1:100 w/v, HS  1.0 ml Dust Mites DF. 10,000 AU/mL, HS  1.0 ml Dust Mites DP. 10,000 AU/mL, HS  1.0 ml  Diluent: HSA qs to 5ml (Patient not taking: Reported on 3/16/2023), Disp: 5 mL, Rfl: PRN     ORDER FOR ALLERGEN IMMUNOTHERAPY, Name of Mix: Mix #2  Tree  Sylvain, White 1:20 w/v, HS  0.5 ml Birch Mix PRW 1:20 w/v, HS  0.5 ml Boxelder-Maple Mix BHR (Boxelder Hard Red) 1:20 w/v, HS  0.5 ml Hillrose, Common 1:20 w/v, HS  0.5 ml Oak Mix RVW 1:20 w/v, HS 0.5 ml Pine, White 1:20 w/v, ALK 0.5 ml Gilbert Tree, Black 1:20  w/v, HS 0.5 ml Diluent: HSA qs to 5ml (Patient not taking: Reported on 3/16/2023), Disp: 5 mL, Rfl: PRN     ORDER FOR ALLERGEN IMMUNOTHERAPY, Name of Mix: Mix #3  Grass, Weeds Prabhu Grass 1:20 w/v, HS 0.5 ml Steve Grass (Std) 100,000 BAU/mL, HS 0.4 ml Lamb's Quarters 1:20 w/v, HS 0.5 ml Nettle 1:20 w/v, HS 0.5 ml Plantain, English 1:20 w/v, HS 0.5 ml Ragweed Mixed 1:20 w/v ALK  0.5 ml Russian Thistle 1:20 w/v, HS 0.5 ml Sorrel, Sheep 1:20 w/v, HS 0.5 ml Diluent: HSA qs to 5ml (Patient not taking: Reported on 3/16/2023), Disp: 5 mL, Rfl: PRN     sulfamethoxazole-trimethoprim (BACTRIM DS) 800-160 MG tablet, Take 1 tablet by mouth 2 times daily for 14 days, Disp: 28 tablet, Rfl: 1  No Known Allergies    EXAM:   /71 (BP Location: Right arm, Patient Position: Sitting, Cuff Size: Adult Regular)   Pulse 68   SpO2 96%   Physical Exam  Vitals and nursing note reviewed.   Constitutional:       Appearance: Normal appearance.   HENT:      Head: Normocephalic and atraumatic.      Right Ear: External ear normal.      Left Ear: External ear normal.      Nose: No mucosal edema or rhinorrhea.      Mouth/Throat:      Mouth: Mucous membranes are moist. No oral lesions.      Pharynx: Oropharynx is clear. Uvula midline. No posterior oropharyngeal erythema.   Eyes:      General: Lids are normal.      Extraocular Movements: Extraocular movements intact.      Conjunctiva/sclera: Conjunctivae normal.   Neck:      Comments: No asymmetry, masses, or scars  Cardiovascular:      Rate and Rhythm: Normal rate and regular rhythm.      Heart sounds: Normal heart sounds, S1 normal and S2 normal.   Pulmonary:      Effort: Pulmonary effort is normal. No respiratory distress.      Breath sounds: Normal breath sounds and air entry.   Musculoskeletal:      Comments: No musculoskeletal defects appreciated   Skin:     General: Skin is warm and dry.      Findings: No lesion or rash.   Neurological:      General: No focal deficit present.       Mental Status: She is alert.   Psychiatric:         Mood and Affect: Mood and affect normal.           WORKUP:  Spirometry    SPIROMETRY       FVC 2.85L (86% of predicted).     FEV1 1.92L (69% of predicted).     FEV1/FVC 67%      I have reviewed and interpreted these results. Testing meets criteria for acceptability and reproducibility. Values are consistent with mild airflow obstruction. Values have decreased when compared to those obtained on 2/25/22.    Asthma Control Test (ACT) total score: 20       ASSESSMENT/PLAN:  Reginaldo Ferraro is a 32 year old female here for a follow-up visit.    1. Moderate persistent asthma without complication - Reports she is currently doing well though she had a respiratory illness last fall with lingering symptoms through January. Now feels she has returned to her usual state of health and has rarely used rescue medications in the past month. Spirometry demonstrates airflow obstruction with FEV1 less than 70%. Given her clinical improvement her lung function may be lagging behind in recovery. Will continue to hold allergen immunotherapy treatment for now given the increased risk of anaphylaxis in the setting of low FEV1 particularly as this is a change from her baseline.    - continue Advair 500-50mcg - 1 puff twice daily  - take 2 to 4 puffs of albuterol HFA every 4 hours as needed  - Spirometry, Breathing Capacity: Normal Order, Clinic Performed    2. Seasonal allergic rhinitis due to pollen    - hold allergen immunotherapy at this time  - continue fexofenadine - 180mg once to twice daily as needed  - increased fluticasone nasal spray to 2 sprays in each nostril daily    3. Allergic rhinitis due to animals - see above    4. Allergic rhinitis due to dust mite - see above      Follow-up in 6-8 weeks, sooner if needed      Thank you for allowing me to participate in the care of Reginaldo Ferraro.      Polo Mccain MD, FAAAAI  Allergy/Immunology  [facility] -  [facility]      Chart documentation done in part with Dragon Voice Recognition Software. Although reviewed after completion, some word and grammatical errors may remain.

## 2023-03-18 ENCOUNTER — MYC MEDICAL ADVICE (OUTPATIENT)
Dept: FAMILY MEDICINE | Facility: CLINIC | Age: 33
End: 2023-03-18
Payer: COMMERCIAL

## 2023-03-18 DIAGNOSIS — J45.40 MODERATE PERSISTENT ASTHMA, UNSPECIFIED WHETHER COMPLICATED: Primary | ICD-10-CM

## 2023-03-19 LAB
BACTERIA SPEC CULT: ABNORMAL
BACTERIA SPEC CULT: ABNORMAL
BACTERIA SPEC CULT: NORMAL
GRAM STAIN RESULT: ABNORMAL

## 2023-03-20 DIAGNOSIS — J32.4 CHRONIC PANSINUSITIS: Primary | ICD-10-CM

## 2023-03-20 DIAGNOSIS — J31.0 PURULENT RHINITIS: ICD-10-CM

## 2023-03-20 DIAGNOSIS — A49.01 STAPH AUREUS INFECTION: ICD-10-CM

## 2023-03-20 RX ORDER — SULFAMETHOXAZOLE/TRIMETHOPRIM 800-160 MG
1 TABLET ORAL 2 TIMES DAILY
Qty: 28 TABLET | Refills: 1 | Status: SHIPPED | OUTPATIENT
Start: 2023-03-20 | End: 2023-04-03

## 2023-03-20 NOTE — RESULT ENCOUNTER NOTE
Called patient regarding abnormal results.  See orders only encounter for discussion and plan of treatment

## 2023-03-20 NOTE — TELEPHONE ENCOUNTER
Patient wanted orders faxed to [facility] on [address]. Faxed to [phone].     Randi Ruelas on 3/20/2023 at 4:41 PM

## 2023-03-20 NOTE — PROGRESS NOTES
Called and spoke with Reginaldo and explained culture results showing Staph aureus.    I will treat with standard De-colonizing protocol with 14 days of oral Bactrim, and one month of Mupirocin nasal irrigations.    If everything is cleared by the end of month, follow up as needed    Otherwise contact me, because if we don't resolve the issue I would then want a new CT sinus

## 2023-03-30 LAB — BACTERIA SPEC CULT: NO GROWTH

## 2023-04-26 ENCOUNTER — MYC MEDICAL ADVICE (OUTPATIENT)
Dept: OTOLARYNGOLOGY | Facility: CLINIC | Age: 33
End: 2023-04-26
Payer: COMMERCIAL

## 2023-04-26 DIAGNOSIS — A49.01 STAPH AUREUS INFECTION: ICD-10-CM

## 2023-04-26 DIAGNOSIS — J32.4 CHRONIC PANSINUSITIS: Primary | ICD-10-CM

## 2023-04-26 DIAGNOSIS — J31.0 PURULENT RHINITIS: ICD-10-CM

## 2023-05-02 ENCOUNTER — ANCILLARY PROCEDURE (OUTPATIENT)
Dept: CT IMAGING | Facility: CLINIC | Age: 33
End: 2023-05-02
Attending: OTOLARYNGOLOGY
Payer: COMMERCIAL

## 2023-05-02 DIAGNOSIS — A49.01 STAPH AUREUS INFECTION: ICD-10-CM

## 2023-05-02 DIAGNOSIS — J32.4 CHRONIC PANSINUSITIS: ICD-10-CM

## 2023-05-02 DIAGNOSIS — J31.0 PURULENT RHINITIS: ICD-10-CM

## 2023-05-02 PROCEDURE — 70486 CT MAXILLOFACIAL W/O DYE: CPT | Mod: TC | Performed by: RADIOLOGY

## 2023-05-03 ENCOUNTER — TELEPHONE (OUTPATIENT)
Dept: OTOLARYNGOLOGY | Facility: CLINIC | Age: 33
End: 2023-05-03
Payer: COMMERCIAL

## 2023-05-03 DIAGNOSIS — A49.01 STAPH AUREUS INFECTION: ICD-10-CM

## 2023-05-03 DIAGNOSIS — J31.0 PURULENT RHINITIS: ICD-10-CM

## 2023-05-03 DIAGNOSIS — J32.4 CHRONIC PANSINUSITIS: Primary | ICD-10-CM

## 2023-05-03 RX ORDER — CLARITHROMYCIN 500 MG
500 TABLET ORAL 2 TIMES DAILY
Qty: 60 TABLET | Refills: 0 | Status: SHIPPED | OUTPATIENT
Start: 2023-05-03 | End: 2023-06-02

## 2023-05-03 NOTE — TELEPHONE ENCOUNTER
Spoke with patient regarding scheduling surgery, Patient has additional questions regarding aftercare and FMLA. Please call and advise   Surgery is for IMAGE GUIDED FUNCTIONAL ENDOSCOPIC SINUS SURGERY (FESS) WITHOUT SEPTOPLASTY, WITH HYDRODEBRIDER (Bilateral)

## 2023-05-03 NOTE — PROGRESS NOTES
Called and spoke with Reginaldo.    The CT sinus showed widespread chronic sinusitis.    We discussed options, and I am going to try her on yet another long course of antibiotics, this time with Biaxin for 30 days.    But at the same time, I will put in surgery orders for revision Maxillary and Ethmoid surgery, with hydrodebrider    In the event that the Biaxin clears things up, we can always cancel surgery

## 2023-05-04 PROBLEM — A49.01 STAPH AUREUS INFECTION: Status: ACTIVE | Noted: 2023-05-04

## 2023-05-04 PROBLEM — J32.4 CHRONIC PANSINUSITIS: Status: ACTIVE | Noted: 2023-05-04

## 2023-05-04 PROBLEM — J31.0 PURULENT RHINITIS: Status: ACTIVE | Noted: 2023-05-04

## 2023-05-04 NOTE — TELEPHONE ENCOUNTER
RN left message for patient requesting call back    Stephie JORGENSEN, Specialty RN 5/4/2023 1:09 PM

## 2023-05-04 NOTE — TELEPHONE ENCOUNTER
Patient returned call. RN answered all of patient's questions that she has at this time.    Stephie JORGENSEN, Specialty RN 5/4/2023 1:15 PM

## 2023-05-23 DIAGNOSIS — R05.9 COUGH: ICD-10-CM

## 2023-05-23 DIAGNOSIS — J45.40 MODERATE PERSISTENT ASTHMA WITHOUT COMPLICATION: Primary | ICD-10-CM

## 2023-05-23 DIAGNOSIS — R06.2 WHEEZING: ICD-10-CM

## 2023-05-23 RX ORDER — BUDESONIDE AND FORMOTEROL FUMARATE DIHYDRATE 160; 4.5 UG/1; UG/1
2 AEROSOL RESPIRATORY (INHALATION) 2 TIMES DAILY
Qty: 10.2 G | Refills: 1 | Status: SHIPPED | OUTPATIENT
Start: 2023-05-23 | End: 2023-11-02

## 2023-05-23 RX ORDER — BUDESONIDE AND FORMOTEROL FUMARATE DIHYDRATE 160; 4.5 UG/1; UG/1
2 AEROSOL RESPIRATORY (INHALATION) 2 TIMES DAILY
OUTPATIENT
Start: 2023-05-23

## 2023-05-23 NOTE — TELEPHONE ENCOUNTER
Rx sent for 60 day supply of Symbicort as an alternative to Advair. Pulmicort is not an appropriate/adequate substitution for Advair.    Patient is due for a follow-up visit and will need to be seen for additional medication refills.

## 2023-05-25 NOTE — TELEPHONE ENCOUNTER
My chart message sent to patient advising of Dr. Mccain's message below.    Ileana JORGENSEN RN, BSN

## 2023-06-08 ENCOUNTER — MYC MEDICAL ADVICE (OUTPATIENT)
Dept: FAMILY MEDICINE | Facility: CLINIC | Age: 33
End: 2023-06-08

## 2023-06-08 ASSESSMENT — PATIENT HEALTH QUESTIONNAIRE - PHQ9
10. IF YOU CHECKED OFF ANY PROBLEMS, HOW DIFFICULT HAVE THESE PROBLEMS MADE IT FOR YOU TO DO YOUR WORK, TAKE CARE OF THINGS AT HOME, OR GET ALONG WITH OTHER PEOPLE: SOMEWHAT DIFFICULT
SUM OF ALL RESPONSES TO PHQ QUESTIONS 1-9: 4
SUM OF ALL RESPONSES TO PHQ QUESTIONS 1-9: 4

## 2023-06-08 NOTE — TELEPHONE ENCOUNTER
Spoke to patient on the phone. Has an appointment scheduled with a different provider tomorrow.     Randi Ruelas -    [facility]

## 2023-06-09 ENCOUNTER — OFFICE VISIT (OUTPATIENT)
Dept: FAMILY MEDICINE | Facility: CLINIC | Age: 33
End: 2023-06-09
Payer: COMMERCIAL

## 2023-06-09 VITALS
HEART RATE: 64 BPM | RESPIRATION RATE: 20 BRPM | TEMPERATURE: 97.8 F | WEIGHT: 152.6 LBS | OXYGEN SATURATION: 96 % | DIASTOLIC BLOOD PRESSURE: 72 MMHG | SYSTOLIC BLOOD PRESSURE: 107 MMHG | HEIGHT: 61 IN | BODY MASS INDEX: 28.81 KG/M2

## 2023-06-09 DIAGNOSIS — Z01.818 PREOP GENERAL PHYSICAL EXAM: Primary | ICD-10-CM

## 2023-06-09 DIAGNOSIS — J31.0 PURULENT RHINITIS: ICD-10-CM

## 2023-06-09 DIAGNOSIS — J32.4 CHRONIC PANSINUSITIS: ICD-10-CM

## 2023-06-09 DIAGNOSIS — A49.01 STAPH AUREUS INFECTION: ICD-10-CM

## 2023-06-09 LAB
BASOPHILS # BLD AUTO: 0.1 10E3/UL (ref 0–0.2)
BASOPHILS NFR BLD AUTO: 1 %
EOSINOPHIL # BLD AUTO: 1.6 10E3/UL (ref 0–0.7)
EOSINOPHIL NFR BLD AUTO: 19 %
ERYTHROCYTE [DISTWIDTH] IN BLOOD BY AUTOMATED COUNT: 13 % (ref 10–15)
HCG UR QL: NEGATIVE
HCT VFR BLD AUTO: 36.5 % (ref 35–47)
HGB BLD-MCNC: 11.9 G/DL (ref 11.7–15.7)
IMM GRANULOCYTES # BLD: 0 10E3/UL
IMM GRANULOCYTES NFR BLD: 0 %
LYMPHOCYTES # BLD AUTO: 2.5 10E3/UL (ref 0.8–5.3)
LYMPHOCYTES NFR BLD AUTO: 29 %
MCH RBC QN AUTO: 30.1 PG (ref 26.5–33)
MCHC RBC AUTO-ENTMCNC: 32.6 G/DL (ref 31.5–36.5)
MCV RBC AUTO: 92 FL (ref 78–100)
MONOCYTES # BLD AUTO: 0.5 10E3/UL (ref 0–1.3)
MONOCYTES NFR BLD AUTO: 6 %
NEUTROPHILS # BLD AUTO: 3.9 10E3/UL (ref 1.6–8.3)
NEUTROPHILS NFR BLD AUTO: 46 %
PLATELET # BLD AUTO: 454 10E3/UL (ref 150–450)
RBC # BLD AUTO: 3.95 10E6/UL (ref 3.8–5.2)
WBC # BLD AUTO: 8.5 10E3/UL (ref 4–11)

## 2023-06-09 PROCEDURE — 36415 COLL VENOUS BLD VENIPUNCTURE: CPT | Performed by: PHYSICIAN ASSISTANT

## 2023-06-09 PROCEDURE — 85025 COMPLETE CBC W/AUTO DIFF WBC: CPT | Performed by: PHYSICIAN ASSISTANT

## 2023-06-09 PROCEDURE — 81025 URINE PREGNANCY TEST: CPT | Performed by: PHYSICIAN ASSISTANT

## 2023-06-09 PROCEDURE — 99214 OFFICE O/P EST MOD 30 MIN: CPT | Performed by: PHYSICIAN ASSISTANT

## 2023-06-09 ASSESSMENT — PAIN SCALES - GENERAL: PAINLEVEL: NO PAIN (0)

## 2023-06-09 NOTE — RESULT ENCOUNTER NOTE
Dear Reginaldo  Your platelets were barely above normal.   This is not concerning but follow up with Cally in September and consider repeating  Your pregnancy test was negative.  Good luck with surgery   Please call or MyChart my office with any questions or concerns.   Melba Gray, PAC

## 2023-06-09 NOTE — H&P (VIEW-ONLY)
[address] [address]  Phone: [phone]  Primary Provider: Cally Rucker  Pre-op Performing Provider: RICCARDO OLIVARES      PREOPERATIVE EVALUATION:  Today's date: 6/9/2023    Reginaldo Ferraro is a 32 year old female who presents for a preoperative evaluation.       View : No data to display.              Surgical Information:  Surgery/Procedure: Endoscopic sinus   Surgery Location: [facility]   Surgeon: Lovely   Surgery Date: 6/15  Time of Surgery: 8  Where patient plans to recover: At home with family  Fax number for surgical facility: [phone]    Assessment & Plan     The proposed surgical procedure is considered INTERMEDIATE risk.    Preop general physical exam  Normal hemoglobin and negative pregnancy test   Platelets slightly elevated- follow up with primary in September and consider repeating    - CBC with platelets and differential  - HCG Qual, Urine ([id number])  - CBC with platelets and differential  - HCG Qual, Urine ([id number])    Chronic pansinusitis  Reason for procedure     Purulent rhinitis  Reason for procedure     Staph aureus infection  Reason for procedure               - No identified additional risk factors other than previously addressed    Antiplatelet or Anticoagulation Medication Instructions:   - Patient is on no antiplatelet or anticoagulation medications.    Additional Medication Instructions:  Patient is to take all scheduled medications on the day of surgery    RECOMMENDATION:  APPROVAL GIVEN to proceed with proposed procedure, without further diagnostic evaluation.            Subjective       HPI related to upcoming procedure: patient unknown to me presents for preop  History of chronic sinusitis post septoplasty, inferior turbinate reduction, inferior  turbinate reduction,bilateral endoscopic michael bullosa reduction and maxillary anostomies 4/4/22     Currently menstruating       6/8/2023    10:01 AM   Preop  Questions   1. Have you ever had a heart attack or stroke? No   2. Have you ever had surgery on your heart or blood vessels, such as a stent placement, a coronary artery bypass, or surgery on an artery in your head, neck, heart, or legs? No   3. Do you have chest pain with activity? No   4. Do you have a history of  heart failure? No   5. Do you currently have a cold, bronchitis or symptoms of other infection? No   6. Do you have a cough, shortness of breath, or wheezing? No   7. Do you or anyone in your family have previous history of blood clots? No   8. Do you or does anyone in your family have a serious bleeding problem such as prolonged bleeding following surgeries or cuts? No   9. Have you ever had problems with anemia or been told to take iron pills? UNKNOWN -    10. Have you had any abnormal blood loss such as black, tarry or bloody stools, or abnormal vaginal bleeding? No   11. Have you ever had a blood transfusion? No   12. Are you willing to have a blood transfusion if it is medically needed before, during, or after your surgery? Yes   13. Have you or any of your relatives ever had problems with anesthesia? No   14. Do you have sleep apnea, excessive snoring or daytime drowsiness? No   15. Do you have any artifical heart valves or other implanted medical devices like a pacemaker, defibrillator, or continuous glucose monitor? No   16. Do you have artificial joints? No   17. Are you allergic to latex? No   18. Is there any chance that you may be pregnant? No       Health Care Directive:  Patient does not have a Health Care Directive or Living Will: Discussed advance care planning with patient; however, patient declined at this time.    Preoperative Review of :   reviewed - alprazolam 12/26/22      Status of Chronic Conditions:  See problem list for active medical problems.  Problems all longstanding and stable, except as noted/documented.  See ROS for pertinent symptoms related to these  conditions.      Review of Systems  CONSTITUTIONAL: NEGATIVE for fever, chills, change in weight  INTEGUMENTARY/SKIN: NEGATIVE for worrisome rashes, moles or lesions  EYES: NEGATIVE for vision changes or irritation  ENT/MOUTH: see hpi   RESP: NEGATIVE for significant cough or SOB  CV: NEGATIVE for chest pain, palpitations or peripheral edema  GI: NEGATIVE for nausea, abdominal pain, heartburn, or change in bowel habits  : NEGATIVE for frequency, dysuria, or hematuria  MUSCULOSKELETAL: NEGATIVE for significant arthralgias or myalgia  NEURO: NEGATIVE for weakness, dizziness or paresthesias  ENDOCRINE: NEGATIVE for temperature intolerance, skin/hair changes  HEME: NEGATIVE for bleeding problems  PSYCHIATRIC: NEGATIVE for changes in mood or affect    Patient Active Problem List    Diagnosis Date Noted     Chronic pansinusitis 05/04/2023     Priority: Medium     Added automatically from request for surgery [id number]       Purulent rhinitis 05/04/2023     Priority: Medium     Added automatically from request for surgery [id number]       Staph aureus infection 05/04/2023     Priority: Medium     Added automatically from request for surgery [id number]       Pain in both lower legs 11/10/2022     Priority: Medium     Moderate persistent asthma 03/17/2022     Priority: Medium     Allergic rhinitis due to animals 03/17/2022     Priority: Medium     Allergic rhinitis due to dust mite 03/17/2022     Priority: Medium     Seasonal allergic rhinitis due to pollen 03/17/2022     Priority: Medium     Chronic maxillary sinusitis 02/15/2022     Priority: Medium     Added automatically from request for surgery [id number]       Mild episode of recurrent major depressive disorder (H) 06/11/2018     Priority: Medium     Adjustment disorder with depressed mood 08/15/2016     Priority: Medium     Mixed personality disorder (H) 08/15/2016     Priority: Medium     Insomnia, unspecified insomnia 11/02/2015     Priority: Medium     Vitamin D deficiency  11/02/2015     Priority: Medium     Cervical high risk HPV (human papillomavirus) test positive 01/17/2012     Priority: Medium       12/23/11 LSIL, cannot rule out HSIL @ age 21. Plan colposcopy within 3 months.  4/30/12 Colpo No bx taken. NIL pap neg HPV. Plan pap in 1 year  10/15/13 NIL pap, + HR HPV (58). Plan pap in 1 year   10/31/14 NIL pap, + HR HPV. Plan: cotest in 1 year  11/2/15 NIL pap, neg HPV. Plan: cotest in 3 yrs.  5/21/18 NIL pap, + HR HPV (not 16/18) @ age 27. Plan 1 year cotest per provider  9/17/20 NIL pap, HPV was not able to be added due to age of sample. Plan: Cotest in 1 yr  8/19/21 LSIL Pap, Neg HPV. [place] due by 11/19/21.    9/16/21 Colpo ECC neg. Plan cotest due 8/19/22.   9/2/22 NIL pap, Neg HPV. Plan: cotest in 1 year          Past Medical History:   Diagnosis Date     Abnormal Pap smear of cervix 12/23/2011 9/21 LSIL     Cervical high risk HPV (human papillomavirus) test positive 2013    10/31/14, 5/21/18     Depressive disorder 12/23/2011    possibly chronic     Environmental allergies      Influenza A with pneumonia 12/24/2021     Migraine      Migraines     headaches with allergies     Moderate major depression 12/23/2011     Moderate major depression (H) 12/23/2011     Moderate persistent asthma 3/17/2022     Nasal obstruction 2/15/2022    Added automatically from request for surgery [id number]     Nasal septal deviation 2/15/2022    Added automatically from request for surgery [id number]     Nasal turbinate hypertrophy 2/15/2022    Added automatically from request for surgery [id number]     NO ACTIVE PROBLEMS      Seasonal allergic rhinitis      Sepsis (H) 12/24/2021     Past Surgical History:   Procedure Laterality Date     DILATION AND EVACUATION N/A 02/16/2022    Procedure: DILATION AND EVACUATION, UTERUS;  Surgeon: Vianey Domingo MD;  Location: [facility]     ENDOSCOPIC SINUS SURGERY Bilateral 04/04/2022    Procedure: FUNCTIONAL ENDOSCOPIC SINUS SURGERY, with bilateral maxillary  antrostomies and bilateral michael bullosa reduction,;  Surgeon: Miquel Rutledge MD;  Location: [facility] OR     EYE SURGERY  01/2013    Lasic surgery     MA BREAST AUGMENTATION  2018     SEPTOPLASTY, TURBINOPLASTY, COMBINED Bilateral 04/04/2022    Procedure: WITH NASAL SEPTOPLASTY and bilateral inferior turbinate reduction;  Surgeon: Miquel Rutledge MD;  Location: [facility] OR     Current Outpatient Medications   Medication Sig Dispense Refill     ALPRAZolam (XANAX) 0.25 MG tablet Take 1 tablet (0.25 mg) by mouth at bedtime as needed, may repeat once for anxiety 30 tablet 0     azelastine (OPTIVAR) 0.05 % ophthalmic solution Apply 1 drop to eye 2 times daily 6 mL 3     COMBIVENT RESPIMAT  MCG/ACT inhaler  (Patient not taking: Reported on 3/16/2023)       COMPOUNDED NON-CONTROLLED SUBSTANCE (CMPD RX) - PHARMACY TO MIX COMPOUNDED MEDICATION Apply 20 mLs into each nare 2 times daily Mupirocin 22grams/Liter for nasal irrigation 2000 mL 4     EPINEPHrine (ANY BX GENERIC EQUIV) 0.3 MG/0.3ML injection 2-pack Inject into the muscle as directed for anaphylaxis (Patient not taking: Reported on 3/16/2023) 2 each 2     escitalopram (LEXAPRO) 10 MG tablet Take 1 tablet (10 mg) by mouth daily 90 tablet 0     Fexofenadine HCl (ALLEGRA ALLERGY PO)        fluticasone-salmeterol (ADVAIR) 500-50 MCG/ACT inhaler Inhale 1 puff into the lungs every 12 hours 1 each 5     ipratropium - albuterol 0.5 mg/2.5 mg/3 mL (DUONEB) 0.5-2.5 (3) MG/3ML neb solution Inhale 1 vial (3 mLs) into the lungs every 6 hours as needed for shortness of breath or wheezing 360 mL 3     omeprazole (PRILOSEC) 20 MG DR capsule Take 1 capsule (20 mg) by mouth daily 90 capsule 1     ORDER FOR ALLERGEN IMMUNOTHERAPY Name of Mix: Mix #1  Dust Mite, Cat, Dog  Cat Hair, Standardized 10,000 BAU/mL, ALK  2.0 ml  Dog Hair-Dander, A. P.  1:100 w/v, HS  1.0 ml  Dust Mites DF. 10,000 AU/mL, HS  1.0 ml  Dust Mites DP. 10,000 AU/mL, HS  1.0 ml   Diluent: HSA qs to 5ml (Patient  not taking: Reported on 3/16/2023) 5 mL PRN     ORDER FOR ALLERGEN IMMUNOTHERAPY Name of Mix: Mix #2  Tree   Sylvain, White 1:20 w/v, HS  0.5 ml  Birch Mix PRW 1:20 w/v, HS  0.5 ml  Boxelder-Maple Mix BHR (Boxelder Hard Red) 1:20 w/v, HS  0.5 ml  [place], Common 1:20 w/v, HS  0.5 ml  Oak Mix RVW 1:20 w/v, HS 0.5 ml  Pine, White 1:20 w/v, ALK 0.5 ml  [place] Tree, Black 1:20 w/v, HS 0.5 ml  Diluent: HSA qs to 5ml (Patient not taking: Reported on 3/16/2023) 5 mL PRN     ORDER FOR ALLERGEN IMMUNOTHERAPY Name of Mix: Mix #3  Grass, Weeds  Prabhu Grass 1:20 w/v, HS 0.5 ml  Steve Grass (Std) 100,000 BAU/mL, HS 0.4 ml  Lamb's Quarters 1:20 w/v, HS 0.5 ml  Nettle 1:20 w/v, HS 0.5 ml  Plantain, English 1:20 w/v, HS 0.5 ml  Ragweed Mixed 1:20 w/v ALK  0.5 ml  Russian Thistle 1:20 w/v, HS 0.5 ml  Sorrel, Sheep 1:20 w/v, HS 0.5 ml  Diluent: HSA qs to 5ml (Patient not taking: Reported on 3/16/2023) 5 mL PRN     SYMBICORT 160-4.5 MCG/ACT Inhaler Inhale 2 puffs into the lungs 2 times daily Needs appointment for additional refills 10.2 g 1     VENTOLIN  (90 Base) MCG/ACT inhaler Inhale 1-2 puffs into the lungs every 4 hours as needed for shortness of breath or wheezing 18 g 1       No Known Allergies     Social History     Tobacco Use     Smoking status: Never     Passive exposure: Never     Smokeless tobacco: Never   Vaping Use     Vaping status: Never Used   Substance Use Topics     Alcohol use: Yes     Comment: Socially     Family History   Problem Relation Age of Onset     Arthritis Mother      Lupus Mother      Heart Disease Father      Cerebrovascular Disease Father         stroke     Hyperlipidemia Father      Diabetes Paternal Grandmother      Hypertension Paternal Grandmother      Respiratory Paternal Grandmother         asthma     Colon Cancer No family hx of      Breast Cancer No family hx of      Coronary Artery Disease No family hx of      History   Drug Use No         Objective     /72 (BP Location: Left  "arm, Patient Position: Sitting, Cuff Size: Adult Regular)   Pulse 64   Temp 97.8  F (36.6  C) (Oral)   Resp 20   Ht 1.545 m (5' 0.83\")   Wt 69.2 kg (152 lb 9.6 oz)   LMP 06/05/2023 (Exact Date)   SpO2 96%   BMI 29.00 kg/m      Physical Exam    GENERAL APPEARANCE: healthy, alert and no distress     EYES: EOMI, PERRL     HENT: ear canals and TM's normal and nose and mouth without ulcers or lesions     NECK: no adenopathy, no asymmetry, masses, or scars and thyroid normal to palpation     RESP: lungs clear to auscultation - no rales, rhonchi or wheezes     CV: regular rates and rhythm, normal S1 S2, no S3 or S4 and no murmur, click or rub     ABDOMEN:  soft, nontender, no HSM or masses and bowel sounds normal     MS: extremities normal- no gross deformities noted, no evidence of inflammation in joints, FROM in all extremities.     SKIN: no suspicious lesions or rashes     NEURO: Normal strength and tone, sensory exam grossly normal, mentation intact and speech normal     PSYCH: mentation appears normal. and affect normal/bright     LYMPHATICS: No cervical adenopathy    No results for input(s): HGB, PLT, INR, NA, POTASSIUM, CR, A1C in the last 43466 hours.     Diagnostics:  Recent Results (from the past 168 hour(s))   CBC with platelets and differential    Collection Time: 06/09/23  8:15 AM   Result Value Ref Range    WBC Count 8.5 4.0 - 11.0 10e3/uL    RBC Count 3.95 3.80 - 5.20 10e6/uL    Hemoglobin 11.9 11.7 - 15.7 g/dL    Hematocrit 36.5 35.0 - 47.0 %    MCV 92 78 - 100 fL    MCH 30.1 26.5 - 33.0 pg    MCHC 32.6 31.5 - 36.5 g/dL    RDW 13.0 10.0 - 15.0 %    Platelet Count 454 (H) 150 - 450 10e3/uL    % Neutrophils 46 %    % Lymphocytes 29 %    % Monocytes 6 %    % Eosinophils 19 %    % Basophils 1 %    % Immature Granulocytes 0 %    Absolute Neutrophils 3.9 1.6 - 8.3 10e3/uL    Absolute Lymphocytes 2.5 0.8 - 5.3 10e3/uL    Absolute Monocytes 0.5 0.0 - 1.3 10e3/uL    Absolute Eosinophils 1.6 (H) 0.0 - 0.7 " 10e3/uL    Absolute Basophils 0.1 0.0 - 0.2 10e3/uL    Absolute Immature Granulocytes 0.0 <=0.4 10e3/uL   HCG Qual, Urine ([id number])    Collection Time: 06/09/23  8:25 AM   Result Value Ref Range    hCG Urine Qualitative Negative Negative      No EKG required, no history of coronary heart disease, significant arrhythmia, peripheral arterial disease or other structural heart disease.    Revised Cardiac Risk Index (RCRI):  The patient has the following serious cardiovascular risks for perioperative complications:   - No serious cardiac risks = 0 points     RCRI Interpretation: 0 points: Class I (very low risk - 0.4% complication rate)           Signed Electronically by: Melba Gray PA-C  Copy of this evaluation report is provided to requesting physician.      Answers for HPI/ROS submitted by the patient on 6/8/2023  If you checked off any problems, how difficult have these problems made it for you to do your work, take care of things at home, or get along with other people?: Somewhat difficult  PHQ9 TOTAL SCORE: 4

## 2023-06-09 NOTE — PROGRESS NOTES
[address] [address]  Phone: [phone]  Primary Provider: Cally Rucker  Pre-op Performing Provider: RICCARDO OLIVARES      PREOPERATIVE EVALUATION:  Today's date: 6/9/2023    Reginaldo Ferraro is a 32 year old female who presents for a preoperative evaluation.       View : No data to display.              Surgical Information:  Surgery/Procedure: Endoscopic sinus   Surgery Location: [facility]   Surgeon: Lovely   Surgery Date: 6/15  Time of Surgery: 8  Where patient plans to recover: At home with family  Fax number for surgical facility: [phone]    Assessment & Plan     The proposed surgical procedure is considered INTERMEDIATE risk.    Preop general physical exam  Normal hemoglobin and negative pregnancy test   Platelets slightly elevated- follow up with primary in September and consider repeating    - CBC with platelets and differential  - HCG Qual, Urine ([id number])  - CBC with platelets and differential  - HCG Qual, Urine ([id number])    Chronic pansinusitis  Reason for procedure     Purulent rhinitis  Reason for procedure     Staph aureus infection  Reason for procedure               - No identified additional risk factors other than previously addressed    Antiplatelet or Anticoagulation Medication Instructions:   - Patient is on no antiplatelet or anticoagulation medications.    Additional Medication Instructions:  Patient is to take all scheduled medications on the day of surgery    RECOMMENDATION:  APPROVAL GIVEN to proceed with proposed procedure, without further diagnostic evaluation.            Subjective       HPI related to upcoming procedure: patient unknown to me presents for preop  History of chronic sinusitis post septoplasty, inferior turbinate reduction, inferior  turbinate reduction,bilateral endoscopic michael bullosa reduction and maxillary anostomies 4/4/22     Currently menstruating       6/8/2023    10:01 AM   Preop  Questions   1. Have you ever had a heart attack or stroke? No   2. Have you ever had surgery on your heart or blood vessels, such as a stent placement, a coronary artery bypass, or surgery on an artery in your head, neck, heart, or legs? No   3. Do you have chest pain with activity? No   4. Do you have a history of  heart failure? No   5. Do you currently have a cold, bronchitis or symptoms of other infection? No   6. Do you have a cough, shortness of breath, or wheezing? No   7. Do you or anyone in your family have previous history of blood clots? No   8. Do you or does anyone in your family have a serious bleeding problem such as prolonged bleeding following surgeries or cuts? No   9. Have you ever had problems with anemia or been told to take iron pills? UNKNOWN -    10. Have you had any abnormal blood loss such as black, tarry or bloody stools, or abnormal vaginal bleeding? No   11. Have you ever had a blood transfusion? No   12. Are you willing to have a blood transfusion if it is medically needed before, during, or after your surgery? Yes   13. Have you or any of your relatives ever had problems with anesthesia? No   14. Do you have sleep apnea, excessive snoring or daytime drowsiness? No   15. Do you have any artifical heart valves or other implanted medical devices like a pacemaker, defibrillator, or continuous glucose monitor? No   16. Do you have artificial joints? No   17. Are you allergic to latex? No   18. Is there any chance that you may be pregnant? No       Health Care Directive:  Patient does not have a Health Care Directive or Living Will: Discussed advance care planning with patient; however, patient declined at this time.    Preoperative Review of :   reviewed - alprazolam 12/26/22      Status of Chronic Conditions:  See problem list for active medical problems.  Problems all longstanding and stable, except as noted/documented.  See ROS for pertinent symptoms related to these  conditions.      Review of Systems  CONSTITUTIONAL: NEGATIVE for fever, chills, change in weight  INTEGUMENTARY/SKIN: NEGATIVE for worrisome rashes, moles or lesions  EYES: NEGATIVE for vision changes or irritation  ENT/MOUTH: see hpi   RESP: NEGATIVE for significant cough or SOB  CV: NEGATIVE for chest pain, palpitations or peripheral edema  GI: NEGATIVE for nausea, abdominal pain, heartburn, or change in bowel habits  : NEGATIVE for frequency, dysuria, or hematuria  MUSCULOSKELETAL: NEGATIVE for significant arthralgias or myalgia  NEURO: NEGATIVE for weakness, dizziness or paresthesias  ENDOCRINE: NEGATIVE for temperature intolerance, skin/hair changes  HEME: NEGATIVE for bleeding problems  PSYCHIATRIC: NEGATIVE for changes in mood or affect    Patient Active Problem List    Diagnosis Date Noted     Chronic pansinusitis 05/04/2023     Priority: Medium     Added automatically from request for surgery [id number]       Purulent rhinitis 05/04/2023     Priority: Medium     Added automatically from request for surgery [id number]       Staph aureus infection 05/04/2023     Priority: Medium     Added automatically from request for surgery [id number]       Pain in both lower legs 11/10/2022     Priority: Medium     Moderate persistent asthma 03/17/2022     Priority: Medium     Allergic rhinitis due to animals 03/17/2022     Priority: Medium     Allergic rhinitis due to dust mite 03/17/2022     Priority: Medium     Seasonal allergic rhinitis due to pollen 03/17/2022     Priority: Medium     Chronic maxillary sinusitis 02/15/2022     Priority: Medium     Added automatically from request for surgery [id number]       Mild episode of recurrent major depressive disorder (H) 06/11/2018     Priority: Medium     Adjustment disorder with depressed mood 08/15/2016     Priority: Medium     Mixed personality disorder (H) 08/15/2016     Priority: Medium     Insomnia, unspecified insomnia 11/02/2015     Priority: Medium     Vitamin D deficiency  11/02/2015     Priority: Medium     Cervical high risk HPV (human papillomavirus) test positive 01/17/2012     Priority: Medium       12/23/11 LSIL, cannot rule out HSIL @ age 21. Plan colposcopy within 3 months.  4/30/12 Colpo No bx taken. NIL pap neg HPV. Plan pap in 1 year  10/15/13 NIL pap, + HR HPV (58). Plan pap in 1 year   10/31/14 NIL pap, + HR HPV. Plan: cotest in 1 year  11/2/15 NIL pap, neg HPV. Plan: cotest in 3 yrs.  5/21/18 NIL pap, + HR HPV (not 16/18) @ age 27. Plan 1 year cotest per provider  9/17/20 NIL pap, HPV was not able to be added due to age of sample. Plan: Cotest in 1 yr  8/19/21 LSIL Pap, Neg HPV. [place] due by 11/19/21.    9/16/21 Colpo ECC neg. Plan cotest due 8/19/22.   9/2/22 NIL pap, Neg HPV. Plan: cotest in 1 year          Past Medical History:   Diagnosis Date     Abnormal Pap smear of cervix 12/23/2011 9/21 LSIL     Cervical high risk HPV (human papillomavirus) test positive 2013    10/31/14, 5/21/18     Depressive disorder 12/23/2011    possibly chronic     Environmental allergies      Influenza A with pneumonia 12/24/2021     Migraine      Migraines     headaches with allergies     Moderate major depression 12/23/2011     Moderate major depression (H) 12/23/2011     Moderate persistent asthma 3/17/2022     Nasal obstruction 2/15/2022    Added automatically from request for surgery [id number]     Nasal septal deviation 2/15/2022    Added automatically from request for surgery [id number]     Nasal turbinate hypertrophy 2/15/2022    Added automatically from request for surgery [id number]     NO ACTIVE PROBLEMS      Seasonal allergic rhinitis      Sepsis (H) 12/24/2021     Past Surgical History:   Procedure Laterality Date     DILATION AND EVACUATION N/A 02/16/2022    Procedure: DILATION AND EVACUATION, UTERUS;  Surgeon: Vianey Domingo MD;  Location: [facility]     ENDOSCOPIC SINUS SURGERY Bilateral 04/04/2022    Procedure: FUNCTIONAL ENDOSCOPIC SINUS SURGERY, with bilateral maxillary  antrostomies and bilateral michael bullosa reduction,;  Surgeon: Miquel Rutledge MD;  Location: [facility] OR     EYE SURGERY  01/2013    Lasic surgery     MT BREAST AUGMENTATION  2018     SEPTOPLASTY, TURBINOPLASTY, COMBINED Bilateral 04/04/2022    Procedure: WITH NASAL SEPTOPLASTY and bilateral inferior turbinate reduction;  Surgeon: Miquel Rutledge MD;  Location: [facility] OR     Current Outpatient Medications   Medication Sig Dispense Refill     ALPRAZolam (XANAX) 0.25 MG tablet Take 1 tablet (0.25 mg) by mouth at bedtime as needed, may repeat once for anxiety 30 tablet 0     azelastine (OPTIVAR) 0.05 % ophthalmic solution Apply 1 drop to eye 2 times daily 6 mL 3     COMBIVENT RESPIMAT  MCG/ACT inhaler  (Patient not taking: Reported on 3/16/2023)       COMPOUNDED NON-CONTROLLED SUBSTANCE (CMPD RX) - PHARMACY TO MIX COMPOUNDED MEDICATION Apply 20 mLs into each nare 2 times daily Mupirocin 22grams/Liter for nasal irrigation 2000 mL 4     EPINEPHrine (ANY BX GENERIC EQUIV) 0.3 MG/0.3ML injection 2-pack Inject into the muscle as directed for anaphylaxis (Patient not taking: Reported on 3/16/2023) 2 each 2     escitalopram (LEXAPRO) 10 MG tablet Take 1 tablet (10 mg) by mouth daily 90 tablet 0     Fexofenadine HCl (ALLEGRA ALLERGY PO)        fluticasone-salmeterol (ADVAIR) 500-50 MCG/ACT inhaler Inhale 1 puff into the lungs every 12 hours 1 each 5     ipratropium - albuterol 0.5 mg/2.5 mg/3 mL (DUONEB) 0.5-2.5 (3) MG/3ML neb solution Inhale 1 vial (3 mLs) into the lungs every 6 hours as needed for shortness of breath or wheezing 360 mL 3     omeprazole (PRILOSEC) 20 MG DR capsule Take 1 capsule (20 mg) by mouth daily 90 capsule 1     ORDER FOR ALLERGEN IMMUNOTHERAPY Name of Mix: Mix #1  Dust Mite, Cat, Dog  Cat Hair, Standardized 10,000 BAU/mL, ALK  2.0 ml  Dog Hair-Dander, A. P.  1:100 w/v, HS  1.0 ml  Dust Mites DF. 10,000 AU/mL, HS  1.0 ml  Dust Mites DP. 10,000 AU/mL, HS  1.0 ml   Diluent: HSA qs to 5ml (Patient  not taking: Reported on 3/16/2023) 5 mL PRN     ORDER FOR ALLERGEN IMMUNOTHERAPY Name of Mix: Mix #2  Tree   Sylvain, White 1:20 w/v, HS  0.5 ml  Birch Mix PRW 1:20 w/v, HS  0.5 ml  Boxelder-Maple Mix BHR (Boxelder Hard Red) 1:20 w/v, HS  0.5 ml  [place], Common 1:20 w/v, HS  0.5 ml  Oak Mix RVW 1:20 w/v, HS 0.5 ml  Pine, White 1:20 w/v, ALK 0.5 ml  [place] Tree, Black 1:20 w/v, HS 0.5 ml  Diluent: HSA qs to 5ml (Patient not taking: Reported on 3/16/2023) 5 mL PRN     ORDER FOR ALLERGEN IMMUNOTHERAPY Name of Mix: Mix #3  Grass, Weeds  Prabhu Grass 1:20 w/v, HS 0.5 ml  Steve Grass (Std) 100,000 BAU/mL, HS 0.4 ml  Lamb's Quarters 1:20 w/v, HS 0.5 ml  Nettle 1:20 w/v, HS 0.5 ml  Plantain, English 1:20 w/v, HS 0.5 ml  Ragweed Mixed 1:20 w/v ALK  0.5 ml  Russian Thistle 1:20 w/v, HS 0.5 ml  Sorrel, Sheep 1:20 w/v, HS 0.5 ml  Diluent: HSA qs to 5ml (Patient not taking: Reported on 3/16/2023) 5 mL PRN     SYMBICORT 160-4.5 MCG/ACT Inhaler Inhale 2 puffs into the lungs 2 times daily Needs appointment for additional refills 10.2 g 1     VENTOLIN  (90 Base) MCG/ACT inhaler Inhale 1-2 puffs into the lungs every 4 hours as needed for shortness of breath or wheezing 18 g 1       No Known Allergies     Social History     Tobacco Use     Smoking status: Never     Passive exposure: Never     Smokeless tobacco: Never   Vaping Use     Vaping status: Never Used   Substance Use Topics     Alcohol use: Yes     Comment: Socially     Family History   Problem Relation Age of Onset     Arthritis Mother      Lupus Mother      Heart Disease Father      Cerebrovascular Disease Father         stroke     Hyperlipidemia Father      Diabetes Paternal Grandmother      Hypertension Paternal Grandmother      Respiratory Paternal Grandmother         asthma     Colon Cancer No family hx of      Breast Cancer No family hx of      Coronary Artery Disease No family hx of      History   Drug Use No         Objective     /72 (BP Location: Left  "arm, Patient Position: Sitting, Cuff Size: Adult Regular)   Pulse 64   Temp 97.8  F (36.6  C) (Oral)   Resp 20   Ht 1.545 m (5' 0.83\")   Wt 69.2 kg (152 lb 9.6 oz)   LMP 06/05/2023 (Exact Date)   SpO2 96%   BMI 29.00 kg/m      Physical Exam    GENERAL APPEARANCE: healthy, alert and no distress     EYES: EOMI, PERRL     HENT: ear canals and TM's normal and nose and mouth without ulcers or lesions     NECK: no adenopathy, no asymmetry, masses, or scars and thyroid normal to palpation     RESP: lungs clear to auscultation - no rales, rhonchi or wheezes     CV: regular rates and rhythm, normal S1 S2, no S3 or S4 and no murmur, click or rub     ABDOMEN:  soft, nontender, no HSM or masses and bowel sounds normal     MS: extremities normal- no gross deformities noted, no evidence of inflammation in joints, FROM in all extremities.     SKIN: no suspicious lesions or rashes     NEURO: Normal strength and tone, sensory exam grossly normal, mentation intact and speech normal     PSYCH: mentation appears normal. and affect normal/bright     LYMPHATICS: No cervical adenopathy    No results for input(s): HGB, PLT, INR, NA, POTASSIUM, CR, A1C in the last 68205 hours.     Diagnostics:  Recent Results (from the past 168 hour(s))   CBC with platelets and differential    Collection Time: 06/09/23  8:15 AM   Result Value Ref Range    WBC Count 8.5 4.0 - 11.0 10e3/uL    RBC Count 3.95 3.80 - 5.20 10e6/uL    Hemoglobin 11.9 11.7 - 15.7 g/dL    Hematocrit 36.5 35.0 - 47.0 %    MCV 92 78 - 100 fL    MCH 30.1 26.5 - 33.0 pg    MCHC 32.6 31.5 - 36.5 g/dL    RDW 13.0 10.0 - 15.0 %    Platelet Count 454 (H) 150 - 450 10e3/uL    % Neutrophils 46 %    % Lymphocytes 29 %    % Monocytes 6 %    % Eosinophils 19 %    % Basophils 1 %    % Immature Granulocytes 0 %    Absolute Neutrophils 3.9 1.6 - 8.3 10e3/uL    Absolute Lymphocytes 2.5 0.8 - 5.3 10e3/uL    Absolute Monocytes 0.5 0.0 - 1.3 10e3/uL    Absolute Eosinophils 1.6 (H) 0.0 - 0.7 " 10e3/uL    Absolute Basophils 0.1 0.0 - 0.2 10e3/uL    Absolute Immature Granulocytes 0.0 <=0.4 10e3/uL   HCG Qual, Urine ([id number])    Collection Time: 06/09/23  8:25 AM   Result Value Ref Range    hCG Urine Qualitative Negative Negative      No EKG required, no history of coronary heart disease, significant arrhythmia, peripheral arterial disease or other structural heart disease.    Revised Cardiac Risk Index (RCRI):  The patient has the following serious cardiovascular risks for perioperative complications:   - No serious cardiac risks = 0 points     RCRI Interpretation: 0 points: Class I (very low risk - 0.4% complication rate)           Signed Electronically by: Melba Gray PA-C  Copy of this evaluation report is provided to requesting physician.      Answers for HPI/ROS submitted by the patient on 6/8/2023  If you checked off any problems, how difficult have these problems made it for you to do your work, take care of things at home, or get along with other people?: Somewhat difficult  PHQ9 TOTAL SCORE: 4

## 2023-06-09 NOTE — PATIENT INSTRUCTIONS
Schedule annual exam with pap smear with Cally Rucker approximately 9/3/23 - come in fasting  Patient Education    For informational purposes only. Not to replace the advice of your health care provider. Copyright   , 2019 [employer]. All rights reserved. Clinically reviewed by Kayla Ware MD. [employer] [id number] - REV .  Preparing for Your Surgery  Getting started  A nurse will call you to review your health history and instructions. They will give you an arrival time based on your scheduled surgery time. Please be ready to share:  Your doctor's clinic name and phone number  Your medical, surgical, and anesthesia history  A list of allergies and sensitivities  A list of medicines, including herbal treatments and over-the-counter drugs  Whether the patient has a legal guardian (ask how to send us the papers in advance)  Please tell us if you're pregnant--or if there's any chance you might be pregnant. Some surgeries may injure a fetus (unborn baby), so they require a pregnancy test. Surgeries that are safe for a fetus don't always need a test, and you can choose whether to have one.   If you have a child who's having surgery, please ask for a copy of Preparing for Your Child's Surgery.    Preparing for surgery  Within 10 to 30 days of surgery: Have a pre-op exam (sometimes called an H&P, or History and Physical). This can be done at a clinic or pre-operative center.  If you're having a , you may not need this exam. Talk to your care team.  At your pre-op exam, talk to your care team about all medicines you take. If you need to stop any medicines before surgery, ask when to start taking them again.  We do this for your safety. Many medicines can make you bleed too much during surgery. Some change how well surgery (anesthesia) drugs work.  Call your insurance company to let them know you're having surgery. (If you don't have insurance, call [phone].)  Call your clinic if there's any  change in your health. This includes signs of a cold or flu (sore throat, runny nose, cough, rash, fever). It also includes a scrape or scratch near the surgery site.  If you have questions on the day of surgery, call your hospital or surgery center.  Eating and drinking guidelines  For your safety: Unless your surgeon tells you otherwise, follow the guidelines below.  Eat and drink as usual until 8 hours before you arrive for surgery. After that, no food or milk.  Drink clear liquids until 2 hours before you arrive. These are liquids you can see through, like water, Gatorade, and Propel Water. They also include plain black coffee and tea (no cream or milk), candy, and breath mints. You can spit out gum when you arrive.  If you drink alcohol: Stop drinking it the night before surgery.  If your care team tells you to take medicine on the morning of surgery, it's okay to take it with a sip of water.  Preventing infection  Shower or bathe the night before and morning of your surgery. Follow the instructions your clinic gave you. (If no instructions, use regular soap.)  Don't shave or clip hair near your surgery site. We'll remove the hair if needed.  Don't smoke or vape the morning of surgery. You may chew nicotine gum up to 2 hours before surgery. A nicotine patch is okay.  Note: Some surgeries require you to completely quit smoking and nicotine. Check with your surgeon.  Your care team will make every effort to keep you safe from infection. We will:  Clean our hands often with soap and water (or an alcohol-based hand rub).  Clean the skin at your surgery site with a special soap that kills germs.  Give you a special gown to keep you warm. (Cold raises the risk of infection.)  Wear special hair covers, masks, gowns and gloves during surgery.  Give antibiotic medicine, if prescribed. Not all surgeries need antibiotics.  What to bring on the day of surgery  Photo ID and insurance card  Copy of your health care  directive, if you have one  Glasses and hearing aids (bring cases)  You can't wear contacts during surgery  Inhaler and eye drops, if you use them (tell us about these when you arrive)  CPAP machine or breathing device, if you use them  A few personal items, if spending the night  If you have . . .  A pacemaker, ICD (cardiac defibrillator) or other implant: Bring the ID card.  An implanted stimulator: Bring the remote control.  A legal guardian: Bring a copy of the certified (court-stamped) guardianship papers.  Please remove any jewelry, including body piercings. Leave jewelry and other valuables at home.  If you're going home the day of surgery  You must have a responsible adult drive you home. They should stay with you overnight as well.  If you don't have someone to stay with you, and you aren't safe to go home alone, we may keep you overnight. Insurance often won't pay for this.  After surgery  If it's hard to control your pain or you need more pain medicine, please call your surgeon's office.  Questions?   If you have any questions for your care team, list them here: _________________________________________________________________________________________________________________________________________________________________________ ____________________________________ ____________________________________ ____________________________________    How to Take Your Medication Before Surgery  - Take all of your medications before surgery as usual

## 2023-06-14 ENCOUNTER — ANESTHESIA EVENT (OUTPATIENT)
Dept: SURGERY | Facility: AMBULATORY SURGERY CENTER | Age: 33
End: 2023-06-14
Payer: COMMERCIAL

## 2023-06-15 ENCOUNTER — HOSPITAL ENCOUNTER (OUTPATIENT)
Facility: AMBULATORY SURGERY CENTER | Age: 33
Discharge: HOME | End: 2023-06-15
Attending: OTOLARYNGOLOGY | Admitting: OTOLARYNGOLOGY
Payer: COMMERCIAL

## 2023-06-15 ENCOUNTER — ANESTHESIA (OUTPATIENT)
Dept: SURGERY | Facility: AMBULATORY SURGERY CENTER | Age: 33
End: 2023-06-15
Payer: COMMERCIAL

## 2023-06-15 VITALS
DIASTOLIC BLOOD PRESSURE: 73 MMHG | RESPIRATION RATE: 17 BRPM | WEIGHT: 152 LBS | TEMPERATURE: 97.6 F | BODY MASS INDEX: 28.88 KG/M2 | HEART RATE: 67 BPM | OXYGEN SATURATION: 90 % | SYSTOLIC BLOOD PRESSURE: 108 MMHG

## 2023-06-15 DIAGNOSIS — A49.01 STAPH AUREUS INFECTION: ICD-10-CM

## 2023-06-15 DIAGNOSIS — G89.18 ACUTE POST-OPERATIVE PAIN: Primary | ICD-10-CM

## 2023-06-15 DIAGNOSIS — J31.0 PURULENT RHINITIS: ICD-10-CM

## 2023-06-15 DIAGNOSIS — J32.4 CHRONIC PANSINUSITIS: ICD-10-CM

## 2023-06-15 LAB — HCG UR QL: NEGATIVE

## 2023-06-15 PROCEDURE — 81025 URINE PREGNANCY TEST: CPT | Performed by: ANESTHESIOLOGY

## 2023-06-15 PROCEDURE — 31267 ENDOSCOPY MAXILLARY SINUS: CPT | Mod: RT

## 2023-06-15 PROCEDURE — 31267 ENDOSCOPY MAXILLARY SINUS: CPT | Mod: 50 | Performed by: OTOLARYNGOLOGY

## 2023-06-15 PROCEDURE — 61782 SCAN PROC CRANIAL EXTRA: CPT

## 2023-06-15 PROCEDURE — G8916 PT W IV AB GIVEN ON TIME: HCPCS

## 2023-06-15 PROCEDURE — 31255 NSL/SINS NDSC W/TOT ETHMDCT: CPT | Mod: 50 | Performed by: OTOLARYNGOLOGY

## 2023-06-15 PROCEDURE — G8907 PT DOC NO EVENTS ON DISCHARG: HCPCS

## 2023-06-15 PROCEDURE — 31255 NSL/SINS NDSC W/TOT ETHMDCT: CPT | Mod: LT

## 2023-06-15 RX ORDER — FENTANYL CITRATE 50 UG/ML
25 INJECTION, SOLUTION INTRAMUSCULAR; INTRAVENOUS EVERY 5 MIN PRN
Status: DISCONTINUED | OUTPATIENT
Start: 2023-06-15 | End: 2023-06-16 | Stop reason: HOSPADM

## 2023-06-15 RX ORDER — PHENYLEPHRINE HYDROCHLORIDE 10 MG/ML
INJECTION INTRAVENOUS PRN
Status: DISCONTINUED | OUTPATIENT
Start: 2023-06-15 | End: 2023-06-15

## 2023-06-15 RX ORDER — LIDOCAINE 40 MG/G
CREAM TOPICAL
Status: DISCONTINUED | OUTPATIENT
Start: 2023-06-15 | End: 2023-06-16 | Stop reason: HOSPADM

## 2023-06-15 RX ORDER — SODIUM CHLORIDE, SODIUM LACTATE, POTASSIUM CHLORIDE, CALCIUM CHLORIDE 600; 310; 30; 20 MG/100ML; MG/100ML; MG/100ML; MG/100ML
INJECTION, SOLUTION INTRAVENOUS CONTINUOUS
Status: DISCONTINUED | OUTPATIENT
Start: 2023-06-15 | End: 2023-06-16 | Stop reason: HOSPADM

## 2023-06-15 RX ORDER — LIDOCAINE HYDROCHLORIDE AND EPINEPHRINE 10; 10 MG/ML; UG/ML
INJECTION, SOLUTION INFILTRATION; PERINEURAL PRN
Status: DISCONTINUED | OUTPATIENT
Start: 2023-06-15 | End: 2023-06-15 | Stop reason: HOSPADM

## 2023-06-15 RX ORDER — COCAINE HYDROCHLORIDE 40 MG/ML
SOLUTION NASAL PRN
Status: DISCONTINUED | OUTPATIENT
Start: 2023-06-15 | End: 2023-06-15 | Stop reason: HOSPADM

## 2023-06-15 RX ORDER — PROPOFOL 10 MG/ML
INJECTION, EMULSION INTRAVENOUS PRN
Status: DISCONTINUED | OUTPATIENT
Start: 2023-06-15 | End: 2023-06-15

## 2023-06-15 RX ORDER — FENTANYL CITRATE 50 UG/ML
50 INJECTION, SOLUTION INTRAMUSCULAR; INTRAVENOUS EVERY 5 MIN PRN
Status: DISCONTINUED | OUTPATIENT
Start: 2023-06-15 | End: 2023-06-16 | Stop reason: HOSPADM

## 2023-06-15 RX ORDER — ONDANSETRON 4 MG/1
4 TABLET, ORALLY DISINTEGRATING ORAL EVERY 30 MIN PRN
Status: DISCONTINUED | OUTPATIENT
Start: 2023-06-15 | End: 2023-06-16 | Stop reason: HOSPADM

## 2023-06-15 RX ORDER — ONDANSETRON 2 MG/ML
4 INJECTION INTRAMUSCULAR; INTRAVENOUS EVERY 30 MIN PRN
Status: DISCONTINUED | OUTPATIENT
Start: 2023-06-15 | End: 2023-06-16 | Stop reason: HOSPADM

## 2023-06-15 RX ORDER — ONDANSETRON 8 MG/1
8 TABLET, ORALLY DISINTEGRATING ORAL EVERY 8 HOURS PRN
Qty: 15 TABLET | Refills: 3 | Status: SHIPPED | OUTPATIENT
Start: 2023-06-15 | End: 2024-03-04

## 2023-06-15 RX ORDER — OXYCODONE HYDROCHLORIDE 5 MG/1
10 TABLET ORAL
Status: DISCONTINUED | OUTPATIENT
Start: 2023-06-15 | End: 2023-06-16 | Stop reason: HOSPADM

## 2023-06-15 RX ORDER — CIPROFLOXACIN 500 MG/1
500 TABLET, FILM COATED ORAL 2 TIMES DAILY
Qty: 14 TABLET | Refills: 0 | Status: SHIPPED | OUTPATIENT
Start: 2023-06-15 | End: 2023-06-22

## 2023-06-15 RX ORDER — CEFAZOLIN SODIUM 2 G/100ML
2 INJECTION, SOLUTION INTRAVENOUS SEE ADMIN INSTRUCTIONS
Status: DISCONTINUED | OUTPATIENT
Start: 2023-06-15 | End: 2023-06-16 | Stop reason: HOSPADM

## 2023-06-15 RX ORDER — ONDANSETRON 2 MG/ML
INJECTION INTRAMUSCULAR; INTRAVENOUS PRN
Status: DISCONTINUED | OUTPATIENT
Start: 2023-06-15 | End: 2023-06-15

## 2023-06-15 RX ORDER — OXYCODONE HYDROCHLORIDE 5 MG/1
5 TABLET ORAL
Status: DISCONTINUED | OUTPATIENT
Start: 2023-06-15 | End: 2023-06-16 | Stop reason: HOSPADM

## 2023-06-15 RX ORDER — LIDOCAINE HYDROCHLORIDE 20 MG/ML
INJECTION, SOLUTION INFILTRATION; PERINEURAL PRN
Status: DISCONTINUED | OUTPATIENT
Start: 2023-06-15 | End: 2023-06-15

## 2023-06-15 RX ORDER — ACETAMINOPHEN 325 MG/1
975 TABLET ORAL ONCE
Status: COMPLETED | OUTPATIENT
Start: 2023-06-15 | End: 2023-06-15

## 2023-06-15 RX ORDER — DEXAMETHASONE SODIUM PHOSPHATE 10 MG/ML
10 INJECTION, SOLUTION INTRAMUSCULAR; INTRAVENOUS ONCE
Status: COMPLETED | OUTPATIENT
Start: 2023-06-15 | End: 2023-06-15

## 2023-06-15 RX ORDER — HYDROCODONE BITARTRATE AND ACETAMINOPHEN 5; 325 MG/1; MG/1
1 TABLET ORAL EVERY 6 HOURS PRN
Qty: 30 TABLET | Refills: 0 | Status: SHIPPED | OUTPATIENT
Start: 2023-06-15 | End: 2023-11-02

## 2023-06-15 RX ORDER — CEFAZOLIN SODIUM 2 G/100ML
2 INJECTION, SOLUTION INTRAVENOUS
Status: COMPLETED | OUTPATIENT
Start: 2023-06-15 | End: 2023-06-15

## 2023-06-15 RX ADMIN — Medication 40 MG: at 10:01

## 2023-06-15 RX ADMIN — CEFAZOLIN SODIUM 2 G: 2 INJECTION, SOLUTION INTRAVENOUS at 09:53

## 2023-06-15 RX ADMIN — Medication 0.5 MG: at 10:01

## 2023-06-15 RX ADMIN — LIDOCAINE HYDROCHLORIDE 80 MG: 20 INJECTION, SOLUTION INFILTRATION; PERINEURAL at 10:01

## 2023-06-15 RX ADMIN — ONDANSETRON 4 MG: 2 INJECTION INTRAMUSCULAR; INTRAVENOUS at 10:43

## 2023-06-15 RX ADMIN — ACETAMINOPHEN 975 MG: 325 TABLET ORAL at 08:49

## 2023-06-15 RX ADMIN — PHENYLEPHRINE HYDROCHLORIDE 100 MCG: 10 INJECTION INTRAVENOUS at 10:39

## 2023-06-15 RX ADMIN — PROPOFOL 30 MCG/KG/MIN: 10 INJECTION, EMULSION INTRAVENOUS at 10:04

## 2023-06-15 RX ADMIN — PROPOFOL 50 MG: 10 INJECTION, EMULSION INTRAVENOUS at 10:06

## 2023-06-15 RX ADMIN — SODIUM CHLORIDE, SODIUM LACTATE, POTASSIUM CHLORIDE, CALCIUM CHLORIDE: 600; 310; 30; 20 INJECTION, SOLUTION INTRAVENOUS at 09:07

## 2023-06-15 RX ADMIN — DEXAMETHASONE SODIUM PHOSPHATE 10 MG: 10 INJECTION, SOLUTION INTRAMUSCULAR; INTRAVENOUS at 10:03

## 2023-06-15 RX ADMIN — PROPOFOL 200 MG: 10 INJECTION, EMULSION INTRAVENOUS at 10:01

## 2023-06-15 NOTE — OP NOTE
PREOPERATIVE DIAGNOSES:   1. Chronic sinusitis.   POSTOPERATIVE DIAGNOSES:   1. Chronic sinusitis.   PROCEDURES PERFORMED:   1. Bilateral revision endoscopic maxillary antrostomy with tissue removal.   2. Bilateral revision endoscopic total ethmoidectomy.   SURGEON: Atilio Murry MD   ASSISTANT: None  BLOOD LOSS: 30 mL.   COMPLICATIONS: None.   SPECIMENS: None.   ANESTHESIA: GETA.   INDICATIONS: Reginaldo Ferraro  presented to me with a long history of chronic nasal disease and chronic sinusitis, and previously had sinus surgery by another surgeon.    CT scan confirmed pansinusitis and multiple rounds of oral antibiotics, steroids, and allergy medication did not resolve the issue. Therefore, my recommendation was for the above-named procedures. Preoperatively, risks discussed included the risks of infection, bleeding, the risks of general anesthesia, possible recurrence of sinus disease, possible injury to the eyes, base of skull and tear duct system, and possible alteration of sense of smell, although the patient has not had a sense of smell for many years. The patient understood these risks and possible outcomes and wished to proceed.     OPERATIVE PROCEDURE: After being taken to the operating room and induction of general endotracheal tube anesthesia, the bed was rotated 90 degrees.  I began by applying topical anesthetic in the form of 2 cottonoids on each side of the nose which had been soaked with a total of 4 mL of 4% liquid cocaine.   I removed the cottonoids from the right side of the nose and entered the right nasal cavity. I then entered with the spinal needle and 0 degree endoscope and injected the posterior lateral wall of the nasal cavity as well as the body of the right middle turbinate and the anterior insertion of the right middle turbinate.   After I did this field block, I then proceeded with the sinus surgery, I used the rotating backbiter and trimmed away at the remnants of the uncinate process  to expose the natural ostium of the right maxillary sinus. I entered the right maxillary sinus with a 60 degree curved shaver blade and removed mucopurulence and polypoid tissue from the right maxillary sinus as predicted by the CT.   After this was done, I then confirmed the position of the ethmoid bulla. I switched back to the 12 degree shaver and a 0 degree endoscope and took down the face of the ethmoid bulla. I skeletonized the horizontal lamella and proceeded directly posteriorly through several layers of posterior ethmoid air cells which had no pneumatization whatsoever and were completely filled with polypoid mucosa and inspissated secretions. After reaching the face of the sphenoid sinus on the right side, I had found the posterior extent of the roof of the ethmoid. I then was able to dissect in a posterior to anterior direction, removing polyps and bony septations along the way. Eventually I crossed the ethmoid infundibulum into the anterior ethmoid system.   At this point, I had completed the right-sided sinus surgery.     At this point, I turned my attention to the left nasal cavity.  I used a spinal needle with local anesthetic to inject the posterior lateral wall as well as the full length of the left inferior turbinate. I used the rotating backbiter to trim away at the uncinate bone to expose the natural ostium of the left maxillary sinus.   Once I identified it, I was able to use the sinus shaver to trim away at the remainder of the uncinate and open the left maxillary sinus. It was completely overgrown with polypoid mucosa and mucopurulence. I used a 60-degree sinus shaver and directly into the left maxillary sinus to trim away copious amounts of polypoid mucosa and mucopurulence.   After this was completely cleaned out, I switched back to the 12 degree shaver and a 0 degree endoscope. I took down the remnants of the face of the ethmoid bulla and skeletonized the horizontal lamella and proceeded  directly posteriorly through several layers of posterior ethmoid cells which were completely filled with polypoid mucosa and inspissated secretions.   Eventually I reached the posterior most left ethmoid cell. I identified and confirmed that I had located the roof of the posterior ethmoid system. I then dissected in a posterior to anterior direction with the 12 degree shaver, trimming away diseased mucosa as well as bony septations. I crossed the ethmoid infundibulum into the anterior ethmoid system and, using the rotating backbiter, trimmed away at the superiormost remnant of the uncinate bone to identify the agger nasi cell. Using a shaver, I trimmed away at the remainder of the floor of the agger nasi, and it was also completely filled with polypoid mucosa.  I removed several more anterior ethmoid cells.   At this point, I completed the sinuses on the left side.     At this point, the entire procedure was now complete. I reinspected both sides of the nose and there was good hemostasis. I applied Thais hemostatic powder to the roof of the ethmoid bilaterally. I then placed small pieces of Nasopore dressing in between the middle turbinates and the lateral walls to prevent lateralization and scarring, and I took a second piece of Nasopore cut lengthwise and placed it in between the inferior turbinates and the nasal septum bilaterally for tamponade.   All instruments were accounted for and all counts were correct. The patient's bed was rotated 90 degrees back to the care of anesthesia. She was awakened, extubated and sent to the recovery room in good condition.

## 2023-06-15 NOTE — ANESTHESIA PREPROCEDURE EVALUATION
Anesthesia Pre-Procedure Evaluation    Patient: Reginaldo Ferraro   MRN: [id number] : 1990        Procedure : Procedure(s):  IMAGE GUIDED FUNCTIONAL ENDOSCOPIC SINUS SURGERY (FESS) WITHOUT SEPTOPLASTY, WITH HYDRODEBRIDER          Past Medical History:   Diagnosis Date     Abnormal Pap smear of cervix 2011 LSIL     Cervical high risk HPV (human papillomavirus) test positive 2013    10/31/14, 18     Depressive disorder 2011    possibly chronic     Environmental allergies      Influenza A with pneumonia 2021     Migraine      Migraines     headaches with allergies     Moderate major depression 2011     Moderate major depression (H) 2011     Moderate persistent asthma 3/17/2022     Nasal obstruction 2/15/2022    Added automatically from request for surgery [id number]     Nasal septal deviation 2/15/2022    Added automatically from request for surgery [id number]     Nasal turbinate hypertrophy 2/15/2022    Added automatically from request for surgery [id number]     NO ACTIVE PROBLEMS      Seasonal allergic rhinitis      Sepsis (H) 2021      Past Surgical History:   Procedure Laterality Date     DILATION AND EVACUATION N/A 2022    Procedure: DILATION AND EVACUATION, UTERUS;  Surgeon: Vianey Domingo MD;  Location:  OR     ENDOSCOPIC SINUS SURGERY Bilateral 2022    Procedure: FUNCTIONAL ENDOSCOPIC SINUS SURGERY, with bilateral maxillary antrostomies and bilateral michael bullosa reduction,;  Surgeon: Miquel Rutledge MD;  Location:  OR     EYE SURGERY  2013    Lasic surgery     [place] BREAST AUGMENTATION  2018     SEPTOPLASTY, TURBINOPLASTY, COMBINED Bilateral 2022    Procedure: WITH NASAL SEPTOPLASTY and bilateral inferior turbinate reduction;  Surgeon: Miquel Rutledge MD;  Location:  OR      No Known Allergies   Social History     Tobacco Use     Smoking status: Never     Passive exposure: Never     Smokeless tobacco: Never   Vaping Use      Vaping status: Never Used   Substance Use Topics     Alcohol use: Yes     Comment: Socially      Wt Readings from Last 1 Encounters:   06/15/23 68.9 kg (152 lb)        Anesthesia Evaluation            ROS/MED HX  ENT/Pulmonary:     (+) allergic rhinitis, asthma     Neurologic:     (+) migraines,     Cardiovascular:       METS/Exercise Tolerance:     Hematologic:       Musculoskeletal:       GI/Hepatic:       Renal/Genitourinary:       Endo:       Psychiatric/Substance Use:     (+) psychiatric history depression     Infectious Disease:       Malignancy:       Other:            Physical Exam    Airway  airway exam normal      Mallampati: II   TM distance: > 3 FB   Neck ROM: full   Mouth opening: > 3 cm    Respiratory Devices and Support         Dental       (+) Completely normal teeth      Cardiovascular          Rhythm and rate: regular and normal     Pulmonary   pulmonary exam normal        breath sounds clear to auscultation           OUTSIDE LABS:  CBC:   Lab Results   Component Value Date    WBC 8.5 06/09/2023    WBC 7.1 11/16/2018    HGB 11.9 06/09/2023    HGB 12.4 11/16/2018    HCT 36.5 06/09/2023    HCT 37.6 11/16/2018     (H) 06/09/2023     11/16/2018     BMP:   Lab Results   Component Value Date    GLC 91 02/16/2022     COAGS: No results found for: PTT, INR, FIBR  POC:   Lab Results   Component Value Date    HCG Negative 06/15/2023    HCGS Negative 11/16/2018     HEPATIC: No results found for: ALBUMIN, PROTTOTAL, ALT, AST, GGT, ALKPHOS, BILITOTAL, BILIDIRECT, ZHANG  OTHER:   Lab Results   Component Value Date    TSH 1.13 10/31/2014       Anesthesia Plan    ASA Status:  2   NPO Status:  NPO Appropriate    Anesthesia Type: General.     - Airway: ETT   Induction: Intravenous.   Maintenance: Balanced.        Consents    Anesthesia Plan(s) and associated risks, benefits, and realistic alternatives discussed. Questions answered and patient/representative(s) expressed understanding.    -  Discussed:     - Discussed with:  Patient      - Extended Intubation/Ventilatory Support Discussed: No.      - Patient is DNR/DNI Status: No    Use of blood products discussed: No .     Postoperative Care    Pain management: IV analgesics, Oral pain medications, Multi-modal analgesia.   PONV prophylaxis: Ondansetron (or other 5HT-3), Background Propofol Infusion     Comments:                Jagdish Fermin MD

## 2023-06-15 NOTE — INTERVAL H&P NOTE
"I have reviewed the surgical (or preoperative) H&P that is linked to this encounter, and examined the patient. There are no significant changes    Clinical Conditions Present on Arrival:  Clinically Significant Risk Factors Present on Admission                  # Overweight: Estimated body mass index is 28.88 kg/m  as calculated from the following:    Height as of 6/9/23: 1.545 m (5' 0.83\").    Weight as of this encounter: 68.9 kg (152 lb).       "

## 2023-06-15 NOTE — ANESTHESIA CARE TRANSFER NOTE
Patient: Reginaldo Ferraro    Procedure: Procedure(s):  IMAGE GUIDED FUNCTIONAL ENDOSCOPIC SINUS SURGERY (FESS) WITHOUT SEPTOPLASTY, maxillary entrostomy, ethmoidectomy and turbinate reduction       Diagnosis: Chronic pansinusitis [J32.4]  Purulent rhinitis [J31.0]  Staph aureus infection [A49.01]  Diagnosis Additional Information: No value filed.    Anesthesia Type:   General     Note:    Oropharynx: oropharynx clear of all foreign objects  Level of Consciousness: awake  Oxygen Supplementation: face mask  Level of Supplemental Oxygen (L/min / FiO2): 7  Independent Airway: airway patency satisfactory and stable  Dentition: dentition unchanged  Vital Signs Stable: post-procedure vital signs reviewed and stable  Report to RN Given: handoff report given  Patient transferred to: PACU    Handoff Report: Identifed the Patient, Identified the Reponsible Provider, Reviewed the pertinent medical history, Discussed the surgical course, Reviewed Intra-OP anesthesia mangement and issues during anesthesia, Set expectations for post-procedure period and Allowed opportunity for questions and acknowledgement of understanding      Vitals:  Vitals Value Taken Time   BP     Temp     Pulse     Resp     SpO2         Electronically Signed By: VIANEY Aldana CRNA  Becky 15, 2023  11:06 AM

## 2023-06-15 NOTE — ANESTHESIA POSTPROCEDURE EVALUATION
Patient: Reginaldo Ferraro    Procedure: Procedure(s):  IMAGE GUIDED FUNCTIONAL ENDOSCOPIC SINUS SURGERY (FESS) WITHOUT SEPTOPLASTY, maxillary entrostomy, ethmoidectomy and turbinate reduction       Anesthesia Type:  General    Note:  Disposition: Outpatient   Postop Pain Control: Uneventful            Sign Out: Well controlled pain   PONV: No   Neuro/Psych: Uneventful            Sign Out: Acceptable/Baseline neuro status   Airway/Respiratory: Uneventful            Sign Out: Acceptable/Baseline resp. status   CV/Hemodynamics: Uneventful            Sign Out: Acceptable CV status   Other NRE: NONE   DID A NON-ROUTINE EVENT OCCUR? No           Last vitals:  Vitals Value Taken Time   /70 06/15/23 1115   Temp 36.2  C (97.2  F) 06/15/23 1115   Pulse 90 06/15/23 1121   Resp 15 06/15/23 1121   SpO2 92 % 06/15/23 1121   Vitals shown include unvalidated device data.    Electronically Signed By: Jagdish Fermin MD  Becky 15, 2023  2:12 PM

## 2023-06-15 NOTE — PROGRESS NOTES
HPI - Reginaldo Ferraro is a 32 year old female who is here for their first postoperative visit, status post endoscopic sinus surgery on 6/15/2023.  They report the expected amount of congestion, facial pressure, and mild bloody drainage.  No changes in vision, no fevers or chills.  Nasal saline rinses and oral antibiotics have been tolerated.    Physical Exam  /73   LMP 06/05/2023     General - The patient is well nourished and well developed, and appears to have good nutritional status.  Alert and oriented to person and place, answers questions and cooperates with examination appropriately.   Head and Face - Normocephalic and atraumatic, with no gross asymmetry noted of the contour of the facial features.  The facial nerve is intact, with strong symmetric movements.  Eyes - Extraocular movements intact, and the pupils were reactive to light.  Sclera were not icteric or injected, conjunctiva were pink and moist.  Mouth - Examination of the oral cavity shows pink, healthy, moist mucosa.  No lesions or ulceration noted.  The dentition are in good repair.  The tongue is mobile and midline.  Nose - Nasal exam performed with a headlight and nasal speculum.  I suctioned out a small amount of residual nasopore and dark mucous.  The middle meatus was visible and open bilaterally, no purulence or signs of early synechiae formation.    A/P - Reginaldo Ferraro is a 32 year old female is doing well from sinus surgery.  There are no signs of complications or infection.  Patient instructed to continue saline rinses.  I will see them in 1 week for endoscopically assisted debridement of the sinuses.

## 2023-06-15 NOTE — ANESTHESIA PROCEDURE NOTES
Airway       Patient location during procedure: OR       Procedure Start/Stop Times: 6/15/2023 10:07 AM  Staff -        CRNA: Miquel Paez APRN CRNA       Performed By: other anesthesia staff  Consent for Airway        Urgency: elective  Indications and Patient Condition       Indications for airway management: clark-procedural       Induction type:intravenous       Mask difficulty assessment: 1 - vent by mask    Final Airway Details       Final airway type: endotracheal airway       Successful airway: ETT - single and Oral  Endotracheal Airway Details        ETT size (mm): 7.0       Cuffed: yes       Successful intubation technique: direct laryngoscopy       DL Blade Type: MAC 3       Grade View of Cords: 1       Adjucts: stylet       Position: Left       Measured from: gums/teeth       Secured at (cm): 21       Bite block used: Oral Airway (emergence)    Post intubation assessment        Placement verified by: capnometry, equal breath sounds and chest rise        Number of attempts at approach: 1       Secured with: cloth tape       Ease of procedure: easy       Dentition: Intact and Unchanged    Medication(s) Administered   Medication Administration Time: 6/15/2023 10:07 AM    Additional Comments       By med student

## 2023-06-15 NOTE — DISCHARGE INSTRUCTIONS
Tylenol 975 mg was given at 8:45 AM.    You should not take more then 4,000 mg of tylenol/acetaminophen in a 24 hour period.    [facility]   Adult Discharge Orders & Instructions     For 24 hours after surgery    Get plenty of rest.  A responsible adult must stay with you for at least 24 hours after you leave the hospital.   Do not drive or use heavy equipment.  If you have weakness or tingling, don't drive or use heavy equipment until this feeling goes away.  Do not drink alcohol.  Avoid strenuous or risky activities.  Ask for help when climbing stairs.   You may feel lightheaded.  IF so, sit for a few minutes before standing.  Have someone help you get up.   If you have nausea (feel sick to your stomach): Drink only clear liquids such as apple juice, ginger ale, broth or 7-Up.  Rest may also help.  Be sure to drink enough fluids.  Move to a regular diet as you feel able.  You may have a slight fever. Call the doctor if your fever is over 100 F (37.7 C) (taken under the tongue) or lasts longer than 24 hours.  You may have a dry mouth, a sore throat, muscle aches or trouble sleeping.  These should go away after 24 hours.  Do not make important or legal decisions.   Call your doctor for any of the followin.  Signs of infection (fever, growing tenderness at the surgery site, a large amount of drainage or bleeding, severe pain, foul-smelling drainage, redness, swelling).    2. It has been over 8 to 10 hours since surgery and you are still not able to urinate (pass water).    3.  Headache for over 24 hours.    Instructions for Sinus Surgery    Recovery - Everyone recovers differently from a general anesthetic.  Symptoms such as fatigue, nausea, light-headedness, and sometimes a low grade fever (up to 100 degrees) are not unusual.  As your body removes the anesthetic drugs from circulation, these symptoms will resolve.  Your nose will be sore after surgery, and you may even have symptoms similar to  a sinus infection with headache, congestion, and pressure.  These will resolve with healing.  For several days you may experience bloody drainage from the nose, please use the drip pad as necessary for this.  If there is persistent bleeding, please call the office during business hours or the on call ENT physician after hours.  There are no diet restrictions after sinus surgery, and you can resume your home medications.  Please blow your nose very gently for two weeks after surgery.  Limit your activity to no strenuous activities until I see you for the first follow-up visit.      Medications - You were sent home with narcotic pain medication.  If you can tolerate the discomfort during your recovery by using just plain Tylenol or ibuprofen (advil), please do so.  However, do not hesitate to use the stronger pain medication if needed.  If you were sent home with an antibiotic, it is primarily used to help the healing process.  If it causes loose bowel movements or other signs of intolerance, it is appropriate to discontinue it.  By far the most important measure you can take to speed recovery, and maximize the chances of long term success of sinus surgery is using the sinus rinses at least three time per day for the first month after surgery.   Please start these:     Tomorrow    Complications - Problems related to sinus surgery almost always are detected during the operation, and special instruction will be given in that situation.  However, unexpected things can happen, and are all related to the structures around the sinus cavities.  Symptoms that should alert you to a possible problem include: severe eye pain or eye swelling, persistent heavy bleeding from the nose, and high fevers with headache and neck pain.  Any of these symptoms should be called into my office or to the on call ENT if after hours.  The most common non-emergency complication of sinus surgery is the formation of scar tissue which can re-block  the sinuses.  This is addressed below.    Follow-up -  As you have noted, there are quite a few follow-up visits after sinus surgery.  This is done to aggressively manage the most common complication of this technique, which is scar tissue blocking the sinuses.  These visits will require the examination of your nose and possibly removal of crusts of dry mucous and blood, with possible removal of early scar tissue.  Please prepare for these visits by using your sinus rinses.    If there are any questions or issues with the above, or if there are other issues that concern you, always feel free to call the clinic and I am happy to speak with you as soon as I can.    Elmer Murry MD  Otolaryngology  [facility]  Business Hours [phone]/ After Hours and Weekends [phone]

## 2023-06-22 ENCOUNTER — OFFICE VISIT (OUTPATIENT)
Dept: OTOLARYNGOLOGY | Facility: CLINIC | Age: 33
End: 2023-06-22
Payer: COMMERCIAL

## 2023-06-22 VITALS — SYSTOLIC BLOOD PRESSURE: 108 MMHG | DIASTOLIC BLOOD PRESSURE: 73 MMHG

## 2023-06-22 DIAGNOSIS — J32.4 CHRONIC PANSINUSITIS: Primary | ICD-10-CM

## 2023-06-22 PROCEDURE — 99213 OFFICE O/P EST LOW 20 MIN: CPT | Performed by: OTOLARYNGOLOGY

## 2023-06-22 NOTE — LETTER
6/22/2023         RE: Reginaldo Ferraro  [address] [facility] [address]        Dear Colleague,    Thank you for referring your patient, Reginaldo Ferraro, to the [facility]. Please see a copy of my visit note below.    HPI - Reginaldo Ferraro is a 32 year old female who is here for their first postoperative visit, status post endoscopic sinus surgery on 6/15/2023.  They report the expected amount of congestion, facial pressure, and mild bloody drainage.  No changes in vision, no fevers or chills.  Nasal saline rinses and oral antibiotics have been tolerated.    Physical Exam  /73   LMP 06/05/2023     General - The patient is well nourished and well developed, and appears to have good nutritional status.  Alert and oriented to person and place, answers questions and cooperates with examination appropriately.   Head and Face - Normocephalic and atraumatic, with no gross asymmetry noted of the contour of the facial features.  The facial nerve is intact, with strong symmetric movements.  Eyes - Extraocular movements intact, and the pupils were reactive to light.  Sclera were not icteric or injected, conjunctiva were pink and moist.  Mouth - Examination of the oral cavity shows pink, healthy, moist mucosa.  No lesions or ulceration noted.  The dentition are in good repair.  The tongue is mobile and midline.  Nose - Nasal exam performed with a headlight and nasal speculum.  I suctioned out a small amount of residual nasopore and dark mucous.  The middle meatus was visible and open bilaterally, no purulence or signs of early synechiae formation.    A/P - Reginaldo Ferraro is a 32 year old female is doing well from sinus surgery.  There are no signs of complications or infection.  Patient instructed to continue saline rinses.  I will see them in 1 week for endoscopically assisted debridement of the sinuses.        Again, thank you for allowing me to participate in the care of  your patient.        Sincerely,        Atilio Murry MD

## 2023-06-29 NOTE — PROGRESS NOTES
Post Op Sinus 2    HPI - Reginaldo Ferraro is here for their second postoperative visit, status post endoscopic sinus surgery on 6/15/2023. There was the expected amount of congestion which has now mostly resolved, and no bleeding has occurred.  The generalized facial pressure has been resolving, and there have been no fevers or chills since the first postoperative visit.  The sinus rinses are continuing three times per day, and are being tolerated well.  No visual changes.    Physical Exam -   Pulse 71   LMP 06/05/2023   SpO2 98%     General - The patient is awake and alert, and answers questions appropriately during the history and physical.  The vocal quality is hypernasal, but there is no dyspnea or stridor noted.  Eyes - The EOMI, there is no conjuncitval or scleral injection.  Pupils are equally round and reactive to light.  Oral - The oral mucosa is pink and moist.  The tongue is mobile and midline on protrusion, no edema noted.  Nasal - The nasal examination was done with a rigid nasal endoscope today for the purposes of bilateral endoscopically assisted debridement of the sinuses.  I began by spraying both sides with lidocaine and neosynephrine.  Color photographs were taken for the medical record.  I began on the left side.  The middle meatus was noted to obstructed with a dark crust, this was gently dislodged from the lateral wall with a number 7 suction, I was then able to visualize the left maxillary sinusotomy, it is healing well and open.  No purulence noted.  I then moved further back, and debrided some dark crusts and thick dark mucous away from the ethmoid roof.  The roof was then visualized and is healing well.  I turned my attention to the right side.  Once again some dark crust was dislodged from the middle meatus and removed from the nose.  I was then able to pass the scope into the right middle meatus.  The maxillary sinusotomy is healing well, no abnormal secretions noted.  Further back, I  debrided some crusts from the lateral wall and roof of the ethmoid system.  I was then able to visualize a nicely remucosalizing surface.  Bialterally, no early synechiae or touch points were noted.    A/P - Reginaldo Ferraro is status post endoscopic nasal surgery and does not show any signs of complications.      Her allergic rhinitis might still play a part here, however, she is healing so well I am not going to start her on nasotopical steroids at this point    But, if her congestion or clear nasal drainage continues, let me know and I will send budesonide in [facility] for her.    For her eye symptoms of gunk and dripping do not improve, also let me know

## 2023-07-07 ENCOUNTER — OFFICE VISIT (OUTPATIENT)
Dept: OTOLARYNGOLOGY | Facility: CLINIC | Age: 33
End: 2023-07-07
Payer: COMMERCIAL

## 2023-07-07 VITALS — HEART RATE: 71 BPM | OXYGEN SATURATION: 98 %

## 2023-07-07 DIAGNOSIS — J32.4 CHRONIC PANSINUSITIS: Primary | ICD-10-CM

## 2023-07-07 PROCEDURE — 99213 OFFICE O/P EST LOW 20 MIN: CPT | Mod: 25 | Performed by: OTOLARYNGOLOGY

## 2023-07-07 PROCEDURE — 31231 NASAL ENDOSCOPY DX: CPT | Performed by: OTOLARYNGOLOGY

## 2023-07-07 ASSESSMENT — PAIN SCALES - GENERAL: PAINLEVEL: NO PAIN (0)

## 2023-07-07 NOTE — LETTER
7/7/2023         RE: Reginaldo Ferraro  [address] [facility] [address]        Dear Colleague,    Thank you for referring your patient, Reginaldo Ferraro, to the [facility]. Please see a copy of my visit note below.    Post Op Sinus 2    HPI - Reginaldo Ferraro is here for their second postoperative visit, status post endoscopic sinus surgery on 6/15/2023. There was the expected amount of congestion which has now mostly resolved, and no bleeding has occurred.  The generalized facial pressure has been resolving, and there have been no fevers or chills since the first postoperative visit.  The sinus rinses are continuing three times per day, and are being tolerated well.  No visual changes.    Physical Exam -   Pulse 71   LMP 06/05/2023   SpO2 98%     General - The patient is awake and alert, and answers questions appropriately during the history and physical.  The vocal quality is hypernasal, but there is no dyspnea or stridor noted.  Eyes - The EOMI, there is no conjuncitval or scleral injection.  Pupils are equally round and reactive to light.  Oral - The oral mucosa is pink and moist.  The tongue is mobile and midline on protrusion, no edema noted.  Nasal - The nasal examination was done with a rigid nasal endoscope today for the purposes of bilateral endoscopically assisted debridement of the sinuses.  I began by spraying both sides with lidocaine and neosynephrine.  Color photographs were taken for the medical record.  I began on the left side.  The middle meatus was noted to obstructed with a dark crust, this was gently dislodged from the lateral wall with a number 7 suction, I was then able to visualize the left maxillary sinusotomy, it is healing well and open.  No purulence noted.  I then moved further back, and debrided some dark crusts and thick dark mucous away from the ethmoid roof.  The roof was then visualized and is healing well.  I turned my attention to the  right side.  Once again some dark crust was dislodged from the middle meatus and removed from the nose.  I was then able to pass the scope into the right middle meatus.  The maxillary sinusotomy is healing well, no abnormal secretions noted.  Further back, I debrided some crusts from the lateral wall and roof of the ethmoid system.  I was then able to visualize a nicely remucosalizing surface.  Bialterally, no early synechiae or touch points were noted.    A/P - Reginaldo Ferraro is status post endoscopic nasal surgery and does not show any signs of complications.      Her allergic rhinitis might still play a part here, however, she is healing so well I am not going to start her on nasotopical steroids at this point    But, if her congestion or clear nasal drainage continues, let me know and I will send budesonide in [facility] for her.    For her eye symptoms of gunk and dripping do not improve, also let me know         Again, thank you for allowing me to participate in the care of your patient.        Sincerely,        Atilio Murry MD

## 2023-07-31 ENCOUNTER — OFFICE VISIT (OUTPATIENT)
Dept: ALLERGY | Facility: CLINIC | Age: 33
End: 2023-07-31
Payer: COMMERCIAL

## 2023-07-31 ENCOUNTER — TRANSFERRED RECORDS (OUTPATIENT)
Dept: HEALTH INFORMATION MANAGEMENT | Facility: CLINIC | Age: 33
End: 2023-07-31

## 2023-07-31 VITALS
BODY MASS INDEX: 28.88 KG/M2 | HEART RATE: 70 BPM | WEIGHT: 152 LBS | DIASTOLIC BLOOD PRESSURE: 77 MMHG | OXYGEN SATURATION: 98 % | SYSTOLIC BLOOD PRESSURE: 114 MMHG

## 2023-07-31 DIAGNOSIS — J45.40 MODERATE PERSISTENT ASTHMA WITHOUT COMPLICATION: Primary | ICD-10-CM

## 2023-07-31 DIAGNOSIS — Z51.6 NEED FOR DESENSITIZATION TO ALLERGENS: ICD-10-CM

## 2023-07-31 DIAGNOSIS — J30.81 ALLERGIC RHINITIS DUE TO ANIMALS: ICD-10-CM

## 2023-07-31 DIAGNOSIS — J30.1 SEASONAL ALLERGIC RHINITIS DUE TO POLLEN: ICD-10-CM

## 2023-07-31 DIAGNOSIS — J30.89 ALLERGIC RHINITIS DUE TO DUST MITE: ICD-10-CM

## 2023-07-31 LAB
FEF 25/75: NORMAL
FEV-1: NORMAL
FEV1/FVC: NORMAL
FVC: NORMAL

## 2023-07-31 PROCEDURE — 94010 BREATHING CAPACITY TEST: CPT | Performed by: ALLERGY & IMMUNOLOGY

## 2023-07-31 PROCEDURE — 99214 OFFICE O/P EST MOD 30 MIN: CPT | Mod: 25 | Performed by: ALLERGY & IMMUNOLOGY

## 2023-07-31 RX ORDER — ALBUTEROL SULFATE 90 UG/1
1-2 AEROSOL, METERED RESPIRATORY (INHALATION) EVERY 4 HOURS PRN
Qty: 18 G | Refills: 1 | Status: SHIPPED | OUTPATIENT
Start: 2023-07-31 | End: 2023-11-02

## 2023-07-31 RX ORDER — EPINEPHRINE 0.3 MG/.3ML
INJECTION SUBCUTANEOUS
Qty: 2 EACH | Refills: 2 | Status: SHIPPED | OUTPATIENT
Start: 2023-07-31 | End: 2024-03-04

## 2023-07-31 RX ORDER — FLUTICASONE PROPIONATE AND SALMETEROL 500; 50 UG/1; UG/1
1 POWDER RESPIRATORY (INHALATION) EVERY 12 HOURS
Qty: 1 EACH | Refills: 5 | Status: SHIPPED | OUTPATIENT
Start: 2023-07-31 | End: 2023-11-02

## 2023-07-31 ASSESSMENT — ENCOUNTER SYMPTOMS
ADENOPATHY: 0
WHEEZING: 0
ABDOMINAL PAIN: 0
LIGHT-HEADEDNESS: 0
EYE REDNESS: 0
EYE ITCHING: 0
NAUSEA: 0
RHINORRHEA: 0
CHILLS: 0
JOINT SWELLING: 0
HEADACHES: 0
NERVOUS/ANXIOUS: 0
COUGH: 1
SHORTNESS OF BREATH: 0
SORE THROAT: 0
FEVER: 0
CONSTIPATION: 0
EYE DISCHARGE: 0
FATIGUE: 0
VOMITING: 0
SINUS PRESSURE: 0
ACTIVITY CHANGE: 0
CHEST TIGHTNESS: 0
DIZZINESS: 0
DIARRHEA: 0

## 2023-07-31 ASSESSMENT — ASTHMA QUESTIONNAIRES: ACT_TOTALSCORE: 24

## 2023-07-31 NOTE — LETTER
7/31/2023         RE: Reginaldo Ferraro  [address] [facility] [id number]        Dear Colleague,    Thank you for referring your patient, Reginaldo Ferraro, to the [facility]. Please see a copy of my visit note below.    Reginaldo Ferraro was seen in the Allergy Clinic at [facility].      Reginaldo Ferraro is a 32 year old Not  or  female who is seen today for a follow-up visit. Feels she has been doing well. Taking fexofenadine every morning - 360-540mg all at once. Also using sinus irrigations daily to help with healing. Had another nasal surgery on 6/15/23.    Asthma has been well controlled. She is taking fluticasone-salmeterol - 1 puff twice daily. Using albuterol intermittently - primarily to pre-medicate prior to playing soccer. No exacerbations or oral steroid use for asthma in the last year.     Reginaldo would like to resume SCIT therapy. Her last injection was over 6 months ago.      Past Medical History:   Diagnosis Date     Abnormal Pap smear of cervix 12/23/2011 9/21 LSIL     Cervical high risk HPV (human papillomavirus) test positive 2013    10/31/14, 5/21/18     Depressive disorder 12/23/2011    possibly chronic     Environmental allergies      Influenza A with pneumonia 12/24/2021     Migraine      Migraines     headaches with allergies     Moderate major depression 12/23/2011     Moderate major depression (H) 12/23/2011     Moderate persistent asthma 3/17/2022     Nasal obstruction 2/15/2022    Added automatically from request for surgery [id number]     Nasal septal deviation 2/15/2022    Added automatically from request for surgery [id number]     Nasal turbinate hypertrophy 2/15/2022    Added automatically from request for surgery [id number]     NO ACTIVE PROBLEMS      Seasonal allergic rhinitis      Sepsis (H) 12/24/2021     Family History   Problem Relation Age of Onset     Arthritis Mother      Lupus Mother      Heart  Disease Father      Cerebrovascular Disease Father         stroke     Hyperlipidemia Father      Diabetes Paternal Grandmother      Hypertension Paternal Grandmother      Respiratory Paternal Grandmother         asthma     Colon Cancer No family hx of      Breast Cancer No family hx of      Coronary Artery Disease No family hx of      Social History     Tobacco Use     Smoking status: Never     Passive exposure: Never     Smokeless tobacco: Never   Vaping Use     Vaping Use: Never used   Substance Use Topics     Alcohol use: Yes     Comment: Socially     Drug use: No     Social History     Social History Narrative    ENVIRONMENTAL HISTORY: [place] The home is heated with a forced air. They does have central air conditioning. The patient's bedroom is furnished with feather/wool bedding or pillows and carpeting in bedroom.  Pets inside the house include 2 cat(s) and 1 dog(s). There is no history of cockroach or mice infestation. There is/are 0 smokers living in the house.  There is/are 0 who smoke ecigarettes/vape living in the house.The house does not have a damp basement.             Past medical, family, and social history were reviewed.    Review of Systems   Constitutional:  Negative for activity change, chills, fatigue and fever.   HENT:  Positive for sneezing. Negative for congestion, nosebleeds, postnasal drip, rhinorrhea, sinus pressure and sore throat.    Eyes:  Negative for discharge, redness and itching.   Respiratory:  Positive for cough. Negative for chest tightness, shortness of breath and wheezing.    Cardiovascular:  Negative for chest pain.   Gastrointestinal:  Negative for abdominal pain, constipation, diarrhea, nausea and vomiting.   Musculoskeletal:  Negative for joint swelling.   Skin:  Negative for rash.   Neurological:  Negative for dizziness, light-headedness and headaches.   Hematological:  Negative for adenopathy.   Psychiatric/Behavioral:   Negative for behavioral problems. The patient is not nervous/anxious.          Current Outpatient Medications:      ALPRAZolam (XANAX) 0.25 MG tablet, Take 1 tablet (0.25 mg) by mouth at bedtime as needed, may repeat once for anxiety, Disp: 30 tablet, Rfl: 0     azelastine (OPTIVAR) 0.05 % ophthalmic solution, Apply 1 drop to eye 2 times daily, Disp: 6 mL, Rfl: 3     COMBIVENT RESPIMAT  MCG/ACT inhaler, , Disp: , Rfl:      EPINEPHrine (ANY BX GENERIC EQUIV) 0.3 MG/0.3ML injection 2-pack, Inject into the muscle as directed for anaphylaxis, Disp: 2 each, Rfl: 2     Fexofenadine HCl (ALLEGRA ALLERGY PO), , Disp: , Rfl:      fluticasone-salmeterol (ADVAIR) 500-50 MCG/ACT inhaler, Inhale 1 puff into the lungs every 12 hours, Disp: 1 each, Rfl: 5     HYDROcodone-acetaminophen (NORCO) 5-325 MG tablet, Take 1 tablet by mouth every 6 hours as needed for pain, Disp: 30 tablet, Rfl: 0     ipratropium - albuterol 0.5 mg/2.5 mg/3 mL (DUONEB) 0.5-2.5 (3) MG/3ML neb solution, Inhale 1 vial (3 mLs) into the lungs every 6 hours as needed for shortness of breath or wheezing, Disp: 360 mL, Rfl: 3     omeprazole (PRILOSEC) 20 MG DR capsule, Take 1 capsule (20 mg) by mouth daily, Disp: 90 capsule, Rfl: 1     ondansetron (ZOFRAN ODT) 8 MG ODT tab, Take 1 tablet (8 mg) by mouth every 8 hours as needed for nausea, Disp: 15 tablet, Rfl: 3     ORDER FOR ALLERGEN IMMUNOTHERAPY, Name of Mix: Mix #1  Dust Mite, Cat, Dog Cat Hair, Standardized 10,000 BAU/mL, ALK  2.0 ml Dog Hair-Dander, A. P.  1:100 w/v, HS  1.0 ml Dust Mites DF. 10,000 AU/mL, HS  1.0 ml Dust Mites DP. 10,000 AU/mL, HS  1.0 ml  Diluent: HSA qs to 5ml, Disp: 5 mL, Rfl: PRN     ORDER FOR ALLERGEN IMMUNOTHERAPY, Name of Mix: Mix #2  Tree  Sylvain, White 1:20 w/v, HS  0.5 ml Birch Mix PRW 1:20 w/v, HS  0.5 ml Boxelder-Maple Mix BHR (Boxelder Hard Red) 1:20 w/v, HS  0.5 ml [place], Common 1:20 w/v, HS  0.5 ml Oak Mix RVW 1:20 w/v, HS 0.5 ml Pine, White 1:20 w/v, ALK 0.5 ml  [place]ison Tree, Black 1:20 w/v, HS 0.5 ml Diluent: HSA qs to 5ml, Disp: 5 mL, Rfl: PRN     ORDER FOR ALLERGEN IMMUNOTHERAPY, Name of Mix: Mix #3  Grass, Weeds Prabhu Grass 1:20 w/v, HS 0.5 ml Steve Grass (Std) 100,000 BAU/mL, HS 0.4 ml Lamb's Quarters 1:20 w/v, HS 0.5 ml Nettle 1:20 w/v, HS 0.5 ml Plantain, English 1:20 w/v, HS 0.5 ml Ragweed Mixed 1:20 w/v ALK  0.5 ml Russian Thistle 1:20 w/v, HS 0.5 ml Sorrel, Sheep 1:20 w/v, HS 0.5 ml Diluent: HSA qs to 5ml, Disp: 5 mL, Rfl: PRN     SYMBICORT 160-4.5 MCG/ACT Inhaler, Inhale 2 puffs into the lungs 2 times daily Needs appointment for additional refills, Disp: 10.2 g, Rfl: 1     VENTOLIN  (90 Base) MCG/ACT inhaler, Inhale 1-2 puffs into the lungs every 4 hours as needed for shortness of breath or wheezing, Disp: 18 g, Rfl: 1  No Known Allergies    EXAM:   /77 (BP Location: Right arm, Patient Position: Sitting, Cuff Size: Adult Regular)   Pulse 70   Wt 68.9 kg (152 lb)   LMP 06/05/2023   SpO2 98%   BMI 28.88 kg/m    Physical Exam  Vitals and nursing note reviewed.   Constitutional:       Appearance: Normal appearance.   HENT:      Head: Normocephalic and atraumatic.      Right Ear: External ear normal.      Left Ear: External ear normal.      Nose: No mucosal edema or rhinorrhea.      Mouth/Throat:      Mouth: Mucous membranes are moist. No oral lesions.      Pharynx: Oropharynx is clear. Uvula midline. No posterior oropharyngeal erythema.   Eyes:      General: Lids are normal.      Extraocular Movements: Extraocular movements intact.      Conjunctiva/sclera: Conjunctivae normal.   Neck:      Comments: No asymmetry, masses, or scars  Cardiovascular:      Rate and Rhythm: Normal rate and regular rhythm.      Heart sounds: Normal heart sounds, S1 normal and S2 normal.   Pulmonary:      Effort: Pulmonary effort is normal. No respiratory distress.      Breath sounds: Normal breath sounds and air entry.   Musculoskeletal:      Comments: No  musculoskeletal defects appreciated   Skin:     General: Skin is warm and dry.      Findings: No lesion or rash.   Neurological:      General: No focal deficit present.      Mental Status: She is alert.   Psychiatric:         Mood and Affect: Mood and affect normal.           WORKUP:  Spirometry    SPIROMETRY       FVC 3.18L (94% of predicted).     FEV1 2.60L (91% of predicted).     FEV1/FVC 82%      I have reviewed and interpreted these results. Testing meets criteria for acceptability and reproducibility. Values are consistent with normal lung function.    Asthma Control Test (ACT) total score: 24      ASSESSMENT/PLAN:  Reginaldo Ferraro is a 32 year old female here for a follow-up visit.    1. Moderate persistent asthma without complication - Well controlled with current therapy. Using albuterol sporadically and primarily prior to playing soccer. Spirometry today is normal.    - Spirometry, Breathing Capacity: Normal Order, Clinic Performed  - fluticasone-salmeterol (ADVAIR) 500-50 MCG/ACT inhaler; Inhale 1 puff into the lungs every 12 hours  Dispense: 1 each; Refill: 5  - VENTOLIN  (90 Base) MCG/ACT inhaler; Inhale 1-2 puffs into the lungs every 4 hours as needed for shortness of breath or wheezing  Dispense: 18 g; Refill: 1    2. Seasonal allergic rhinitis due to pollen - Reginaldo would like to resume allergen immunotherapy treatment. As her last injection was over 6 months ago she would need to start from the beginning. Reginaldo  was counseled regarding the risks and benefits of allergen immunotherapy treatment. The risks include hives, swelling, cough, shortness of breath, and anaphylaxis which may be fatal if not treated promptly. The rate of systemic allergic reactions, including anaphylaxis, from immunotherapy is 0.025-0.4% but affects 5 to 7% of patients. The risk of death is 1 and 2.5 million. Reginaldo was advised regarding the epinephrine autoinjector policy and that they must remain in  the allergy clinic for 30 minutes following injection administration for monitoring.  Additionally, the expected duration of treatment and financial responsibility and potential out-of-pocket costs were discussed.  After our discussion she wishes to proceed with treatment.    - plan to initiate allergen immunotherapy per protocol  - epinephrine auto-injector required per protocol  - continue taking fexofenadine daily as needed - up to 360mg twice daily    3. Allergic rhinitis due to dust mite - see above    4. Allergic rhinitis due to animals - see above    5. Need for desensitization to allergens    - EPINEPHrine (ANY BX GENERIC EQUIV) 0.3 MG/0.3ML injection 2-pack; Inject into the muscle as directed for anaphylaxis  Dispense: 2 each; Refill: 2      Follow-up in 3 months after initiating it treatment      Thank you for allowing me to participate in the care of Reginaldo Ferraro.      Polo Mccain MD, [facility]  Allergy/Immunology  [facility]      Chart documentation done in part with Dragon Voice Recognition Software. Although reviewed after completion, some word and grammatical errors may remain.      Again, thank you for allowing me to participate in the care of your patient.        Sincerely,        Polo Mccain MD

## 2023-07-31 NOTE — PROGRESS NOTES
Reginaldo Ferraro was seen in the Allergy Clinic at [facility].      Reginaldo Ferraro is a 32 year old Not  or  female who is seen today for a follow-up visit. Feels she has been doing well. Taking fexofenadine every morning - 360-540mg all at once. Also using sinus irrigations daily to help with healing. Had another nasal surgery on 6/15/23.    Asthma has been well controlled. She is taking fluticasone-salmeterol - 1 puff twice daily. Using albuterol intermittently - primarily to pre-medicate prior to playing soccer. No exacerbations or oral steroid use for asthma in the last year.     Reginaldo would like to resume SCIT therapy. Her last injection was over 6 months ago.      Past Medical History:   Diagnosis Date    Abnormal Pap smear of cervix 12/23/2011 9/21 LSIL    Cervical high risk HPV (human papillomavirus) test positive 2013    10/31/14, 5/21/18    Depressive disorder 12/23/2011    possibly chronic    Environmental allergies     Influenza A with pneumonia 12/24/2021    Migraine     Migraines     headaches with allergies    Moderate major depression 12/23/2011    Moderate major depression (H) 12/23/2011    Moderate persistent asthma 3/17/2022    Nasal obstruction 2/15/2022    Added automatically from request for surgery [id number]    Nasal septal deviation 2/15/2022    Added automatically from request for surgery [id number]    Nasal turbinate hypertrophy 2/15/2022    Added automatically from request for surgery [id number]    NO ACTIVE PROBLEMS     Seasonal allergic rhinitis     Sepsis (H) 12/24/2021     Family History   Problem Relation Age of Onset    Arthritis Mother     Lupus Mother     Heart Disease Father     Cerebrovascular Disease Father         stroke    Hyperlipidemia Father     Diabetes Paternal Grandmother     Hypertension Paternal Grandmother     Respiratory Paternal Grandmother         asthma    Colon Cancer No family hx of     Breast Cancer No family hx of      Coronary Artery Disease No family hx of      Social History     Tobacco Use    Smoking status: Never     Passive exposure: Never    Smokeless tobacco: Never   Vaping Use    Vaping Use: Never used   Substance Use Topics    Alcohol use: Yes     Comment: Socially    Drug use: No     Social History     Social History Narrative    ENVIRONMENTAL HISTORY: [place] The home is heated with a forced air. They does have central air conditioning. The patient's bedroom is furnished with feather/wool bedding or pillows and carpeting in bedroom.  Pets inside the house include 2 cat(s) and 1 dog(s). There is no history of cockroach or mice infestation. There is/are 0 smokers living in the house.  There is/are 0 who smoke ecigarettes/vape living in the house.The house does not have a damp basement.             Past medical, family, and social history were reviewed.    Review of Systems   Constitutional:  Negative for activity change, chills, fatigue and fever.   HENT:  Positive for sneezing. Negative for congestion, nosebleeds, postnasal drip, rhinorrhea, sinus pressure and sore throat.    Eyes:  Negative for discharge, redness and itching.   Respiratory:  Positive for cough. Negative for chest tightness, shortness of breath and wheezing.    Cardiovascular:  Negative for chest pain.   Gastrointestinal:  Negative for abdominal pain, constipation, diarrhea, nausea and vomiting.   Musculoskeletal:  Negative for joint swelling.   Skin:  Negative for rash.   Neurological:  Negative for dizziness, light-headedness and headaches.   Hematological:  Negative for adenopathy.   Psychiatric/Behavioral:  Negative for behavioral problems. The patient is not nervous/anxious.          Current Outpatient Medications:     ALPRAZolam (XANAX) 0.25 MG tablet, Take 1 tablet (0.25 mg) by mouth at bedtime as needed, may repeat once for anxiety, Disp: 30 tablet, Rfl: 0    azelastine (OPTIVAR) 0.05 % ophthalmic  solution, Apply 1 drop to eye 2 times daily, Disp: 6 mL, Rfl: 3    COMBIVENT RESPIMAT  MCG/ACT inhaler, , Disp: , Rfl:     EPINEPHrine (ANY BX GENERIC EQUIV) 0.3 MG/0.3ML injection 2-pack, Inject into the muscle as directed for anaphylaxis, Disp: 2 each, Rfl: 2    Fexofenadine HCl (ALLEGRA ALLERGY PO), , Disp: , Rfl:     fluticasone-salmeterol (ADVAIR) 500-50 MCG/ACT inhaler, Inhale 1 puff into the lungs every 12 hours, Disp: 1 each, Rfl: 5    HYDROcodone-acetaminophen (NORCO) 5-325 MG tablet, Take 1 tablet by mouth every 6 hours as needed for pain, Disp: 30 tablet, Rfl: 0    ipratropium - albuterol 0.5 mg/2.5 mg/3 mL (DUONEB) 0.5-2.5 (3) MG/3ML neb solution, Inhale 1 vial (3 mLs) into the lungs every 6 hours as needed for shortness of breath or wheezing, Disp: 360 mL, Rfl: 3    omeprazole (PRILOSEC) 20 MG DR capsule, Take 1 capsule (20 mg) by mouth daily, Disp: 90 capsule, Rfl: 1    ondansetron (ZOFRAN ODT) 8 MG ODT tab, Take 1 tablet (8 mg) by mouth every 8 hours as needed for nausea, Disp: 15 tablet, Rfl: 3    ORDER FOR ALLERGEN IMMUNOTHERAPY, Name of Mix: Mix #1  Dust Mite, Cat, Dog Cat Hair, Standardized 10,000 BAU/mL, ALK  2.0 ml Dog Hair-Dander, A. P.  1:100 w/v, HS  1.0 ml Dust Mites DF. 10,000 AU/mL, HS  1.0 ml Dust Mites DP. 10,000 AU/mL, HS  1.0 ml  Diluent: HSA qs to 5ml, Disp: 5 mL, Rfl: PRN    ORDER FOR ALLERGEN IMMUNOTHERAPY, Name of Mix: Mix #2  Tree  Sylvain, White 1:20 w/v, HS  0.5 ml Birch Mix PRW 1:20 w/v, HS  0.5 ml Boxelder-Maple Mix BHR (Boxelder Hard Red) 1:20 w/v, HS  0.5 ml [place], Common 1:20 w/v, HS  0.5 ml Oak Mix RVW 1:20 w/v, HS 0.5 ml Pine, White 1:20 w/v, ALK 0.5 ml [place] Tree, Black 1:20 w/v, HS 0.5 ml Diluent: HSA qs to 5ml, Disp: 5 mL, Rfl: PRN    ORDER FOR ALLERGEN IMMUNOTHERAPY, Name of Mix: Mix #3  Grass, Weeds Prabhu Grass 1:20 w/v, HS 0.5 ml Steve Grass (Std) 100,000 BAU/mL, HS 0.4 ml Lamb's Quarters 1:20 w/v, HS 0.5 ml Nettle 1:20 w/v, HS 0.5 ml Plantain, English  1:20 w/v, HS 0.5 ml Ragweed Mixed 1:20 w/v ALK  0.5 ml Russian Thistle 1:20 w/v, HS 0.5 ml Sorrel, Sheep 1:20 w/v, HS 0.5 ml Diluent: HSA qs to 5ml, Disp: 5 mL, Rfl: PRN    SYMBICORT 160-4.5 MCG/ACT Inhaler, Inhale 2 puffs into the lungs 2 times daily Needs appointment for additional refills, Disp: 10.2 g, Rfl: 1    VENTOLIN  (90 Base) MCG/ACT inhaler, Inhale 1-2 puffs into the lungs every 4 hours as needed for shortness of breath or wheezing, Disp: 18 g, Rfl: 1  No Known Allergies    EXAM:   /77 (BP Location: Right arm, Patient Position: Sitting, Cuff Size: Adult Regular)   Pulse 70   Wt 68.9 kg (152 lb)   LMP 06/05/2023   SpO2 98%   BMI 28.88 kg/m    Physical Exam  Vitals and nursing note reviewed.   Constitutional:       Appearance: Normal appearance.   HENT:      Head: Normocephalic and atraumatic.      Right Ear: External ear normal.      Left Ear: External ear normal.      Nose: No mucosal edema or rhinorrhea.      Mouth/Throat:      Mouth: Mucous membranes are moist. No oral lesions.      Pharynx: Oropharynx is clear. Uvula midline. No posterior oropharyngeal erythema.   Eyes:      General: Lids are normal.      Extraocular Movements: Extraocular movements intact.      Conjunctiva/sclera: Conjunctivae normal.   Neck:      Comments: No asymmetry, masses, or scars  Cardiovascular:      Rate and Rhythm: Normal rate and regular rhythm.      Heart sounds: Normal heart sounds, S1 normal and S2 normal.   Pulmonary:      Effort: Pulmonary effort is normal. No respiratory distress.      Breath sounds: Normal breath sounds and air entry.   Musculoskeletal:      Comments: No musculoskeletal defects appreciated   Skin:     General: Skin is warm and dry.      Findings: No lesion or rash.   Neurological:      General: No focal deficit present.      Mental Status: She is alert.   Psychiatric:         Mood and Affect: Mood and affect normal.           WORKUP:  Spirometry    SPIROMETRY       FVC 3.18L (94%  of predicted).     FEV1 2.60L (91% of predicted).     FEV1/FVC 82%      I have reviewed and interpreted these results. Testing meets criteria for acceptability and reproducibility. Values are consistent with normal lung function.    Asthma Control Test (ACT) total score: 24      ASSESSMENT/PLAN:  Reginaldo Ferraro is a 32 year old female here for a follow-up visit.    1. Moderate persistent asthma without complication - Well controlled with current therapy. Using albuterol sporadically and primarily prior to playing soccer. Spirometry today is normal.    - Spirometry, Breathing Capacity: Normal Order, Clinic Performed  - fluticasone-salmeterol (ADVAIR) 500-50 MCG/ACT inhaler; Inhale 1 puff into the lungs every 12 hours  Dispense: 1 each; Refill: 5  - VENTOLIN  (90 Base) MCG/ACT inhaler; Inhale 1-2 puffs into the lungs every 4 hours as needed for shortness of breath or wheezing  Dispense: 18 g; Refill: 1    2. Seasonal allergic rhinitis due to pollen - Reginaldo would like to resume allergen immunotherapy treatment. As her last injection was over 6 months ago she would need to start from the beginning. Reginaldo  was counseled regarding the risks and benefits of allergen immunotherapy treatment. The risks include hives, swelling, cough, shortness of breath, and anaphylaxis which may be fatal if not treated promptly. The rate of systemic allergic reactions, including anaphylaxis, from immunotherapy is 0.025-0.4% but affects 5 to 7% of patients. The risk of death is 1 and 2.5 million. Reginaldo was advised regarding the epinephrine autoinjector policy and that they must remain in the allergy clinic for 30 minutes following injection administration for monitoring.  Additionally, the expected duration of treatment and financial responsibility and potential out-of-pocket costs were discussed.  After our discussion she wishes to proceed with treatment.    - plan to initiate allergen immunotherapy per protocol  -  epinephrine auto-injector required per protocol  - continue taking fexofenadine daily as needed - up to 360mg twice daily    3. Allergic rhinitis due to dust mite - see above    4. Allergic rhinitis due to animals - see above    5. Need for desensitization to allergens    - EPINEPHrine (ANY BX GENERIC EQUIV) 0.3 MG/0.3ML injection 2-pack; Inject into the muscle as directed for anaphylaxis  Dispense: 2 each; Refill: 2      Follow-up in 3 months after initiating it treatment      Thank you for allowing me to participate in the care of Reginaldo Ferraro.      Polo Mccain MD, FAAAAI  Allergy/Immunology  [facility]      Chart documentation done in part with Dragon Voice Recognition Software. Although reviewed after completion, some word and grammatical errors may remain.

## 2023-08-02 DIAGNOSIS — J30.1 SEASONAL ALLERGIC RHINITIS DUE TO POLLEN: ICD-10-CM

## 2023-08-02 DIAGNOSIS — J30.81 ALLERGIC RHINITIS DUE TO ANIMALS: ICD-10-CM

## 2023-08-02 DIAGNOSIS — J30.89 ALLERGIC RHINITIS DUE TO DUST MITE: ICD-10-CM

## 2023-08-02 NOTE — PROGRESS NOTES
ALLERGY SOLUTION NEW REQUEST    Reginaldo Ferraro 1990 MRN: [id number]    DATE NEEDED:  4 weeks  Vial Color   Content   Top Dose         Vial Size  Green 1:1,000, Blue 1:100, Yellow 1:10, and Red 1:1 Cat, Dog, Dust Mite  Red 1:1 0.5   5mL  Green 1:1,000, Blue 1:100, Yellow 1:10, and Red 1:1 Trees    Red 1:1 0.5   5mL  Green 1:1,000, Blue 1:100, Yellow 1:10, and Red 1:1 Grass, Weeds   Red 1:1 0.5   5mL        Shot Clinic Location:  [place]  Ship to Location: [place]  Billing Location: [place]  Special Instructions:  None        Requester Signature  Polo Mccain MD

## 2023-08-03 DIAGNOSIS — J30.81 ALLERGIC RHINITIS DUE TO ANIMALS: Primary | ICD-10-CM

## 2023-08-03 DIAGNOSIS — J30.89 ALLERGIC RHINITIS DUE TO DUST MITE: ICD-10-CM

## 2023-08-03 PROCEDURE — 95165 ANTIGEN THERAPY SERVICES: CPT | Performed by: ALLERGY & IMMUNOLOGY

## 2023-08-03 NOTE — PROGRESS NOTES
Allergy serums billed to [facility].     Vials billed below:    Vial Color   Content                      Vial Size Expiration Date  Green 1:1,000, Blue 1:100, Yellow 1:10, and Red 1:1  Cat, Dog, Dust Mite  5mL each Green 11/3/23, Blue 2/3/24, Yellow & Red 8/3/24    Billed 30 units    Brian Corona / ERICK

## 2023-08-04 DIAGNOSIS — J30.1 SEASONAL ALLERGIC RHINITIS DUE TO POLLEN: Primary | ICD-10-CM

## 2023-08-04 PROCEDURE — 95165 ANTIGEN THERAPY SERVICES: CPT | Performed by: ALLERGY & IMMUNOLOGY

## 2023-08-04 NOTE — PROGRESS NOTES
Allergy serums billed to [facility].     Vials billed below:    Vial Color   Content                      Vial Size Expiration Date  Green 1:1,000, Blue 1:100, Yellow 1:10, and Red 1:1  Trees  5mL each Green 11/4/23, Blue 2/4/24, Yellow & Red 8/4/24    Billed 30 units    Checked by Brian Corona / LPN        Signature  Brian Corona LPN

## 2023-08-10 DIAGNOSIS — J30.1 SEASONAL ALLERGIC RHINITIS DUE TO POLLEN: Primary | ICD-10-CM

## 2023-08-10 PROCEDURE — 95165 ANTIGEN THERAPY SERVICES: CPT | Performed by: ALLERGY & IMMUNOLOGY

## 2023-08-10 NOTE — PROGRESS NOTES
Allergy serums billed to [facility].     Vials billed below:    Vial Color   Content                      Vial Size Expiration Date  Green 1:1,000, Blue 1:100, Yellow 1:10, and Red 1:1  Grass, Weeds  5mL each Green 11/10/23, Blue 2/10/24, Yellow & Red 8/10/24    Billed 30 units    Checked by Brian Corona / LPN        Signature  Brian Corona LPN

## 2023-08-15 NOTE — PROGRESS NOTES
Allergy serums received at [facility].    Vials received below:    Vial Color Content                      Vial Size Expiration Date  Red 1:1 Cat, Dog, Dust Mite 5mL  08/03/2024  Red 1:1 Trees 5mL  08/04/2024  Red 1:1 Grass, Weeds 5mL  08/10/2024    Vial Color Content                      Vial Size Expiration Date  Yellow 1:10 Cat, Dog, Dust Mite 5mL  08/03/2024  Yellow 1:10 Trees 5mL  08/04/2024  Yellow 1:10 Grass, Weeds 5mL  08/10/2024    Vial Color Content                      Vial Size Expiration Date  Blue 1:100 Cat, Dog, Dust Mite 5mL  02/03/2024  Blue 1:100 Trees 5mL  02/04/2024  Blue 1:100 Grass, Weeds 5mL  02/10/2024    Vial Color Content                      Vial Size Expiration Date  Green 1:1,000 Cat, Dog, Dust Mite 5mL  11/03/2023  Green 1:1,000 Trees 5mL  11/04/2023  Green 1:1,000 Grass, Weeds 5mL  11/10/2023      Florence JORGENSEN RN, BSN, PHN

## 2023-09-12 ENCOUNTER — OFFICE VISIT (OUTPATIENT)
Dept: ALLERGY | Facility: CLINIC | Age: 33
End: 2023-09-12
Payer: COMMERCIAL

## 2023-09-12 VITALS — OXYGEN SATURATION: 98 % | HEART RATE: 65 BPM | DIASTOLIC BLOOD PRESSURE: 65 MMHG | SYSTOLIC BLOOD PRESSURE: 104 MMHG

## 2023-09-12 DIAGNOSIS — J30.81 ALLERGIC RHINITIS DUE TO ANIMALS: ICD-10-CM

## 2023-09-12 DIAGNOSIS — J30.1 SEASONAL ALLERGIC RHINITIS DUE TO POLLEN: Primary | ICD-10-CM

## 2023-09-12 DIAGNOSIS — J30.89 ALLERGIC RHINITIS DUE TO DUST MITE: ICD-10-CM

## 2023-09-12 PROCEDURE — 95180 RAPID DESENSITIZATION: CPT | Performed by: ALLERGY & IMMUNOLOGY

## 2023-09-12 ASSESSMENT — ENCOUNTER SYMPTOMS
COUGH: 1
EYE REDNESS: 0
JOINT SWELLING: 0
SHORTNESS OF BREATH: 0
ACTIVITY CHANGE: 0
EYE DISCHARGE: 0
ARTHRALGIAS: 0
FACIAL SWELLING: 0
WHEEZING: 0
VOMITING: 0
DIARRHEA: 0
MYALGIAS: 0
ADENOPATHY: 0
SINUS PRESSURE: 0
NAUSEA: 0
HEADACHES: 0
CHEST TIGHTNESS: 0
CHILLS: 0
FEVER: 0
RHINORRHEA: 0
EYE ITCHING: 0

## 2023-09-12 NOTE — PROGRESS NOTES
Reginaldo Ferraro was seen in the Allergy Clinic at [facility].      Reginaldo Ferraro is a 32 year old Not  or  female who is seen today for initial cluster immunotherapy visit. She has been feeling well and has not had any recent fever or illness.  She premedicated with fexofenadine as directed prior to today's visit.    Past Medical History:   Diagnosis Date    Abnormal Pap smear of cervix 12/23/2011 9/21 LSIL    Cervical high risk HPV (human papillomavirus) test positive 2013    10/31/14, 5/21/18    Depressive disorder 12/23/2011    possibly chronic    Environmental allergies     Influenza A with pneumonia 12/24/2021    Migraine     Migraines     headaches with allergies    Moderate major depression 12/23/2011    Moderate major depression (H) 12/23/2011    Moderate persistent asthma 3/17/2022    Nasal obstruction 2/15/2022    Added automatically from request for surgery [id number]    Nasal septal deviation 2/15/2022    Added automatically from request for surgery [id number]    Nasal turbinate hypertrophy 2/15/2022    Added automatically from request for surgery [id number]    NO ACTIVE PROBLEMS     Seasonal allergic rhinitis     Sepsis (H) 12/24/2021     Family History   Problem Relation Age of Onset    Arthritis Mother     Lupus Mother     Heart Disease Father     Cerebrovascular Disease Father         stroke    Hyperlipidemia Father     Diabetes Paternal Grandmother     Hypertension Paternal Grandmother     Respiratory Paternal Grandmother         asthma    Colon Cancer No family hx of     Breast Cancer No family hx of     Coronary Artery Disease No family hx of      Social History     Tobacco Use    Smoking status: Never     Passive exposure: Never    Smokeless tobacco: Never   Vaping Use    Vaping Use: Never used   Substance Use Topics    Alcohol use: Yes     Comment: Socially    Drug use: No     Social History     Social History Narrative    ENVIRONMENTAL HISTORY: [place] The home is heated with a forced air. They does have central air conditioning. The patient's bedroom is furnished with feather/wool bedding or pillows and carpeting in bedroom.  Pets inside the house include 2 cat(s) and 1 dog(s). There is no history of cockroach or mice infestation. There is/are 0 smokers living in the house.  There is/are 0 who smoke ecigarettes/vape living in the house.The house does not have a damp basement.             Past medical, family, and social history were reviewed.    Review of Systems   Constitutional:  Negative for activity change, chills and fever.   HENT:  Positive for congestion and sneezing. Negative for dental problem, ear pain, facial swelling, nosebleeds, postnasal drip, rhinorrhea and sinus pressure.    Eyes:  Negative for discharge, redness and itching.   Respiratory:  Positive for cough. Negative for chest tightness, shortness of breath and wheezing.    Cardiovascular:  Negative for chest pain.   Gastrointestinal:  Negative for diarrhea, nausea and vomiting.   Musculoskeletal:  Negative for arthralgias, joint swelling and myalgias.   Skin:  Negative for rash.   Allergic/Immunologic: Positive for environmental allergies.   Neurological:  Negative for headaches.   Hematological:  Negative for adenopathy.   Psychiatric/Behavioral:  Negative for behavioral problems and self-injury.          Current Outpatient Medications:     ALPRAZolam (XANAX) 0.25 MG tablet, Take 1 tablet (0.25 mg) by mouth at bedtime as needed, may repeat once for anxiety, Disp: 30 tablet, Rfl: 0    Fexofenadine HCl (ALLEGRA ALLERGY PO), , Disp: , Rfl:     ipratropium - albuterol 0.5 mg/2.5 mg/3 mL (DUONEB) 0.5-2.5 (3) MG/3ML neb solution, Inhale 1 vial (3 mLs) into the lungs every 6 hours as needed for shortness of breath or wheezing, Disp: 360 mL, Rfl: 3    ORDER FOR ALLERGEN IMMUNOTHERAPY, Name of Mix: Mix #1  Dust Mite, Cat, Dog Cat Hair, Standardized A.P.  10,000 BAU/mL, HS  2.0 ml Dog Hair-Dander, UF  1:650 w/v, HS  1.0 ml Dust Mites DF. 10,000 AU/mL, HS  1.0 ml Dust Mites DP. 10,000 AU/mL, HS  1.0 ml  Diluent: HSA qs to 5ml, Disp: 5 mL, Rfl: PRN    ORDER FOR ALLERGEN IMMUNOTHERAPY, Name of Mix: Mix #2  Tree  Sylvain, White 1:20 w/v, HS  0.5 ml Birch Mix PRW 1:20 w/v, HS  0.5 ml Boxelder-Maple Mix BHR (Boxelder Hard Red) 1:20 w/v, HS  0.5 ml [place], Common 1:20 w/v, HS  0.5 ml Oak Mix RVW 1:20 w/v, HS 0.5 ml Pine Mix 1:20 w/v, HS 0.5 ml [place] Tree, Black 1:20 w/v, HS 0.5 ml Diluent: HSA qs to 5ml, Disp: 5 mL, Rfl: PRN    ORDER FOR ALLERGEN IMMUNOTHERAPY, Name of Mix: Mix #3  Grass, Weeds Prabhu Grass 1:20 w/v, HS 0.5 ml Steve Grass (Std) 100,000 BAU/mL, HS 0.4 ml Lamb's Quarters 1:20 w/v, HS 0.5 ml Nettle 1:20 w/v, HS 0.5 ml Plantain, English 1:20 w/v, HS 0.5 ml Ragweed, Mix (Giant, Short) 1:20 w/v, HS 0.5 ml Russian Thistle 1:20 w/v, HS 0.5 ml Sorrel, Sheep 1:20 w/v, HS 0.5 ml Diluent: HSA qs to 5ml, Disp: 5 mL, Rfl: PRN    VENTOLIN  (90 Base) MCG/ACT inhaler, Inhale 1-2 puffs into the lungs every 4 hours as needed for shortness of breath or wheezing, Disp: 18 g, Rfl: 1    azelastine (OPTIVAR) 0.05 % ophthalmic solution, Apply 1 drop to eye 2 times daily (Patient not taking: Reported on 9/12/2023), Disp: 6 mL, Rfl: 3    COMBIVENT RESPIMAT  MCG/ACT inhaler, , Disp: , Rfl:     EPINEPHrine (ANY BX GENERIC EQUIV) 0.3 MG/0.3ML injection 2-pack, Inject into the muscle as directed for anaphylaxis (Patient not taking: Reported on 9/12/2023), Disp: 2 each, Rfl: 2    fluticasone-salmeterol (ADVAIR) 500-50 MCG/ACT inhaler, Inhale 1 puff into the lungs every 12 hours, Disp: 1 each, Rfl: 5    HYDROcodone-acetaminophen (NORCO) 5-325 MG tablet, Take 1 tablet by mouth every 6 hours as needed for pain, Disp: 30 tablet, Rfl: 0    omeprazole (PRILOSEC) 20 MG DR capsule, Take 1 capsule (20 mg) by mouth daily (Patient not taking: Reported on 9/12/2023), Disp: 90  capsule, Rfl: 1    ondansetron (ZOFRAN ODT) 8 MG ODT tab, Take 1 tablet (8 mg) by mouth every 8 hours as needed for nausea (Patient not taking: Reported on 9/12/2023), Disp: 15 tablet, Rfl: 3    SYMBICORT 160-4.5 MCG/ACT Inhaler, Inhale 2 puffs into the lungs 2 times daily Needs appointment for additional refills, Disp: 10.2 g, Rfl: 1  No Known Allergies    EXAM:   /65   Pulse 65   SpO2 98%   Physical Exam  Vitals and nursing note reviewed.   Constitutional:       Appearance: Normal appearance.   HENT:      Head: Normocephalic and atraumatic.      Right Ear: External ear normal.      Left Ear: External ear normal.      Nose: No rhinorrhea.      Mouth/Throat:      Mouth: Mucous membranes are moist. No oral lesions.      Pharynx: Oropharynx is clear. Uvula midline. No posterior oropharyngeal erythema.   Eyes:      General: Lids are normal.      Extraocular Movements: Extraocular movements intact.      Conjunctiva/sclera: Conjunctivae normal.   Neck:      Comments: No asymmetry, masses, or scars  Cardiovascular:      Rate and Rhythm: Normal rate and regular rhythm.      Heart sounds: S1 normal and S2 normal. No murmur heard.  Pulmonary:      Effort: Pulmonary effort is normal. No respiratory distress.      Breath sounds: Normal breath sounds and air entry.   Musculoskeletal:      Comments: No musculoskeletal defects appreciated   Skin:     General: Skin is warm and dry.      Findings: No lesion or rash.   Neurological:      General: No focal deficit present.      Mental Status: She is alert.   Psychiatric:         Mood and Affect: Mood and affect normal.           WORKUP:  Cluster Immunotherapy    Cluster Allergen Immunotherapy:    After explaining risks and benefits, and obtaining verbal and written consent, we proceeded with cluster immunotherapy.     VISIT  VIAL COLOR/STRENGTH  DOSES TO BE GIVEN    1  GREEN (1:1000), BLUE (1:100)  GREEN 0.1, GREEN 0.2, GREEN 0.4, BLUE 0.1    2  BLUE (1:100), YELLOW (1:10)   BLUE 0.2, BLUE 0.4, YELLOW 0.05    3  YELLOW (1:10)  YELLOW 0.1, YELLOW 0.15, YELLOW 0.25    4  YELLOW (1:10)  YELLOW 0.35, YELLOW 0.5    5  RED (1:1)  RED 0.05, RED 0.1    6  RED (1:1)  RED 0.15, RED 0.2    7  RED (1:1)  RED 0.3, RED 0.4    8  RED (1:1)  RED 0.5        VISIT 1    Time Injection Given: 13:28  Green 1:1,000   Trees     0.1 mL  Green 1:1,000   Grass, Weeds    0.1 mL  Green 1:1,000   Cat, Dog, Dust Mite   0.1 mL          Time Injection Given: 14:09  Green 1:1,000   Trees     0.2 mL  Green 1:1,000   Grass, Weeds    0.2 mL  Green 1:1,000   Cat, Dog, Dust Mite   0.2 mL    Time Injection Given: 14:43  Green 1:1,000   Trees     0.4 mL  Green 1:1,000   Grass, Weeds    0.4 mL  Green 1:1,000   Cat, Dog, Dust Mite   0.4 mL  L    Time Injection Given: 15:20  Blue 1:100   Trees     0.1 mL  Blue 1:100   Grass, Weeds    0.1 mL  Blue 1:100   Cat, Dog, Dust Mite   0.1 mL      Start Time: 13:28  End Time: 15:50        VITALS   Time BP Pulse pOx Reaction Treatment   14:05 101/71 65 97% none N/a   14:40 102/69 64 98% none N/a   15:15 103/73 58 98% none N/a   15:50 103/67 60 975 none N/a       ASSESSMENT/PLAN:  Reginaldo Ferraro is a 32 year old female here for cluster immunotherapy.    1. Seasonal allergic rhinitis due to pollen - Reginaldo tolerated today's procedure well without developing any signs or symptoms of an adverse reaction.    - return in 7-14 days to continue cluster protocol  - continue pre-medication with fexofenadine as directed  - RAPID DESENSITIZATION    2. Allergic rhinitis due to animals    - RAPID DESENSITIZATION    3. Allergic rhinitis due to dust mite    - RAPID DESENSITIZATION      Thank you for allowing me to participate in the care of Reginaldo Ferraro.      Polo Mccain MD, FAAAAI  Allergy/Immunology  [facility]      Chart documentation done in part with Dragon Voice Recognition Software. Although reviewed  after completion, some word and grammatical errors may remain.

## 2023-09-12 NOTE — PROGRESS NOTES
Prior to initiation of cluster immunotherapy RN ensured that patient was feeling healthy, has premedicated with Zyrtec or Allegra yesterday twice daily and this morning. RN also ensured that patient has unexpired Epi-Pen, had no new medication changes, and did not have a reaction after the last allergy shots were given. If the patient has asthma it is well-controlled. The patient has not been ill in the past 7 days. Patient was given allergy injections per cluster immunotherapy protocol 30 minutes apart and vital signs were monitored. Patient was monitored in clinic for 30 minutes after last injection was given and assessed by provider before discharging.     Josefina Neal RN

## 2023-09-12 NOTE — LETTER
9/12/2023         RE: Reginaldo Ferraro  [address] [facility] [address]        Dear Colleague,    Thank you for referring your patient, Reginaldo Ferraro, to the [facility]. Please see a copy of my visit note below.    Reginaldo Ferraro was seen in the Allergy Clinic at [facility].      Regnialdo Ferraro is a 32 year old Not  or  female who is seen today for initial cluster immunotherapy visit. She has been feeling well and has not had any recent fever or illness.  She premedicated with fexofenadine as directed prior to today's visit.    Past Medical History:   Diagnosis Date     Abnormal Pap smear of cervix 12/23/2011 9/21 LSIL     Cervical high risk HPV (human papillomavirus) test positive 2013    10/31/14, 5/21/18     Depressive disorder 12/23/2011    possibly chronic     Environmental allergies      Influenza A with pneumonia 12/24/2021     Migraine      Migraines     headaches with allergies     Moderate major depression 12/23/2011     Moderate major depression (H) 12/23/2011     Moderate persistent asthma 3/17/2022     Nasal obstruction 2/15/2022    Added automatically from request for surgery [id number]     Nasal septal deviation 2/15/2022    Added automatically from request for surgery [id number]     Nasal turbinate hypertrophy 2/15/2022    Added automatically from request for surgery [id number]     NO ACTIVE PROBLEMS      Seasonal allergic rhinitis      Sepsis (H) 12/24/2021     Family History   Problem Relation Age of Onset     Arthritis Mother      Lupus Mother      Heart Disease Father      Cerebrovascular Disease Father         stroke     Hyperlipidemia Father      Diabetes Paternal Grandmother      Hypertension Paternal Grandmother      Respiratory Paternal Grandmother         asthma     Colon Cancer No family hx of      Breast Cancer No family hx of      Coronary Artery Disease No family hx of      Social History     Tobacco Use      Smoking status: Never     Passive exposure: Never     Smokeless tobacco: Never   Vaping Use     Vaping Use: Never used   Substance Use Topics     Alcohol use: Yes     Comment: Socially     Drug use: No     Social History     Social History Narrative    ENVIRONMENTAL HISTORY: [place] The home is heated with a forced air. They does have central air conditioning. The patient's bedroom is furnished with feather/wool bedding or pillows and carpeting in bedroom.  Pets inside the house include 2 cat(s) and 1 dog(s). There is no history of cockroach or mice infestation. There is/are 0 smokers living in the house.  There is/are 0 who smoke ecigarettes/vape living in the house.The house does not have a damp basement.             Past medical, family, and social history were reviewed.    Review of Systems   Constitutional:  Negative for activity change, chills and fever.   HENT:  Positive for congestion and sneezing. Negative for dental problem, ear pain, facial swelling, nosebleeds, postnasal drip, rhinorrhea and sinus pressure.    Eyes:  Negative for discharge, redness and itching.   Respiratory:  Positive for cough. Negative for chest tightness, shortness of breath and wheezing.    Cardiovascular:  Negative for chest pain.   Gastrointestinal:  Negative for diarrhea, nausea and vomiting.   Musculoskeletal:  Negative for arthralgias, joint swelling and myalgias.   Skin:  Negative for rash.   Allergic/Immunologic: Positive for environmental allergies.   Neurological:  Negative for headaches.   Hematological:  Negative for adenopathy.   Psychiatric/Behavioral:  Negative for behavioral problems and self-injury.          Current Outpatient Medications:      ALPRAZolam (XANAX) 0.25 MG tablet, Take 1 tablet (0.25 mg) by mouth at bedtime as needed, may repeat once for anxiety, Disp: 30 tablet, Rfl: 0     Fexofenadine HCl (ALLEGRA ALLERGY PO), , Disp: , Rfl:      ipratropium -  albuterol 0.5 mg/2.5 mg/3 mL (DUONEB) 0.5-2.5 (3) MG/3ML neb solution, Inhale 1 vial (3 mLs) into the lungs every 6 hours as needed for shortness of breath or wheezing, Disp: 360 mL, Rfl: 3     ORDER FOR ALLERGEN IMMUNOTHERAPY, Name of Mix: Mix #1  Dust Mite, Cat, Dog Cat Hair, Standardized A.P. 10,000 BAU/mL, HS  2.0 ml Dog Hair-Dander, UF  1:650 w/v, HS  1.0 ml Dust Mites DF. 10,000 AU/mL, HS  1.0 ml Dust Mites DP. 10,000 AU/mL, HS  1.0 ml  Diluent: HSA qs to 5ml, Disp: 5 mL, Rfl: PRN     ORDER FOR ALLERGEN IMMUNOTHERAPY, Name of Mix: Mix #2  Tree  Sylvain, White 1:20 w/v, HS  0.5 ml Birch Mix PRW 1:20 w/v, HS  0.5 ml Boxelder-Maple Mix BHR (Boxelder Hard Red) 1:20 w/v, HS  0.5 ml [place], Common 1:20 w/v, HS  0.5 ml Oak Mix RVW 1:20 w/v, HS 0.5 ml Pine Mix 1:20 w/v, HS 0.5 ml [place] Tree, Black 1:20 w/v, HS 0.5 ml Diluent: HSA qs to 5ml, Disp: 5 mL, Rfl: PRN     ORDER FOR ALLERGEN IMMUNOTHERAPY, Name of Mix: Mix #3  Grass, Weeds Prabhu Grass 1:20 w/v, HS 0.5 ml Steve Grass (Std) 100,000 BAU/mL, HS 0.4 ml Lamb's Quarters 1:20 w/v, HS 0.5 ml Nettle 1:20 w/v, HS 0.5 ml Plantain, English 1:20 w/v, HS 0.5 ml Ragweed, Mix (Giant, Short) 1:20 w/v, HS 0.5 ml Russian Thistle 1:20 w/v, HS 0.5 ml Sorrel, Sheep 1:20 w/v, HS 0.5 ml Diluent: HSA qs to 5ml, Disp: 5 mL, Rfl: PRN     VENTOLIN  (90 Base) MCG/ACT inhaler, Inhale 1-2 puffs into the lungs every 4 hours as needed for shortness of breath or wheezing, Disp: 18 g, Rfl: 1     azelastine (OPTIVAR) 0.05 % ophthalmic solution, Apply 1 drop to eye 2 times daily (Patient not taking: Reported on 9/12/2023), Disp: 6 mL, Rfl: 3     COMBIVENT RESPIMAT  MCG/ACT inhaler, , Disp: , Rfl:      EPINEPHrine (ANY BX GENERIC EQUIV) 0.3 MG/0.3ML injection 2-pack, Inject into the muscle as directed for anaphylaxis (Patient not taking: Reported on 9/12/2023), Disp: 2 each, Rfl: 2     fluticasone-salmeterol (ADVAIR) 500-50 MCG/ACT inhaler, Inhale 1 puff into the lungs every 12  hours, Disp: 1 each, Rfl: 5     HYDROcodone-acetaminophen (NORCO) 5-325 MG tablet, Take 1 tablet by mouth every 6 hours as needed for pain, Disp: 30 tablet, Rfl: 0     omeprazole (PRILOSEC) 20 MG DR capsule, Take 1 capsule (20 mg) by mouth daily (Patient not taking: Reported on 9/12/2023), Disp: 90 capsule, Rfl: 1     ondansetron (ZOFRAN ODT) 8 MG ODT tab, Take 1 tablet (8 mg) by mouth every 8 hours as needed for nausea (Patient not taking: Reported on 9/12/2023), Disp: 15 tablet, Rfl: 3     SYMBICORT 160-4.5 MCG/ACT Inhaler, Inhale 2 puffs into the lungs 2 times daily Needs appointment for additional refills, Disp: 10.2 g, Rfl: 1  No Known Allergies    EXAM:   /65   Pulse 65   SpO2 98%   Physical Exam  Vitals and nursing note reviewed.   Constitutional:       Appearance: Normal appearance.   HENT:      Head: Normocephalic and atraumatic.      Right Ear: External ear normal.      Left Ear: External ear normal.      Nose: No rhinorrhea.      Mouth/Throat:      Mouth: Mucous membranes are moist. No oral lesions.      Pharynx: Oropharynx is clear. Uvula midline. No posterior oropharyngeal erythema.   Eyes:      General: Lids are normal.      Extraocular Movements: Extraocular movements intact.      Conjunctiva/sclera: Conjunctivae normal.   Neck:      Comments: No asymmetry, masses, or scars  Cardiovascular:      Rate and Rhythm: Normal rate and regular rhythm.      Heart sounds: S1 normal and S2 normal. No murmur heard.  Pulmonary:      Effort: Pulmonary effort is normal. No respiratory distress.      Breath sounds: Normal breath sounds and air entry.   Musculoskeletal:      Comments: No musculoskeletal defects appreciated   Skin:     General: Skin is warm and dry.      Findings: No lesion or rash.   Neurological:      General: No focal deficit present.      Mental Status: She is alert.   Psychiatric:         Mood and Affect: Mood and affect normal.           WORKUP:  Cluster Immunotherapy    Cluster Allergen  Immunotherapy:    After explaining risks and benefits, and obtaining verbal and written consent, we proceeded with cluster immunotherapy.     VISIT  VIAL COLOR/STRENGTH  DOSES TO BE GIVEN    1  GREEN (1:1000), BLUE (1:100)  GREEN 0.1, GREEN 0.2, GREEN 0.4, BLUE 0.1    2  BLUE (1:100), YELLOW (1:10)  BLUE 0.2, BLUE 0.4, YELLOW 0.05    3  YELLOW (1:10)  YELLOW 0.1, YELLOW 0.15, YELLOW 0.25    4  YELLOW (1:10)  YELLOW 0.35, YELLOW 0.5    5  RED (1:1)  RED 0.05, RED 0.1    6  RED (1:1)  RED 0.15, RED 0.2    7  RED (1:1)  RED 0.3, RED 0.4    8  RED (1:1)  RED 0.5        VISIT 1    Time Injection Given: 13:28  Green 1:1,000   Trees     0.1 mL  Green 1:1,000   Grass, Weeds    0.1 mL  Green 1:1,000   Cat, Dog, Dust Mite   0.1 mL          Time Injection Given: 14:09  Green 1:1,000   Trees     0.2 mL  Green 1:1,000   Grass, Weeds    0.2 mL  Green 1:1,000   Cat, Dog, Dust Mite   0.2 mL    Time Injection Given: 14:43  Green 1:1,000   Trees     0.4 mL  Green 1:1,000   Grass, Weeds    0.4 mL  Green 1:1,000   Cat, Dog, Dust Mite   0.4 mL  L    Time Injection Given: 15:20  Blue 1:100   Trees     0.1 mL  Blue 1:100   Grass, Weeds    0.1 mL  Blue 1:100   Cat, Dog, Dust Mite   0.1 mL      Start Time: 13:28  End Time: 15:50        VITALS   Time BP Pulse pOx Reaction Treatment   14:05 101/71 65 97% none N/a   14:40 102/69 64 98% none N/a   15:15 103/73 58 98% none N/a   15:50 103/67 60 975 none N/a       ASSESSMENT/PLAN:  Reginaldo Ferraro is a 32 year old female here for cluster immunotherapy.    1. Seasonal allergic rhinitis due to pollen - Reginaldo tolerated today's procedure well without developing any signs or symptoms of an adverse reaction.    - return in 7-14 days to continue cluster protocol  - continue pre-medication with fexofenadine as directed  - RAPID DESENSITIZATION    2. Allergic rhinitis due to animals    - RAPID DESENSITIZATION    3. Allergic rhinitis due to dust mite    - RAPID DESENSITIZATION      Thank you for  allowing me to participate in the care of Reginaldo Ferraro.      Polo Mccain MD, FAAAAI  Allergy/Immunology  [facility]      Chart documentation done in part with Dragon Voice Recognition Software. Although reviewed after completion, some word and grammatical errors may remain.      Prior to initiation of cluster immunotherapy RN ensured that patient was feeling healthy, has premedicated with Zyrtec or Allegra yesterday twice daily and this morning. RN also ensured that patient has unexpired Epi-Pen, had no new medication changes, and did not have a reaction after the last allergy shots were given. If the patient has asthma it is well-controlled. The patient has not been ill in the past 7 days. Patient was given allergy injections per cluster immunotherapy protocol 30 minutes apart and vital signs were monitored. Patient was monitored in clinic for 30 minutes after last injection was given and assessed by provider before discharging.     Josefina Neal RN      Again, thank you for allowing me to participate in the care of your patient.        Sincerely,        Polo Mccain MD

## 2023-09-19 ENCOUNTER — OFFICE VISIT (OUTPATIENT)
Dept: ALLERGY | Facility: CLINIC | Age: 33
End: 2023-09-19
Payer: COMMERCIAL

## 2023-09-19 VITALS — DIASTOLIC BLOOD PRESSURE: 80 MMHG | SYSTOLIC BLOOD PRESSURE: 121 MMHG | HEART RATE: 69 BPM | OXYGEN SATURATION: 98 %

## 2023-09-19 DIAGNOSIS — J30.89 ALLERGIC RHINITIS DUE TO DUST MITE: ICD-10-CM

## 2023-09-19 DIAGNOSIS — J30.81 ALLERGIC RHINITIS DUE TO ANIMALS: ICD-10-CM

## 2023-09-19 DIAGNOSIS — J30.1 SEASONAL ALLERGIC RHINITIS DUE TO POLLEN: Primary | ICD-10-CM

## 2023-09-19 PROCEDURE — 95180 RAPID DESENSITIZATION: CPT | Performed by: ALLERGY & IMMUNOLOGY

## 2023-09-19 ASSESSMENT — ENCOUNTER SYMPTOMS
COUGH: 0
SINUS PRESSURE: 0
EYE ITCHING: 0
FEVER: 0
NAUSEA: 0
DIARRHEA: 0
EYE DISCHARGE: 0
FACIAL SWELLING: 0
VOMITING: 0
HEADACHES: 0
MYALGIAS: 0
ACTIVITY CHANGE: 0
JOINT SWELLING: 0
RHINORRHEA: 0
CHEST TIGHTNESS: 0
NERVOUS/ANXIOUS: 1
CHILLS: 0
WHEEZING: 0
ARTHRALGIAS: 0
SHORTNESS OF BREATH: 0
EYE REDNESS: 0
ADENOPATHY: 0

## 2023-09-19 NOTE — PROGRESS NOTES
Reginaldo Ferraro was seen in the Allergy Clinic at [facility].      Reginaldo Ferraro is a 32 year old Not  or  female who is seen today for cluster immunotherapy. No adverse reactions after her last visit. She has premedicated with fexofenadine as directed.    Past Medical History:   Diagnosis Date    Abnormal Pap smear of cervix 12/23/2011 9/21 LSIL    Cervical high risk HPV (human papillomavirus) test positive 2013    10/31/14, 5/21/18    Depressive disorder 12/23/2011    possibly chronic    Environmental allergies     Influenza A with pneumonia 12/24/2021    Migraine     Migraines     headaches with allergies    Moderate major depression 12/23/2011    Moderate major depression (H) 12/23/2011    Moderate persistent asthma 3/17/2022    Nasal obstruction 2/15/2022    Added automatically from request for surgery [id number]    Nasal septal deviation 2/15/2022    Added automatically from request for surgery [id number]    Nasal turbinate hypertrophy 2/15/2022    Added automatically from request for surgery [id number]    NO ACTIVE PROBLEMS     Seasonal allergic rhinitis     Sepsis (H) 12/24/2021     Family History   Problem Relation Age of Onset    Arthritis Mother     Lupus Mother     Heart Disease Father     Cerebrovascular Disease Father         stroke    Hyperlipidemia Father     Diabetes Paternal Grandmother     Hypertension Paternal Grandmother     Respiratory Paternal Grandmother         asthma    Colon Cancer No family hx of     Breast Cancer No family hx of     Coronary Artery Disease No family hx of      Social History     Tobacco Use    Smoking status: Never     Passive exposure: Never    Smokeless tobacco: Never   Vaping Use    Vaping Use: Never used   Substance Use Topics    Alcohol use: Yes     Comment: Socially    Drug use: No     Social History     Social History Narrative    ENVIRONMENTAL HISTORY: [place] The home is  heated with a forced air. They does have central air conditioning. The patient's bedroom is furnished with feather/wool bedding or pillows and carpeting in bedroom.  Pets inside the house include 2 cat(s) and 1 dog(s). There is no history of cockroach or mice infestation. There is/are 0 smokers living in the house.  There is/are 0 who smoke ecigarettes/vape living in the house.The house does not have a damp basement.             Past medical, family, and social history were reviewed.    Review of Systems   Constitutional:  Negative for activity change, chills and fever.   HENT:  Negative for congestion, dental problem, ear pain, facial swelling, nosebleeds, postnasal drip, rhinorrhea, sinus pressure and sneezing.    Eyes:  Negative for discharge, redness and itching.   Respiratory:  Negative for cough, chest tightness, shortness of breath and wheezing.    Cardiovascular:  Negative for chest pain.   Gastrointestinal:  Negative for diarrhea, nausea and vomiting.   Musculoskeletal:  Negative for arthralgias, joint swelling and myalgias.   Skin:  Negative for rash.   Allergic/Immunologic: Positive for environmental allergies.   Neurological:  Negative for headaches.   Hematological:  Negative for adenopathy.   Psychiatric/Behavioral:  Negative for behavioral problems and self-injury. The patient is nervous/anxious.          Current Outpatient Medications:     ALPRAZolam (XANAX) 0.25 MG tablet, Take 1 tablet (0.25 mg) by mouth at bedtime as needed, may repeat once for anxiety, Disp: 30 tablet, Rfl: 0    azelastine (OPTIVAR) 0.05 % ophthalmic solution, Apply 1 drop to eye 2 times daily (Patient not taking: Reported on 9/12/2023), Disp: 6 mL, Rfl: 3    COMBIVENT RESPIMAT  MCG/ACT inhaler, , Disp: , Rfl:     EPINEPHrine (ANY BX GENERIC EQUIV) 0.3 MG/0.3ML injection 2-pack, Inject into the muscle as directed for anaphylaxis (Patient not taking: Reported on 9/12/2023), Disp: 2 each, Rfl: 2    Fexofenadine HCl (ALLEGRA  ALLERGY PO), , Disp: , Rfl:     fluticasone-salmeterol (ADVAIR) 500-50 MCG/ACT inhaler, Inhale 1 puff into the lungs every 12 hours, Disp: 1 each, Rfl: 5    HYDROcodone-acetaminophen (NORCO) 5-325 MG tablet, Take 1 tablet by mouth every 6 hours as needed for pain, Disp: 30 tablet, Rfl: 0    ipratropium - albuterol 0.5 mg/2.5 mg/3 mL (DUONEB) 0.5-2.5 (3) MG/3ML neb solution, Inhale 1 vial (3 mLs) into the lungs every 6 hours as needed for shortness of breath or wheezing, Disp: 360 mL, Rfl: 3    omeprazole (PRILOSEC) 20 MG DR capsule, Take 1 capsule (20 mg) by mouth daily (Patient not taking: Reported on 9/12/2023), Disp: 90 capsule, Rfl: 1    ondansetron (ZOFRAN ODT) 8 MG ODT tab, Take 1 tablet (8 mg) by mouth every 8 hours as needed for nausea (Patient not taking: Reported on 9/12/2023), Disp: 15 tablet, Rfl: 3    ORDER FOR ALLERGEN IMMUNOTHERAPY, Name of Mix: Mix #1  Dust Mite, Cat, Dog Cat Hair, Standardized A.P. 10,000 BAU/mL, HS  2.0 ml Dog Hair-Dander, UF  1:650 w/v, HS  1.0 ml Dust Mites DF. 10,000 AU/mL, HS  1.0 ml Dust Mites DP. 10,000 AU/mL, HS  1.0 ml  Diluent: HSA qs to 5ml, Disp: 5 mL, Rfl: PRN    ORDER FOR ALLERGEN IMMUNOTHERAPY, Name of Mix: Mix #2  Tree  Sylvain, White 1:20 w/v, HS  0.5 ml Birch Mix PRW 1:20 w/v, HS  0.5 ml Boxelder-Maple Mix BHR (Boxelder Hard Red) 1:20 w/v, HS  0.5 ml [place], Common 1:20 w/v, HS  0.5 ml Oak Mix RVW 1:20 w/v, HS 0.5 ml Pine Mix 1:20 w/v, HS 0.5 ml [place] Tree, Black 1:20 w/v, HS 0.5 ml Diluent: HSA qs to 5ml, Disp: 5 mL, Rfl: PRN    ORDER FOR ALLERGEN IMMUNOTHERAPY, Name of Mix: Mix #3  Grass, Weeds Prabhu Grass 1:20 w/v, HS 0.5 ml Steve Grass (Std) 100,000 BAU/mL, HS 0.4 ml Lamb's Quarters 1:20 w/v, HS 0.5 ml Nettle 1:20 w/v, HS 0.5 ml Plantain, English 1:20 w/v, HS 0.5 ml Ragweed, Mix (Giant, Short) 1:20 w/v, HS 0.5 ml Russian Thistle 1:20 w/v, HS 0.5 ml Sorrel, Sheep 1:20 w/v, HS 0.5 ml Diluent: HSA qs to 5ml, Disp: 5 mL, Rfl: PRN    SYMBICORT 160-4.5 MCG/ACT  Inhaler, Inhale 2 puffs into the lungs 2 times daily Needs appointment for additional refills, Disp: 10.2 g, Rfl: 1    VENTOLIN  (90 Base) MCG/ACT inhaler, Inhale 1-2 puffs into the lungs every 4 hours as needed for shortness of breath or wheezing, Disp: 18 g, Rfl: 1  No Known Allergies    EXAM:   /80   Pulse 69   SpO2 98%   Physical Exam  Vitals and nursing note reviewed.   Constitutional:       Appearance: Normal appearance.   HENT:      Head: Normocephalic and atraumatic.      Right Ear: External ear normal.      Left Ear: External ear normal.      Nose: No rhinorrhea.      Mouth/Throat:      Mouth: Mucous membranes are moist. No oral lesions.      Pharynx: Oropharynx is clear. Uvula midline. No posterior oropharyngeal erythema.   Eyes:      General: Lids are normal.      Extraocular Movements: Extraocular movements intact.      Conjunctiva/sclera: Conjunctivae normal.   Neck:      Comments: No asymmetry, masses, or scars  Cardiovascular:      Rate and Rhythm: Normal rate and regular rhythm.      Heart sounds: S1 normal and S2 normal. No murmur heard.  Pulmonary:      Effort: Pulmonary effort is normal. No respiratory distress.      Breath sounds: Normal breath sounds and air entry.   Musculoskeletal:      Comments: No musculoskeletal defects appreciated   Skin:     General: Skin is warm and dry.      Findings: No lesion or rash.   Neurological:      General: No focal deficit present.      Mental Status: She is alert.   Psychiatric:         Mood and Affect: Mood and affect normal.           WORKUP:  Cluster Immunotherapy    Cluster Allergen Immunotherapy:    After explaining risks and benefits, and obtaining verbal and written consent, we proceeded with cluster immunotherapy.     VISIT  VIAL COLOR/STRENGTH  DOSES TO BE GIVEN    1  GREEN (1:1000), BLUE (1:100)  GREEN 0.1, GREEN 0.2, GREEN 0.4, BLUE 0.1    2  BLUE (1:100), YELLOW (1:10)  BLUE 0.2, BLUE 0.4, YELLOW 0.05    3  YELLOW (1:10)  YELLOW  0.1, YELLOW 0.15, YELLOW 0.25    4  YELLOW (1:10)  YELLOW 0.35, YELLOW 0.5    5  RED (1:1)  RED 0.05, RED 0.1    6  RED (1:1)  RED 0.15, RED 0.2    7  RED (1:1)  RED 0.3, RED 0.4    8  RED (1:1)  RED 0.5        VISIT 2    After explaining risks and benefits, and obtaining verbal and written consent, we proceeded with cluster immunotherapy.    Time Injection Given: 13:15  Blue 1:100   Trees      0.2 mL  Blue 1:100   Grass, Weeds     0.2 mL  Blue 1:100   Cat, Dog, Dust Mite    0.2 mL      Time Injection Given: 13:51  Blue 1:100   Trees      0.4 mL  Blue 1:100   Grass, Weeds     0.4 mL  Blue 1:100   Cat, Dog, Dust Mite    0.4 mL      Time Injection Given: 14:27  Yellow 1:10   Trees      0.05 mL  Yellow 1:10   Grass, Weeds     0.05 mL  Yellow 1:10   Cat, Dog, Dust Mite    0.05 mL      Start Time: 13:15  End Time: 15:00        VITALS   Time BP Pulse pOx Reaction Treatment   13:45 107/73 70 98% none N/a   14:21 105/75 66 98% none N/a   15:00 111/80 62 98% none N/a       ASSESSMENT/PLAN:  Reginaldo Ferraro is a 32 year old female here for cluster immunotherapy.    1. Seasonal allergic rhinitis due to pollen - Reginaldo tolerated today's procedure well without developing signs or symptoms of an adverse reaction.    - return in 7-14 days to continue cluster protocol  - continue pre-medication with fexofenadine as directed  - RAPID DESENSITIZATION    2. Allergic rhinitis due to animals    - RAPID DESENSITIZATION    3. Allergic rhinitis due to dust mite    - RAPID DESENSITIZATION      Thank you for allowing me to participate in the care of Reginaldo Ferraro.      Polo Mccain MD, FAAAAI  Allergy/Immunology  [facility]      Chart documentation done in part with Dragon Voice Recognition Software. Although reviewed after completion, some word and grammatical errors may remain.

## 2023-09-19 NOTE — LETTER
9/19/2023         RE: Reginaldo Ferraro  [address] [facility] [address]        Dear Colleague,    Thank you for referring your patient, Reginaldo Ferraro, to the [facility]. Please see a copy of my visit note below.    Reginaldo Ferraro was seen in the Allergy Clinic at [facility].      Reginaldo Ferraro is a 32 year old Not  or  female who is seen today for cluster immunotherapy. No adverse reactions after her last visit. She has premedicated with fexofenadine as directed.    Past Medical History:   Diagnosis Date     Abnormal Pap smear of cervix 12/23/2011 9/21 LSIL     Cervical high risk HPV (human papillomavirus) test positive 2013    10/31/14, 5/21/18     Depressive disorder 12/23/2011    possibly chronic     Environmental allergies      Influenza A with pneumonia 12/24/2021     Migraine      Migraines     headaches with allergies     Moderate major depression 12/23/2011     Moderate major depression (H) 12/23/2011     Moderate persistent asthma 3/17/2022     Nasal obstruction 2/15/2022    Added automatically from request for surgery [id number]     Nasal septal deviation 2/15/2022    Added automatically from request for surgery [id number]     Nasal turbinate hypertrophy 2/15/2022    Added automatically from request for surgery [id number]     NO ACTIVE PROBLEMS      Seasonal allergic rhinitis      Sepsis (H) 12/24/2021     Family History   Problem Relation Age of Onset     Arthritis Mother      Lupus Mother      Heart Disease Father      Cerebrovascular Disease Father         stroke     Hyperlipidemia Father      Diabetes Paternal Grandmother      Hypertension Paternal Grandmother      Respiratory Paternal Grandmother         asthma     Colon Cancer No family hx of      Breast Cancer No family hx of      Coronary Artery Disease No family hx of      Social History     Tobacco Use     Smoking status: Never     Passive exposure: Never      Smokeless tobacco: Never   Vaping Use     Vaping Use: Never used   Substance Use Topics     Alcohol use: Yes     Comment: Socially     Drug use: No     Social History     Social History Narrative    ENVIRONMENTAL HISTORY: [place] The home is heated with a forced air. They does have central air conditioning. The patient's bedroom is furnished with feather/wool bedding or pillows and carpeting in bedroom.  Pets inside the house include 2 cat(s) and 1 dog(s). There is no history of cockroach or mice infestation. There is/are 0 smokers living in the house.  There is/are 0 who smoke ecigarettes/vape living in the house.The house does not have a damp basement.             Past medical, family, and social history were reviewed.    Review of Systems   Constitutional:  Negative for activity change, chills and fever.   HENT:  Negative for congestion, dental problem, ear pain, facial swelling, nosebleeds, postnasal drip, rhinorrhea, sinus pressure and sneezing.    Eyes:  Negative for discharge, redness and itching.   Respiratory:  Negative for cough, chest tightness, shortness of breath and wheezing.    Cardiovascular:  Negative for chest pain.   Gastrointestinal:  Negative for diarrhea, nausea and vomiting.   Musculoskeletal:  Negative for arthralgias, joint swelling and myalgias.   Skin:  Negative for rash.   Allergic/Immunologic: Positive for environmental allergies.   Neurological:  Negative for headaches.   Hematological:  Negative for adenopathy.   Psychiatric/Behavioral:  Negative for behavioral problems and self-injury. The patient is nervous/anxious.          Current Outpatient Medications:      ALPRAZolam (XANAX) 0.25 MG tablet, Take 1 tablet (0.25 mg) by mouth at bedtime as needed, may repeat once for anxiety, Disp: 30 tablet, Rfl: 0     azelastine (OPTIVAR) 0.05 % ophthalmic solution, Apply 1 drop to eye 2 times daily (Patient not taking: Reported on 9/12/2023), Disp:  6 mL, Rfl: 3     COMBIVENT RESPIMAT  MCG/ACT inhaler, , Disp: , Rfl:      EPINEPHrine (ANY BX GENERIC EQUIV) 0.3 MG/0.3ML injection 2-pack, Inject into the muscle as directed for anaphylaxis (Patient not taking: Reported on 9/12/2023), Disp: 2 each, Rfl: 2     Fexofenadine HCl (ALLEGRA ALLERGY PO), , Disp: , Rfl:      fluticasone-salmeterol (ADVAIR) 500-50 MCG/ACT inhaler, Inhale 1 puff into the lungs every 12 hours, Disp: 1 each, Rfl: 5     HYDROcodone-acetaminophen (NORCO) 5-325 MG tablet, Take 1 tablet by mouth every 6 hours as needed for pain, Disp: 30 tablet, Rfl: 0     ipratropium - albuterol 0.5 mg/2.5 mg/3 mL (DUONEB) 0.5-2.5 (3) MG/3ML neb solution, Inhale 1 vial (3 mLs) into the lungs every 6 hours as needed for shortness of breath or wheezing, Disp: 360 mL, Rfl: 3     omeprazole (PRILOSEC) 20 MG DR capsule, Take 1 capsule (20 mg) by mouth daily (Patient not taking: Reported on 9/12/2023), Disp: 90 capsule, Rfl: 1     ondansetron (ZOFRAN ODT) 8 MG ODT tab, Take 1 tablet (8 mg) by mouth every 8 hours as needed for nausea (Patient not taking: Reported on 9/12/2023), Disp: 15 tablet, Rfl: 3     ORDER FOR ALLERGEN IMMUNOTHERAPY, Name of Mix: Mix #1  Dust Mite, Cat, Dog Cat Hair, Standardized A.P. 10,000 BAU/mL, HS  2.0 ml Dog Hair-Dander, UF  1:650 w/v, HS  1.0 ml Dust Mites DF. 10,000 AU/mL, HS  1.0 ml Dust Mites DP. 10,000 AU/mL, HS  1.0 ml  Diluent: HSA qs to 5ml, Disp: 5 mL, Rfl: PRN     ORDER FOR ALLERGEN IMMUNOTHERAPY, Name of Mix: Mix #2  Tree  Sylvain, White 1:20 w/v, HS  0.5 ml Birch Mix PRW 1:20 w/v, HS  0.5 ml Boxelder-Maple Mix BHR (Boxelder Hard Red) 1:20 w/v, HS  0.5 ml [place], Common 1:20 w/v, HS  0.5 ml Oak Mix RVW 1:20 w/v, HS 0.5 ml Pine Mix 1:20 w/v, HS 0.5 ml [place] Tree, Black 1:20 w/v, HS 0.5 ml Diluent: HSA qs to 5ml, Disp: 5 mL, Rfl: PRN     ORDER FOR ALLERGEN IMMUNOTHERAPY, Name of Mix: Mix #3  Grass, Weeds Prabhu Grass 1:20 w/v, HS 0.5 ml Steve Grass (Std) 100,000 BAU/mL, HS  0.4 ml Lamb's Quarters 1:20 w/v, HS 0.5 ml Nettle 1:20 w/v, HS 0.5 ml Plantain, English 1:20 w/v, HS 0.5 ml Ragweed, Mix (Giant, Short) 1:20 w/v, HS 0.5 ml Russian Thistle 1:20 w/v, HS 0.5 ml Sorrel, Sheep 1:20 w/v, HS 0.5 ml Diluent: HSA qs to 5ml, Disp: 5 mL, Rfl: PRN     SYMBICORT 160-4.5 MCG/ACT Inhaler, Inhale 2 puffs into the lungs 2 times daily Needs appointment for additional refills, Disp: 10.2 g, Rfl: 1     VENTOLIN  (90 Base) MCG/ACT inhaler, Inhale 1-2 puffs into the lungs every 4 hours as needed for shortness of breath or wheezing, Disp: 18 g, Rfl: 1  No Known Allergies    EXAM:   /80   Pulse 69   SpO2 98%   Physical Exam  Vitals and nursing note reviewed.   Constitutional:       Appearance: Normal appearance.   HENT:      Head: Normocephalic and atraumatic.      Right Ear: External ear normal.      Left Ear: External ear normal.      Nose: No rhinorrhea.      Mouth/Throat:      Mouth: Mucous membranes are moist. No oral lesions.      Pharynx: Oropharynx is clear. Uvula midline. No posterior oropharyngeal erythema.   Eyes:      General: Lids are normal.      Extraocular Movements: Extraocular movements intact.      Conjunctiva/sclera: Conjunctivae normal.   Neck:      Comments: No asymmetry, masses, or scars  Cardiovascular:      Rate and Rhythm: Normal rate and regular rhythm.      Heart sounds: S1 normal and S2 normal. No murmur heard.  Pulmonary:      Effort: Pulmonary effort is normal. No respiratory distress.      Breath sounds: Normal breath sounds and air entry.   Musculoskeletal:      Comments: No musculoskeletal defects appreciated   Skin:     General: Skin is warm and dry.      Findings: No lesion or rash.   Neurological:      General: No focal deficit present.      Mental Status: She is alert.   Psychiatric:         Mood and Affect: Mood and affect normal.           WORKUP:  Cluster Immunotherapy    Cluster Allergen Immunotherapy:    After explaining risks and benefits, and  obtaining verbal and written consent, we proceeded with cluster immunotherapy.     VISIT  VIAL COLOR/STRENGTH  DOSES TO BE GIVEN    1  GREEN (1:1000), BLUE (1:100)  GREEN 0.1, GREEN 0.2, GREEN 0.4, BLUE 0.1    2  BLUE (1:100), YELLOW (1:10)  BLUE 0.2, BLUE 0.4, YELLOW 0.05    3  YELLOW (1:10)  YELLOW 0.1, YELLOW 0.15, YELLOW 0.25    4  YELLOW (1:10)  YELLOW 0.35, YELLOW 0.5    5  RED (1:1)  RED 0.05, RED 0.1    6  RED (1:1)  RED 0.15, RED 0.2    7  RED (1:1)  RED 0.3, RED 0.4    8  RED (1:1)  RED 0.5        VISIT 2    After explaining risks and benefits, and obtaining verbal and written consent, we proceeded with cluster immunotherapy.    Time Injection Given: 13:15  Blue 1:100   Trees      0.2 mL  Blue 1:100   Grass, Weeds     0.2 mL  Blue 1:100   Cat, Dog, Dust Mite    0.2 mL      Time Injection Given: 13:51  Blue 1:100   Trees      0.4 mL  Blue 1:100   Grass, Weeds     0.4 mL  Blue 1:100   Cat, Dog, Dust Mite    0.4 mL      Time Injection Given: 14:27  Yellow 1:10   Trees      0.05 mL  Yellow 1:10   Grass, Weeds     0.05 mL  Yellow 1:10   Cat, Dog, Dust Mite    0.05 mL      Start Time: 13:15  End Time: 15:00        VITALS   Time BP Pulse pOx Reaction Treatment   13:45 107/73 70 98% none N/a   14:21 105/75 66 98% none N/a   15:00 111/80 62 98% none N/a       ASSESSMENT/PLAN:  Reginaldo Ferraro is a 32 year old female here for cluster immunotherapy.    1. Seasonal allergic rhinitis due to pollen - Reginaldo tolerated today's procedure well without developing signs or symptoms of an adverse reaction.    - return in 7-14 days to continue cluster protocol  - continue pre-medication with fexofenadine as directed  - RAPID DESENSITIZATION    2. Allergic rhinitis due to animals    - RAPID DESENSITIZATION    3. Allergic rhinitis due to dust mite    - RAPID DESENSITIZATION      Thank you for allowing me to participate in the care of Reginaldo Ferraro.      Polo Mccain MD, FAAAAI  Allergy/Immunology  [facility]  Clinics - [facility]      Chart documentation done in part with Dragon Voice Recognition Software. Although reviewed after completion, some word and grammatical errors may remain.    Prior to initiation of cluster immunotherapy RN ensured that patient was feeling healthy, has premedicated with Zyrtec or Allegra yesterday twice daily and this morning. RN also ensured that patient has unexpired Epi-Pen, had no new medication changes, and did not have a reaction after the last allergy shots were given. If the patient has asthma it is well-controlled. The patient has not been ill in the past 7 days. Patient was given allergy injections per cluster immunotherapy protocol 30 minutes apart and vital signs were monitored. Patient was monitored in clinic for 30 minutes after last injection was given and assessed by provider before discharging.     Josefina Neal RN      Again, thank you for allowing me to participate in the care of your patient.        Sincerely,        Polo Mccain MD

## 2023-09-26 ENCOUNTER — OFFICE VISIT (OUTPATIENT)
Dept: ALLERGY | Facility: CLINIC | Age: 33
End: 2023-09-26
Payer: COMMERCIAL

## 2023-09-26 VITALS — OXYGEN SATURATION: 97 % | SYSTOLIC BLOOD PRESSURE: 116 MMHG | HEART RATE: 66 BPM | DIASTOLIC BLOOD PRESSURE: 70 MMHG

## 2023-09-26 DIAGNOSIS — J30.81 ALLERGIC RHINITIS DUE TO ANIMALS: ICD-10-CM

## 2023-09-26 DIAGNOSIS — J30.89 ALLERGIC RHINITIS DUE TO DUST MITE: ICD-10-CM

## 2023-09-26 DIAGNOSIS — J30.1 SEASONAL ALLERGIC RHINITIS DUE TO POLLEN: Primary | ICD-10-CM

## 2023-09-26 PROCEDURE — 95180 RAPID DESENSITIZATION: CPT | Performed by: ALLERGY & IMMUNOLOGY

## 2023-09-26 NOTE — PROGRESS NOTES
Reginaldo Ferraro was seen in the Allergy Clinic at [facility].      Reginaldo Ferraro is a 32 year old Not  or  female who is seen today for cluster immunotherapy. No adverse reactions after her last visit. She has premedicated with fexofenadine as directed.     Past Medical History:   Diagnosis Date    Abnormal Pap smear of cervix 12/23/2011 9/21 LSIL    Cervical high risk HPV (human papillomavirus) test positive 2013    10/31/14, 5/21/18    Depressive disorder 12/23/2011    possibly chronic    Environmental allergies     Influenza A with pneumonia 12/24/2021    Migraine     Migraines     headaches with allergies    Moderate major depression 12/23/2011    Moderate major depression (H) 12/23/2011    Moderate persistent asthma 3/17/2022    Nasal obstruction 2/15/2022    Added automatically from request for surgery [id number]    Nasal septal deviation 2/15/2022    Added automatically from request for surgery [id number]    Nasal turbinate hypertrophy 2/15/2022    Added automatically from request for surgery [id number]    NO ACTIVE PROBLEMS     Seasonal allergic rhinitis     Sepsis (H) 12/24/2021     Family History   Problem Relation Age of Onset    Arthritis Mother     Lupus Mother     Heart Disease Father     Cerebrovascular Disease Father         stroke    Hyperlipidemia Father     Diabetes Paternal Grandmother     Hypertension Paternal Grandmother     Respiratory Paternal Grandmother         asthma    Colon Cancer No family hx of     Breast Cancer No family hx of     Coronary Artery Disease No family hx of      Social History     Tobacco Use    Smoking status: Never     Passive exposure: Never    Smokeless tobacco: Never   Vaping Use    Vaping Use: Never used   Substance Use Topics    Alcohol use: Yes     Comment: Socially    Drug use: No     Social History     Social History Narrative    ENVIRONMENTAL HISTORY: [place] The home is  heated with a forced air. They does have central air conditioning. The patient's bedroom is furnished with feather/wool bedding or pillows and carpeting in bedroom.  Pets inside the house include 2 cat(s) and 1 dog(s). There is no history of cockroach or mice infestation. There is/are 0 smokers living in the house.  There is/are 0 who smoke ecigarettes/vape living in the house.The house does not have a damp basement.             Past medical, family, and social history were reviewed.        Current Outpatient Medications:     ALPRAZolam (XANAX) 0.25 MG tablet, Take 1 tablet (0.25 mg) by mouth at bedtime as needed, may repeat once for anxiety, Disp: 30 tablet, Rfl: 0    azelastine (OPTIVAR) 0.05 % ophthalmic solution, Apply 1 drop to eye 2 times daily (Patient not taking: Reported on 9/12/2023), Disp: 6 mL, Rfl: 3    COMBIVENT RESPIMAT  MCG/ACT inhaler, , Disp: , Rfl:     EPINEPHrine (ANY BX GENERIC EQUIV) 0.3 MG/0.3ML injection 2-pack, Inject into the muscle as directed for anaphylaxis (Patient not taking: Reported on 9/12/2023), Disp: 2 each, Rfl: 2    Fexofenadine HCl (ALLEGRA ALLERGY PO), , Disp: , Rfl:     fluticasone-salmeterol (ADVAIR) 500-50 MCG/ACT inhaler, Inhale 1 puff into the lungs every 12 hours, Disp: 1 each, Rfl: 5    HYDROcodone-acetaminophen (NORCO) 5-325 MG tablet, Take 1 tablet by mouth every 6 hours as needed for pain, Disp: 30 tablet, Rfl: 0    ipratropium - albuterol 0.5 mg/2.5 mg/3 mL (DUONEB) 0.5-2.5 (3) MG/3ML neb solution, Inhale 1 vial (3 mLs) into the lungs every 6 hours as needed for shortness of breath or wheezing, Disp: 360 mL, Rfl: 3    omeprazole (PRILOSEC) 20 MG DR capsule, Take 1 capsule (20 mg) by mouth daily (Patient not taking: Reported on 9/12/2023), Disp: 90 capsule, Rfl: 1    ondansetron (ZOFRAN ODT) 8 MG ODT tab, Take 1 tablet (8 mg) by mouth every 8 hours as needed for nausea (Patient not taking: Reported on 9/12/2023), Disp: 15 tablet, Rfl: 3    ORDER FOR ALLERGEN  IMMUNOTHERAPY, Name of Mix: Mix #1  Dust Mite, Cat, Dog Cat Hair, Standardized A.P. 10,000 BAU/mL, HS  2.0 ml Dog Hair-Dander, UF  1:650 w/v, HS  1.0 ml Dust Mites DF. 10,000 AU/mL, HS  1.0 ml Dust Mites DP. 10,000 AU/mL, HS  1.0 ml  Diluent: HSA qs to 5ml, Disp: 5 mL, Rfl: PRN    ORDER FOR ALLERGEN IMMUNOTHERAPY, Name of Mix: Mix #2  Tree  Sylvian, White 1:20 w/v, HS  0.5 ml Birch Mix PRW 1:20 w/v, HS  0.5 ml Boxelder-Maple Mix BHR (Boxelder Hard Red) 1:20 w/v, HS  0.5 ml [place], Common 1:20 w/v, HS  0.5 ml Oak Mix RVW 1:20 w/v, HS 0.5 ml Pine Mix 1:20 w/v, HS 0.5 ml [place] Tree, Black 1:20 w/v, HS 0.5 ml Diluent: HSA qs to 5ml, Disp: 5 mL, Rfl: PRN    ORDER FOR ALLERGEN IMMUNOTHERAPY, Name of Mix: Mix #3  Grass, Weeds Prabhu Grass 1:20 w/v, HS 0.5 ml Steve Grass (Std) 100,000 BAU/mL, HS 0.4 ml Lamb's Quarters 1:20 w/v, HS 0.5 ml Nettle 1:20 w/v, HS 0.5 ml Plantain, English 1:20 w/v, HS 0.5 ml Ragweed, Mix (Giant, Short) 1:20 w/v, HS 0.5 ml Russian Thistle 1:20 w/v, HS 0.5 ml Sorrel, Sheep 1:20 w/v, HS 0.5 ml Diluent: HSA qs to 5ml, Disp: 5 mL, Rfl: PRN    SYMBICORT 160-4.5 MCG/ACT Inhaler, Inhale 2 puffs into the lungs 2 times daily Needs appointment for additional refills, Disp: 10.2 g, Rfl: 1    VENTOLIN  (90 Base) MCG/ACT inhaler, Inhale 1-2 puffs into the lungs every 4 hours as needed for shortness of breath or wheezing, Disp: 18 g, Rfl: 1  No Known Allergies    EXAM:   /70   Pulse 66   SpO2 97%   Physical Exam  Vitals and nursing note reviewed.   Constitutional:       Appearance: Normal appearance.   HENT:      Head: Normocephalic and atraumatic.      Right Ear: External ear normal.      Left Ear: External ear normal.      Nose: No rhinorrhea.      Mouth/Throat:      Mouth: Mucous membranes are moist. No oral lesions.      Pharynx: Oropharynx is clear. Uvula midline. No posterior oropharyngeal erythema.   Eyes:      General: Lids are normal.      Extraocular Movements: Extraocular movements  intact.      Conjunctiva/sclera: Conjunctivae normal.   Neck:      Comments: No asymmetry, masses, or scars  Cardiovascular:      Rate and Rhythm: Normal rate and regular rhythm.      Heart sounds: S1 normal and S2 normal. No murmur heard.  Pulmonary:      Effort: Pulmonary effort is normal. No respiratory distress.      Breath sounds: Normal breath sounds and air entry.   Musculoskeletal:      Comments: No musculoskeletal defects appreciated   Skin:     General: Skin is warm and dry.      Findings: No lesion or rash.   Neurological:      General: No focal deficit present.      Mental Status: She is alert.   Psychiatric:         Mood and Affect: Mood and affect normal.           WORKUP:  Cluster Immunotherapy    Cluster Allergen Immunotherapy:    After explaining risks and benefits, and obtaining verbal and written consent, we proceeded with cluster immunotherapy.     VISIT  VIAL COLOR/STRENGTH  DOSES TO BE GIVEN    1  GREEN (1:1000), BLUE (1:100)  GREEN 0.1, GREEN 0.2, GREEN 0.4, BLUE 0.1    2  BLUE (1:100), YELLOW (1:10)  BLUE 0.2, BLUE 0.4, YELLOW 0.05    3  YELLOW (1:10)  YELLOW 0.1, YELLOW 0.15, YELLOW 0.25    4  YELLOW (1:10)  YELLOW 0.35, YELLOW 0.5    5  RED (1:1)  RED 0.05, RED 0.1    6  RED (1:1)  RED 0.15, RED 0.2    7  RED (1:1)  RED 0.3, RED 0.4    8  RED (1:1)  RED 0.5        VISIT 3    Time Injection Given: 13:21  Yellow 1:10   Trees     0.1 mL  Yellow 1:10   Grass, Weeds    0.1 mL  Yellow 1:10   Cat, Dog, Dust Mite   0.1 mL      Time Injection Given: 14:01  Yellow 1:10   Trees     0.15 mL  Yellow 1:10   Grass, Weeds    0.15 mL  Yellow 1:10   Cat, Dog, Dust Mite   0.15 mL    Time Injection Given: 14:37  Yellow 1:10   Trees     0.25 mL  Yellow 1:10   Grass, Weeds    0.25 mL  Yellow 1:10   Cat, Dog, Dust Mite   0.25 mL    Start Time: 13:21  End Time: 15:07        VITALS   Time BP Pulse pOx Reaction Treatment   13:55 99/63 57 97% none N/a   14:35 104/73 57 97% none N/a   15:07 116/81 54 99% none N/a        ASSESSMENT/PLAN:  Reginaldo Ferraro is a 32 year old female here for cluster immunotherapy.    1. Seasonal allergic rhinitis due to pollen - Reginaldo tolerated today's procedure well without developing any signs or symptoms of an adverse reaction.    - return in 7-14 days to continue cluster protocol  - continue pre-medication with fexofenadine as directed  - RAPID DESENSITIZATION    2. Allergic rhinitis due to animals    - RAPID DESENSITIZATION    3. Allergic rhinitis due to dust mite    - RAPID DESENSITIZATION      Thank you for allowing me to participate in the care of Reginaldo Ferraro.      Polo Mccain MD, FAAAAI  Allergy/Immunology  [facility]      Chart documentation done in part with Dragon Voice Recognition Software. Although reviewed after completion, some word and grammatical errors may remain.

## 2023-09-26 NOTE — LETTER
9/26/2023         RE: Reginaldo Ferraro  [address] [facility] [address]        Dear Colleague,    Thank you for referring your patient, Reginaldo Ferraro, to the [facility]. Please see a copy of my visit note below.    Reginaldo Ferraro was seen in the Allergy Clinic at [facility].      Reginaldo Ferraro is a 32 year old Not  or  female who is seen today for cluster immunotherapy. No adverse reactions after her last visit. She has premedicated with fexofenadine as directed.     Past Medical History:   Diagnosis Date     Abnormal Pap smear of cervix 12/23/2011 9/21 LSIL     Cervical high risk HPV (human papillomavirus) test positive 2013    10/31/14, 5/21/18     Depressive disorder 12/23/2011    possibly chronic     Environmental allergies      Influenza A with pneumonia 12/24/2021     Migraine      Migraines     headaches with allergies     Moderate major depression 12/23/2011     Moderate major depression (H) 12/23/2011     Moderate persistent asthma 3/17/2022     Nasal obstruction 2/15/2022    Added automatically from request for surgery [id number]     Nasal septal deviation 2/15/2022    Added automatically from request for surgery [id number]     Nasal turbinate hypertrophy 2/15/2022    Added automatically from request for surgery [id number]     NO ACTIVE PROBLEMS      Seasonal allergic rhinitis      Sepsis (H) 12/24/2021     Family History   Problem Relation Age of Onset     Arthritis Mother      Lupus Mother      Heart Disease Father      Cerebrovascular Disease Father         stroke     Hyperlipidemia Father      Diabetes Paternal Grandmother      Hypertension Paternal Grandmother      Respiratory Paternal Grandmother         asthma     Colon Cancer No family hx of      Breast Cancer No family hx of      Coronary Artery Disease No family hx of      Social History     Tobacco Use     Smoking status: Never     Passive exposure: Never      Smokeless tobacco: Never   Vaping Use     Vaping Use: Never used   Substance Use Topics     Alcohol use: Yes     Comment: Socially     Drug use: No     Social History     Social History Narrative    ENVIRONMENTAL HISTORY: [place] The home is heated with a forced air. They does have central air conditioning. The patient's bedroom is furnished with feather/wool bedding or pillows and carpeting in bedroom.  Pets inside the house include 2 cat(s) and 1 dog(s). There is no history of cockroach or mice infestation. There is/are 0 smokers living in the house.  There is/are 0 who smoke ecigarettes/vape living in the house.The house does not have a damp basement.             Past medical, family, and social history were reviewed.        Current Outpatient Medications:      ALPRAZolam (XANAX) 0.25 MG tablet, Take 1 tablet (0.25 mg) by mouth at bedtime as needed, may repeat once for anxiety, Disp: 30 tablet, Rfl: 0     azelastine (OPTIVAR) 0.05 % ophthalmic solution, Apply 1 drop to eye 2 times daily (Patient not taking: Reported on 9/12/2023), Disp: 6 mL, Rfl: 3     COMBIVENT RESPIMAT  MCG/ACT inhaler, , Disp: , Rfl:      EPINEPHrine (ANY BX GENERIC EQUIV) 0.3 MG/0.3ML injection 2-pack, Inject into the muscle as directed for anaphylaxis (Patient not taking: Reported on 9/12/2023), Disp: 2 each, Rfl: 2     Fexofenadine HCl (ALLEGRA ALLERGY PO), , Disp: , Rfl:      fluticasone-salmeterol (ADVAIR) 500-50 MCG/ACT inhaler, Inhale 1 puff into the lungs every 12 hours, Disp: 1 each, Rfl: 5     HYDROcodone-acetaminophen (NORCO) 5-325 MG tablet, Take 1 tablet by mouth every 6 hours as needed for pain, Disp: 30 tablet, Rfl: 0     ipratropium - albuterol 0.5 mg/2.5 mg/3 mL (DUONEB) 0.5-2.5 (3) MG/3ML neb solution, Inhale 1 vial (3 mLs) into the lungs every 6 hours as needed for shortness of breath or wheezing, Disp: 360 mL, Rfl: 3     omeprazole (PRILOSEC) 20 MG DR capsule, Take 1  capsule (20 mg) by mouth daily (Patient not taking: Reported on 9/12/2023), Disp: 90 capsule, Rfl: 1     ondansetron (ZOFRAN ODT) 8 MG ODT tab, Take 1 tablet (8 mg) by mouth every 8 hours as needed for nausea (Patient not taking: Reported on 9/12/2023), Disp: 15 tablet, Rfl: 3     ORDER FOR ALLERGEN IMMUNOTHERAPY, Name of Mix: Mix #1  Dust Mite, Cat, Dog Cat Hair, Standardized A.P. 10,000 BAU/mL, HS  2.0 ml Dog Hair-Dander, UF  1:650 w/v, HS  1.0 ml Dust Mites DF. 10,000 AU/mL, HS  1.0 ml Dust Mites DP. 10,000 AU/mL, HS  1.0 ml  Diluent: HSA qs to 5ml, Disp: 5 mL, Rfl: PRN     ORDER FOR ALLERGEN IMMUNOTHERAPY, Name of Mix: Mix #2  Tree  Sylvain, White 1:20 w/v, HS  0.5 ml Birch Mix PRW 1:20 w/v, HS  0.5 ml Boxelder-Maple Mix BHR (Boxelder Hard Red) 1:20 w/v, HS  0.5 ml [place], Common 1:20 w/v, HS  0.5 ml Oak Mix RVW 1:20 w/v, HS 0.5 ml Pine Mix 1:20 w/v, HS 0.5 ml [place] Tree, Black 1:20 w/v, HS 0.5 ml Diluent: HSA qs to 5ml, Disp: 5 mL, Rfl: PRN     ORDER FOR ALLERGEN IMMUNOTHERAPY, Name of Mix: Mix #3  Grass, Weeds Prabhu Grass 1:20 w/v, HS 0.5 ml Steve Grass (Std) 100,000 BAU/mL, HS 0.4 ml Lamb's Quarters 1:20 w/v, HS 0.5 ml Nettle 1:20 w/v, HS 0.5 ml Plantain, English 1:20 w/v, HS 0.5 ml Ragweed, Mix (Giant, Short) 1:20 w/v, HS 0.5 ml Russian Thistle 1:20 w/v, HS 0.5 ml Sorrel, Sheep 1:20 w/v, HS 0.5 ml Diluent: HSA qs to 5ml, Disp: 5 mL, Rfl: PRN     SYMBICORT 160-4.5 MCG/ACT Inhaler, Inhale 2 puffs into the lungs 2 times daily Needs appointment for additional refills, Disp: 10.2 g, Rfl: 1     VENTOLIN  (90 Base) MCG/ACT inhaler, Inhale 1-2 puffs into the lungs every 4 hours as needed for shortness of breath or wheezing, Disp: 18 g, Rfl: 1  No Known Allergies    EXAM:   /70   Pulse 66   SpO2 97%   Physical Exam  Vitals and nursing note reviewed.   Constitutional:       Appearance: Normal appearance.   HENT:      Head: Normocephalic and atraumatic.      Right Ear: External ear normal.      Left  Ear: External ear normal.      Nose: No rhinorrhea.      Mouth/Throat:      Mouth: Mucous membranes are moist. No oral lesions.      Pharynx: Oropharynx is clear. Uvula midline. No posterior oropharyngeal erythema.   Eyes:      General: Lids are normal.      Extraocular Movements: Extraocular movements intact.      Conjunctiva/sclera: Conjunctivae normal.   Neck:      Comments: No asymmetry, masses, or scars  Cardiovascular:      Rate and Rhythm: Normal rate and regular rhythm.      Heart sounds: S1 normal and S2 normal. No murmur heard.  Pulmonary:      Effort: Pulmonary effort is normal. No respiratory distress.      Breath sounds: Normal breath sounds and air entry.   Musculoskeletal:      Comments: No musculoskeletal defects appreciated   Skin:     General: Skin is warm and dry.      Findings: No lesion or rash.   Neurological:      General: No focal deficit present.      Mental Status: She is alert.   Psychiatric:         Mood and Affect: Mood and affect normal.           WORKUP:  Cluster Immunotherapy    Cluster Allergen Immunotherapy:    After explaining risks and benefits, and obtaining verbal and written consent, we proceeded with cluster immunotherapy.     VISIT  VIAL COLOR/STRENGTH  DOSES TO BE GIVEN    1  GREEN (1:1000), BLUE (1:100)  GREEN 0.1, GREEN 0.2, GREEN 0.4, BLUE 0.1    2  BLUE (1:100), YELLOW (1:10)  BLUE 0.2, BLUE 0.4, YELLOW 0.05    3  YELLOW (1:10)  YELLOW 0.1, YELLOW 0.15, YELLOW 0.25    4  YELLOW (1:10)  YELLOW 0.35, YELLOW 0.5    5  RED (1:1)  RED 0.05, RED 0.1    6  RED (1:1)  RED 0.15, RED 0.2    7  RED (1:1)  RED 0.3, RED 0.4    8  RED (1:1)  RED 0.5        VISIT 3    Time Injection Given: 13:21  Yellow 1:10   Trees     0.1 mL  Yellow 1:10   Grass, Weeds    0.1 mL  Yellow 1:10   Cat, Dog, Dust Mite   0.1 mL      Time Injection Given: 14:01  Yellow 1:10   Trees     0.15 mL  Yellow 1:10   Grass, Weeds    0.15 mL  Yellow 1:10   Cat, Dog, Dust Mite   0.15 mL    Time Injection Given:  14:37  Yellow 1:10   Trees     0.25 mL  Yellow 1:10   Grass, Weeds    0.25 mL  Yellow 1:10   Cat, Dog, Dust Mite   0.25 mL    Start Time: 13:21  End Time: 15:07        VITALS   Time BP Pulse pOx Reaction Treatment   13:55 99/63 57 97% none N/a   14:35 104/73 57 97% none N/a   15:07 116/81 54 99% none N/a       ASSESSMENT/PLAN:  Reginaldo Ferraro is a 32 year old female here for cluster immunotherapy.    1. Seasonal allergic rhinitis due to pollen - Reginaldo tolerated today's procedure well without developing any signs or symptoms of an adverse reaction.    - return in 7-14 days to continue cluster protocol  - continue pre-medication with fexofenadine as directed  - RAPID DESENSITIZATION    2. Allergic rhinitis due to animals    - RAPID DESENSITIZATION    3. Allergic rhinitis due to dust mite    - RAPID DESENSITIZATION      Thank you for allowing me to participate in the care of Reginaldo Ferraro.      Polo Mccain MD, FAAAAI  Allergy/Immunology  [facility]      Chart documentation done in part with Dragon Voice Recognition Software. Although reviewed after completion, some word and grammatical errors may remain.      Prior to initiation of cluster immunotherapy RN ensured that patient was feeling healthy, has premedicated with Zyrtec or Allegra yesterday twice daily and this morning. RN also ensured that patient has unexpired Epi-Pen, had no new medication changes, and did not have a reaction after the last allergy shots were given. If the patient has asthma it is well-controlled. The patient has not been ill in the past 7 days. Patient was given allergy injections per cluster immunotherapy protocol 30 minutes apart and vital signs were monitored. Patient was monitored in clinic for 30 minutes after last injection was given and assessed by provider before discharging.     Josefina Neal RN      Again, thank you for allowing me to  participate in the care of your patient.        Sincerely,        Polo Mccain MD

## 2023-09-29 NOTE — PROGRESS NOTES
SUBJECTIVE:   Reginaldo Ferraro is a 32 year old female [facility]    Patient Story: Presents with right elbow swelling and pain. Limited ROM due to pain. Denies fevers. Denies trauma. Woke up and had the swelling and pain 9/23.  .  Pain has been present approximately 11 days..   Swelling went down but pain still present but a lot less pain     No known injury.    Present symptoms: pain if bumped or leaned on still.      Treatments tried to this point: aleve in the ER. Has been icing.    Orthopedic PMH: no previous right elbow issues.    Review of Systems:  Constitutional:  NEGATIVE for fever, chills, change in weight  Integumentary/Skin:  NEGATIVE for worrisome rashes, moles or lesions  Eyes:  NEGATIVE for vision changes or irritation  ENT/Mouth:  NEGATIVE for ear, mouth and throat problems  Resp:  NEGATIVE for significant cough or SOB  Breast:  NEGATIVE for masses, tenderness or discharge  CV:  NEGATIVE for chest pain, palpitations or peripheral edema  GI:  NEGATIVE for nausea, abdominal pain, heartburn, or change in bowel habits  :  Negative   Musculoskeletal:  See HPI above  Neuro:  NEGATIVE for weakness, dizziness or paresthesias  Endocrine:  NEGATIVE for temperature intolerance, skin/hair changes  Heme/allergy/immune:  NEGATIVE for bleeding problems  Psychiatric:  NEGATIVE for changes in mood or affect    Past Medical History:   Past Medical History:   Diagnosis Date    Abnormal Pap smear of cervix 12/23/2011 9/21 LSIL    Cervical high risk HPV (human papillomavirus) test positive 2013    10/31/14, 5/21/18    Depressive disorder 12/23/2011    possibly chronic    Environmental allergies     Influenza A with pneumonia 12/24/2021    Migraine     Migraines     headaches with allergies    Moderate major depression 12/23/2011    Moderate major depression (H) 12/23/2011    Moderate persistent asthma 3/17/2022    Nasal obstruction 2/15/2022     Added automatically from request for surgery [id number]    Nasal septal deviation 2/15/2022    Added automatically from request for surgery [id number]    Nasal turbinate hypertrophy 2/15/2022    Added automatically from request for surgery [id number]    NO ACTIVE PROBLEMS     Seasonal allergic rhinitis     Sepsis (H) 12/24/2021     Past Surgical History:   Past Surgical History:   Procedure Laterality Date    DILATION AND EVACUATION N/A 02/16/2022    Procedure: DILATION AND EVACUATION, UTERUS;  Surgeon: Vianey Domingo MD;  Location: [facility] OR    ENDOSCOPIC SINUS SURGERY Bilateral 04/04/2022    Procedure: FUNCTIONAL ENDOSCOPIC SINUS SURGERY, with bilateral maxillary antrostomies and bilateral michael bullosa reduction,;  Surgeon: Miquel Rutledge MD;  Location:  OR    EYE SURGERY  01/2013    Lasic surgery    OPTICAL TRACKING SYSTEM ENDOSCOPIC SINUS SURGERY Bilateral 6/15/2023    Procedure: IMAGE GUIDED FUNCTIONAL ENDOSCOPIC SINUS SURGERY (FESS) WITHOUT SEPTOPLASTY, maxillary entrostomy, ethmoidectomy and turbinate reduction;  Surgeon: Atilio Murry MD;  Location: [facility] OR    NE BREAST AUGMENTATION  2018    SEPTOPLASTY, TURBINOPLASTY, COMBINED Bilateral 04/04/2022    Procedure: WITH NASAL SEPTOPLASTY and bilateral inferior turbinate reduction;  Surgeon: Miquel Rutledge MD;  Location: [facility] OR     Family History:   Family History   Problem Relation Age of Onset    Arthritis Mother     Lupus Mother     Heart Disease Father     Cerebrovascular Disease Father         stroke    Hyperlipidemia Father     Diabetes Paternal Grandmother     Hypertension Paternal Grandmother     Respiratory Paternal Grandmother         asthma    Colon Cancer No family hx of     Breast Cancer No family hx of     Coronary Artery Disease No family hx of      Social History:   Social History     Tobacco Use    Smoking status: Never     Passive exposure: Never    Smokeless tobacco: Never   Substance Use Topics    Alcohol use: Yes     Comment:  "Socially     OBJECTIVE:  Physical Exam:  BP 98/65 (BP Location: Left arm, Patient Position: Sitting, Cuff Size: Adult Regular)   Pulse 82   Ht 1.549 m (5' 1\")   Wt 63.5 kg (140 lb)   SpO2 95%   BMI 26.45 kg/m    General Appearance: healthy, alert and no distress   Skin: no suspicious lesions or rashes  Neuro: Normal strength and tone, mentation intact and speech normal  Vascular: good pulses, and cappillary refill   Lymph: no lymphadenopathy   Psych:  mentation appears normal and affect normal/bright  Resp: no increased work of breathing     Right Elbow Exam:  normal appearance  Dry and cracked skin over olecranons  Minimal o-bursa swelling  Full range of motion without pain    Xrays right elbow from today:  within normal limits      ASSESSMENT:   Olecranon bursitis, aseptic, resolving.    PLAN:   Avoid leaning on elbow  Lotion to elbow to avoid possible infection through cracked skin.  Nsaid as needed   Elbow pad as needed     Return to clinic: as needed     KARIME Cruz MD  Dept. Orthopedic Surgery  [facility]   "

## 2023-10-03 ENCOUNTER — OFFICE VISIT (OUTPATIENT)
Dept: ALLERGY | Facility: CLINIC | Age: 33
End: 2023-10-03
Payer: COMMERCIAL

## 2023-10-03 ENCOUNTER — ANCILLARY PROCEDURE (OUTPATIENT)
Dept: GENERAL RADIOLOGY | Facility: CLINIC | Age: 33
End: 2023-10-03
Attending: ORTHOPAEDIC SURGERY
Payer: COMMERCIAL

## 2023-10-03 ENCOUNTER — OFFICE VISIT (OUTPATIENT)
Dept: ORTHOPEDICS | Facility: CLINIC | Age: 33
End: 2023-10-03
Payer: COMMERCIAL

## 2023-10-03 VITALS — DIASTOLIC BLOOD PRESSURE: 70 MMHG | SYSTOLIC BLOOD PRESSURE: 97 MMHG | OXYGEN SATURATION: 98 % | HEART RATE: 81 BPM

## 2023-10-03 VITALS
HEIGHT: 61 IN | WEIGHT: 140 LBS | DIASTOLIC BLOOD PRESSURE: 65 MMHG | BODY MASS INDEX: 26.43 KG/M2 | OXYGEN SATURATION: 95 % | SYSTOLIC BLOOD PRESSURE: 98 MMHG | HEART RATE: 82 BPM

## 2023-10-03 DIAGNOSIS — M70.21 OLECRANON BURSITIS OF RIGHT ELBOW: ICD-10-CM

## 2023-10-03 DIAGNOSIS — J30.1 SEASONAL ALLERGIC RHINITIS DUE TO POLLEN: Primary | ICD-10-CM

## 2023-10-03 DIAGNOSIS — J30.89 ALLERGIC RHINITIS DUE TO DUST MITE: ICD-10-CM

## 2023-10-03 DIAGNOSIS — J30.81 ALLERGIC RHINITIS DUE TO ANIMALS: ICD-10-CM

## 2023-10-03 DIAGNOSIS — M25.521 PAIN IN RIGHT ELBOW: Primary | ICD-10-CM

## 2023-10-03 DIAGNOSIS — M25.521 PAIN IN RIGHT ELBOW: ICD-10-CM

## 2023-10-03 PROCEDURE — 99202 OFFICE O/P NEW SF 15 MIN: CPT | Performed by: ORTHOPAEDIC SURGERY

## 2023-10-03 PROCEDURE — 73070 X-RAY EXAM OF ELBOW: CPT | Mod: TC | Performed by: RADIOLOGY

## 2023-10-03 PROCEDURE — 95180 RAPID DESENSITIZATION: CPT | Performed by: ALLERGY & IMMUNOLOGY

## 2023-10-03 ASSESSMENT — PAIN SCALES - GENERAL: PAINLEVEL: MILD PAIN (2)

## 2023-10-03 NOTE — PROGRESS NOTES
Reginaldo Ferraro was seen in the Allergy Clinic at [facility].      Reginaldo Ferraro is a 32 year old Not  or  female who is seen today for cluster immunotherapy. No adverse reactions after her last visit. She has premedicated with fexofenadine as directed.      Past Medical History:   Diagnosis Date    Abnormal Pap smear of cervix 12/23/2011 9/21 LSIL    Cervical high risk HPV (human papillomavirus) test positive 2013    10/31/14, 5/21/18    Depressive disorder 12/23/2011    possibly chronic    Environmental allergies     Influenza A with pneumonia 12/24/2021    Migraine     Migraines     headaches with allergies    Moderate major depression 12/23/2011    Moderate major depression (H) 12/23/2011    Moderate persistent asthma 3/17/2022    Nasal obstruction 2/15/2022    Added automatically from request for surgery [id number]    Nasal septal deviation 2/15/2022    Added automatically from request for surgery [id number]    Nasal turbinate hypertrophy 2/15/2022    Added automatically from request for surgery [id number]    NO ACTIVE PROBLEMS     Seasonal allergic rhinitis     Sepsis (H) 12/24/2021     Family History   Problem Relation Age of Onset    Arthritis Mother     Lupus Mother     Heart Disease Father     Cerebrovascular Disease Father         stroke    Hyperlipidemia Father     Diabetes Paternal Grandmother     Hypertension Paternal Grandmother     Respiratory Paternal Grandmother         asthma    Colon Cancer No family hx of     Breast Cancer No family hx of     Coronary Artery Disease No family hx of      Social History     Tobacco Use    Smoking status: Never     Passive exposure: Never    Smokeless tobacco: Never   Vaping Use    Vaping Use: Never used   Substance Use Topics    Alcohol use: Yes     Comment: Socially    Drug use: No     Social History     Social History Narrative    ENVIRONMENTAL HISTORY: [place] The home is  heated with a forced air. They does have central air conditioning. The patient's bedroom is furnished with feather/wool bedding or pillows and carpeting in bedroom.  Pets inside the house include 2 cat(s) and 1 dog(s). There is no history of cockroach or mice infestation. There is/are 0 smokers living in the house.  There is/are 0 who smoke ecigarettes/vape living in the house.The house does not have a damp basement.             Past medical, family, and social history were reviewed.        Current Outpatient Medications:     ALPRAZolam (XANAX) 0.25 MG tablet, Take 1 tablet (0.25 mg) by mouth at bedtime as needed, may repeat once for anxiety, Disp: 30 tablet, Rfl: 0    azelastine (OPTIVAR) 0.05 % ophthalmic solution, Apply 1 drop to eye 2 times daily, Disp: 6 mL, Rfl: 3    COMBIVENT RESPIMAT  MCG/ACT inhaler, , Disp: , Rfl:     EPINEPHrine (ANY BX GENERIC EQUIV) 0.3 MG/0.3ML injection 2-pack, Inject into the muscle as directed for anaphylaxis, Disp: 2 each, Rfl: 2    Fexofenadine HCl (ALLEGRA ALLERGY PO), , Disp: , Rfl:     fluticasone-salmeterol (ADVAIR) 500-50 MCG/ACT inhaler, Inhale 1 puff into the lungs every 12 hours, Disp: 1 each, Rfl: 5    HYDROcodone-acetaminophen (NORCO) 5-325 MG tablet, Take 1 tablet by mouth every 6 hours as needed for pain, Disp: 30 tablet, Rfl: 0    ipratropium - albuterol 0.5 mg/2.5 mg/3 mL (DUONEB) 0.5-2.5 (3) MG/3ML neb solution, Inhale 1 vial (3 mLs) into the lungs every 6 hours as needed for shortness of breath or wheezing, Disp: 360 mL, Rfl: 3    omeprazole (PRILOSEC) 20 MG DR capsule, Take 1 capsule (20 mg) by mouth daily, Disp: 90 capsule, Rfl: 1    ondansetron (ZOFRAN ODT) 8 MG ODT tab, Take 1 tablet (8 mg) by mouth every 8 hours as needed for nausea, Disp: 15 tablet, Rfl: 3    ORDER FOR ALLERGEN IMMUNOTHERAPY, Name of Mix: Mix #1  Dust Mite, Cat, Dog Cat Hair, Standardized A.P. 10,000 BAU/mL, HS  2.0 ml Dog Hair-Dander, UF  1:650 w/v, HS  1.0 ml Dust Mites DF. 10,000  AU/mL, HS  1.0 ml Dust Mites DP. 10,000 AU/mL, HS  1.0 ml  Diluent: HSA qs to 5ml, Disp: 5 mL, Rfl: PRN    ORDER FOR ALLERGEN IMMUNOTHERAPY, Name of Mix: Mix #2  Tree  Sylvain, White 1:20 w/v, HS  0.5 ml Birch Mix PRW 1:20 w/v, HS  0.5 ml Boxelder-Maple Mix BHR (Boxelder Hard Red) 1:20 w/v, HS  0.5 ml [place], Common 1:20 w/v, HS  0.5 ml Oak Mix RVW 1:20 w/v, HS 0.5 ml Pine Mix 1:20 w/v, HS 0.5 ml [place] Tree, Black 1:20 w/v, HS 0.5 ml Diluent: HSA qs to 5ml, Disp: 5 mL, Rfl: PRN    ORDER FOR ALLERGEN IMMUNOTHERAPY, Name of Mix: Mix #3  Grass, Weeds Prabhu Grass 1:20 w/v, HS 0.5 ml Steve Grass (Std) 100,000 BAU/mL, HS 0.4 ml Lamb's Quarters 1:20 w/v, HS 0.5 ml Nettle 1:20 w/v, HS 0.5 ml Plantain, English 1:20 w/v, HS 0.5 ml Ragweed, Mix (Giant, Short) 1:20 w/v, HS 0.5 ml Russian Thistle 1:20 w/v, HS 0.5 ml Sorrel, Sheep 1:20 w/v, HS 0.5 ml Diluent: HSA qs to 5ml, Disp: 5 mL, Rfl: PRN    SYMBICORT 160-4.5 MCG/ACT Inhaler, Inhale 2 puffs into the lungs 2 times daily Needs appointment for additional refills, Disp: 10.2 g, Rfl: 1    VENTOLIN  (90 Base) MCG/ACT inhaler, Inhale 1-2 puffs into the lungs every 4 hours as needed for shortness of breath or wheezing, Disp: 18 g, Rfl: 1  No Known Allergies    EXAM:   BP 97/70 (BP Location: Left arm, Patient Position: Sitting, Cuff Size: Adult Regular)   Pulse 81   SpO2 98%   Physical Exam  Vitals and nursing note reviewed.   Constitutional:       Appearance: Normal appearance.   HENT:      Head: Normocephalic and atraumatic.      Right Ear: External ear normal.      Left Ear: External ear normal.      Nose: No mucosal edema or rhinorrhea.      Mouth/Throat:      Mouth: Mucous membranes are moist. No oral lesions.      Pharynx: Oropharynx is clear. Uvula midline. No posterior oropharyngeal erythema.   Eyes:      General: Lids are normal.      Extraocular Movements: Extraocular movements intact.      Conjunctiva/sclera: Conjunctivae normal.   Neck:      Comments: No  asymmetry, masses, or scars  Cardiovascular:      Rate and Rhythm: Normal rate and regular rhythm.      Heart sounds: S1 normal and S2 normal. No murmur heard.  Pulmonary:      Effort: Pulmonary effort is normal. No respiratory distress.      Breath sounds: Normal breath sounds and air entry.   Musculoskeletal:      Comments: No musculoskeletal defects appreciated   Skin:     General: Skin is warm and dry.      Findings: No lesion or rash.   Neurological:      General: No focal deficit present.      Mental Status: She is alert.   Psychiatric:         Mood and Affect: Mood and affect normal.           WORKUP:  Cluster Immunotherapy    Cluster Allergen Immunotherapy:    After explaining risks and benefits, and obtaining verbal and written consent, we proceeded with cluster immunotherapy.     VISIT  VIAL COLOR/STRENGTH  DOSES TO BE GIVEN    1  GREEN (1:1000), BLUE (1:100)  GREEN 0.1, GREEN 0.2, GREEN 0.4, BLUE 0.1    2  BLUE (1:100), YELLOW (1:10)  BLUE 0.2, BLUE 0.4, YELLOW 0.05    3  YELLOW (1:10)  YELLOW 0.1, YELLOW 0.15, YELLOW 0.25    4  YELLOW (1:10)  YELLOW 0.35, YELLOW 0.5    5  RED (1:1)  RED 0.05, RED 0.1    6  RED (1:1)  RED 0.15, RED 0.2    7  RED (1:1)  RED 0.3, RED 0.4    8  RED (1:1)  RED 0.5        VISIT 4    Time Injection Given: 13:18  Yellow 1:10   Trees     0.35 mL  Yellow 1:10   Grass, Weeds    0.35 mL  Yellow 1:10   Cat, Dog, Dust Mite   0.35 mL    Time Injection Given: 14:02  Yellow 1:10   Trees     0.5 mL  Yellow 1:10   Grass, Weeds    0.5 mL  Yellow 1:10   Cat, Dog, Dust Mite   0.5 mL      Start Time: 13:18  End Time: 14:32      VITALS   Time BP Pulse pOx Reaction Treatment   14:00 91/62 75 95% none N/a   14:32 101/8 71 95% none N/a       ASSESSMENT/PLAN:  Reginaldo Ferraro is a 32 year old female here for cluster immunotherapy.    1. Seasonal allergic rhinitis due to pollen - Reginaldo tolerated today's procedure well without developing any signs or symptoms of an adverse reaction.    -  return in 7-14 days to continue cluster protocol  - continue pre-medication with fexofenadine as directed  - RAPID DESENSITIZATION    2. Allergic rhinitis due to animals    - RAPID DESENSITIZATION    3. Allergic rhinitis due to dust mite    - RAPID DESENSITIZATION      Thank you for allowing me to participate in the care of Reginaldo Ferraro.      Polo Mccain MD, FAAAAI  Allergy/Immunology  [facility]      Chart documentation done in part with Dragon Voice Recognition Software. Although reviewed after completion, some word and grammatical errors may remain.

## 2023-10-03 NOTE — PATIENT INSTRUCTIONS
Keep treating as you are at home.      Patient Education   Elbow Bursitis: Care Instructions  Overview  Bursitis is pain and swelling of the bursae. These are sacs of fluid that help your joints move smoothly. Olecranon bursitis is a type of bursitis that affects the back of the elbow.  Injury, overuse, or prolonged pressure on your elbow can cause this form of bursitis. Sometimes it happens when people have arthritis. It also can occur for unknown reasons.  Treatment includes avoiding activities that cause pain or that put pressure on the elbow. This helps protect the area while it heals. If your doctor thinks there is an infection, you may be prescribed antibiotics. Most people get better in a few weeks. If the problem doesn't go away, you may need other medicines or treatments.  Follow-up care is a key part of your treatment and safety. Be sure to make and go to all appointments, and call your doctor if you are having problems. It's also a good idea to know your test results and keep a list of the medicines you take.  How can you care for yourself at home?  Ask your doctor if you can take an over-the-counter pain medicine, such as acetaminophen (Tylenol), ibuprofen (Advil, Motrin), or naproxen (Aleve). Be safe with medicines. Read and follow all instructions on the label.  Do not take two or more pain medicines at the same time unless the doctor told you to. Many pain medicines have acetaminophen, which is Tylenol. Too much acetaminophen (Tylenol) can be harmful.  If your doctor prescribed antibiotics, take them as directed. Do not stop taking them just because you feel better. You need to take the full course of antibiotics.  If your doctor gave you a sling, an elastic bandage, or a splint, wear it exactly as instructed. Make sure that you gently bend and straighten your elbow each day if you are using a sling, bandage, or splint. This will help prevent elbow stiffness.  Put ice or a cold pack on your elbow for  "10 to 20 minutes at a time. Try to do this every 1 to 2 hours for the next 3 days (when you are awake) or until the swelling goes down. Put a thin cloth between the ice and your skin.  After 2 or 3 days, you can try applying heat to your elbow. Apply heat for 10 to 20 minutes at a time, several times a day. You might also try switching between ice and heat.  Rest your elbow. Try to stop or reduce any activity that causes pain.  Wear elbow pads during physical activity to prevent injury.  Do not lean your elbows on tables or armrests.  When should you call for help?   Call your doctor now or seek immediate medical care if:    You have new or worse symptoms of infection, such as:  Increased pain, swelling, warmth, or redness.  Red streaks leading from the area.  Pus draining from the area.  A fever.   Watch closely for changes in your health, and be sure to contact your doctor if:    You do not get better as expected.   Where can you learn more?  Go to https://www.Got[employer].net/patiented  Enter B779 in the search box to learn more about \"Elbow Bursitis: Care Instructions.\"  Current as of: November 9, 2022               Content Version: 13.7    2865-7394 [employer].   Care instructions adapted under license by your healthcare professional. If you have questions about a medical condition or this instruction, always ask your healthcare professional. [employer] disclaims any warranty or liability for your use of this information.           "

## 2023-10-03 NOTE — LETTER
10/3/2023         RE: Reginaldo Ferraro  [address] [place] [address]        Dear Colleague,    Thank you for referring your patient, Reginaldo Ferraro, to the [facility]. Please see a copy of my visit note below.    Reginaldo Ferraro was seen in the Allergy Clinic at [facility].      Reginaldo Ferraro is a 32 year old Not  or  female who is seen today for cluster immunotherapy. No adverse reactions after her last visit. She has premedicated with fexofenadine as directed.      Past Medical History:   Diagnosis Date     Abnormal Pap smear of cervix 12/23/2011 9/21 LSIL     Cervical high risk HPV (human papillomavirus) test positive 2013    10/31/14, 5/21/18     Depressive disorder 12/23/2011    possibly chronic     Environmental allergies      Influenza A with pneumonia 12/24/2021     Migraine      Migraines     headaches with allergies     Moderate major depression 12/23/2011     Moderate major depression (H) 12/23/2011     Moderate persistent asthma 3/17/2022     Nasal obstruction 2/15/2022    Added automatically from request for surgery [id number]     Nasal septal deviation 2/15/2022    Added automatically from request for surgery [id number]     Nasal turbinate hypertrophy 2/15/2022    Added automatically from request for surgery [id number]     NO ACTIVE PROBLEMS      Seasonal allergic rhinitis      Sepsis (H) 12/24/2021     Family History   Problem Relation Age of Onset     Arthritis Mother      Lupus Mother      Heart Disease Father      Cerebrovascular Disease Father         stroke     Hyperlipidemia Father      Diabetes Paternal Grandmother      Hypertension Paternal Grandmother      Respiratory Paternal Grandmother         asthma     Colon Cancer No family hx of      Breast Cancer No family hx of      Coronary Artery Disease No family hx of      Social History     Tobacco Use     Smoking status: Never     Passive exposure: Never      Smokeless tobacco: Never   Vaping Use     Vaping Use: Never used   Substance Use Topics     Alcohol use: Yes     Comment: Socially     Drug use: No     Social History     Social History Narrative    ENVIRONMENTAL HISTORY: [place] The home is heated with a forced air. They does have central air conditioning. The patient's bedroom is furnished with feather/wool bedding or pillows and carpeting in bedroom.  Pets inside the house include 2 cat(s) and 1 dog(s). There is no history of cockroach or mice infestation. There is/are 0 smokers living in the house.  There is/are 0 who smoke ecigarettes/vape living in the house.The house does not have a damp basement.             Past medical, family, and social history were reviewed.        Current Outpatient Medications:      ALPRAZolam (XANAX) 0.25 MG tablet, Take 1 tablet (0.25 mg) by mouth at bedtime as needed, may repeat once for anxiety, Disp: 30 tablet, Rfl: 0     azelastine (OPTIVAR) 0.05 % ophthalmic solution, Apply 1 drop to eye 2 times daily, Disp: 6 mL, Rfl: 3     COMBIVENT RESPIMAT  MCG/ACT inhaler, , Disp: , Rfl:      EPINEPHrine (ANY BX GENERIC EQUIV) 0.3 MG/0.3ML injection 2-pack, Inject into the muscle as directed for anaphylaxis, Disp: 2 each, Rfl: 2     Fexofenadine HCl (ALLEGRA ALLERGY PO), , Disp: , Rfl:      fluticasone-salmeterol (ADVAIR) 500-50 MCG/ACT inhaler, Inhale 1 puff into the lungs every 12 hours, Disp: 1 each, Rfl: 5     HYDROcodone-acetaminophen (NORCO) 5-325 MG tablet, Take 1 tablet by mouth every 6 hours as needed for pain, Disp: 30 tablet, Rfl: 0     ipratropium - albuterol 0.5 mg/2.5 mg/3 mL (DUONEB) 0.5-2.5 (3) MG/3ML neb solution, Inhale 1 vial (3 mLs) into the lungs every 6 hours as needed for shortness of breath or wheezing, Disp: 360 mL, Rfl: 3     omeprazole (PRILOSEC) 20 MG DR capsule, Take 1 capsule (20 mg) by mouth daily, Disp: 90 capsule, Rfl: 1     ondansetron (ZOFRAN ODT) 8 MG  ODT tab, Take 1 tablet (8 mg) by mouth every 8 hours as needed for nausea, Disp: 15 tablet, Rfl: 3     ORDER FOR ALLERGEN IMMUNOTHERAPY, Name of Mix: Mix #1  Dust Mite, Cat, Dog Cat Hair, Standardized A.P. 10,000 BAU/mL, HS  2.0 ml Dog Hair-Dander, UF  1:650 w/v, HS  1.0 ml Dust Mites DF. 10,000 AU/mL, HS  1.0 ml Dust Mites DP. 10,000 AU/mL, HS  1.0 ml  Diluent: HSA qs to 5ml, Disp: 5 mL, Rfl: PRN     ORDER FOR ALLERGEN IMMUNOTHERAPY, Name of Mix: Mix #2  Tree  Sylvain, White 1:20 w/v, HS  0.5 ml Birch Mix PRW 1:20 w/v, HS  0.5 ml Boxelder-Maple Mix BHR (Boxelder Hard Red) 1:20 w/v, HS  0.5 ml [place], Common 1:20 w/v, HS  0.5 ml Oak Mix RVW 1:20 w/v, HS 0.5 ml Pine Mix 1:20 w/v, HS 0.5 ml [place] Tree, Black 1:20 w/v, HS 0.5 ml Diluent: HSA qs to 5ml, Disp: 5 mL, Rfl: PRN     ORDER FOR ALLERGEN IMMUNOTHERAPY, Name of Mix: Mix #3  Grass, Weeds Prabhu Grass 1:20 w/v, HS 0.5 ml Steve Grass (Std) 100,000 BAU/mL, HS 0.4 ml Lamb's Quarters 1:20 w/v, HS 0.5 ml Nettle 1:20 w/v, HS 0.5 ml Plantain, English 1:20 w/v, HS 0.5 ml Ragweed, Mix (Giant, Short) 1:20 w/v, HS 0.5 ml Russian Thistle 1:20 w/v, HS 0.5 ml Sorrel, Sheep 1:20 w/v, HS 0.5 ml Diluent: HSA qs to 5ml, Disp: 5 mL, Rfl: PRN     SYMBICORT 160-4.5 MCG/ACT Inhaler, Inhale 2 puffs into the lungs 2 times daily Needs appointment for additional refills, Disp: 10.2 g, Rfl: 1     VENTOLIN  (90 Base) MCG/ACT inhaler, Inhale 1-2 puffs into the lungs every 4 hours as needed for shortness of breath or wheezing, Disp: 18 g, Rfl: 1  No Known Allergies    EXAM:   BP 97/70 (BP Location: Left arm, Patient Position: Sitting, Cuff Size: Adult Regular)   Pulse 81   SpO2 98%   Physical Exam  Vitals and nursing note reviewed.   Constitutional:       Appearance: Normal appearance.   HENT:      Head: Normocephalic and atraumatic.      Right Ear: External ear normal.      Left Ear: External ear normal.      Nose: No mucosal edema or rhinorrhea.      Mouth/Throat:      Mouth:  Mucous membranes are moist. No oral lesions.      Pharynx: Oropharynx is clear. Uvula midline. No posterior oropharyngeal erythema.   Eyes:      General: Lids are normal.      Extraocular Movements: Extraocular movements intact.      Conjunctiva/sclera: Conjunctivae normal.   Neck:      Comments: No asymmetry, masses, or scars  Cardiovascular:      Rate and Rhythm: Normal rate and regular rhythm.      Heart sounds: S1 normal and S2 normal. No murmur heard.  Pulmonary:      Effort: Pulmonary effort is normal. No respiratory distress.      Breath sounds: Normal breath sounds and air entry.   Musculoskeletal:      Comments: No musculoskeletal defects appreciated   Skin:     General: Skin is warm and dry.      Findings: No lesion or rash.   Neurological:      General: No focal deficit present.      Mental Status: She is alert.   Psychiatric:         Mood and Affect: Mood and affect normal.           WORKUP:  Cluster Immunotherapy    Cluster Allergen Immunotherapy:    After explaining risks and benefits, and obtaining verbal and written consent, we proceeded with cluster immunotherapy.     VISIT  VIAL COLOR/STRENGTH  DOSES TO BE GIVEN    1  GREEN (1:1000), BLUE (1:100)  GREEN 0.1, GREEN 0.2, GREEN 0.4, BLUE 0.1    2  BLUE (1:100), YELLOW (1:10)  BLUE 0.2, BLUE 0.4, YELLOW 0.05    3  YELLOW (1:10)  YELLOW 0.1, YELLOW 0.15, YELLOW 0.25    4  YELLOW (1:10)  YELLOW 0.35, YELLOW 0.5    5  RED (1:1)  RED 0.05, RED 0.1    6  RED (1:1)  RED 0.15, RED 0.2    7  RED (1:1)  RED 0.3, RED 0.4    8  RED (1:1)  RED 0.5        VISIT 4    Time Injection Given: 13:18  Yellow 1:10   Trees     0.35 mL  Yellow 1:10   Grass, Weeds    0.35 mL  Yellow 1:10   Cat, Dog, Dust Mite   0.35 mL    Time Injection Given: 14:02  Yellow 1:10   Trees     0.5 mL  Yellow 1:10   Grass, Weeds    0.5 mL  Yellow 1:10   Cat, Dog, Dust Mite   0.5 mL      Start Time: 13:18  End Time: 14:32      VITALS   Time BP Pulse pOx Reaction Treatment   14:00 91/62 75 95% none  N/a   14:32 101/8 71 95% none N/a       ASSESSMENT/PLAN:  Reginaldo Ferraro is a 32 year old female here for cluster immunotherapy.    1. Seasonal allergic rhinitis due to pollen - Reginaldo tolerated today's procedure well without developing any signs or symptoms of an adverse reaction.    - return in 7-14 days to continue cluster protocol  - continue pre-medication with fexofenadine as directed  - RAPID DESENSITIZATION    2. Allergic rhinitis due to animals    - RAPID DESENSITIZATION    3. Allergic rhinitis due to dust mite    - RAPID DESENSITIZATION      Thank you for allowing me to participate in the care of Reginaldo Ferraro.      Polo Mccain MD, FAAAAI  Allergy/Immunology  [facility]      Chart documentation done in part with Dragon Voice Recognition Software. Although reviewed after completion, some word and grammatical errors may remain.      Prior to initiation of cluster immunotherapy RN ensured that patient was feeling healthy, has premedicated with Zyrtec or Allegra yesterday twice daily and this morning. RN also ensured that patient has unexpired Epi-Pen, had no new medication changes, and did not have a reaction after the last allergy shots were given. If the patient has asthma it is well-controlled. The patient has not been ill in the past 7 days. Patient was given allergy injections per cluster immunotherapy protocol 30 minutes apart and vital signs were monitored. Patient was monitored in clinic for 30 minutes after last injection was given and assessed by provider before discharging.     Josefina Neal RN      Again, thank you for allowing me to participate in the care of your patient.        Sincerely,        Polo Mccain MD

## 2023-10-03 NOTE — LETTER
10/3/2023         RE: Reginaldo Ferraro  [address] [place] [facility] [address]        Dear Colleague,    Thank you for referring your patient, Reginaldo Ferraro, to the [facility]. Please see a copy of my visit note below.    SUBJECTIVE:   Reginaldo Ferraro is a 32 year old female who is seen as a [facility] ED referral for evaluation of her right elbow.    Patient Story: Presents with right elbow swelling and pain. Limited ROM due to pain. Denies fevers. Denies trauma. Woke up and had the swelling and pain 9/23.  .  Pain has been present approximately 11 days..   Swelling went down but pain still present but a lot less pain     No known injury.    Present symptoms: pain if bumped or leaned on still.      Treatments tried to this point: aleve in the ER. Has been icing.    Orthopedic PMH: no previous right elbow issues.    Review of Systems:  Constitutional:  NEGATIVE for fever, chills, change in weight  Integumentary/Skin:  NEGATIVE for worrisome rashes, moles or lesions  Eyes:  NEGATIVE for vision changes or irritation  ENT/Mouth:  NEGATIVE for ear, mouth and throat problems  Resp:  NEGATIVE for significant cough or SOB  Breast:  NEGATIVE for masses, tenderness or discharge  CV:  NEGATIVE for chest pain, palpitations or peripheral edema  GI:  NEGATIVE for nausea, abdominal pain, heartburn, or change in bowel habits  :  Negative   Musculoskeletal:  See HPI above  Neuro:  NEGATIVE for weakness, dizziness or paresthesias  Endocrine:  NEGATIVE for temperature intolerance, skin/hair changes  Heme/allergy/immune:  NEGATIVE for bleeding problems  Psychiatric:  NEGATIVE for changes in mood or affect    Past Medical History:   Past Medical History:   Diagnosis Date     Abnormal Pap smear of cervix 12/23/2011 9/21 LSIL     Cervical high risk HPV (human papillomavirus) test positive 2013    10/31/14, 5/21/18     Depressive disorder 12/23/2011    possibly chronic     Environmental  allergies      Influenza A with pneumonia 12/24/2021     Migraine      Migraines     headaches with allergies     Moderate major depression 12/23/2011     Moderate major depression (H) 12/23/2011     Moderate persistent asthma 3/17/2022     Nasal obstruction 2/15/2022    Added automatically from request for surgery [id number]     Nasal septal deviation 2/15/2022    Added automatically from request for surgery [id number]     Nasal turbinate hypertrophy 2/15/2022    Added automatically from request for surgery [id number]     NO ACTIVE PROBLEMS      Seasonal allergic rhinitis      Sepsis (H) 12/24/2021     Past Surgical History:   Past Surgical History:   Procedure Laterality Date     DILATION AND EVACUATION N/A 02/16/2022    Procedure: DILATION AND EVACUATION, UTERUS;  Surgeon: Vianey Domingo MD;  Location:  OR     ENDOSCOPIC SINUS SURGERY Bilateral 04/04/2022    Procedure: FUNCTIONAL ENDOSCOPIC SINUS SURGERY, with bilateral maxillary antrostomies and bilateral michael bullosa reduction,;  Surgeon: Miquel Rutledge MD;  Location:  OR     EYE SURGERY  01/2013    Lasic surgery     OPTICAL TRACKING SYSTEM ENDOSCOPIC SINUS SURGERY Bilateral 6/15/2023    Procedure: IMAGE GUIDED FUNCTIONAL ENDOSCOPIC SINUS SURGERY (FESS) WITHOUT SEPTOPLASTY, maxillary entrostomy, ethmoidectomy and turbinate reduction;  Surgeon: Atilio Murry MD;  Location:  OR     ME BREAST AUGMENTATION  2018     SEPTOPLASTY, TURBINOPLASTY, COMBINED Bilateral 04/04/2022    Procedure: WITH NASAL SEPTOPLASTY and bilateral inferior turbinate reduction;  Surgeon: Miquel Rutledge MD;  Location:  OR     Family History:   Family History   Problem Relation Age of Onset     Arthritis Mother      Lupus Mother      Heart Disease Father      Cerebrovascular Disease Father         stroke     Hyperlipidemia Father      Diabetes Paternal Grandmother      Hypertension Paternal Grandmother      Respiratory Paternal Grandmother         asthma     Colon  "Cancer No family hx of      Breast Cancer No family hx of      Coronary Artery Disease No family hx of      Social History:   Social History     Tobacco Use     Smoking status: Never     Passive exposure: Never     Smokeless tobacco: Never   Substance Use Topics     Alcohol use: Yes     Comment: Socially     OBJECTIVE:  Physical Exam:  BP 98/65 (BP Location: Left arm, Patient Position: Sitting, Cuff Size: Adult Regular)   Pulse 82   Ht 1.549 m (5' 1\")   Wt 63.5 kg (140 lb)   SpO2 95%   BMI 26.45 kg/m    General Appearance: healthy, alert and no distress   Skin: no suspicious lesions or rashes  Neuro: Normal strength and tone, mentation intact and speech normal  Vascular: good pulses, and cappillary refill   Lymph: no lymphadenopathy   Psych:  mentation appears normal and affect normal/bright  Resp: no increased work of breathing     Right Elbow Exam:  normal appearance  Dry and cracked skin over olecranons  Minimal o-bursa swelling  Full range of motion without pain       ASSESSMENT:   Olecranon bursitis, aseptic, resolving.    PLAN:   Avoid leaning on elbow  Lotion to elbow to avoid possible infection through cracked skin.  Nsaid as needed   Elbow pad as needed     Return to clinic: as needed     KARIME Cruz MD  Dept. Orthopedic Surgery  [facility]       Again, thank you for allowing me to participate in the care of your patient.        Sincerely,        José Cruz MD  "

## 2023-10-06 ASSESSMENT — ENCOUNTER SYMPTOMS
CHILLS: 1
ARTHRALGIAS: 1
PALPITATIONS: 0
BREAST MASS: 0
FREQUENCY: 1
CONSTIPATION: 0
ABDOMINAL PAIN: 0
HEMATURIA: 0
SHORTNESS OF BREATH: 1
NAUSEA: 1
PARESTHESIAS: 1
NERVOUS/ANXIOUS: 1
FEVER: 0
JOINT SWELLING: 1
DIARRHEA: 0
HEADACHES: 1
WEAKNESS: 1
EYE PAIN: 0
SORE THROAT: 0
HEMATOCHEZIA: 0
COUGH: 1
MYALGIAS: 0
DYSURIA: 0
DIZZINESS: 0

## 2023-10-10 ENCOUNTER — OFFICE VISIT (OUTPATIENT)
Dept: ALLERGY | Facility: CLINIC | Age: 33
End: 2023-10-10
Payer: COMMERCIAL

## 2023-10-10 VITALS — SYSTOLIC BLOOD PRESSURE: 96 MMHG | HEART RATE: 70 BPM | DIASTOLIC BLOOD PRESSURE: 59 MMHG | OXYGEN SATURATION: 95 %

## 2023-10-10 DIAGNOSIS — J30.81 ALLERGIC RHINITIS DUE TO ANIMALS: ICD-10-CM

## 2023-10-10 DIAGNOSIS — J30.89 ALLERGIC RHINITIS DUE TO DUST MITE: ICD-10-CM

## 2023-10-10 DIAGNOSIS — J30.1 SEASONAL ALLERGIC RHINITIS DUE TO POLLEN: Primary | ICD-10-CM

## 2023-10-10 PROCEDURE — 95180 RAPID DESENSITIZATION: CPT | Performed by: ALLERGY & IMMUNOLOGY

## 2023-10-10 NOTE — LETTER
10/10/2023         RE: Reginaldo Ferraro  [address] [facility] [address]        Dear Colleague,    Thank you for referring your patient, Reginaldo Ferraro, to the [facility]. Please see a copy of my visit note below.    Reginaldo Ferraro was seen in the Allergy Clinic at [facility].      Reginaldo Ferraro is a 32 year old Not  or  female who is seen today for cluster immunotherapy. No adverse reactions after her last visit. She has premedicated with fexofenadine as directed.      Past Medical History:   Diagnosis Date     Abnormal Pap smear of cervix 12/23/2011 9/21 LSIL     Cervical high risk HPV (human papillomavirus) test positive 2013    10/31/14, 5/21/18     Depressive disorder 12/23/2011    possibly chronic     Environmental allergies      Influenza A with pneumonia 12/24/2021     Migraine      Migraines     headaches with allergies     Moderate major depression 12/23/2011     Moderate major depression (H) 12/23/2011     Moderate persistent asthma 3/17/2022     Nasal obstruction 2/15/2022    Added automatically from request for surgery [id number]     Nasal septal deviation 2/15/2022    Added automatically from request for surgery [id number]     Nasal turbinate hypertrophy 2/15/2022    Added automatically from request for surgery [id number]     NO ACTIVE PROBLEMS      Seasonal allergic rhinitis      Sepsis (H) 12/24/2021     Family History   Problem Relation Age of Onset     Arthritis Mother      Lupus Mother      Heart Disease Father      Cerebrovascular Disease Father         stroke     Hyperlipidemia Father      Diabetes Paternal Grandmother      Hypertension Paternal Grandmother      Respiratory Paternal Grandmother         asthma     Colon Cancer No family hx of      Breast Cancer No family hx of      Coronary Artery Disease No family hx of      Social History     Tobacco Use     Smoking status: Never     Passive exposure: Never      Smokeless tobacco: Never   Vaping Use     Vaping Use: Never used   Substance Use Topics     Alcohol use: Yes     Comment: Socially     Drug use: No     Social History     Social History Narrative    ENVIRONMENTAL HISTORY: [place] The home is heated with a forced air. They does have central air conditioning. The patient's bedroom is furnished with feather/wool bedding or pillows and carpeting in bedroom.  Pets inside the house include 2 cat(s) and 1 dog(s). There is no history of cockroach or mice infestation. There is/are 0 smokers living in the house.  There is/are 0 who smoke ecigarettes/vape living in the house.The house does not have a damp basement.             Past medical, family, and social history were reviewed.        Current Outpatient Medications:      ALPRAZolam (XANAX) 0.25 MG tablet, Take 1 tablet (0.25 mg) by mouth at bedtime as needed, may repeat once for anxiety, Disp: 30 tablet, Rfl: 0     azelastine (OPTIVAR) 0.05 % ophthalmic solution, Apply 1 drop to eye 2 times daily, Disp: 6 mL, Rfl: 3     COMBIVENT RESPIMAT  MCG/ACT inhaler, , Disp: , Rfl:      EPINEPHrine (ANY BX GENERIC EQUIV) 0.3 MG/0.3ML injection 2-pack, Inject into the muscle as directed for anaphylaxis, Disp: 2 each, Rfl: 2     Fexofenadine HCl (ALLEGRA ALLERGY PO), , Disp: , Rfl:      fluticasone-salmeterol (ADVAIR) 500-50 MCG/ACT inhaler, Inhale 1 puff into the lungs every 12 hours, Disp: 1 each, Rfl: 5     HYDROcodone-acetaminophen (NORCO) 5-325 MG tablet, Take 1 tablet by mouth every 6 hours as needed for pain, Disp: 30 tablet, Rfl: 0     ipratropium - albuterol 0.5 mg/2.5 mg/3 mL (DUONEB) 0.5-2.5 (3) MG/3ML neb solution, Inhale 1 vial (3 mLs) into the lungs every 6 hours as needed for shortness of breath or wheezing, Disp: 360 mL, Rfl: 3     omeprazole (PRILOSEC) 20 MG DR capsule, Take 1 capsule (20 mg) by mouth daily, Disp: 90 capsule, Rfl: 1     ondansetron (ZOFRAN ODT) 8 MG  ODT tab, Take 1 tablet (8 mg) by mouth every 8 hours as needed for nausea, Disp: 15 tablet, Rfl: 3     ORDER FOR ALLERGEN IMMUNOTHERAPY, Name of Mix: Mix #1  Dust Mite, Cat, Dog Cat Hair, Standardized A.P. 10,000 BAU/mL, HS  2.0 ml Dog Hair-Dander, UF  1:650 w/v, HS  1.0 ml Dust Mites DF. 10,000 AU/mL, HS  1.0 ml Dust Mites DP. 10,000 AU/mL, HS  1.0 ml  Diluent: HSA qs to 5ml, Disp: 5 mL, Rfl: PRN     ORDER FOR ALLERGEN IMMUNOTHERAPY, Name of Mix: Mix #2  Tree  Sylvain, White 1:20 w/v, HS  0.5 ml Birch Mix PRW 1:20 w/v, HS  0.5 ml Boxelder-Maple Mix BHR (Boxelder Hard Red) 1:20 w/v, HS  0.5 ml [place], Common 1:20 w/v, HS  0.5 ml Oak Mix RVW 1:20 w/v, HS 0.5 ml Pine Mix 1:20 w/v, HS 0.5 ml [place] Tree, Black 1:20 w/v, HS 0.5 ml Diluent: HSA qs to 5ml, Disp: 5 mL, Rfl: PRN     ORDER FOR ALLERGEN IMMUNOTHERAPY, Name of Mix: Mix #3  Grass, Weeds Prabhu Grass 1:20 w/v, HS 0.5 ml Steve Grass (Std) 100,000 BAU/mL, HS 0.4 ml Lamb's Quarters 1:20 w/v, HS 0.5 ml Nettle 1:20 w/v, HS 0.5 ml Plantain, English 1:20 w/v, HS 0.5 ml Ragweed, Mix (Giant, Short) 1:20 w/v, HS 0.5 ml Russian Thistle 1:20 w/v, HS 0.5 ml Sorrel, Sheep 1:20 w/v, HS 0.5 ml Diluent: HSA qs to 5ml, Disp: 5 mL, Rfl: PRN     SYMBICORT 160-4.5 MCG/ACT Inhaler, Inhale 2 puffs into the lungs 2 times daily Needs appointment for additional refills, Disp: 10.2 g, Rfl: 1     VENTOLIN  (90 Base) MCG/ACT inhaler, Inhale 1-2 puffs into the lungs every 4 hours as needed for shortness of breath or wheezing, Disp: 18 g, Rfl: 1  No Known Allergies    EXAM:   BP 96/59   Pulse 70   SpO2 95%   Physical Exam  Vitals and nursing note reviewed.   Constitutional:       Appearance: Normal appearance.   HENT:      Head: Normocephalic and atraumatic.      Right Ear: External ear normal.      Left Ear: External ear normal.      Nose: No rhinorrhea.      Mouth/Throat:      Mouth: Mucous membranes are moist. No oral lesions.      Pharynx: Oropharynx is clear. Uvula midline.  No posterior oropharyngeal erythema.   Eyes:      General: Lids are normal.      Extraocular Movements: Extraocular movements intact.      Conjunctiva/sclera: Conjunctivae normal.   Neck:      Comments: No asymmetry, masses, or scars  Cardiovascular:      Rate and Rhythm: Normal rate and regular rhythm.      Heart sounds: S1 normal and S2 normal. No murmur heard.  Pulmonary:      Effort: Pulmonary effort is normal. No respiratory distress.      Breath sounds: Normal breath sounds and air entry.   Musculoskeletal:      Comments: No musculoskeletal defects appreciated   Skin:     General: Skin is warm and dry.      Findings: No lesion or rash.   Neurological:      General: No focal deficit present.      Mental Status: She is alert.   Psychiatric:         Mood and Affect: Mood and affect normal.           WORKUP:  Cluster Immunotherapy    Cluster Allergen Immunotherapy:    After explaining risks and benefits, and obtaining verbal and written consent, we proceeded with cluster immunotherapy.     VISIT  VIAL COLOR/STRENGTH  DOSES TO BE GIVEN    1  GREEN (1:1000), BLUE (1:100)  GREEN 0.1, GREEN 0.2, GREEN 0.4, BLUE 0.1    2  BLUE (1:100), YELLOW (1:10)  BLUE 0.2, BLUE 0.4, YELLOW 0.05    3  YELLOW (1:10)  YELLOW 0.1, YELLOW 0.15, YELLOW 0.25    4  YELLOW (1:10)  YELLOW 0.35, YELLOW 0.5    5  RED (1:1)  RED 0.05, RED 0.1    6  RED (1:1)  RED 0.15, RED 0.2    7  RED (1:1)  RED 0.3, RED 0.4    8  RED (1:1)  RED 0.5        VISIT 5    Time Injection Given: 13:15  Red 1:1   Trees     0.05 mL  Red 1:1   Grass, Weeds    0.05 mL  Red 1:1   Cat, Dog, Dust Mite   0.05 mL    Time Injection Given: 13:50  Red 1:1   Trees     0.1 mL  Red 1:1   Grass, Weeds    0.1 mL  Red 1:1   Cat, Dog, Dust Mite   0.1 mL      Start Time: 13:15  End Time: 14:20      VITALS   Time BP Pulse pOx Reaction Treatment   13:45 103/70 83 100% none N/a   14:20 95/67 74 95% none N/a       ASSESSMENT/PLAN:  Reginaldo Ferraro is a 32 year old female here for  cluster immunotherapy.    1. Seasonal allergic rhinitis due to pollen - Reginaldo tolerated today's procedure well without developing any signs or symptoms of an adverse reaction.    - return in 7-14 days to continue cluster protocol  - continue pre-medication with fexofenadine as directed  - RAPID DESENSITIZATION    2. Allergic rhinitis due to animals    - RAPID DESENSITIZATION    3. Allergic rhinitis due to dust mite    - RAPID DESENSITIZATION      Thank you for allowing me to participate in the care of Reginaldo Ferraro.      Polo Mccain MD, FAAAAI  Allergy/Immunology  [facility]      Chart documentation done in part with Dragon Voice Recognition Software. Although reviewed after completion, some word and grammatical errors may remain.      Prior to initiation of cluster immunotherapy RN ensured that patient was feeling healthy, has premedicated with Zyrtec or Allegra yesterday twice daily and this morning. RN also ensured that patient has unexpired Epi-Pen, had no new medication changes, and did not have a reaction after the last allergy shots were given. If the patient has asthma it is well-controlled. The patient has not been ill in the past 7 days. Patient was given allergy injections per cluster immunotherapy protocol 30 minutes apart and vital signs were monitored. Patient was monitored in clinic for 30 minutes after last injection was given and assessed by provider before discharging.     Josefina Neal, RN      Again, thank you for allowing me to participate in the care of your patient.        Sincerely,        Polo Mccain MD

## 2023-10-10 NOTE — PROGRESS NOTES
Reginaldo Ferraro was seen in the Allergy Clinic at [facility].      Reginaldo Ferraro is a 32 year old Not  or  female who is seen today for cluster immunotherapy. No adverse reactions after her last visit. She has premedicated with fexofenadine as directed.      Past Medical History:   Diagnosis Date    Abnormal Pap smear of cervix 12/23/2011 9/21 LSIL    Cervical high risk HPV (human papillomavirus) test positive 2013    10/31/14, 5/21/18    Depressive disorder 12/23/2011    possibly chronic    Environmental allergies     Influenza A with pneumonia 12/24/2021    Migraine     Migraines     headaches with allergies    Moderate major depression 12/23/2011    Moderate major depression (H) 12/23/2011    Moderate persistent asthma 3/17/2022    Nasal obstruction 2/15/2022    Added automatically from request for surgery [id number]    Nasal septal deviation 2/15/2022    Added automatically from request for surgery [id number]    Nasal turbinate hypertrophy 2/15/2022    Added automatically from request for surgery [id number]    NO ACTIVE PROBLEMS     Seasonal allergic rhinitis     Sepsis (H) 12/24/2021     Family History   Problem Relation Age of Onset    Arthritis Mother     Lupus Mother     Heart Disease Father     Cerebrovascular Disease Father         stroke    Hyperlipidemia Father     Diabetes Paternal Grandmother     Hypertension Paternal Grandmother     Respiratory Paternal Grandmother         asthma    Colon Cancer No family hx of     Breast Cancer No family hx of     Coronary Artery Disease No family hx of      Social History     Tobacco Use    Smoking status: Never     Passive exposure: Never    Smokeless tobacco: Never   Vaping Use    Vaping Use: Never used   Substance Use Topics    Alcohol use: Yes     Comment: Socially    Drug use: No     Social History     Social History Narrative    ENVIRONMENTAL HISTORY: [place] The home is  heated with a forced air. They does have central air conditioning. The patient's bedroom is furnished with feather/wool bedding or pillows and carpeting in bedroom.  Pets inside the house include 2 cat(s) and 1 dog(s). There is no history of cockroach or mice infestation. There is/are 0 smokers living in the house.  There is/are 0 who smoke ecigarettes/vape living in the house.The house does not have a damp basement.             Past medical, family, and social history were reviewed.        Current Outpatient Medications:     ALPRAZolam (XANAX) 0.25 MG tablet, Take 1 tablet (0.25 mg) by mouth at bedtime as needed, may repeat once for anxiety, Disp: 30 tablet, Rfl: 0    azelastine (OPTIVAR) 0.05 % ophthalmic solution, Apply 1 drop to eye 2 times daily, Disp: 6 mL, Rfl: 3    COMBIVENT RESPIMAT  MCG/ACT inhaler, , Disp: , Rfl:     EPINEPHrine (ANY BX GENERIC EQUIV) 0.3 MG/0.3ML injection 2-pack, Inject into the muscle as directed for anaphylaxis, Disp: 2 each, Rfl: 2    Fexofenadine HCl (ALLEGRA ALLERGY PO), , Disp: , Rfl:     fluticasone-salmeterol (ADVAIR) 500-50 MCG/ACT inhaler, Inhale 1 puff into the lungs every 12 hours, Disp: 1 each, Rfl: 5    HYDROcodone-acetaminophen (NORCO) 5-325 MG tablet, Take 1 tablet by mouth every 6 hours as needed for pain, Disp: 30 tablet, Rfl: 0    ipratropium - albuterol 0.5 mg/2.5 mg/3 mL (DUONEB) 0.5-2.5 (3) MG/3ML neb solution, Inhale 1 vial (3 mLs) into the lungs every 6 hours as needed for shortness of breath or wheezing, Disp: 360 mL, Rfl: 3    omeprazole (PRILOSEC) 20 MG DR capsule, Take 1 capsule (20 mg) by mouth daily, Disp: 90 capsule, Rfl: 1    ondansetron (ZOFRAN ODT) 8 MG ODT tab, Take 1 tablet (8 mg) by mouth every 8 hours as needed for nausea, Disp: 15 tablet, Rfl: 3    ORDER FOR ALLERGEN IMMUNOTHERAPY, Name of Mix: Mix #1  Dust Mite, Cat, Dog Cat Hair, Standardized A.P. 10,000 BAU/mL, HS  2.0 ml Dog Hair-Dander, UF  1:650 w/v, HS  1.0 ml Dust Mites DF. 10,000  AU/mL, HS  1.0 ml Dust Mites DP. 10,000 AU/mL, HS  1.0 ml  Diluent: HSA qs to 5ml, Disp: 5 mL, Rfl: PRN    ORDER FOR ALLERGEN IMMUNOTHERAPY, Name of Mix: Mix #2  Tree  Sylvain, White 1:20 w/v, HS  0.5 ml Birch Mix PRW 1:20 w/v, HS  0.5 ml Boxelder-Maple Mix BHR (Boxelder Hard Red) 1:20 w/v, HS  0.5 ml [place], Common 1:20 w/v, HS  0.5 ml Oak Mix RVW 1:20 w/v, HS 0.5 ml Pine Mix 1:20 w/v, HS 0.5 ml [place] Tree, Black 1:20 w/v, HS 0.5 ml Diluent: HSA qs to 5ml, Disp: 5 mL, Rfl: PRN    ORDER FOR ALLERGEN IMMUNOTHERAPY, Name of Mix: Mix #3  Grass, Weeds Prabhu Grass 1:20 w/v, HS 0.5 ml Steve Grass (Std) 100,000 BAU/mL, HS 0.4 ml Lamb's Quarters 1:20 w/v, HS 0.5 ml Nettle 1:20 w/v, HS 0.5 ml Plantain, English 1:20 w/v, HS 0.5 ml Ragweed, Mix (Giant, Short) 1:20 w/v, HS 0.5 ml Russian Thistle 1:20 w/v, HS 0.5 ml Sorrel, Sheep 1:20 w/v, HS 0.5 ml Diluent: HSA qs to 5ml, Disp: 5 mL, Rfl: PRN    SYMBICORT 160-4.5 MCG/ACT Inhaler, Inhale 2 puffs into the lungs 2 times daily Needs appointment for additional refills, Disp: 10.2 g, Rfl: 1    VENTOLIN  (90 Base) MCG/ACT inhaler, Inhale 1-2 puffs into the lungs every 4 hours as needed for shortness of breath or wheezing, Disp: 18 g, Rfl: 1  No Known Allergies    EXAM:   BP 96/59   Pulse 70   SpO2 95%   Physical Exam  Vitals and nursing note reviewed.   Constitutional:       Appearance: Normal appearance.   HENT:      Head: Normocephalic and atraumatic.      Right Ear: External ear normal.      Left Ear: External ear normal.      Nose: No rhinorrhea.      Mouth/Throat:      Mouth: Mucous membranes are moist. No oral lesions.      Pharynx: Oropharynx is clear. Uvula midline. No posterior oropharyngeal erythema.   Eyes:      General: Lids are normal.      Extraocular Movements: Extraocular movements intact.      Conjunctiva/sclera: Conjunctivae normal.   Neck:      Comments: No asymmetry, masses, or scars  Cardiovascular:      Rate and Rhythm: Normal rate and regular  rhythm.      Heart sounds: S1 normal and S2 normal. No murmur heard.  Pulmonary:      Effort: Pulmonary effort is normal. No respiratory distress.      Breath sounds: Normal breath sounds and air entry.   Musculoskeletal:      Comments: No musculoskeletal defects appreciated   Skin:     General: Skin is warm and dry.      Findings: No lesion or rash.   Neurological:      General: No focal deficit present.      Mental Status: She is alert.   Psychiatric:         Mood and Affect: Mood and affect normal.           WORKUP:  Cluster Immunotherapy    Cluster Allergen Immunotherapy:    After explaining risks and benefits, and obtaining verbal and written consent, we proceeded with cluster immunotherapy.     VISIT  VIAL COLOR/STRENGTH  DOSES TO BE GIVEN    1  GREEN (1:1000), BLUE (1:100)  GREEN 0.1, GREEN 0.2, GREEN 0.4, BLUE 0.1    2  BLUE (1:100), YELLOW (1:10)  BLUE 0.2, BLUE 0.4, YELLOW 0.05    3  YELLOW (1:10)  YELLOW 0.1, YELLOW 0.15, YELLOW 0.25    4  YELLOW (1:10)  YELLOW 0.35, YELLOW 0.5    5  RED (1:1)  RED 0.05, RED 0.1    6  RED (1:1)  RED 0.15, RED 0.2    7  RED (1:1)  RED 0.3, RED 0.4    8  RED (1:1)  RED 0.5        VISIT 5    Time Injection Given: 13:15  Red 1:1   Trees     0.05 mL  Red 1:1   Grass, Weeds    0.05 mL  Red 1:1   Cat, Dog, Dust Mite   0.05 mL    Time Injection Given: 13:50  Red 1:1   Trees     0.1 mL  Red 1:1   Grass, Weeds    0.1 mL  Red 1:1   Cat, Dog, Dust Mite   0.1 mL      Start Time: 13:15  End Time: 14:20      VITALS   Time BP Pulse pOx Reaction Treatment   13:45 103/70 83 100% none N/a   14:20 95/67 74 95% none N/a       ASSESSMENT/PLAN:  Reginaldo Ferraro is a 32 year old female here for cluster immunotherapy.    1. Seasonal allergic rhinitis due to pollen - Laurenstella tolerated today's procedure well without developing any signs or symptoms of an adverse reaction.    - return in 7-14 days to continue cluster protocol  - continue pre-medication with fexofenadine as directed  - RAPID  DESENSITIZATION    2. Allergic rhinitis due to animals    - RAPID DESENSITIZATION    3. Allergic rhinitis due to dust mite    - RAPID DESENSITIZATION      Thank you for allowing me to participate in the care of Reginaldo Ferraro.      Polo Mccain MD, FAAAAI  Allergy/Immunology  [facility]      Chart documentation done in part with Dragon Voice Recognition Software. Although reviewed after completion, some word and grammatical errors may remain.

## 2023-10-13 ENCOUNTER — OFFICE VISIT (OUTPATIENT)
Dept: FAMILY MEDICINE | Facility: CLINIC | Age: 33
End: 2023-10-13
Payer: COMMERCIAL

## 2023-10-13 VITALS
TEMPERATURE: 98.7 F | DIASTOLIC BLOOD PRESSURE: 79 MMHG | OXYGEN SATURATION: 93 % | WEIGHT: 151.4 LBS | BODY MASS INDEX: 28.58 KG/M2 | RESPIRATION RATE: 17 BRPM | HEIGHT: 61 IN | SYSTOLIC BLOOD PRESSURE: 119 MMHG | HEART RATE: 81 BPM

## 2023-10-13 DIAGNOSIS — Z13.220 SCREENING FOR HYPERLIPIDEMIA: ICD-10-CM

## 2023-10-13 DIAGNOSIS — J45.40 MODERATE PERSISTENT ASTHMA WITHOUT COMPLICATION: ICD-10-CM

## 2023-10-13 DIAGNOSIS — Z12.4 CERVICAL CANCER SCREENING: ICD-10-CM

## 2023-10-13 DIAGNOSIS — L29.9 ITCHING: ICD-10-CM

## 2023-10-13 DIAGNOSIS — F33.0 MILD EPISODE OF RECURRENT MAJOR DEPRESSIVE DISORDER (H): ICD-10-CM

## 2023-10-13 DIAGNOSIS — E66.3 OVERWEIGHT: ICD-10-CM

## 2023-10-13 DIAGNOSIS — Z00.00 ROUTINE GENERAL MEDICAL EXAMINATION AT A HEALTH CARE FACILITY: Primary | ICD-10-CM

## 2023-10-13 DIAGNOSIS — R53.83 OTHER FATIGUE: ICD-10-CM

## 2023-10-13 DIAGNOSIS — F41.0 PANIC ATTACK: ICD-10-CM

## 2023-10-13 LAB
CHOLEST SERPL-MCNC: 202 MG/DL
ERYTHROCYTE [DISTWIDTH] IN BLOOD BY AUTOMATED COUNT: 13 % (ref 10–15)
FERRITIN SERPL-MCNC: 35 NG/ML (ref 6–175)
HCT VFR BLD AUTO: 39.3 % (ref 35–47)
HDLC SERPL-MCNC: 55 MG/DL
HGB BLD-MCNC: 13.1 G/DL (ref 11.7–15.7)
IRON BINDING CAPACITY (ROCHE): 420 UG/DL (ref 240–430)
IRON SATN MFR SERPL: 7 % (ref 15–46)
IRON SERPL-MCNC: 29 UG/DL (ref 37–145)
LDLC SERPL CALC-MCNC: 130 MG/DL
MCH RBC QN AUTO: 30 PG (ref 26.5–33)
MCHC RBC AUTO-ENTMCNC: 33.3 G/DL (ref 31.5–36.5)
MCV RBC AUTO: 90 FL (ref 78–100)
NONHDLC SERPL-MCNC: 147 MG/DL
PLATELET # BLD AUTO: 370 10E3/UL (ref 150–450)
RBC # BLD AUTO: 4.37 10E6/UL (ref 3.8–5.2)
TRIGL SERPL-MCNC: 83 MG/DL
TSH SERPL DL<=0.005 MIU/L-ACNC: 1.49 UIU/ML (ref 0.3–4.2)
WBC # BLD AUTO: 9.1 10E3/UL (ref 4–11)

## 2023-10-13 PROCEDURE — 90677 PCV20 VACCINE IM: CPT | Performed by: PHYSICIAN ASSISTANT

## 2023-10-13 PROCEDURE — 87624 HPV HI-RISK TYP POOLED RSLT: CPT | Performed by: PHYSICIAN ASSISTANT

## 2023-10-13 PROCEDURE — 36415 COLL VENOUS BLD VENIPUNCTURE: CPT | Performed by: PHYSICIAN ASSISTANT

## 2023-10-13 PROCEDURE — 99214 OFFICE O/P EST MOD 30 MIN: CPT | Mod: 25 | Performed by: PHYSICIAN ASSISTANT

## 2023-10-13 PROCEDURE — G0145 SCR C/V CYTO,THINLAYER,RESCR: HCPCS | Performed by: PHYSICIAN ASSISTANT

## 2023-10-13 PROCEDURE — 85027 COMPLETE CBC AUTOMATED: CPT | Performed by: PHYSICIAN ASSISTANT

## 2023-10-13 PROCEDURE — 82728 ASSAY OF FERRITIN: CPT | Performed by: PHYSICIAN ASSISTANT

## 2023-10-13 PROCEDURE — 90472 IMMUNIZATION ADMIN EACH ADD: CPT | Performed by: PHYSICIAN ASSISTANT

## 2023-10-13 PROCEDURE — 84443 ASSAY THYROID STIM HORMONE: CPT | Performed by: PHYSICIAN ASSISTANT

## 2023-10-13 PROCEDURE — 99395 PREV VISIT EST AGE 18-39: CPT | Mod: 25 | Performed by: PHYSICIAN ASSISTANT

## 2023-10-13 PROCEDURE — 83550 IRON BINDING TEST: CPT | Performed by: PHYSICIAN ASSISTANT

## 2023-10-13 PROCEDURE — 83540 ASSAY OF IRON: CPT | Performed by: PHYSICIAN ASSISTANT

## 2023-10-13 PROCEDURE — 90471 IMMUNIZATION ADMIN: CPT | Performed by: PHYSICIAN ASSISTANT

## 2023-10-13 PROCEDURE — 90686 IIV4 VACC NO PRSV 0.5 ML IM: CPT | Performed by: PHYSICIAN ASSISTANT

## 2023-10-13 PROCEDURE — 90715 TDAP VACCINE 7 YRS/> IM: CPT | Performed by: PHYSICIAN ASSISTANT

## 2023-10-13 PROCEDURE — 80061 LIPID PANEL: CPT | Performed by: PHYSICIAN ASSISTANT

## 2023-10-13 RX ORDER — BENZONATATE 200 MG/1
200 CAPSULE ORAL 3 TIMES DAILY PRN
Qty: 40 CAPSULE | Refills: 2 | Status: SHIPPED | OUTPATIENT
Start: 2023-10-13 | End: 2024-03-04

## 2023-10-13 RX ORDER — ALPRAZOLAM 0.25 MG
0.25 TABLET ORAL
Qty: 30 TABLET | Refills: 0 | Status: SHIPPED | OUTPATIENT
Start: 2023-10-13 | End: 2024-03-04

## 2023-10-13 ASSESSMENT — ENCOUNTER SYMPTOMS
PARESTHESIAS: 1
HEMATURIA: 0
CONSTIPATION: 0
SORE THROAT: 0
PALPITATIONS: 0
DYSURIA: 0
FREQUENCY: 1
SHORTNESS OF BREATH: 1
NAUSEA: 1
NERVOUS/ANXIOUS: 1
HEMATOCHEZIA: 0
MYALGIAS: 0
JOINT SWELLING: 1
FEVER: 0
ARTHRALGIAS: 1
CHILLS: 1
WEAKNESS: 1
DIZZINESS: 0
HEADACHES: 1
EYE PAIN: 0
COUGH: 1
ABDOMINAL PAIN: 0
DIARRHEA: 0
BREAST MASS: 0

## 2023-10-13 NOTE — PROGRESS NOTES
SUBJECTIVE:   CC: Reginaldo is an 32 year old who presents for preventive health visit.       10/13/2023     8:29 AM   Additional Questions   Roomed by memo deshpande     Weight gain: interested in referral weight loss management.   Diet: Can't eat a normal size plate. Happened over the past three years around the pregnancy. Smoothies and veggies at every meal  Exercise: Recreational hip hop dance   Super achey. Thinks it is connected to the weight.  Night itchiness, started after miscarriage. Always the legs. Sensation to itch every night.   Does not think it is related to anxiety.  Hasn't changed detergents or new fragrances  Uses bar   Dry Skin: severe on elbows. A couple months.   Recent history of olecranon bursitis     Healthy Habits:     Getting at least 3 servings of Calcium per day:  Yes    Bi-annual eye exam:  NO    Dental care twice a year:  Yes    Sleep apnea or symptoms of sleep apnea:  Daytime drowsiness    Diet:  Regular (no restrictions) and Breakfast skipped    Frequency of exercise:  2-3 days/week    Duration of exercise:  30-45 minutes    Taking medications regularly:  No    Barriers to taking medications:  Problems remembering to take them, Side effects and Other    Medication side effects:  Other    Additional concerns today:  Yes (mom was dx with lupus and she is wondering if there is anything she should be looking for)            Social History     Tobacco Use    Smoking status: Never     Passive exposure: Never    Smokeless tobacco: Never   Substance Use Topics    Alcohol use: Yes     Comment: Social             10/6/2023     9:15 AM   Alcohol Use   Prescreen: >3 drinks/day or >7 drinks/week? No     Reviewed orders with patient.  Reviewed health maintenance and updated orders accordingly - Yes  Labs reviewed in EPIC    Breast Cancer Screenin/16/2021     4:40 PM   Breast CA Risk Assessment (FHS-7)   Do you have a family history of breast, colon, or ovarian cancer? No / Unknown  "          Pertinent mammograms are reviewed under the imaging tab.    History of abnormal Pap smear: YES - updated in Problem List and Health Maintenance accordingly      Latest Ref Rng & Units 9/2/2022     7:14 AM 8/19/2021     1:30 PM 9/17/2020    11:20 AM   PAP / HPV   PAP  Negative for Intraepithelial Lesion or Malignancy (NILM)  Low-grade squamous intraepithelial lesion (LSIL) encompassing HPV/mild dysplasia/CIN1     PAP (Historical)    NIL    HPV 16 DNA Negative Negative  Negative     HPV 18 DNA Negative Negative  Negative     Other HR HPV Negative Negative  Negative       Reviewed and updated as needed this visit by clinical staff     Meds              Reviewed and updated as needed this visit by Provider                     Review of Systems   Constitutional:  Positive for chills. Negative for fever.   HENT:  Positive for congestion. Negative for ear pain, hearing loss and sore throat.    Eyes:  Negative for pain and visual disturbance.   Respiratory:  Positive for cough and shortness of breath.    Cardiovascular:  Positive for chest pain and peripheral edema. Negative for palpitations.   Gastrointestinal:  Positive for nausea. Negative for abdominal pain, constipation, diarrhea and hematochezia.   Breasts:  Positive for tenderness. Negative for breast mass and discharge.   Genitourinary:  Positive for frequency and urgency. Negative for dysuria, genital sores, hematuria, pelvic pain, vaginal bleeding and vaginal discharge.   Musculoskeletal:  Positive for arthralgias and joint swelling. Negative for myalgias.   Skin:  Negative for rash.   Neurological:  Positive for weakness, headaches and paresthesias. Negative for dizziness.   Psychiatric/Behavioral:  Positive for mood changes. The patient is nervous/anxious.           OBJECTIVE:   /79 (BP Location: Left arm, Patient Position: Chair, Cuff Size: Adult Large)   Pulse 81   Temp 98.7  F (37.1  C) (Oral)   Resp 17   Ht 1.542 m (5' 0.71\")   Wt 68.7 " kg (151 lb 6.4 oz)   LMP 09/23/2023   SpO2 93%   BMI 28.88 kg/m    Physical Exam  GENERAL: healthy, alert and no distress  EYES: Eyes grossly normal to inspection, PERRL and conjunctivae and sclerae normal  HENT: ear canals and TM's normal, nose and mouth without ulcers or lesions  NECK: no adenopathy, no asymmetry, masses, or scars and thyroid normal to palpation  RESP: mild expiratory wheeze  CV: regular rate and rhythm, normal S1 S2, no S3 or S4, no murmur, click or rub   ABDOMEN: soft, nontender, no hepatosplenomegaly, no masses and bowel sounds normal   (female): normal female external genitalia, normal urethral meatus , vaginal mucosa pink, moist, well rugated, and normal cervix   MS: no gross musculoskeletal defects noted, no edema  SKIN: no suspicious lesions or rashes  NEURO: Normal strength and tone, mentation intact and speech normal  PSYCH: mentation appears normal, affect normal/bright    Diagnostic Test Results:  Labs reviewed in Epic    ASSESSMENT/PLAN:   (Z00.00) Routine general medical examination at a health care facility  (primary encounter diagnosis)    (Z12.4) Cervical cancer screening  Comment: If normal, need one more normal pap smear next year. Then, can move to pap smears every 3 years.  Plan: Pap Screen with HPV - recommended age 30 - 65         years    (R53.83) Other fatigue  Plan: Ferritin, CBC with platelets, Iron & Iron         Binding Capacity, TSH WITH FREE T4 REFLEX       (L29.9) Itching  Plan: Ferritin, CBC with platelets, Iron & Iron         Binding Capacity, TSH WITH FREE T4 REFLEX    (F41.0) Panic attack  Plan: Refill of Xanax ordered.  Rare use. Refilled.     (Z13.220) Screening for hyperlipidemia  Plan: Lipid panel reflex to direct LDL Non-fasting    (E66.3) Overweight  Comment: Continue activity and healthy diet.  Plan: Nutrition Referral     (J45.40) Moderate persistent asthma without complication  Comment: Managed by allergy   Plan: Tessalon Pearls refilled.    Weight  "gain: discussed that some weight gain may be attributed to inhalers and steroids. Continue healthy diet and exercise.  Night itchiness, try switching to a mild cleanser.   Dry skin on elbows. Likely due to reduction in swelling. Can use Aquaphor for dry skin.     Patient agreeable with plan.          COUNSELING:  Reviewed preventive health counseling, as reflected in patient instructions      BMI:   Estimated body mass index is 28.88 kg/m  as calculated from the following:    Height as of this encounter: 1.542 m (5' 0.71\").    Weight as of this encounter: 68.7 kg (151 lb 6.4 oz).         She reports that she has never smoked. She has never been exposed to tobacco smoke. She has never used smokeless tobacco.          BALDO Green on 10/13/2023 at 10:07 AM  [facility]  The student NA Wilcox acted as a scribe and the encounter documented above was completely performed by myself and the documentation reflects the work I have performed today.   Cally Rucker PA-C     "

## 2023-10-13 NOTE — NURSING NOTE
Prior to immunization administration, verified patients identity using patient s name and date of birth. Please see Immunization Activity for additional information.     Screening Questionnaire for Adult Immunization    Are you sick today?   No   Do you have allergies to medications, food, a vaccine component or latex?   No   Have you ever had a serious reaction after receiving a vaccination?   No   Do you have a long-term health problem with heart, lung, kidney, or metabolic disease (e.g., diabetes), asthma, a blood disorder, no spleen, complement component deficiency, a cochlear implant, or a spinal fluid leak?  Are you on long-term aspirin therapy?   No   Do you have cancer, leukemia, HIV/AIDS, or any other immune system problem?   No   Do you have a parent, brother, or sister with an immune system problem?   No   In the past 3 months, have you taken medications that affect  your immune system, such as prednisone, other steroids, or anticancer drugs; drugs for the treatment of rheumatoid arthritis, Crohn s disease, or psoriasis; or have you had radiation treatments?   No   Have you had a seizure, or a brain or other nervous system problem?   No   During the past year, have you received a transfusion of blood or blood    products, or been given immune (gamma) globulin or antiviral drug?   No   For women: Are you pregnant or is there a chance you could become       pregnant during the next month?   No   Have you received any vaccinations in the past 4 weeks?   No     Immunization questionnaire answers were all negative.      Patient instructed to remain in clinic for 15 minutes afterwards, and to report any adverse reactions.     Screening performed by Stephie Figueroa CMA on 10/13/2023 at 9:18 AM.      Spoke with provider about pt receiving allergy vaccines this week. Provider agreed with giving vaccines signed.    Stephie BROOKS MA

## 2023-10-16 NOTE — RESULT ENCOUNTER NOTE
Reginaldo,     Your iron levels are just slightly low. I would recommend you take a daily multivitamin with iron daily.   No other concerns.   Cally Rucker PA-C

## 2023-10-17 ENCOUNTER — OFFICE VISIT (OUTPATIENT)
Dept: ALLERGY | Facility: CLINIC | Age: 33
End: 2023-10-17
Payer: COMMERCIAL

## 2023-10-17 VITALS — SYSTOLIC BLOOD PRESSURE: 105 MMHG | DIASTOLIC BLOOD PRESSURE: 61 MMHG | HEART RATE: 67 BPM | OXYGEN SATURATION: 97 %

## 2023-10-17 DIAGNOSIS — J30.89 ALLERGIC RHINITIS DUE TO DUST MITE: ICD-10-CM

## 2023-10-17 DIAGNOSIS — J30.1 SEASONAL ALLERGIC RHINITIS DUE TO POLLEN: Primary | ICD-10-CM

## 2023-10-17 DIAGNOSIS — J30.81 ALLERGIC RHINITIS DUE TO ANIMALS: ICD-10-CM

## 2023-10-17 PROCEDURE — 95180 RAPID DESENSITIZATION: CPT | Performed by: ALLERGY & IMMUNOLOGY

## 2023-10-17 NOTE — PROGRESS NOTES
Reginaldo Ferraro was seen in the Allergy Clinic at [facility].      Reginaldo Ferraro is a 32 year old Not  or  female who is seen today for cluster immunotherapy. No adverse reactions after her last visit. She has premedicated with fexofenadine as directed.       Past Medical History:   Diagnosis Date    Abnormal Pap smear of cervix 12/23/2011 9/21 LSIL    Cervical high risk HPV (human papillomavirus) test positive 2013    10/31/14, 5/21/18    Depressive disorder 12/23/2011    possibly chronic    Environmental allergies     Influenza A with pneumonia 12/24/2021    Migraine     Migraines     headaches with allergies    Moderate major depression 12/23/2011    Moderate major depression (H) 12/23/2011    Moderate persistent asthma 3/17/2022    Nasal obstruction 2/15/2022    Added automatically from request for surgery [id number]    Nasal septal deviation 2/15/2022    Added automatically from request for surgery [id number]    Nasal turbinate hypertrophy 2/15/2022    Added automatically from request for surgery [id number]    NO ACTIVE PROBLEMS     Seasonal allergic rhinitis     Sepsis (H) 12/24/2021     Family History   Problem Relation Age of Onset    Arthritis Mother     Lupus Mother     Heart Disease Father     Cerebrovascular Disease Father         stroke    Hyperlipidemia Father     Diabetes Paternal Grandmother     Hypertension Paternal Grandmother     Respiratory Paternal Grandmother         asthma    Colon Cancer No family hx of     Breast Cancer No family hx of     Coronary Artery Disease No family hx of      Social History     Tobacco Use    Smoking status: Never     Passive exposure: Never    Smokeless tobacco: Never   Vaping Use    Vaping Use: Never used   Substance Use Topics    Alcohol use: Yes     Comment: Social    Drug use: No     Social History     Social History Narrative    ENVIRONMENTAL HISTORY: [place] The home is  heated with a forced air. They does have central air conditioning. The patient's bedroom is furnished with feather/wool bedding or pillows and carpeting in bedroom.  Pets inside the house include 2 cat(s) and 1 dog(s). There is no history of cockroach or mice infestation. There is/are 0 smokers living in the house.  There is/are 0 who smoke ecigarettes/vape living in the house.The house does not have a damp basement.             Past medical, family, and social history were reviewed.        Current Outpatient Medications:     ALPRAZolam (XANAX) 0.25 MG tablet, Take 1 tablet (0.25 mg) by mouth at bedtime as needed, may repeat once for anxiety, Disp: 30 tablet, Rfl: 0    azelastine (OPTIVAR) 0.05 % ophthalmic solution, Apply 1 drop to eye 2 times daily, Disp: 6 mL, Rfl: 3    benzonatate (TESSALON) 200 MG capsule, Take 1 capsule (200 mg) by mouth 3 times daily as needed for cough, Disp: 40 capsule, Rfl: 2    COMBIVENT RESPIMAT  MCG/ACT inhaler, , Disp: , Rfl:     EPINEPHrine (ANY BX GENERIC EQUIV) 0.3 MG/0.3ML injection 2-pack, Inject into the muscle as directed for anaphylaxis, Disp: 2 each, Rfl: 2    Fexofenadine HCl (ALLEGRA ALLERGY PO), , Disp: , Rfl:     fluticasone-salmeterol (ADVAIR) 500-50 MCG/ACT inhaler, Inhale 1 puff into the lungs every 12 hours, Disp: 1 each, Rfl: 5    HYDROcodone-acetaminophen (NORCO) 5-325 MG tablet, Take 1 tablet by mouth every 6 hours as needed for pain, Disp: 30 tablet, Rfl: 0    ipratropium - albuterol 0.5 mg/2.5 mg/3 mL (DUONEB) 0.5-2.5 (3) MG/3ML neb solution, Inhale 1 vial (3 mLs) into the lungs every 6 hours as needed for shortness of breath or wheezing, Disp: 360 mL, Rfl: 3    omeprazole (PRILOSEC) 20 MG DR capsule, Take 1 capsule (20 mg) by mouth daily, Disp: 90 capsule, Rfl: 1    ondansetron (ZOFRAN ODT) 8 MG ODT tab, Take 1 tablet (8 mg) by mouth every 8 hours as needed for nausea, Disp: 15 tablet, Rfl: 3    ORDER FOR ALLERGEN IMMUNOTHERAPY, Name of Mix: Mix #1  Dust  Mite, Cat, Dog Cat Hair, Standardized A.P. 10,000 BAU/mL, HS  2.0 ml Dog Hair-Dander, UF  1:650 w/v, HS  1.0 ml Dust Mites DF. 10,000 AU/mL, HS  1.0 ml Dust Mites DP. 10,000 AU/mL, HS  1.0 ml  Diluent: HSA qs to 5ml, Disp: 5 mL, Rfl: PRN    ORDER FOR ALLERGEN IMMUNOTHERAPY, Name of Mix: Mix #2  Tree  Sylvain, White 1:20 w/v, HS  0.5 ml Birch Mix PRW 1:20 w/v, HS  0.5 ml Boxelder-Maple Mix BHR (Boxelder Hard Red) 1:20 w/v, HS  0.5 ml [place], Common 1:20 w/v, HS  0.5 ml Oak Mix RVW 1:20 w/v, HS 0.5 ml Pine Mix 1:20 w/v, HS 0.5 ml [place] Tree, Black 1:20 w/v, HS 0.5 ml Diluent: HSA qs to 5ml, Disp: 5 mL, Rfl: PRN    ORDER FOR ALLERGEN IMMUNOTHERAPY, Name of Mix: Mix #3  Grass, Weeds Prabhu Grass 1:20 w/v, HS 0.5 ml Steve Grass (Std) 100,000 BAU/mL, HS 0.4 ml Lamb's Quarters 1:20 w/v, HS 0.5 ml Nettle 1:20 w/v, HS 0.5 ml Plantain, English 1:20 w/v, HS 0.5 ml Ragweed, Mix (Giant, Short) 1:20 w/v, HS 0.5 ml Russian Thistle 1:20 w/v, HS 0.5 ml Sorrel, Sheep 1:20 w/v, HS 0.5 ml Diluent: HSA qs to 5ml, Disp: 5 mL, Rfl: PRN    SYMBICORT 160-4.5 MCG/ACT Inhaler, Inhale 2 puffs into the lungs 2 times daily Needs appointment for additional refills, Disp: 10.2 g, Rfl: 1    VENTOLIN  (90 Base) MCG/ACT inhaler, Inhale 1-2 puffs into the lungs every 4 hours as needed for shortness of breath or wheezing, Disp: 18 g, Rfl: 1  No Known Allergies    EXAM:   /61 (BP Location: Left arm, Patient Position: Sitting, Cuff Size: Adult Regular)   Pulse 67   LMP 09/23/2023   SpO2 97%   Physical Exam  Vitals and nursing note reviewed.   Constitutional:       Appearance: Normal appearance.   HENT:      Head: Normocephalic and atraumatic.      Right Ear: External ear normal.      Left Ear: External ear normal.      Nose: No rhinorrhea.      Mouth/Throat:      Mouth: Mucous membranes are moist. No oral lesions.      Pharynx: Oropharynx is clear. Uvula midline. No posterior oropharyngeal erythema.   Eyes:      General: Lids are  normal.      Extraocular Movements: Extraocular movements intact.      Conjunctiva/sclera: Conjunctivae normal.   Neck:      Comments: No asymmetry, masses, or scars  Cardiovascular:      Rate and Rhythm: Normal rate and regular rhythm.      Heart sounds: S1 normal and S2 normal. No murmur heard.  Pulmonary:      Effort: Pulmonary effort is normal. No respiratory distress.      Breath sounds: Normal breath sounds and air entry.   Musculoskeletal:      Comments: No musculoskeletal defects appreciated   Skin:     General: Skin is warm and dry.      Findings: No lesion or rash.   Neurological:      General: No focal deficit present.      Mental Status: She is alert.   Psychiatric:         Mood and Affect: Mood and affect normal.           WORKUP:  Cluster Immunotherapy    Cluster Allergen Immunotherapy:    After explaining risks and benefits, and obtaining verbal and written consent, we proceeded with cluster immunotherapy.     VISIT  VIAL COLOR/STRENGTH  DOSES TO BE GIVEN    1  GREEN (1:1000), BLUE (1:100)  GREEN 0.1, GREEN 0.2, GREEN 0.4, BLUE 0.1    2  BLUE (1:100), YELLOW (1:10)  BLUE 0.2, BLUE 0.4, YELLOW 0.05    3  YELLOW (1:10)  YELLOW 0.1, YELLOW 0.15, YELLOW 0.25    4  YELLOW (1:10)  YELLOW 0.35, YELLOW 0.5    5  RED (1:1)  RED 0.05, RED 0.1    6  RED (1:1)  RED 0.15, RED 0.2    7  RED (1:1)  RED 0.3, RED 0.4    8  RED (1:1)  RED 0.5        VISIT 6    Time Injection Given: 7:59  Red 1:1   Trees     0.15mL  Red 1:1   Grass, Weeds    0.15mL  Red 1:1   Cat, Dog, Dust Mite   0.15mL    Time Injection Given: 8:35  Red 1:1   Trees     0.2 mL  Red 1:1   Grass, Weeds    0.2 mL  Red 1:1   Cat, Dog, Dust Mite   0.2 mL      Start Time: 7:59  End Time: 9:05      VITALS   Time BP Pulse pOx Reaction Treatment   8:30 106/70 77 99% none N/a   9:05 105/71 69 96% none N/a       ASSESSMENT/PLAN:  Reginaldo Ferraro is a 32 year old female here for cluster immunotherapy.    1. Seasonal allergic rhinitis due to pollen - Marist[place]  tolerated today's procedure well without developing any signs or symptoms of an adverse reaction.    - return in 7-14 days to continue cluster protocol  - continue pre-medication with fexofenadine as directed  - RAPID DESENSITIZATION    2. Allergic rhinitis due to animals  - RAPID DESENSITIZATION    3. Allergic rhinitis due to dust mite    - RAPID DESENSITIZATION      Thank you for allowing me to participate in the care of Laurensybil Resendizon.      Polo Mccain MD, FAAAAI  Allergy/Immunology  [facility]      Chart documentation done in part with Dragon Voice Recognition Software. Although reviewed after completion, some word and grammatical errors may remain.

## 2023-10-17 NOTE — LETTER
10/17/2023         RE: Reginaldo Ferraro  [address] [facility] [address]        Dear Colleague,    Thank you for referring your patient, Reginaldo Ferraro, to the [facility]. Please see a copy of my visit note below.    Reginaldo Ferraro was seen in the Allergy Clinic at [facility].      Reginaldo Ferraro is a 32 year old Not  or  female who is seen today for cluster immunotherapy. No adverse reactions after her last visit. She has premedicated with fexofenadine as directed.       Past Medical History:   Diagnosis Date     Abnormal Pap smear of cervix 12/23/2011 9/21 LSIL     Cervical high risk HPV (human papillomavirus) test positive 2013    10/31/14, 5/21/18     Depressive disorder 12/23/2011    possibly chronic     Environmental allergies      Influenza A with pneumonia 12/24/2021     Migraine      Migraines     headaches with allergies     Moderate major depression 12/23/2011     Moderate major depression (H) 12/23/2011     Moderate persistent asthma 3/17/2022     Nasal obstruction 2/15/2022    Added automatically from request for surgery [id number]     Nasal septal deviation 2/15/2022    Added automatically from request for surgery [id number]     Nasal turbinate hypertrophy 2/15/2022    Added automatically from request for surgery [id number]     NO ACTIVE PROBLEMS      Seasonal allergic rhinitis      Sepsis (H) 12/24/2021     Family History   Problem Relation Age of Onset     Arthritis Mother      Lupus Mother      Heart Disease Father      Cerebrovascular Disease Father         stroke     Hyperlipidemia Father      Diabetes Paternal Grandmother      Hypertension Paternal Grandmother      Respiratory Paternal Grandmother         asthma     Colon Cancer No family hx of      Breast Cancer No family hx of      Coronary Artery Disease No family hx of      Social History     Tobacco Use     Smoking status: Never     Passive exposure: Never      Smokeless tobacco: Never   Vaping Use     Vaping Use: Never used   Substance Use Topics     Alcohol use: Yes     Comment: Social     Drug use: No     Social History     Social History Narrative    ENVIRONMENTAL HISTORY: [place] The home is heated with a forced air. They does have central air conditioning. The patient's bedroom is furnished with feather/wool bedding or pillows and carpeting in bedroom.  Pets inside the house include 2 cat(s) and 1 dog(s). There is no history of cockroach or mice infestation. There is/are 0 smokers living in the house.  There is/are 0 who smoke ecigarettes/vape living in the house.The house does not have a damp basement.             Past medical, family, and social history were reviewed.        Current Outpatient Medications:      ALPRAZolam (XANAX) 0.25 MG tablet, Take 1 tablet (0.25 mg) by mouth at bedtime as needed, may repeat once for anxiety, Disp: 30 tablet, Rfl: 0     azelastine (OPTIVAR) 0.05 % ophthalmic solution, Apply 1 drop to eye 2 times daily, Disp: 6 mL, Rfl: 3     benzonatate (TESSALON) 200 MG capsule, Take 1 capsule (200 mg) by mouth 3 times daily as needed for cough, Disp: 40 capsule, Rfl: 2     COMBIVENT RESPIMAT  MCG/ACT inhaler, , Disp: , Rfl:      EPINEPHrine (ANY BX GENERIC EQUIV) 0.3 MG/0.3ML injection 2-pack, Inject into the muscle as directed for anaphylaxis, Disp: 2 each, Rfl: 2     Fexofenadine HCl (ALLEGRA ALLERGY PO), , Disp: , Rfl:      fluticasone-salmeterol (ADVAIR) 500-50 MCG/ACT inhaler, Inhale 1 puff into the lungs every 12 hours, Disp: 1 each, Rfl: 5     HYDROcodone-acetaminophen (NORCO) 5-325 MG tablet, Take 1 tablet by mouth every 6 hours as needed for pain, Disp: 30 tablet, Rfl: 0     ipratropium - albuterol 0.5 mg/2.5 mg/3 mL (DUONEB) 0.5-2.5 (3) MG/3ML neb solution, Inhale 1 vial (3 mLs) into the lungs every 6 hours as needed for shortness of breath or wheezing, Disp: 360 mL, Rfl: 3      omeprazole (PRILOSEC) 20 MG DR capsule, Take 1 capsule (20 mg) by mouth daily, Disp: 90 capsule, Rfl: 1     ondansetron (ZOFRAN ODT) 8 MG ODT tab, Take 1 tablet (8 mg) by mouth every 8 hours as needed for nausea, Disp: 15 tablet, Rfl: 3     ORDER FOR ALLERGEN IMMUNOTHERAPY, Name of Mix: Mix #1  Dust Mite, Cat, Dog Cat Hair, Standardized A.P. 10,000 BAU/mL, HS  2.0 ml Dog Hair-Dander, UF  1:650 w/v, HS  1.0 ml Dust Mites DF. 10,000 AU/mL, HS  1.0 ml Dust Mites DP. 10,000 AU/mL, HS  1.0 ml  Diluent: HSA qs to 5ml, Disp: 5 mL, Rfl: PRN     ORDER FOR ALLERGEN IMMUNOTHERAPY, Name of Mix: Mix #2  Tree  Sylvain, White 1:20 w/v, HS  0.5 ml Birch Mix PRW 1:20 w/v, HS  0.5 ml Boxelder-Maple Mix BHR (Boxelder Hard Red) 1:20 w/v, HS  0.5 ml Peach, Common 1:20 w/v, HS  0.5 ml Oak Mix RVW 1:20 w/v, HS 0.5 ml Pine Mix 1:20 w/v, HS 0.5 ml [place] Tree, Black 1:20 w/v, HS 0.5 ml Diluent: HSA qs to 5ml, Disp: 5 mL, Rfl: PRN     ORDER FOR ALLERGEN IMMUNOTHERAPY, Name of Mix: Mix #3  Grass, Weeds Prabhu Grass 1:20 w/v, HS 0.5 ml Steve Grass (Std) 100,000 BAU/mL, HS 0.4 ml Lamb's Quarters 1:20 w/v, HS 0.5 ml Nettle 1:20 w/v, HS 0.5 ml Plantain, English 1:20 w/v, HS 0.5 ml Ragweed, Mix (Giant, Short) 1:20 w/v, HS 0.5 ml Russian Thistle 1:20 w/v, HS 0.5 ml Sorrel, Sheep 1:20 w/v, HS 0.5 ml Diluent: HSA qs to 5ml, Disp: 5 mL, Rfl: PRN     SYMBICORT 160-4.5 MCG/ACT Inhaler, Inhale 2 puffs into the lungs 2 times daily Needs appointment for additional refills, Disp: 10.2 g, Rfl: 1     VENTOLIN  (90 Base) MCG/ACT inhaler, Inhale 1-2 puffs into the lungs every 4 hours as needed for shortness of breath or wheezing, Disp: 18 g, Rfl: 1  No Known Allergies    EXAM:   /61 (BP Location: Left arm, Patient Position: Sitting, Cuff Size: Adult Regular)   Pulse 67   LMP 09/23/2023   SpO2 97%   Physical Exam  Vitals and nursing note reviewed.   Constitutional:       Appearance: Normal appearance.   HENT:      Head: Normocephalic and  atraumatic.      Right Ear: External ear normal.      Left Ear: External ear normal.      Nose: No rhinorrhea.      Mouth/Throat:      Mouth: Mucous membranes are moist. No oral lesions.      Pharynx: Oropharynx is clear. Uvula midline. No posterior oropharyngeal erythema.   Eyes:      General: Lids are normal.      Extraocular Movements: Extraocular movements intact.      Conjunctiva/sclera: Conjunctivae normal.   Neck:      Comments: No asymmetry, masses, or scars  Cardiovascular:      Rate and Rhythm: Normal rate and regular rhythm.      Heart sounds: S1 normal and S2 normal. No murmur heard.  Pulmonary:      Effort: Pulmonary effort is normal. No respiratory distress.      Breath sounds: Normal breath sounds and air entry.   Musculoskeletal:      Comments: No musculoskeletal defects appreciated   Skin:     General: Skin is warm and dry.      Findings: No lesion or rash.   Neurological:      General: No focal deficit present.      Mental Status: She is alert.   Psychiatric:         Mood and Affect: Mood and affect normal.           WORKUP:  Cluster Immunotherapy    Cluster Allergen Immunotherapy:    After explaining risks and benefits, and obtaining verbal and written consent, we proceeded with cluster immunotherapy.     VISIT  VIAL COLOR/STRENGTH  DOSES TO BE GIVEN    1  GREEN (1:1000), BLUE (1:100)  GREEN 0.1, GREEN 0.2, GREEN 0.4, BLUE 0.1    2  BLUE (1:100), YELLOW (1:10)  BLUE 0.2, BLUE 0.4, YELLOW 0.05    3  YELLOW (1:10)  YELLOW 0.1, YELLOW 0.15, YELLOW 0.25    4  YELLOW (1:10)  YELLOW 0.35, YELLOW 0.5    5  RED (1:1)  RED 0.05, RED 0.1    6  RED (1:1)  RED 0.15, RED 0.2    7  RED (1:1)  RED 0.3, RED 0.4    8  RED (1:1)  RED 0.5        VISIT 6    Time Injection Given: 7:59  Red 1:1   Trees     0.15mL  Red 1:1   Grass, Weeds    0.15mL  Red 1:1   Cat, Dog, Dust Mite   0.15mL    Time Injection Given: 8:35  Red 1:1   Trees     0.2 mL  Red 1:1   Grass, Weeds    0.2 mL  Red 1:1   Cat, Dog, Dust Mite   0.2  mL      Start Time: 7:59  End Time: 9:05      VITALS   Time BP Pulse pOx Reaction Treatment   8:30 106/70 77 99% none N/a   9:05 105/71 69 96% none N/a       ASSESSMENT/PLAN:  Reginaldo Ferraro is a 32 year old female here for cluster immunotherapy.    1. Seasonal allergic rhinitis due to pollen - Reginaldo tolerated today's procedure well without developing any signs or symptoms of an adverse reaction.    - return in 7-14 days to continue cluster protocol  - continue pre-medication with fexofenadine as directed  - RAPID DESENSITIZATION    2. Allergic rhinitis due to animals  - RAPID DESENSITIZATION    3. Allergic rhinitis due to dust mite    - RAPID DESENSITIZATION      Thank you for allowing me to participate in the care of Reginaldo Ferraro.      Polo Mccain MD, [facility]  Allergy/Immunology  [facility]      Chart documentation done in part with Dragon Voice Recognition Software. Although reviewed after completion, some word and grammatical errors may remain.      Again, thank you for allowing me to participate in the care of your patient.        Sincerely,        Polo Mccain MD

## 2023-10-19 ENCOUNTER — PATIENT OUTREACH (OUTPATIENT)
Dept: FAMILY MEDICINE | Facility: CLINIC | Age: 33
End: 2023-10-19
Payer: COMMERCIAL

## 2023-10-19 DIAGNOSIS — R87.810 CERVICAL HIGH RISK HPV (HUMAN PAPILLOMAVIRUS) TEST POSITIVE: Primary | ICD-10-CM

## 2023-10-19 LAB
HUMAN PAPILLOMA VIRUS 16 DNA: NEGATIVE
HUMAN PAPILLOMA VIRUS 18 DNA: NEGATIVE
HUMAN PAPILLOMA VIRUS FINAL DIAGNOSIS: ABNORMAL
HUMAN PAPILLOMA VIRUS OTHER HR: POSITIVE

## 2023-10-21 ENCOUNTER — MYC MEDICAL ADVICE (OUTPATIENT)
Dept: FAMILY MEDICINE | Facility: CLINIC | Age: 33
End: 2023-10-21
Payer: COMMERCIAL

## 2023-10-23 NOTE — TELEPHONE ENCOUNTER
Spoke to patient on the phone and scheduled for soonest available.     Randi Ruelas -    [facility]

## 2023-10-24 ENCOUNTER — MYC MEDICAL ADVICE (OUTPATIENT)
Dept: ALLERGY | Facility: CLINIC | Age: 33
End: 2023-10-24

## 2023-10-30 ASSESSMENT — ANXIETY QUESTIONNAIRES
1. FEELING NERVOUS, ANXIOUS, OR ON EDGE: SEVERAL DAYS
2. NOT BEING ABLE TO STOP OR CONTROL WORRYING: SEVERAL DAYS
3. WORRYING TOO MUCH ABOUT DIFFERENT THINGS: SEVERAL DAYS
6. BECOMING EASILY ANNOYED OR IRRITABLE: SEVERAL DAYS
GAD7 TOTAL SCORE: 7
7. FEELING AFRAID AS IF SOMETHING AWFUL MIGHT HAPPEN: SEVERAL DAYS
4. TROUBLE RELAXING: MORE THAN HALF THE DAYS
5. BEING SO RESTLESS THAT IT IS HARD TO SIT STILL: NOT AT ALL
GAD7 TOTAL SCORE: 7
IF YOU CHECKED OFF ANY PROBLEMS ON THIS QUESTIONNAIRE, HOW DIFFICULT HAVE THESE PROBLEMS MADE IT FOR YOU TO DO YOUR WORK, TAKE CARE OF THINGS AT HOME, OR GET ALONG WITH OTHER PEOPLE: SOMEWHAT DIFFICULT

## 2023-10-31 ENCOUNTER — OFFICE VISIT (OUTPATIENT)
Dept: ALLERGY | Facility: CLINIC | Age: 33
End: 2023-10-31
Payer: COMMERCIAL

## 2023-10-31 VITALS — SYSTOLIC BLOOD PRESSURE: 107 MMHG | DIASTOLIC BLOOD PRESSURE: 58 MMHG | HEART RATE: 82 BPM | OXYGEN SATURATION: 96 %

## 2023-10-31 DIAGNOSIS — J30.1 SEASONAL ALLERGIC RHINITIS DUE TO POLLEN: Primary | ICD-10-CM

## 2023-10-31 DIAGNOSIS — J30.81 ALLERGIC RHINITIS DUE TO ANIMALS: ICD-10-CM

## 2023-10-31 DIAGNOSIS — J30.89 ALLERGIC RHINITIS DUE TO DUST MITE: ICD-10-CM

## 2023-10-31 PROCEDURE — 95180 RAPID DESENSITIZATION: CPT | Performed by: ALLERGY & IMMUNOLOGY

## 2023-10-31 NOTE — LETTER
10/31/2023         RE: Reginaldo Ferraro  [address] [facility] [address]        Dear Colleague,    Thank you for referring your patient, Reginaldo Ferraro, to the [facility]. Please see a copy of my visit note below.    Reginaldo Ferraro was seen in the Allergy Clinic at [facility].      Reginaldo Ferraro is a 32 year old Not  or  female who is seen today for cluster immunotherapy. No adverse reactions after her last visit. She has premedicated with fexofenadine as directed.     Past Medical History:   Diagnosis Date     Abnormal Pap smear of cervix 12/23/2011 9/21 LSIL     Cervical high risk HPV (human papillomavirus) test positive 2013    10/31/14, 5/21/18     Depressive disorder 12/23/2011    possibly chronic     Environmental allergies      Influenza A with pneumonia 12/24/2021     Migraine      Migraines     headaches with allergies     Moderate major depression 12/23/2011     Moderate major depression (H) 12/23/2011     Moderate persistent asthma 3/17/2022     Nasal obstruction 2/15/2022    Added automatically from request for surgery [id number]     Nasal septal deviation 2/15/2022    Added automatically from request for surgery [id number]     Nasal turbinate hypertrophy 2/15/2022    Added automatically from request for surgery [id number]     NO ACTIVE PROBLEMS      Seasonal allergic rhinitis      Sepsis (H) 12/24/2021     Family History   Problem Relation Age of Onset     Arthritis Mother      Lupus Mother      Heart Disease Father      Cerebrovascular Disease Father         stroke     Hyperlipidemia Father      Diabetes Paternal Grandmother      Hypertension Paternal Grandmother      Respiratory Paternal Grandmother         asthma     Colon Cancer No family hx of      Breast Cancer No family hx of      Coronary Artery Disease No family hx of      Social History     Tobacco Use     Smoking status: Never     Passive exposure: Never      Smokeless tobacco: Never   Vaping Use     Vaping Use: Never used   Substance Use Topics     Alcohol use: Yes     Comment: Social     Drug use: No     Social History     Social History Narrative    ENVIRONMENTAL HISTORY: [place] The home is heated with a forced air. They does have central air conditioning. The patient's bedroom is furnished with feather/wool bedding or pillows and carpeting in bedroom.  Pets inside the house include 2 cat(s) and 1 dog(s). There is no history of cockroach or mice infestation. There is/are 0 smokers living in the house.  There is/are 0 who smoke ecigarettes/vape living in the house.The house does not have a damp basement.             Past medical, family, and social history were reviewed.        Current Outpatient Medications:      ALPRAZolam (XANAX) 0.25 MG tablet, Take 1 tablet (0.25 mg) by mouth at bedtime as needed, may repeat once for anxiety, Disp: 30 tablet, Rfl: 0     azelastine (OPTIVAR) 0.05 % ophthalmic solution, Apply 1 drop to eye 2 times daily, Disp: 6 mL, Rfl: 3     benzonatate (TESSALON) 200 MG capsule, Take 1 capsule (200 mg) by mouth 3 times daily as needed for cough, Disp: 40 capsule, Rfl: 2     COMBIVENT RESPIMAT  MCG/ACT inhaler, , Disp: , Rfl:      EPINEPHrine (ANY BX GENERIC EQUIV) 0.3 MG/0.3ML injection 2-pack, Inject into the muscle as directed for anaphylaxis, Disp: 2 each, Rfl: 2     Fexofenadine HCl (ALLEGRA ALLERGY PO), , Disp: , Rfl:      fluticasone-salmeterol (ADVAIR) 500-50 MCG/ACT inhaler, Inhale 1 puff into the lungs every 12 hours, Disp: 1 each, Rfl: 5     HYDROcodone-acetaminophen (NORCO) 5-325 MG tablet, Take 1 tablet by mouth every 6 hours as needed for pain, Disp: 30 tablet, Rfl: 0     ipratropium - albuterol 0.5 mg/2.5 mg/3 mL (DUONEB) 0.5-2.5 (3) MG/3ML neb solution, Inhale 1 vial (3 mLs) into the lungs every 6 hours as needed for shortness of breath or wheezing, Disp: 360 mL, Rfl: 3      omeprazole (PRILOSEC) 20 MG DR capsule, Take 1 capsule (20 mg) by mouth daily, Disp: 90 capsule, Rfl: 1     ondansetron (ZOFRAN ODT) 8 MG ODT tab, Take 1 tablet (8 mg) by mouth every 8 hours as needed for nausea, Disp: 15 tablet, Rfl: 3     ORDER FOR ALLERGEN IMMUNOTHERAPY, Name of Mix: Mix #1  Dust Mite, Cat, Dog Cat Hair, Standardized A.P. 10,000 BAU/mL, HS  2.0 ml Dog Hair-Dander, UF  1:650 w/v, HS  1.0 ml Dust Mites DF. 10,000 AU/mL, HS  1.0 ml Dust Mites DP. 10,000 AU/mL, HS  1.0 ml  Diluent: HSA qs to 5ml, Disp: 5 mL, Rfl: PRN     ORDER FOR ALLERGEN IMMUNOTHERAPY, Name of Mix: Mix #2  Tree  Sylvain, White 1:20 w/v, HS  0.5 ml Birch Mix PRW 1:20 w/v, HS  0.5 ml Boxelder-Maple Mix BHR (Boxelder Hard Red) 1:20 w/v, HS  0.5 ml [place], Common 1:20 w/v, HS  0.5 ml Oak Mix RVW 1:20 w/v, HS 0.5 ml Pine Mix 1:20 w/v, HS 0.5 ml [place] Tree, Black 1:20 w/v, HS 0.5 ml Diluent: HSA qs to 5ml, Disp: 5 mL, Rfl: PRN     ORDER FOR ALLERGEN IMMUNOTHERAPY, Name of Mix: Mix #3  Grass, Weeds Prabhu Grass 1:20 w/v, HS 0.5 ml Steve Grass (Std) 100,000 BAU/mL, HS 0.4 ml Lamb's Quarters 1:20 w/v, HS 0.5 ml Nettle 1:20 w/v, HS 0.5 ml Plantain, English 1:20 w/v, HS 0.5 ml Ragweed, Mix (Giant, Short) 1:20 w/v, HS 0.5 ml Russian Thistle 1:20 w/v, HS 0.5 ml Sorrel, Sheep 1:20 w/v, HS 0.5 ml Diluent: HSA qs to 5ml, Disp: 5 mL, Rfl: PRN     SYMBICORT 160-4.5 MCG/ACT Inhaler, Inhale 2 puffs into the lungs 2 times daily Needs appointment for additional refills, Disp: 10.2 g, Rfl: 1     VENTOLIN  (90 Base) MCG/ACT inhaler, Inhale 1-2 puffs into the lungs every 4 hours as needed for shortness of breath or wheezing, Disp: 18 g, Rfl: 1  No Known Allergies    EXAM:   /58   Pulse 82   LMP 09/23/2023   SpO2 96%   Physical Exam  Vitals and nursing note reviewed.   Constitutional:       Appearance: Normal appearance.   HENT:      Head: Normocephalic and atraumatic.      Right Ear: External ear normal.      Left Ear: External ear  normal.      Nose: No rhinorrhea.      Mouth/Throat:      Mouth: Mucous membranes are moist. No oral lesions.      Pharynx: Oropharynx is clear. Uvula midline. No posterior oropharyngeal erythema.   Eyes:      General: Lids are normal.      Extraocular Movements: Extraocular movements intact.      Conjunctiva/sclera: Conjunctivae normal.   Neck:      Comments: No asymmetry, masses, or scars  Cardiovascular:      Rate and Rhythm: Normal rate and regular rhythm.      Heart sounds: S1 normal and S2 normal. No murmur heard.  Pulmonary:      Effort: Pulmonary effort is normal. No respiratory distress.      Breath sounds: Normal breath sounds and air entry.   Musculoskeletal:      Comments: No musculoskeletal defects appreciated   Skin:     General: Skin is warm and dry.      Findings: No lesion or rash.   Neurological:      General: No focal deficit present.      Mental Status: She is alert.   Psychiatric:         Mood and Affect: Mood and affect normal.           WORKUP:  Cluster Immunotherapy  Cluster Allergen Immunotherapy:    After explaining risks and benefits, and obtaining verbal and written consent, we proceeded with cluster immunotherapy.     VISIT  VIAL COLOR/STRENGTH  DOSES TO BE GIVEN    1  GREEN (1:1000), BLUE (1:100)  GREEN 0.1, GREEN 0.2, GREEN 0.4, BLUE 0.1    2  BLUE (1:100), YELLOW (1:10)  BLUE 0.2, BLUE 0.4, YELLOW 0.05    3  YELLOW (1:10)  YELLOW 0.1, YELLOW 0.15, YELLOW 0.25    4  YELLOW (1:10)  YELLOW 0.35, YELLOW 0.5    5  RED (1:1)  RED 0.05, RED 0.1    6  RED (1:1)  RED 0.15, RED 0.2    7  RED (1:1)  RED 0.3, RED 0.4    8  RED (1:1)  RED 0.5        VISIT 7    Time Injection Given: 13:16  Red 1:1   Trees     0.3 mL  Red 1:1   Grass, Weeds    0.3 mL  Red 1:1   Cat, Dog, Dust Mite   0.3 mL    Time Injection Given: 13:52  Red 1:1   Trees     0.4 mL  Red 1:1   Grass, Weeds    0.4 mL  Red 1:1   Cat, Dog, Dust Mite   0.4 mL        Start Time: 13:16  End Time: 14:23      VITALS   Time BP Pulse pOx  Reaction Treatment   13:47 120/77 77 96% none N/a   14:23 116/78 91 97% none N/a     ASSESSMENT/PLAN:  Reginalod Ferraro is a 33 year old female here for cluster immunotherapy.    1. Seasonal allergic rhinitis due to pollen - Reginaldo tolerated today's procedure well without developing signs and symptoms of an adverse reaction.    - return in 7-14 days to receive first maintenance dose injection  - continue pre-medication with cetirizine until at monthly maintenance  - RAPID DESENSITIZATION    2. Allergic rhinitis due to animals    - RAPID DESENSITIZATION    3. Allergic rhinitis due to dust mite    - RAPID DESENSITIZATION      Follow-up in 6 months, sooner if needed      Thank you for allowing me to participate in the care of Reginaldo Ferraro.      Polo Mccain MD, [facility]  Allergy/Immunology  [facility]      Chart documentation done in part with Dragon Voice Recognition Software. Although reviewed after completion, some word and grammatical errors may remain.    Prior to initiation of cluster immunotherapy RN ensured that patient was feeling healthy, has premedicated with Zyrtec or Allegra yesterday twice daily and this morning. RN also ensured that patient has unexpired Epi-Pen, had no new medication changes, and did not have a reaction after the last allergy shots were given. If the patient has asthma it is well-controlled. The patient has not been ill in the past 7 days. Patient was given allergy injections per cluster immunotherapy protocol 30 minutes apart and vital signs were monitored. Patient was monitored in clinic for 30 minutes after last injection was given and assessed by provider before discharging.     Josefina Neal RN      Again, thank you for allowing me to participate in the care of your patient.        Sincerely,        Polo Mccain MD

## 2023-10-31 NOTE — PROGRESS NOTES
Reginaldo Ferraro was seen in the Allergy Clinic at [facility].      Reginaldo Ferraro is a 32 year old Not  or  female who is seen today for cluster immunotherapy. No adverse reactions after her last visit. She has premedicated with fexofenadine as directed.     Past Medical History:   Diagnosis Date    Abnormal Pap smear of cervix 12/23/2011 9/21 LSIL    Cervical high risk HPV (human papillomavirus) test positive 2013    10/31/14, 5/21/18    Depressive disorder 12/23/2011    possibly chronic    Environmental allergies     Influenza A with pneumonia 12/24/2021    Migraine     Migraines     headaches with allergies    Moderate major depression 12/23/2011    Moderate major depression (H) 12/23/2011    Moderate persistent asthma 3/17/2022    Nasal obstruction 2/15/2022    Added automatically from request for surgery [id number]    Nasal septal deviation 2/15/2022    Added automatically from request for surgery [id number]    Nasal turbinate hypertrophy 2/15/2022    Added automatically from request for surgery [id number]    NO ACTIVE PROBLEMS     Seasonal allergic rhinitis     Sepsis (H) 12/24/2021     Family History   Problem Relation Age of Onset    Arthritis Mother     Lupus Mother     Heart Disease Father     Cerebrovascular Disease Father         stroke    Hyperlipidemia Father     Diabetes Paternal Grandmother     Hypertension Paternal Grandmother     Respiratory Paternal Grandmother         asthma    Colon Cancer No family hx of     Breast Cancer No family hx of     Coronary Artery Disease No family hx of      Social History     Tobacco Use    Smoking status: Never     Passive exposure: Never    Smokeless tobacco: Never   Vaping Use    Vaping Use: Never used   Substance Use Topics    Alcohol use: Yes     Comment: Social    Drug use: No     Social History     Social History Narrative    ENVIRONMENTAL HISTORY: [place] The home is heated  with a forced air. They does have central air conditioning. The patient's bedroom is furnished with feather/wool bedding or pillows and carpeting in bedroom.  Pets inside the house include 2 cat(s) and 1 dog(s). There is no history of cockroach or mice infestation. There is/are 0 smokers living in the house.  There is/are 0 who smoke ecigarettes/vape living in the house.The house does not have a damp basement.             Past medical, family, and social history were reviewed.        Current Outpatient Medications:     ALPRAZolam (XANAX) 0.25 MG tablet, Take 1 tablet (0.25 mg) by mouth at bedtime as needed, may repeat once for anxiety, Disp: 30 tablet, Rfl: 0    azelastine (OPTIVAR) 0.05 % ophthalmic solution, Apply 1 drop to eye 2 times daily, Disp: 6 mL, Rfl: 3    benzonatate (TESSALON) 200 MG capsule, Take 1 capsule (200 mg) by mouth 3 times daily as needed for cough, Disp: 40 capsule, Rfl: 2    COMBIVENT RESPIMAT  MCG/ACT inhaler, , Disp: , Rfl:     EPINEPHrine (ANY BX GENERIC EQUIV) 0.3 MG/0.3ML injection 2-pack, Inject into the muscle as directed for anaphylaxis, Disp: 2 each, Rfl: 2    Fexofenadine HCl (ALLEGRA ALLERGY PO), , Disp: , Rfl:     fluticasone-salmeterol (ADVAIR) 500-50 MCG/ACT inhaler, Inhale 1 puff into the lungs every 12 hours, Disp: 1 each, Rfl: 5    HYDROcodone-acetaminophen (NORCO) 5-325 MG tablet, Take 1 tablet by mouth every 6 hours as needed for pain, Disp: 30 tablet, Rfl: 0    ipratropium - albuterol 0.5 mg/2.5 mg/3 mL (DUONEB) 0.5-2.5 (3) MG/3ML neb solution, Inhale 1 vial (3 mLs) into the lungs every 6 hours as needed for shortness of breath or wheezing, Disp: 360 mL, Rfl: 3    omeprazole (PRILOSEC) 20 MG DR capsule, Take 1 capsule (20 mg) by mouth daily, Disp: 90 capsule, Rfl: 1    ondansetron (ZOFRAN ODT) 8 MG ODT tab, Take 1 tablet (8 mg) by mouth every 8 hours as needed for nausea, Disp: 15 tablet, Rfl: 3    ORDER FOR ALLERGEN IMMUNOTHERAPY, Name of Mix: Mix #1  Dust Mite,  Cat, Dog Cat Hair, Standardized A.P. 10,000 BAU/mL, HS  2.0 ml Dog Hair-Dander, UF  1:650 w/v, HS  1.0 ml Dust Mites DF. 10,000 AU/mL, HS  1.0 ml Dust Mites DP. 10,000 AU/mL, HS  1.0 ml  Diluent: HSA qs to 5ml, Disp: 5 mL, Rfl: PRN    ORDER FOR ALLERGEN IMMUNOTHERAPY, Name of Mix: Mix #2  Tree  Sylvain, White 1:20 w/v, HS  0.5 ml Birch Mix PRW 1:20 w/v, HS  0.5 ml Boxelder-Maple Mix BHR (Boxelder Hard Red) 1:20 w/v, HS  0.5 ml [place], Common 1:20 w/v, HS  0.5 ml Oak Mix RVW 1:20 w/v, HS 0.5 ml Pine Mix 1:20 w/v, HS 0.5 ml [place] Tree, Black 1:20 w/v, HS 0.5 ml Diluent: HSA qs to 5ml, Disp: 5 mL, Rfl: PRN    ORDER FOR ALLERGEN IMMUNOTHERAPY, Name of Mix: Mix #3  Grass, Weeds Prabhu Grass 1:20 w/v, HS 0.5 ml Steve Grass (Std) 100,000 BAU/mL, HS 0.4 ml Lamb's Quarters 1:20 w/v, HS 0.5 ml Nettle 1:20 w/v, HS 0.5 ml Plantain, English 1:20 w/v, HS 0.5 ml Ragweed, Mix (Giant, Short) 1:20 w/v, HS 0.5 ml Russian Thistle 1:20 w/v, HS 0.5 ml Sorrel, Sheep 1:20 w/v, HS 0.5 ml Diluent: HSA qs to 5ml, Disp: 5 mL, Rfl: PRN    SYMBICORT 160-4.5 MCG/ACT Inhaler, Inhale 2 puffs into the lungs 2 times daily Needs appointment for additional refills, Disp: 10.2 g, Rfl: 1    VENTOLIN  (90 Base) MCG/ACT inhaler, Inhale 1-2 puffs into the lungs every 4 hours as needed for shortness of breath or wheezing, Disp: 18 g, Rfl: 1  No Known Allergies    EXAM:   /58   Pulse 82   LMP 09/23/2023   SpO2 96%   Physical Exam  Vitals and nursing note reviewed.   Constitutional:       Appearance: Normal appearance.   HENT:      Head: Normocephalic and atraumatic.      Right Ear: External ear normal.      Left Ear: External ear normal.      Nose: No rhinorrhea.      Mouth/Throat:      Mouth: Mucous membranes are moist. No oral lesions.      Pharynx: Oropharynx is clear. Uvula midline. No posterior oropharyngeal erythema.   Eyes:      General: Lids are normal.      Extraocular Movements: Extraocular movements intact.       Conjunctiva/sclera: Conjunctivae normal.   Neck:      Comments: No asymmetry, masses, or scars  Cardiovascular:      Rate and Rhythm: Normal rate and regular rhythm.      Heart sounds: S1 normal and S2 normal. No murmur heard.  Pulmonary:      Effort: Pulmonary effort is normal. No respiratory distress.      Breath sounds: Normal breath sounds and air entry.   Musculoskeletal:      Comments: No musculoskeletal defects appreciated   Skin:     General: Skin is warm and dry.      Findings: No lesion or rash.   Neurological:      General: No focal deficit present.      Mental Status: She is alert.   Psychiatric:         Mood and Affect: Mood and affect normal.           WORKUP:  Cluster Immunotherapy  Cluster Allergen Immunotherapy:    After explaining risks and benefits, and obtaining verbal and written consent, we proceeded with cluster immunotherapy.     VISIT  VIAL COLOR/STRENGTH  DOSES TO BE GIVEN    1  GREEN (1:1000), BLUE (1:100)  GREEN 0.1, GREEN 0.2, GREEN 0.4, BLUE 0.1    2  BLUE (1:100), YELLOW (1:10)  BLUE 0.2, BLUE 0.4, YELLOW 0.05    3  YELLOW (1:10)  YELLOW 0.1, YELLOW 0.15, YELLOW 0.25    4  YELLOW (1:10)  YELLOW 0.35, YELLOW 0.5    5  RED (1:1)  RED 0.05, RED 0.1    6  RED (1:1)  RED 0.15, RED 0.2    7  RED (1:1)  RED 0.3, RED 0.4    8  RED (1:1)  RED 0.5        VISIT 7    Time Injection Given: 13:16  Red 1:1   Trees     0.3 mL  Red 1:1   Grass, Weeds    0.3 mL  Red 1:1   Cat, Dog, Dust Mite   0.3 mL    Time Injection Given: 13:52  Red 1:1   Trees     0.4 mL  Red 1:1   Grass, Weeds    0.4 mL  Red 1:1   Cat, Dog, Dust Mite   0.4 mL        Start Time: 13:16  End Time: 14:23      VITALS   Time BP Pulse pOx Reaction Treatment   13:47 120/77 77 96% none N/a   14:23 116/78 91 97% none N/a     ASSESSMENT/PLAN:  Reginaldo Ferraro is a 33 year old female here for cluster immunotherapy.    1. Seasonal allergic rhinitis due to pollen - Laurenstella tolerated today's procedure well without developing signs and  symptoms of an adverse reaction.    - return in 7-14 days to receive first maintenance dose injection  - continue pre-medication with fexofenadine until at monthly maintenance  - RAPID DESENSITIZATION    2. Allergic rhinitis due to animals    - RAPID DESENSITIZATION    3. Allergic rhinitis due to dust mite    - RAPID DESENSITIZATION      Follow-up in 6 months, sooner if needed      Thank you for allowing me to participate in the care of Reginaldo Ferraro.      Polo Mccain MD, FAAAAI  Allergy/Immunology  [facility]      Chart documentation done in part with Dragon Voice Recognition Software. Although reviewed after completion, some word and grammatical errors may remain.

## 2023-11-02 ENCOUNTER — OFFICE VISIT (OUTPATIENT)
Dept: FAMILY MEDICINE | Facility: CLINIC | Age: 33
End: 2023-11-02
Payer: COMMERCIAL

## 2023-11-02 VITALS
DIASTOLIC BLOOD PRESSURE: 70 MMHG | WEIGHT: 158.4 LBS | TEMPERATURE: 98.3 F | BODY MASS INDEX: 30.22 KG/M2 | OXYGEN SATURATION: 98 % | SYSTOLIC BLOOD PRESSURE: 114 MMHG | RESPIRATION RATE: 20 BRPM | HEART RATE: 79 BPM

## 2023-11-02 DIAGNOSIS — J45.40 MODERATE PERSISTENT ASTHMA WITHOUT COMPLICATION: Primary | ICD-10-CM

## 2023-11-02 PROCEDURE — 99214 OFFICE O/P EST MOD 30 MIN: CPT | Performed by: PHYSICIAN ASSISTANT

## 2023-11-02 RX ORDER — IPRATROPIUM BROMIDE AND ALBUTEROL SULFATE 2.5; .5 MG/3ML; MG/3ML
3 SOLUTION RESPIRATORY (INHALATION) EVERY 6 HOURS PRN
Qty: 360 ML | Refills: 3 | Status: SHIPPED | OUTPATIENT
Start: 2023-11-02 | End: 2024-03-04

## 2023-11-02 RX ORDER — PREDNISONE 20 MG/1
TABLET ORAL
Qty: 20 TABLET | Refills: 0 | Status: SHIPPED | OUTPATIENT
Start: 2023-11-02 | End: 2024-03-04

## 2023-11-02 RX ORDER — ALBUTEROL SULFATE 90 UG/1
1-2 AEROSOL, METERED RESPIRATORY (INHALATION) EVERY 4 HOURS PRN
Qty: 18 G | Refills: 1 | Status: SHIPPED | OUTPATIENT
Start: 2023-11-02 | End: 2024-03-04

## 2023-11-02 RX ORDER — INHALER, ASSIST DEVICES
SPACER (EA) MISCELLANEOUS
Qty: 1 EACH | Refills: 0 | Status: SHIPPED | OUTPATIENT
Start: 2023-11-02 | End: 2024-03-04

## 2023-11-02 RX ORDER — BUDESONIDE AND FORMOTEROL FUMARATE DIHYDRATE 160; 4.5 UG/1; UG/1
2 AEROSOL RESPIRATORY (INHALATION) 2 TIMES DAILY
Qty: 10.2 G | Refills: 1 | Status: SHIPPED | OUTPATIENT
Start: 2023-11-02 | End: 2024-03-04

## 2023-11-02 NOTE — PROGRESS NOTES
"  Assessment & Plan     (J45.40) Moderate persistent asthma without complication  (primary encounter diagnosis)  Comment: Patient states asthma is well controlled with Symbicort and Ventolin. A spacer was ordered to go with the two inhalers to improve the delivery of medications. Prednisone was prescribed for future asthma exacerbations that cannot be controlled with the two inhalers and DuoNeb to prevent another ER visit. Instructed patient to message via [employer] if/when the prednisone was started. Additionally, Patient was educated about the emergent symptoms that would warrant going to the ER.   Plan: SYMBICORT 160-4.5 MCG/ACT Inhaler, VENTOLIN HFA        108 (90 Base) MCG/ACT inhaler, predniSONE         (DELTASONE) 20 MG tablet, spacer (OPTICHAMBER         JIMI) holding chamber    Patient was agreeable with the plan.           MED REC REQUIRED  Post Medication Reconciliation Status: patient was not discharged from an inpatient facility or TCU  BMI:   Estimated body mass index is 30.22 kg/m  as calculated from the following:    Height as of 10/13/23: 1.542 m (5' 0.71\").    Weight as of this encounter: 71.8 kg (158 lb 6.4 oz).           BALDO Green on 11/2/2023 at 11:23 AM  [facility]  The student NA Wilcox acted as a scribe and the encounter documented above was completely performed by myself and the documentation reflects the work I have performed today.   Cally Rucker PA-C     Judith Hair is a 33 year old, presenting for the following health issues:  ER F/U, Health Maintenance, Asthma, and Anxiety        11/2/2023     9:25 AM   Additional Questions   Roomed by memo deshpande       History of Present Illness     Asthma:  She presents for follow up of asthma.  She has some cough, no wheezing, and no shortness of breath.  She is using a relief medication 2-3 times per day. She does not miss any doses of her controller medication throughout the week. " Patient is aware of the following triggers: same as previous visit. The patient has had a visit to the Emergency Room, Urgent Care or Hospital due to asthma since the last clinic visit. She has been to the Emergency Room or Urgent Care 1 time.She has had a Hospitalization 0 times.    Mental Health Follow-up:  Patient presents to follow-up on Anxiety.    Patient's anxiety since last visit has been:  Better  The patient is not having other symptoms associated with anxiety.  Any significant life events: No and health concerns  Patient is feeling anxious or having panic attacks.  Patient has no concerns about alcohol or drug use.    She eats 2-3 servings of fruits and vegetables daily.She consumes 0 sweetened beverage(s) daily.She exercises with enough effort to increase her heart rate 30 to 60 minutes per day.  She exercises with enough effort to increase her heart rate 3 or less days per week.   She is taking medications regularly.     Well controlled after prednisone and Duo Neb. Mildly wheezy on Sunday, able to control with Albuterol. Taking Symbicort everyday. Occasionally goes without it (always does at least 1). Asthma exacterbations always happens in October to November. Noted associated symptoms of constant dizziness and headaches on a daily base when she has coughing fits.    ED/UC Followup:    Facility:  [facility]  Date of visit: 10/22/23  Reason for visit: cough and wheezing  Current Status: Duoneb and Prednisone helped but as soon is she done she is having the wheezing and coughing coming back       Review of Systems   CONSTITUTIONAL: NEGATIVE for fever and chills  RESP: NEGATIVE for significant cough or SOB baseline   ROS otherwise negative      Objective    /70 (BP Location: Left arm, Patient Position: Chair, Cuff Size: Adult Regular)   Pulse 79   Temp 98.3  F (36.8  C) (Oral)   Resp 20   Wt 71.8 kg (158 lb 6.4 oz)   LMP 10/24/2023   SpO2 98%   BMI 30.22 kg/m    Body mass  index is 30.22 kg/m .    Physical Exam   GENERAL: healthy, alert and no distress  RESP: lungs clear to auscultation - no rales, rhonchi or wheezes- slight dry cough.   CV: regular rate and rhythm, normal S1 S2, no S3 or S4, no murmur, click or rub, no peripheral edema and peripheral pulses strong

## 2023-11-07 ENCOUNTER — ALLIED HEALTH/NURSE VISIT (OUTPATIENT)
Dept: ALLERGY | Facility: CLINIC | Age: 33
End: 2023-11-07
Payer: COMMERCIAL

## 2023-11-07 DIAGNOSIS — J30.1 SEASONAL ALLERGIC RHINITIS DUE TO POLLEN: Primary | ICD-10-CM

## 2023-11-07 NOTE — PROGRESS NOTES
Patient presented for her allergy shot appointment.  Patient states she does not have her epi pen with her.  Advised patient that allergy shot's could not be administered without an epi pen.  Patient's allergy shot appointment was rescheduled.     Ileana JORGENSEN RN, BSN, PHN

## 2023-11-09 ENCOUNTER — ALLIED HEALTH/NURSE VISIT (OUTPATIENT)
Dept: ALLERGY | Facility: CLINIC | Age: 33
End: 2023-11-09
Payer: COMMERCIAL

## 2023-11-09 DIAGNOSIS — J30.81 ALLERGIC RHINITIS DUE TO ANIMALS: Primary | ICD-10-CM

## 2023-11-09 DIAGNOSIS — J30.1 SEASONAL ALLERGIC RHINITIS DUE TO POLLEN: ICD-10-CM

## 2023-11-09 DIAGNOSIS — J30.89 ALLERGIC RHINITIS DUE TO DUST MITE: ICD-10-CM

## 2023-11-09 PROCEDURE — 95117 IMMUNOTHERAPY INJECTIONS: CPT

## 2023-11-09 NOTE — PROGRESS NOTES
Reginaldo Ferraro presents to clinic today at the request of Polo Mccain MD (ordering provider) for Allergy Immunotherapy injection(s).       This service provided today was under the care of Polo Mccain MD; the supervising provider of the day; who was available if needed.      Patient presented after waiting 30 minutes with no reaction to  injections. Discharged from clinic.    Estela Medeiros RN

## 2023-11-15 ENCOUNTER — VIRTUAL VISIT (OUTPATIENT)
Dept: EDUCATION SERVICES | Facility: CLINIC | Age: 33
End: 2023-11-15
Attending: PHYSICIAN ASSISTANT
Payer: COMMERCIAL

## 2023-11-15 DIAGNOSIS — E66.3 OVERWEIGHT: ICD-10-CM

## 2023-11-15 PROCEDURE — 97802 MEDICAL NUTRITION INDIV IN: CPT | Mod: VID | Performed by: DIETITIAN, REGISTERED

## 2023-11-15 NOTE — PROGRESS NOTES
MEDICAL NUTRITION THERAPY  Visit Type:Initial assessment and intervention  Type of service:  Video Visit    Originating Location (pt. Location): Home  Distant Location (provider location): Offsite  Mode of Communication:  Video Conference via [employer]    Video Start Time:  900  Video End Time (time video stopped): 940    How would patient like to obtain AVS? [place]  SUBJECTIVE:   Reginaldo Ferraro presents today for MNT and education related to weight management.   She is accompanied by self.     PATIENT STATED GOAL(S) FOR THIS VISIT: Interested in the weight management program.   2 years ago, was really sick, steroids / sick / antibiotics and has gained about 30#.  Has been on and off steroids and this has been a struggle with prednisone related weight gain.  160#  (used to be 130#).  Talked about small realistic goals.  Stop weight gain, aim for plateau first, then begin gradual weight loss.  Daughter is pre diabetic and they are working on many healthy eating habits.  Really focusing on smarter eating habits as a family not dieting.  Including as many veggies as possible.  Struggles with variability in eating; times of high intake and low intake.  Added stress of first semester back at school.     Food notes   Liquid smoothies   Lactaid milk / almond milk (2 people are lactose intolerant)  Not much bread, just out of habit   Pastas - once every 3 weeks   Protein / wheat pasta   Rice + frozen veggies + meat (chicken, shrimp, fish)  every few days will have read meat   (pan villanueva - very light oil)  + side of black beans or salad with lemon   Now is in a more eating phase - eating breakfast, fruit smoothie (spinach, blueberries) oatmeal + almond milk, greek yogurt    Toast with scabld eggs + tomatoes / spinach  Breakfast quesidella - eggs, spinach,   Not a coffee person  (very rarely macchioato / does't like sugary things)   Lunch - homemade soup + protein + side salad or fruit   Watches portions of white rice  "(mostly makes this for her partner)   Always thirsty, always drinking water   Once a month a redbull - extremely rare   Greek yogurt mini snacks   Wheat crackers with cheese   Sweet peppers   Trail mix   White cheddar popcorn   Thin crackers + tuna   Raw tomoatoes   Struggles with hunger       EXERCISE:   Likes walking, but it can hurt   Dance class every 2 weeks (hard) / [employer] on weekends   Trying to run a mile a day  - body couldn't take it.   Talked about finding a middle ground with some movement being beneficial, but stopping before it hurts.  Notes she Has a very competative mindset and sometimes working out can be all or nothing.     SOCIO/ECONOMIC:   Lives with: family    OBJECTIVE:   Vitals: LMP 10/24/2023     Wt Readings from Last 5 Encounters:   11/02/23 71.8 kg (158 lb 6.4 oz)   10/13/23 68.7 kg (151 lb 6.4 oz)   10/03/23 63.5 kg (140 lb)   07/31/23 68.9 kg (152 lb)   06/15/23 68.9 kg (152 lb)     Estimated body mass index is 30.22 kg/m  as calculated from the following:    Height as of 10/13/23: 1.542 m (5' 0.71\").    Weight as of 11/2/23: 71.8 kg (158 lb 6.4 oz).    No results found for: \"A1C\"  Cholesterol   Date Value Ref Range Status   10/13/2023 202 (H) <200 mg/dL Final   10/15/2013 158 0 - 200 mg/dL Final     Comment:     LDL Cholesterol is the primary guide to therapy.   The NCEP recommends further evaluation of: patients with cholesterol greater   than 200 mg/dL if additional risk factors are present, cholesterol greater   than   240 mg/dL, triglycerides greater than 150 mg/dL, or HDL less than 40 mg/dL.     Triglycerides   Date Value Ref Range Status   10/13/2023 83 <150 mg/dL Final   10/15/2013 105 0 - 150 mg/dL Final     Comment:     Fasting specimen       ASSESSMENT:   VERBAL AND WRITTEN INFORMATION GIVEN TO SUPPORT:  Discussed: general nutrition guidelines, consistent meals, labeling, fat modification, exercise, dining out/special occasions, fiber, behavior modification, and portion " control.    PLAN:   PATIENT'S BEHAVIOR CHANGE GOALS:   See Patient Instructions for patient stated behavior change goals. AVS was printed and given to patient at today's appointment.    FOLLOW UP:   Referral to weight management program is recommended.    Sylvia Hester RD, LD, [facility]  Diabetes Education    Time spent in minutes: 15 MNT  Encounter: Individual

## 2023-11-15 NOTE — Clinical Note
11/15/2023         RE: Reginaldo Ferraro  [address] [place] [address]        Dear Colleague,    Thank you for referring your patient, Reginaldo Ferraro, to the [facility] DIABETES EDUCATION. Please see a copy of my visit note below.    MEDICAL NUTRITION THERAPY  Visit Type:Initial assessment and intervention  Type of service:  Video Visit    Originating Location (pt. Location): Home  Distant Location (provider location): Offsite  Mode of Communication:  Video Conference via [employer]    Video Start Time:  900  Video End Time (time video stopped): 940    How would patient like to obtain AVS? MyChart  SUBJECTIVE:   Reginaldo Ferraro presents today for MNT and education related to weight management.   She is accompanied by self.     PATIENT STATED GOAL(S) FOR THIS VISIT: Interested in the weight management program.   2 years ago, was really sick, steroids / sick / antibiotics and has gained about 30#.  Has been on and off steroids and this has been a struggle with prednisone related weight gain.  160#  (used to be 130#).  Talked about small realistic goals.  Stop weight gain, aim for plateau first, then begin gradual weight loss.  Daughter is pre diabetic and they are working on many healthy eating habits.  Really focusing on smarter eating habits as a family not dieting.  Including as many veggies as possible.  Struggles with variability in eating; times of high intake and low intake.  Added stress of first semester back at school.     Food notes   Liquid smoothies   Lactaid milk / almond milk (2 people are lactose intolerant)  Not much bread, just out of habit   Pastas - once every 3 weeks   Protein / wheat pasta   Rice + frozen veggies + meat (chicken, shrimp, fish)  every few days will have read meat   (pan villanueva - very light oil)  + side of black beans or salad with lemon   Now is in a more eating phase - eating breakfast, fruit smoothie (spinach, blueberries) oatmeal +  "almond milk, greek yogurt    Toast with scabld eggs + tomatoes / spinach  Breakfast quesidella - eggs, spinach,   Not a coffee person  (very rarely macchioato / does't like sugary things)   Lunch - homemade soup + protein + side salad or fruit   Watches portions of white rice (mostly makes this for her partner)   Always thirsty, always drinking water   Once a month a redbull - extremely rare   Greek yogurt mini snacks   Wheat crackers with cheese   Sweet peppers   Trail mix   White cheddar popcorn   Thin crackers + tuna   Raw tomoatoes   Struggles with hunger       EXERCISE:   Likes walking, but it can hurt   Dance class every 2 weeks (hard) / [employer] on weekends   Trying to run a mile a day  - body couldn't take it.   Talked about finding a middle ground with some movement being beneficial, but stopping before it hurts.  Notes she Has a very competative mindset and sometimes working out can be all or nothing.     SOCIO/ECONOMIC:   Lives with: family    OBJECTIVE:   Vitals: LMP 10/24/2023     Wt Readings from Last 5 Encounters:   11/02/23 71.8 kg (158 lb 6.4 oz)   10/13/23 68.7 kg (151 lb 6.4 oz)   10/03/23 63.5 kg (140 lb)   07/31/23 68.9 kg (152 lb)   06/15/23 68.9 kg (152 lb)     Estimated body mass index is 30.22 kg/m  as calculated from the following:    Height as of 10/13/23: 1.542 m (5' 0.71\").    Weight as of 11/2/23: 71.8 kg (158 lb 6.4 oz).    No results found for: \"A1C\"  Cholesterol   Date Value Ref Range Status   10/13/2023 202 (H) <200 mg/dL Final   10/15/2013 158 0 - 200 mg/dL Final     Comment:     LDL Cholesterol is the primary guide to therapy.   The NCEP recommends further evaluation of: patients with cholesterol greater   than 200 mg/dL if additional risk factors are present, cholesterol greater   than   240 mg/dL, triglycerides greater than 150 mg/dL, or HDL less than 40 mg/dL.     Triglycerides   Date Value Ref Range Status   10/13/2023 83 <150 mg/dL Final   10/15/2013 105 0 - 150 mg/dL Final     " Comment:     Fasting specimen       ASSESSMENT:   VERBAL AND WRITTEN INFORMATION GIVEN TO SUPPORT:  Discussed: general nutrition guidelines, consistent meals, labeling, fat modification, exercise, dining out/special occasions, fiber, behavior modification, and portion control.    PLAN:   PATIENT'S BEHAVIOR CHANGE GOALS:   See Patient Instructions for patient stated behavior change goals. AVS was printed and given to patient at today's appointment.    FOLLOW UP:   Referral to weight management program is recommended.    Sylvia Hester RD, LD, [facility]  Diabetes Education    Time spent in minutes: 15 MNT  Encounter: Individual

## 2023-11-30 ENCOUNTER — ALLIED HEALTH/NURSE VISIT (OUTPATIENT)
Dept: ALLERGY | Facility: CLINIC | Age: 33
End: 2023-11-30
Payer: COMMERCIAL

## 2023-11-30 DIAGNOSIS — J30.81 ALLERGIC RHINITIS DUE TO ANIMALS: Primary | ICD-10-CM

## 2023-11-30 DIAGNOSIS — J30.89 ALLERGIC RHINITIS DUE TO DUST MITE: ICD-10-CM

## 2023-11-30 DIAGNOSIS — J30.9 ALLERGIC RHINITIS: ICD-10-CM

## 2023-11-30 DIAGNOSIS — J30.1 SEASONAL ALLERGIC RHINITIS DUE TO POLLEN: ICD-10-CM

## 2023-11-30 PROCEDURE — 95117 IMMUNOTHERAPY INJECTIONS: CPT

## 2023-11-30 NOTE — PROGRESS NOTES
Reginaldo Ferraro presents to clinic today at the request of Polo Mccain MD (ordering provider) for Allergy Immunotherapy injection(s).       This service provided today was under the care of Polo Mccain MD; the supervising provider of the day; who was available if needed.      Patient presented after waiting 30 minutes with no reaction to  injections. Discharged from clinic.    Stephie JORGENSEN RN

## 2024-02-05 ENCOUNTER — TELEPHONE (OUTPATIENT)
Dept: OBGYN | Facility: CLINIC | Age: 34
End: 2024-02-05
Payer: COMMERCIAL

## 2024-02-05 NOTE — TELEPHONE ENCOUNTER
[facility] Call Center    Phone Message    May a detailed message be left on voicemail: yes     Reason for Call: Other: Patient is calling to schedule a colposcopy. Please call the patient back to schedule. Thank you.      Action Taken: Other: obgyn    Travel Screening: Not Applicable

## 2024-02-19 ENCOUNTER — OFFICE VISIT (OUTPATIENT)
Dept: OBGYN | Facility: CLINIC | Age: 34
End: 2024-02-19
Payer: COMMERCIAL

## 2024-02-19 VITALS — HEART RATE: 64 BPM | DIASTOLIC BLOOD PRESSURE: 68 MMHG | SYSTOLIC BLOOD PRESSURE: 108 MMHG | OXYGEN SATURATION: 98 %

## 2024-02-19 DIAGNOSIS — Z32.02 NEGATIVE PREGNANCY TEST: ICD-10-CM

## 2024-02-19 DIAGNOSIS — R87.810 CERVICAL HIGH RISK HPV (HUMAN PAPILLOMAVIRUS) TEST POSITIVE: Primary | ICD-10-CM

## 2024-02-19 LAB
HCG UR QL: NEGATIVE
INTERNAL QC OK POCT: NORMAL
POCT KIT EXPIRATION DATE: NORMAL
POCT KIT LOT NUMBER: NORMAL

## 2024-02-19 PROCEDURE — 88305 TISSUE EXAM BY PATHOLOGIST: CPT | Performed by: STUDENT IN AN ORGANIZED HEALTH CARE EDUCATION/TRAINING PROGRAM

## 2024-02-19 PROCEDURE — 81025 URINE PREGNANCY TEST: CPT | Performed by: OBSTETRICS & GYNECOLOGY

## 2024-02-19 PROCEDURE — 57456 ENDOCERV CURETTAGE W/SCOPE: CPT | Performed by: OBSTETRICS & GYNECOLOGY

## 2024-02-19 NOTE — PATIENT INSTRUCTIONS
If you have labs or imaging done, the results will automatically release in [facility] without an interpretation.  Your health care professional will review those results and send an interpretation with recommendations as soon as possible, but this may be 1-3 business days.    If you have any questions regarding your visit, please contact your care team.     [employer] [facility]: [phone]  [facility] CLINIC HOURS TELEPHONE NUMBER   DO. Desirae Antonio -Surgery Scheduler  Dina -     DANAE Damon RN Kylie, RN [place]    Monday 8:30 am-5:00 pm  Wednesday 8:30 am-5:00 pm  Friday 8:30 am-5:00 pm    Typical Surgery day: Tuesday [facility]  [address] [place] [address]  Phone:  [phone]  Fax: [phone]   Appointment [phone]    Imaging Scheduling-All Locations [phone]    [facility]  [address] [place] [address] [place] [address] [phone]   **Surgeries** Our Surgery Schedulers will contact you to schedule. If you do not receive a call within 3 business days, please call [phone].  Urgent Care locations:  [facility] Monday-Friday   10 am - 8 pm  Saturday and    9 am - 5 pm [phone]   If you need a medication refill, please contact your pharmacy. Please allow 3 business days for your refill to be completed.  As always, Thank you for trusting us with your healthcare needs!  see additional instructions from your care team below

## 2024-02-19 NOTE — PROGRESS NOTES
"This 32 y/o female, , LMP 2/15/2024, presents for colposcopy examination due to an abnormal DNA marker test result on 10/13/2023 which was + for \"other\" high risk HPV subtypes.  Her pap result on that same date was normal.  However, her pap demonstrated LSIL changes on 2021 but her subsequent ECC result was negative on 2021.  Both her pap and DNA marker test results were normal on 2022.  Informed consent was reviewed and obtained for colposcopy examination today and her UPT result was negative.  She denies any recent risk of pregnancy exposure and states that she has been abstinent.  She denies any hx of smoking or vaping nicotine products.  She feels that her diet and exercise routines are healthy but she only gets 4-5 hours of sleep in per night due to work, school, and personal life.  Therefore, we discussed the need to try for 8 hours per night.  /68 (BP Location: Left arm, Patient Position: Chair, Cuff Size: Adult Regular)   Pulse 64   LMP 02/15/2024   SpO2 98%   Breastfeeding No   ROS:  10 systems were reviewed and the positives were listed under problems.  In the lithotomy position, a bi-valve speculum was placed and her cervix was visualized in its entirety.  The 3% acetic acid was applied and then removed after several minutes.  The SCJ was noted but there were no areas of acetowhitening.  Different levels of magnification were used under the white light.  The green filter was then used but there were no vascular areas noted.  An ECC was obtained and submitted.  She had difficulty tolerating the whole procedure and the speculum was removed.  EBL was 1 mL and there were no complications.  Assessment - Colposcopy with biopsy due to \"other\" high-risk HPV subtypes per DNA marker test  Plan - Submit the one tissue sample (ECC) to pathology.  Follow up care and instructions were reviewed with the patient and all her questions and concerns were addressed.  If today's pathology report " is normal, then the plan is to repeat the co-test in one year.  20 minutes were spent today in chart review, the patient visit, review of tests, and documentation in regard to the issues noted above.  This did NOT include the time spent in the procedure (colposcopy with biopsy).

## 2024-02-26 ASSESSMENT — SLEEP AND FATIGUE QUESTIONNAIRES
HOW LIKELY ARE YOU TO NOD OFF OR FALL ASLEEP IN A CAR, WHILE STOPPED FOR A FEW MINUTES IN TRAFFIC: SLIGHT CHANCE OF DOZING
HOW LIKELY ARE YOU TO NOD OFF OR FALL ASLEEP WHEN YOU ARE A PASSENGER IN A CAR FOR AN HOUR WITHOUT A BREAK: HIGH CHANCE OF DOZING
HOW LIKELY ARE YOU TO NOD OFF OR FALL ASLEEP WHILE SITTING INACTIVE IN A PUBLIC PLACE: MODERATE CHANCE OF DOZING
HOW LIKELY ARE YOU TO NOD OFF OR FALL ASLEEP WHILE SITTING AND READING: HIGH CHANCE OF DOZING
HOW LIKELY ARE YOU TO NOD OFF OR FALL ASLEEP WHILE LYING DOWN TO REST IN THE AFTERNOON WHEN CIRCUMSTANCES PERMIT: HIGH CHANCE OF DOZING
HOW LIKELY ARE YOU TO NOD OFF OR FALL ASLEEP WHILE SITTING AND TALKING TO SOMEONE: WOULD NEVER DOZE
HOW LIKELY ARE YOU TO NOD OFF OR FALL ASLEEP WHILE SITTING QUIETLY AFTER LUNCH WITHOUT ALCOHOL: SLIGHT CHANCE OF DOZING
HOW LIKELY ARE YOU TO NOD OFF OR FALL ASLEEP WHILE WATCHING TV: MODERATE CHANCE OF DOZING

## 2024-02-29 PROBLEM — R63.5 WEIGHT GAIN DUE TO MEDICATION: Status: ACTIVE | Noted: 2021-02-26

## 2024-02-29 PROBLEM — T50.905A WEIGHT GAIN DUE TO MEDICATION: Status: ACTIVE | Noted: 2021-02-26

## 2024-03-04 ENCOUNTER — LAB (OUTPATIENT)
Dept: LAB | Facility: CLINIC | Age: 34
End: 2024-03-04
Payer: COMMERCIAL

## 2024-03-04 ENCOUNTER — OFFICE VISIT (OUTPATIENT)
Dept: SURGERY | Facility: CLINIC | Age: 34
End: 2024-03-04
Payer: COMMERCIAL

## 2024-03-04 VITALS
SYSTOLIC BLOOD PRESSURE: 118 MMHG | WEIGHT: 159 LBS | HEIGHT: 61 IN | DIASTOLIC BLOOD PRESSURE: 66 MMHG | BODY MASS INDEX: 30.02 KG/M2

## 2024-03-04 DIAGNOSIS — R73.03 PREDIABETES: ICD-10-CM

## 2024-03-04 DIAGNOSIS — E66.1 CLASS 1 DRUG-INDUCED OBESITY WITHOUT SERIOUS COMORBIDITY WITH BODY MASS INDEX (BMI) OF 30.0 TO 30.9 IN ADULT: ICD-10-CM

## 2024-03-04 DIAGNOSIS — R79.89 LOW VITAMIN D LEVEL: ICD-10-CM

## 2024-03-04 DIAGNOSIS — E66.811 CLASS 1 DRUG-INDUCED OBESITY WITHOUT SERIOUS COMORBIDITY WITH BODY MASS INDEX (BMI) OF 30.0 TO 30.9 IN ADULT: ICD-10-CM

## 2024-03-04 DIAGNOSIS — E66.811 CLASS 1 DRUG-INDUCED OBESITY WITHOUT SERIOUS COMORBIDITY WITH BODY MASS INDEX (BMI) OF 30.0 TO 30.9 IN ADULT: Primary | ICD-10-CM

## 2024-03-04 DIAGNOSIS — E66.1 CLASS 1 DRUG-INDUCED OBESITY WITHOUT SERIOUS COMORBIDITY WITH BODY MASS INDEX (BMI) OF 30.0 TO 30.9 IN ADULT: Primary | ICD-10-CM

## 2024-03-04 LAB — HBA1C MFR BLD: 5.9 % (ref 0–5.6)

## 2024-03-04 PROCEDURE — 99205 OFFICE O/P NEW HI 60 MIN: CPT | Performed by: EMERGENCY MEDICINE

## 2024-03-04 PROCEDURE — 36415 COLL VENOUS BLD VENIPUNCTURE: CPT

## 2024-03-04 PROCEDURE — 82306 VITAMIN D 25 HYDROXY: CPT

## 2024-03-04 PROCEDURE — 82607 VITAMIN B-12: CPT

## 2024-03-04 PROCEDURE — 83036 HEMOGLOBIN GLYCOSYLATED A1C: CPT

## 2024-03-04 PROCEDURE — 80053 COMPREHEN METABOLIC PANEL: CPT

## 2024-03-04 RX ORDER — FLUTICASONE FUROATE AND VILANTEROL 100; 25 UG/1; UG/1
1 POWDER RESPIRATORY (INHALATION) DAILY
COMMUNITY
End: 2024-04-09

## 2024-03-04 ASSESSMENT — SLEEP AND FATIGUE QUESTIONNAIRES
HOW LIKELY ARE YOU TO NOD OFF OR FALL ASLEEP WHILE SITTING QUIETLY AFTER LUNCH WITHOUT ALCOHOL: SLIGHT CHANCE OF DOZING
HOW LIKELY ARE YOU TO NOD OFF OR FALL ASLEEP WHILE SITTING AND TALKING TO SOMEONE: WOULD NEVER DOZE
HOW LIKELY ARE YOU TO NOD OFF OR FALL ASLEEP IN A CAR, WHILE STOPPED FOR A FEW MINUTES IN TRAFFIC: SLIGHT CHANCE OF DOZING
HOW LIKELY ARE YOU TO NOD OFF OR FALL ASLEEP WHEN YOU ARE A PASSENGER IN A CAR FOR AN HOUR WITHOUT A BREAK: HIGH CHANCE OF DOZING
HOW LIKELY ARE YOU TO NOD OFF OR FALL ASLEEP WHILE WATCHING TV: MODERATE CHANCE OF DOZING
HOW LIKELY ARE YOU TO NOD OFF OR FALL ASLEEP WHILE LYING DOWN TO REST IN THE AFTERNOON WHEN CIRCUMSTANCES PERMIT: HIGH CHANCE OF DOZING
HOW LIKELY ARE YOU TO NOD OFF OR FALL ASLEEP WHILE SITTING AND READING: HIGH CHANCE OF DOZING
HOW LIKELY ARE YOU TO NOD OFF OR FALL ASLEEP WHILE SITTING INACTIVE IN A PUBLIC PLACE: MODERATE CHANCE OF DOZING
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
## 2024-03-04 NOTE — PATIENT INSTRUCTIONS
"Plan:  Welcome to your weight loss season. To start, due to high amounts of exercise, we'll aim for 1600-1700kcal/day with 60-75 grams of lean protein daily for now and 64-75 oz of water daily to help waste elimination and fat burning physiology.    2. To curb appetite, we'll see if Zepbound is covered, plan on staying on low dose for 2 months at 2.5mg/week dosing, then increase if tolerated to 5mg/week and we'll stay at that dose until weight loss season is done. Stop if rash/throat swelling, need for surgery, severe abdominal pain/vomiting or intractable constipation. Continue omeprazole use as heart burn/reflux may be more common with these medications.    3. Avoid eating after 7pm ideally.     4. Follow up with dietician.    5. Check labs today. I'll message next week once tests are back.    6. Use a food tracker/measure protein portions and track water intake. You may have a tendency currently to under nourish in the first half of your day.     What makes a person succeed with dramatic and sustained weight loss?    It's being at the right point in your life where you feel the need to lose the weight, not because anyone told you so but because of a voice inside of you that says, \"I am ready for this\".  You're now at a point where you may be feeling anxious, irritable and when you look in the mirror you do not recognize the person looking back.  Your healthy self is buried somewhere in that reflexion and you want to free it again.  This is the sort of motivation that leads to success, and it comes from you.    Because the only person that can lose that extra weight is you, I like my patients to focus on the mindset of being in Weight Loss Season.  This gives you permission to make the changes necessary to be consistent with the diet/activity and behavior changes that lead to successful, healthy weight loss.  Nearly any diet plan can work for weight loss, but keeping it healthy and nutrient based to prevent " "deficiencies/hair loss/fatigue or irritability is vital.  If you have a plan you want to try, we'll work with you to make sure no adjustments are needed to keep you healthy through your weight loss season and working with our Bariatric Dieticians you'll be given expert guidance to customize your diet plan to suit your particular needs. If you don't have your own ideas in mind, we are always happy to suggest well researched and validated plans that provide enough food to prevent hunger but still tap into your excess fat reserves and lose weight in a sustainable fashion.  There is great evidence that lean protein/healthy fat intake with good fiber intake while minimizing simple starches/carbs produces reliable/sustainable weight loss in most people. But some feel more connected to an intermittent fasting/fast mimicking or ketogenic diet.  These protocols can be hard for many to stick with and that's why we prefer the protein/healthy fat focused diets but if these alternative strategies appeal to you, we can work with you to optimize your knowledge and results with these tools.    Losing weight is a temporary commitment, but you need to be \"All In\" to have a good weight loss season.  To avoid frustration, you have to be willing to be on track 19 out of 20 days or even better than that. But, weight loss season is generally only 4-8 months in length. After that length of time, it can be hard to maintain a negative calorie balance and our brain, motivations and metabolism will usually bring you to a plateau that cannot be broken in this modern world where other commitments start to take priority. That's when we look to stabilize the weight loss you've achieved.  If you've reached your goal by that time, fantastic, and job well done.  If there is more to go, then after a few months of stabilization, we can usually attack that previous plateau and break into new territory.    Because of this time limitation, we want to " "really get to work right away and get into a sustainable routine ASAP.  One of the best predictors of how much weight you're going to lose throughout the season is how much you lose in the first 6 weeks, so prepare well and jump in with both feet.      Occasionally, people may feel like they cannot commit fully to the changes necessary and may want to change one thing at a time and \"get used to\" the idea of losing weight.  That is OK because that is where they are in their life, and they cannot fully commit for any number of reasons.  It's part of that internal motivation and they just haven't reached PRIORTY NUMBER ONE status yet. It's possible that what they need is more time to reach that point and I am always willing to work with people that want to \"dabble\", but understand, the amount of success obtained with half measures, is much less than half results. Behavior Change cannot occur until we prepare our minds, bodies and environment for what is to come, action!    As you go through your plan, look for things to keep your motivation rolling.  The most successful people have a goal or target/reward that they are working towards.  Having a reward that celebrates your new fitness, mobility and energy is the best sort because it will encourage you to do well with the weight maintenance phase and long term lifestyle changes that promotes keeping the weight off for the long term.  Usually, \"getting healthier\" or improving blood tests or losing weight so your clothes fit better is not as internally motivating as having a tangible reward.  A good weight loss season reward is one that isn't food based, is affordable, but something special:  Something you won't be getting/doing unless you achieve your goal.   It s important to keep to the rule of success:  in order to get the reward, your goal MUST be achieved. Write this reward down, where you can look at it daily and keep it in the front of your mind as you go through " your weight loss season and it will help keep you on track.    Tools that help change behavior are vital for success. The most studied and most supported tool for weight loss is nothing more than writing down your food and weight every day.  Every Day.  Accurately and completely.  When you commit to weight loss season, this information tells you whether you're getting ENOUGH food to fuel your weight loss properly as well as teaches you the interaction between different foods you eat and how your body responds with weight loss.  You'll see that sometimes after a heavy workout you don't see the scale move until 2-3 days later.  How saltier meals (chili for example) may make you retain water for 4-5 days before you see the weight come off, you'll get used to the mini-plateaus that develop after a good 3-8 lb drop in weight as well as how you break through if you keep working the diet as you should.    Weight loss is not a linear process, there are mini ups and downs.  Learning how your body loses weight and getting comfortable with that is very rewarding. The act of writing words on paper also solidifies your will power and commitment to the season of weight loss and that by itself changes your brain chemistry/appetite, motivation and prepares you for maximal success.     Behavior change is all about getting into a new routine.  The old habits and routine have to change because without changing the circumstances of how you gained your weight, it's unlikely you'll enjoy satisfying results. If you have snacking habits, like every time you walk through the kitchen you grab a little something, well, that habit has to change and be replaced by a new habit.  It can be something as simple as keeping a doodle pad on the counter that you make a few scribbles and then walk through the kitchen having not opened the cupboard, or starting with a glass of water and leaving the kitchen without anything else, or checking your food  "journal to see how many calories you have left for the day.  Boredom is the enemy as are the old habits. Break new ground and try to push those old habits into a deep hole.  There are apps/counseling options available that can help with some of the day to day urges/behaviors if you're struggling. One commercial product that does a good job is Noom.  Unfortunately, there is a subscription fee.    Finally, exercise always helps.  While not mandatory to lose weight, every little bit helps and exercise has so many other benefits that to not work it into your plan is to miss out on all the mood, sleep, stress and general health benefits that come from making yourself a little short of breath and sweaty at least 3-4 days out of the week.  The metabolism and calorie burn benefits aside, almost every chronic ailment in medicine gets better with proper, aerobic exercise.  Allow yourself to start slow and let your body prepare itself to accept harder training 4-6 weeks down the road, but start now and commit to a plan.  Whether you have the means to hire a , join a gym or just walk out your front door or go down to your basement for a video workout, get into a exercise routine and  after 3 weeks of at least 3 times a week exercise you should be at a point where you can slowly start ramping up 10% each week to our goal of at least 150-300minutes weekly of aerobic exercise and at least twice weekly resistance training/strenghtening with weights/bands or body weight exercises.     I am a big fan of modifying the free training plan, \"Couch to 5k\", for almost all of my patients. Just type it into [employer] or look it up on your smart phone natalya store.  To modify the plan,  you can use the training plan for whatever aerobic activity you do (bike/treadmill/elliptical/rower/pool/etc). During the \"jog\" intervals, you just move a little faster or harder or increase the tension or incline.  You use those little intervals to switch up " "the workout and recruit more muscles and pump the blood a little more and then recover again in the \"walk\" intervals by slowing down, decreasing the incline or turning down the tension.  3-4 days a week is not that much to ask and the benefits are enormous.  Start slow and develop the base from which you can then build on and reduce the risk of injury.  It's much more important 2-4 months from now to be enjoying your exercise then it is to over exert yourself at the start and hurt yourself.  Starting slowly allows your body to accept the training better down the road when the exercise becomes crucial for weight maintenance.  Without exercise down the road during your maintenance phase, all this hard work you are about to put in can be undone. It usually takes about 100-300 calories a day of exercise to maintain a weight loss and our focus during weight loss season is to generate the routine/activities and hobbies that make that enjoyable/sustainable.    Thanks for taking this first and most important step in your weight loss season.  Commit to it and we will cheerlead you all the way to success.  When things get tough or off track we'll offer guidance and analysis and when you reach your goal we'll celebrate your success.  In the end, it is all about your success, your health and what you do with it.      Bertram Gamboa MD  [facility]  [phone]        LEAN PROTEIN SOURCES  Getting 20-30 grams of protein, 3 meals daily, is appropriate for most people, some need more but more than about 40 grams per meal is not useful.  General rule is drinking one ounce of water per gram of protein eaten over the course of the day:  70 grams of protein each day, drink 70 oz of water.  Protein Source Portion Calories Grams of Protein                           Nonfat, plain Greek yogurt    (10 grams sugar or less) 3/4 cup (6 oz)  12-17   Light Yogurt (10 grams sugar or less) 3/4 cup (6 oz) "  6-8   Protein Shake 1 shake 110-180 15-30   Skim/1% Milk or lactose-free milk 1 cup ( 8 oz)  8   Plain or light, flavored soymilk 1 cup  7-8   Plain or light, hemp milk 1 cup 110 6   Fat Free or 1% Cottage Cheese 1/2 cup 90 15   Part skim ricotta cheese 1/2 cup 100 14   Part skim or reduced fat cheese slices 1 ounce 65-80 8     Mozzarella String Cheese 1 80 8   Canned tuna, chicken, crab or salmon  (canned in water)  1/2 cup 100 15-20   White fish (broiled, grilled, baked) 3 ounces 100 21   [place]/Tuna (broiled, grilled, baked) 3 ounces 150-180 21   Shrimp, Scallops, Lobster, Crab 3 ounces 100 21   Pork loin, Pork Tenderloin 3 ounces 150 21   Boneless, skinless chicken /turkey breast                          (broiled, grilled, baked) 3 ounces 120 21   [place], [place], [place], and Venison 3 ounces 120 21   Lean cuts of red meat and pork (sirloin,   round, tenderloin, flank, ground 93%-96%) 3 ounces 170 21   Lean or Extra Lean Ground Turkey 1/2 cup 150 20   90-95% Lean [place] Burger 1 antwan 140-180 21   Low-fat casserole with lean meat 3/4 cup 200 17   Luncheon Meats                                                        (turkey, lean ham, roast beef, chicken) 3 ounces 100 21   Egg (boiled, poached, scrambled) 1 Egg 60 7   Egg Substitute 1/2 cup 70 10   Nuts (limit to 1 serving per day)  3 Tbsp. 150 7   Nut [place] (peanut, almond)  Limit to 1 serving or less daily 1 Tbsp. 90 4   Soy Burger (varies) 1  15   Garbanzo, Black, Tobar Beans 1/2 cup 110 7   Refried Beans 1/2 cup 100 7   Kidney and Lima beans 1/2 cup 110 7   Tempeh 3 oz 175 18   Vegan crumbles 1/2 cup 100 14   Tofu 1/2 cup 110 14   Chili (beans and extra lean beef or turkey) 1 cup 200 23   Lentil Stew/Soup 1 cup 150 12   Black Bean Soup 1 cup 175 12           On-the-Go Breakfast Ideas  As of 2015, the latest research shows what a huge impact eating breakfast has on losing weight and feeling your best. People lose more weight when they  make breakfast their biggest meal of the day compared to Dinner, but even if you cannot go to that degree, getting a breakfast that has at least 20 grams of protein and even a moderate amount of fat is ideal for maintaining good energy through the day and limits overeating in the evening hours.  The following are some quick and easy suggestions for at least getting something of substance into your body in the morning.  Enjoy!    Eating breakfast within 90 minutes of waking up is an important part of taking care of your body on a restricted calorie diet plan.  After sleeping for hours, your body is in need of fuel.  An ideal breakfast is a combination of protein, whole-grain carbohydrates, or fruit.  Here s why:    -Protein digests very slowly in the body, helping you feel more satisfied.  -Whole grains provide dietary fiber, which also digests slowly and helps keep your gut clean.  -Fruit is a great source of vitamins, minerals, and fiber.     Each one of these breakfast combinations has between 200-300 calories and 15-20 grams of protein.  Feel free to mix and match!    Bone Broth (chicken bone broth or beef bone broth) is a great way to boost protein content. 8oz of bone broth will typically have 9-12grams of protein for 40kcal of energy.    Protein: Choose  -1/2 cup low-fat cottage cheese  -2 hard boiled eggs , or one cooked in olive oil (low/slow heat).  -1 low fat string cheese stick  -1 Tablespoon natural peanut butter  -Househappy vegetarian sausage antwan (found in freezer section)  -1 slice lowfat cheese  -6 oz 2% or lowfat Greek yogurt, such as Fage or Oikos.    PLUS    Whole Grains:  Choose   -1 whole wheat English muffin  -1 whole wheat karis, half  -1/2 Fiber One frozen muffin, thawed  -1/2 Fiber One toaster pastry  -1 whole wheat bagel thin  -1/2 cup Kashi cereal  -1 Kashi waffle (or other whole grain high-fiber waffle)  Aim for whole grain/sprouted breads with at least 3g of fiber/slice if having  bread. [place] is one such brand.    OR    Fruit: Choose  -1/3 cup blueberries  -1/2 banana (or a plantain- similar to a banana, yet smaller)  -1/2 cup cantaloupe cubes  -1 small apple  -1 small orange  -1/2 cup strawberries  -handful raspberries/blackberries (each berry is about 1 calorie).    *Adapted from Diabetes Living, Fall 20    Ten Breakfasts Under 250 calories    Ideally, getting between 350-600 calories  (depending on starting height and weight)for breakfast is ideal for avoiding hunger later in the day, adjust/add to the following accordingly:    One- 250 calories, 8.5 g protein  1 slice whole-grain toast   1 Tbsp peanut butter    banana    Two- 250 calories, 8 g protein    cup nonfat/lowfat yogurt  1/3rd cup diced no-sugar peaches  1/3rd cup cereal (like Special K, Cheerios, or bran flakes)    Three- 250 calories, 25 g protein  1 egg scrambled with 1 oz skim milk    cup shredded cheddar    whole grain English muffin  1 oz [place] sommer  1 tsp margarine spread    Four- 225 calories, 25 g protein  1/2 cup Kashi Go-Lean cereal    cup skim milk mixed with 1 scoop Bariatric Advantage protein powder    cup no-sugar diced pears    Five- 250 calories, 20 g protein    cup oatmeal prepared with skim milk, 1 scoop protein powder, and sugar-free maple syrup    Six- 200 calories, 5 g protein  1 whole grain waffle, toasted  1 tablespoon creamy peanut or almond butter    Seven-  250 calories, 19 g protein  Breakfast sandwich: 1 slice whole grain toast, cut in half.  Add 1 scrambled egg and one slice cheddar  cheese.    Eight-  250 calories, 15 g protein  2 eggs scrambled with 1/3 cup frozen spinach (heat before adding to eggs) and 2 tablespoons low fat cream cheese.    Nine-  150 calories, 15 g protein  2/3rd cup cottage cheese    cup cantaloupe    Ten- 200 calories, 20 g protein  Fruit smoothie made with 4 oz. nonfat Greek yogurt,   cup berries, 1 scoop protein powder, and 4 oz skim milk.    Ten Lunches Under  250 Calories    Aim for lunch to be around 300-400 calories a day when trying to lose weight and get that protein in!    One- 200 calories, 11 g protein  1/3 cup tuna salad made with light conde on 1 slice whole grain bread  1 small peeled apple    Two- 250 calories, 16 g protein  1/3 cup lowfat cottage cheese    cup cooked green beans    small fruit cocktail (in natural juice)    Three- 200 calories, 11 g protein    grilled cheese sandwich on whole grain bread with lowfat cheese  2/3rd cup of tomato soup    Four- 250 calories, 22 g protein  Deli wrap: 1 oz sliced turkey, 1 oz sliced ham, 1 oz sliced chicken rolled up with 1 slice low-fat cheese  1 small orange    Five- 250 calories, 28 g protein  2/3rd cup chili with 1 oz shredded cheese  4 saltine crackers    Six- 250 calories, 22 g protein  1 cup fresh spinach with 2 oz chicken, 1/3rd cup mandarin oranges, and 2 tablespoons sliced almonds with 1 tablespoon  vinaigrette dressing    Seven- 200 calories, 11 g protein  1 Tbsp sugar-free preserves and 1 Tbsp peanut butter on 1 slice whole grain toast    cup nonfat/lowfat Greek yogurt    Eight- 250 calories, 18 g protein  1 small soft-shell chicken taco with 1 oz shredded cheese, lettuce, tomato, salsa, and 1 Tbsp light sour cream    cup black beans    Nine- 225 calories, 13 g protein  2 ounces baked chicken  1/4 cup mashed potatoes    cup green beans    Ten- 200 calories, 21 g protein  Deli karis: 2 oz roast beef or other deli meat with 1 tsp Narciso mayonnaise and sliced tomato, onion, and lettuce  1/3rd cup cottage cheese      Ten Dinners Under 300 calories    If you're eating a large breakfast and medium lunch, keep dinner small.  300-400 calories is ideal for most people depending on their caloric needs.    One- 300 calories, 12 g protein  1-inch thick slice of turkey meatloaf    cup baked butternut squash    Two- 200 calories, 9 g protein  Bread-less BLT: 3 slices turkey sommer, sliced tomato, wrapped in a large  lettuce leaf    cup peeled fruit    Three- 275 calories, 36 g protein  3 oz roasted chicken    cup cooked broccoli    cup shredded cheddar cheese    cup unsweetened applesauce    Four- 200 calories, 25 g protein  3 oz baked tilapia  1/3rd cup cooked carrots    cup yogurt    Five- 250 calories, 20 g protein  Grilled ham  n  Swiss: spread 2 tsp ghee or butter on 1 slice of whole grain bread.  Cut bread in half, layer 2 oz deli ham with 1 piece of Swiss cheese and grill until cheese is melted.    cup cooked vegetables    Six- 250 calories, 18 g protein  Vegetarian cheeseburger: 1 [place] cheeseburger topped with lettuce, onion, tomato, and ketchup/mustard    cup sweet potato fries    Seven- 250 calories, 18 g protein  Pork pot roast: 2 oz roasted pork loin, 1/3rd cup roasted carrots,   medium potato, cooked with   cup gravy    Eight- 330 calories, 25 g protein  2 oz meatballs (about 2 small meatballs)    cup spaghetti sauce  1/2 piece toast topped with 1 tsp ghee or butterand topped with garlic powder, toasted in oven    Nine- 250 calories, 16 g protein  Mexican pizza: one 8  corn tortilla topped with 2 oz chicken,   cup salsa, 2 tablespoons black beans, 2 tablespoons shredded cheese.  Bake until cheese is melted.    Ten- 250 calories, 22 g protein  Shrimp stir-villanueva: 3 oz cooked shrimp, 1/6th onion,   pepper,   cup chopped carrots sautéed in 1 tablespoon olive oil, topped with 2 tablespoons stir villanueva sauce and a pinch of sesame seeds        150 Calories or Less Snack Ideas   1 hardboiled egg with   cup berries  1 small apple with 1 hardboiled egg  10 almonds with   cup berries  2 clementines with 1 light string cheese  1 light string cheese with   sliced apple  1 light string cheese wrapped in 2 slices of turkey  4 100% whole wheat crackers (e.g. Triscuit) with 1 light string cheese    c. cottage cheese with   cup fruit and 1 Tbsp sunflower seeds     cup cottage cheese with   of an avocado     can tuna fish with 1 cup  sliced cucumbers     cup roasted garbanzo beans with paprika and cayenne pepper    baked sweet potato with   cup chili beans or   cup cottage cheese  2 oz. nitrate free turkey slices with 1 cup carrots  1 container (6 oz) of low sugar (less than 10 grams of sugar) greek yogurt   3 Tablespoons of hummus with 1 cup sliced bell peppers   2 Tablespoons of hummus with 15 baby carrots  4 Tablespoons ranch dip made with plain Greek Yogurt and 3 mini cucumbers  1/4 cup nuts (any kind)  1 Tablespoon peanut butter with 1 stalk celery   1 dill pickle wrapped in 1-2 slices of deli ham with 1 tsp of mayonnaise/mustard.      Example Meal Plan for a 3544-1133 Calorie Diet:    In order to fuel your weight loss properly and avoid hunger-induced overeating later in the day, for your height and weight, you will enjoy the most success by following the diet below or similar with adjustments based on your particular tastes and preferences.  Exercise may influence speed, amount of weight loss further.     I recommend getting into a meal routine and keeping it similar day to day in the beginning so you don t have to think too hard about what you re going to make/eat.  Keep snacks healthy, ideally containing protein and some vegetables.  Non-processed food is preferable to packaged items.  Eat at least a few crunchy green vegetables if having a snack, which should be 2-3 hours after your mealtimes(prepare these ahead of time for ease of use).  Drink 64 oz -80 oz of water daily for most, some of you will need more and we'll discuss it at your visit if that is the case.      When changing our diet,  we can often mistake thirst for hunger or just have some distracted eating habits that we need to break free from ('bored/mindless eating', screen time,work, driving,etc).  A glass of water and reconsideration of our hunger is often all that is needed.  Having the urge is not the problem, but watching it pass by without acting on it is the  goal.    If you re having hunger problems, add a protein drink/snack to your morning hours or afternoon snack with at least 20grams of protein and not too much sugar (under 10g).  A carton of higher protein/low sugar yogurt can work as well.  If the urge to snack is overwhelming and not satiated, try going for a 10 minute walk/exercise, come home and drink a glass of water and if still hungry, have a  calorie snack (handful of raw/sprouted nuts, veggies and string cheese, protein bar, etc).  Savor it.    It is better to have a large breakfast, a moderate lunch and a smaller dinner to fuel your day.  People lose 10-15% more weight during their weight loss season with this strategy. Optimizing your protein intake at each meal will further keep you more satisfied while eating less food overall.  Getting exercise in early has also been shown to offer the best results (before breakfast ideally but anytime is the right time to exercise if that is not an option for you).    To make sure you re getting adequate vitamins and minerals during weight loss, I recommend one complete multivitamin a day of your choice.  Consider a probiotic and taking some vitamin D 2000 IU daily.    Let supper be your last meal of the day and ideally try to have at least 12 hours between supper and breakfast the next day to tap into some beneficial overnight fasting dynamics.  Midnight snacks need to go away. Water in the evening is fine, unsweetened, non caffeinated herbal tea is helpful as well.  Consolidating your meals within a 8-12 hour period of your day will help tap into these additional metabolic benefits and tends to keep your appetite up for breakfast, further helping to stay on track.  For most of my patients, I don't recommend an intermittent fasting style diet (many find it hard to fit in their lifestyle) but an overnight fast is very doable for most patients and helps regulate our hunger drives a little better.  This makes it  very important to nail good intake at all three meals to feel satisfied/energized and still lose weight.      If evening snacking desires are high, consider a glass of fiber supplement for some additional fullness (metamucil or similar). Most of us don't get the 25-30 grams per day of fiber that promotes good gut health/satiety.  Benefiber, metamucil, citrucel are reasonable/affordable options for most people.  Inulin, chicory, psyllium husk are reasonable options but start slow and low in the dose to avoid gas/bloating until your gut gets acclimated (ramping up to 5-10 grams per day of supplemental fiber after 3-4 weeks if needed).      Example Meal Plan:  Breakfast: 450-475 Calories  1 egg cooked on low in olive oil:   calories.  5oz Greek Yogurt (Fage plain classic: ~150 trudi)  Handful of Berries of your choice (about a calorie per berry or 20-40cal per handful)    cup(cooked) of  old fashioned oatmeal or 1/2 cup(cooked) steel cut oats. (150 trudi)  Sprinkle amount of brown sugar and a pat of butter. (40 trudi)  Glass of  Water  Black coffee or unsweetened Tea (0calories).      2-3 hours Later Snack: (195 calories).  Glass of water  One string Cheese (80 calories) or 4 oz creamed cottage cheese (115 calories) with  Crunchy Celery sticks (less than 10 calories per large stalk) 2 stalks. (20 calories)    of a  Large Banana or   of a Large Apple (60 calories):  eat second half at lunch or afternoon snack.     Lunch:300 -350 calories   Chicken Breast  (baked/broiled/roasted/grilled)  4-6 oz.  (125-180 trudi), BBQ sauce/hot sauce/mustard/seasoning is free. Just use a reasonable amount. Or a can of tuna with 1 tablespoon mayonnaise.  Salad: lettuce, any other veggies (cucumbers, green peppers/celery you like and a small drizzle of dressing to just flavor.  Go as big on the veggies as you like,  as they are practically calorie free.   A whole, 8 inch cucumber is 45 calories, a whole green pepper is 23 calories, a stalk  of celery is 9 calories.  [place] Dressing is 60 calories per tablespoon..so moderate your desired dressing or do a drizzle of olive oil and splash of balsamic vinegar on top,  Total calories unlikely to be over 150 even with dressing.  Glass of Water.    Option for lunch is meal replacement protein drink/smoothie.  Need at least 20 grams of protein and eat the rest of your apple/banana from the morning snack.      Afternoon Snack: 150-200 calories   Cheese Stick or cottage cheese again  and a fresh fruit OR  Granola Bar (protein Bar acceptable if under 200 calories OR  Homemade smoothies:  8oz skim milk,  a handful of berries (fresh or frozen and a serving of protein powder such as BiPro or Mirela sWhey for example.  If you don't like dairy, make with 8oz water, one small banana, handful of berries and the protein powder, add any veggies you want as well:  roughly 200 calories.   Glass of Water    Dinner: 325 calories  4oz of fresh, Atlantic salmon.  Broiled (salt/pepper/dill) for about 8-8.5 minutes (200calories) or  4oz filet mignon steak or sirloin steak  Salad or vegetable sautéed lightly in olive oil or   Broccoli 1.5  cups chopped and steamed  or micro-waved in a little water (75 calories)  Glass of Water,    Cup of herbal tea (unsweetened, caffeine free)      Herbs and seasonings are encouraged to flavor your foods/vegetables.  Make your food delicious.      Tips for Success:  1.  Prepare proteins ahead of time (broil chicken breasts in bulk so you can grab and go), steel cut oats/lentils can be stored in casserole dish/bowl in the fridge for quick scoop in the morning and rewarm in microwave, make use of crock pot recipes (watch salt content).  Making meals that cover 3-4 future meals is an easy way to stay on track.  2.  Drink a 8-12 oz glass of water every 2-3 hours when awake.  We often mistake hunger for thirst, especially when losing weight.  3. Remember your Reward and Motivation when things get  "hard.  4.  Weigh yourself every morning and record, you'll stay on track better and learn how our biorhythms, diet and elimination patterns show up on the scale. Don't worry about 1 or 2 day patterns, but when on track you'll notice good trend downward of weight over 3-4 day segments.  Plateaus tend to resolve after 4-8 days in most cases if you stay consistent with your plan.  These are natural and part of weight loss, even if you're perfect with your plan execution.  5. Call if problems/concerns.  [facility] is a great tool to stay in touch and provide weekly outside accountability. Check in with questions or if you want to brag.  6.  Find a handful of meals/foods that keep you on track and feeling good and get into a routine that is sustainable for you.  It's OK to have a routine that works for you.  7.  Consider taking a complete multivitamin just to make sure all micronutrients are adequate during weight loss.  8. If losing hair/brittle nails it usually means you are not taking enough protein.  Minimum goal is 60 grams daily of protein for smaller women, 80 grams a day for men. Consider taking Biotin as supplement or a \"Hair and Nail\" multivitamin.      MEDICATIONS FOR WEIGHT LOSS  There are several medications available to assist us in weight loss.  By themselves, without a mindful change in diet and increase in movement/activity these medications are disappointing in their results. However, combined with a closely monitored program of diet change and exercise they can be very effective in controlling appetite and boosting initial weight loss.  All weight loss medications need continual re-evaluation for efficacy as their side effects and health benefits fail to be worthwhile if a person is not continuing to lose weight or in maintaining their healthy weight.  Some weight loss medications are scheduled drugs, meaning there is at least a theoretical possibility for developing addiction to them, but in practice " this is rare.  We do anticipate coming off meds in the future- after stabilization of weight loss is assurred.  Finally, a tolerance can develop and people s perceived efficacy of medication can diminish.  In communication with your physician, it may be appropriate to intermittently take a break from these medications and then restart again (few weeks off then restart again) if a plateau is reached that cannot be broken through.  Each person can respond to a medication differently and to be a good option for you, it will need to be affordable, effective and well tolerated with minimal side effects.    In most cases, weight loss progress after one month and three months will be obtained and if a patient is not reaching the satisfactory progress towards weight loss, the medications may be discontinued.  The thought is that if a person is taking a weight loss medication and not receiving the potential health benefit of that drug, the side effects are not worthwhile and use should be discontinued.  On the flip side, there are many people on some weight loss medications for years because it continues to be an effective tool in their weight management and they are tolerating the medication without any long-term side effects.  Each person's response and purpose will be evaluated.      PHENTERMINE (Adipex): approved in 1959 for appetite suppression.  It has stimulant effects and cannot be used with Ritalin, Concerta, or other stimulants.  Although it is not highly addictive, it's chemically related to amphetamines which are addictive and is classified as a Controlled Substance by the [employer].  Occasional dependence can develop, but rarely. The most common side effects are dry mouth, increased energy and concentration, increased pulse, and constipation.  You should not take phentermine if you have glaucoma, hyperthyroidism, or uncontrolled/untreated hypertension or overly anxious. You should stop if dramatic mood swings, severe  insomnia, palpations, chest pains, visual changes or if your Blood Pressure is consistently elevated or any time it's over 160/90.   It's ok to go off the med for a few weeks and restart if efficacy is wearing off.  $24-$30 for 90 tablets at [facility]. Females are required to have reliable birth control to reduce the risk birth defects/miscarriage.      TOPIRAMATE (Topamax): Anti-seizure medication, also used to prevent migraines and sometimes for mood stabilization.  Side effects include paresthesia, glaucoma, altered concentration, attention difficulties, memory and speech problems, metabolic acidosis, depression, increase in body temperature and decrease sweating, risk of kidney stones.  Do not take Topamax while taking Depakote as this can cause high ammonia levels.  You must have reliable birth control as Topamax can cause birth defects.  If prolonged use has occurred it should be tapered off slowly to avoid withdrawal issues.  Insurance usually covers Topiramate.  At higher doses, there may be some confusion/forgetfulness associated with this so we try to limit dose to under 75mg twice daily to reduce this risk. Often covered by insurance as it's used for many reasons.  Topamax will cause carbonated beverages to taste bad. A recheck of your kidney/electrolytes may occur within a few months of starting.    QSYMIA (Phentermine + Topamax extended release):  See above information about phentermine and Topamax.  Most common side effects are paresthesia, dizziness, distortion of taste, insomnia, constipation, and dry mouth.  See above descriptions for the two individual agents.Females are required to have reliable birth control to reduce the risk birth defects/miscarriage.  $100-200 per month but coupons/programs exist at Qsymia.com that may reduce costs depending on a patient's coverage. Has  a low, medium and higher dosing option and usually titrating upwards is expected for continued good benefit and  consideration for tapering downward or using lower dose in stabilization phases.      GLP1 Agonists:  Liraglutide (Victoza/Saxenda), Semaglutide (Ozempic/Wegovy):   Part of the family of Glucagon Like Peptide Agonists, these medications directly suppresses appetite and are often used by diabetic patients due to improvements in glucose/insulin balance.  They also slow how quickly the stomach empties, increasing fullness. They may be hard to get covered for non diabetics and some plans have exclusions for weight loss purposes.  Currently, these are  injectable medications delivered via autoinjector pen. It can be very costly without insurance coverage (over $500/month).  Small risks for pancreatitis exists and dose should be held if increased mid abdominal pain/burning. It is not to be used if previous Multiple Endocrine Neoplasia. In rodents, may increase risk of thyroid tumors and not indicated for anyone with a history of medullary thyroid cancer as a result.  If changes in voice/swallowing should be discontinued. Reliable birth control required in women. Saxenda.[email or web address], Wegovy.com has more information on these medications.    Zepbound (Tirzepatide):  Similar in many ways to Wegovy/Saxenda type products, works by boosting satiety signals that improve sense of fullness/satiety, boosting both GIP and GLP1 hormones. Not to be used by those with Multiple endocrine neoplasia, medullary thyroid cancer and not likely tolerated well for those with recurrent/idiopathic pancreatitis issues. Costly without insurance coverage but very effective in curbing appetite. Once weekly injectable therapy. Nausea/upset stomach/constipation or diarrhea are common side effects. Heart burn can increase in some, as it slows stomach emptying speeds. Should be halted a few weeks prior to elective procedures and may require re-ramping afterwards. Zepbound.[employer] has good patient resources/information.    Contrave (Bupropion/Naltrexone).     "Synergistic combination of a mild appetite suppressing anti-depressant (Bupropion) whose effects are increased due to interaction with Naltrexone.  Naltrexone may have some effects on craving and is often used in addiction medicine to help previous opiate addicts be less prone to relapse as it blocks the action of opiates. Should be stopped if any need for opiate pain medication, surgery or planned procedures where you'll be given sedation/anesthesia. If prolonged use, recommend stepping down bupropion over 2-3 weeks to limit any risk of withdrawal issues. Side effects may include dry mouth, increased heart rate, mild elevation in Blood pressure;  dizziness, ringing in the ears, anxiety (typically due to bupropion), nausea, constipation, and some get fatigued with naltrexone.  About $210 on [facility] for 120 tabs of \"Contrave\", the brand name without insurance coverage. Generic Bupropion 75mg: $25 for 120 tabs, Naltrexone: $55 for 90 tabs without insurance coverage on [facility]. Cannot be used if pregnant/trying to conceive or breast feeding.  Plenity:   NO LONGER AVAILABLE due to company going bankrupt. [email or web address] has more information.        Zepbound (Tirzepatide) is a very effective satiety boosting appetite suppressant that elevates satiety hormones GLP1 and GIP. It needs to be ramped up slowly to be tolerated adequately.  About 1/10 people will not tolerate this medication. Each month, you move up to a higher dose until eventually reaching the 10mg/week dose if tolerated with further ramping to follow if needed. If intolerant or severe side effects, a dose decrease would be wise, so keep me posted if not tolerated the ramping well. This may be a longer term medication based on individual needs/physiology and appetite control.     Injections can be given after cleansing the skin with alcohol prep pad or swab (available OTC).     Stop Zepbound if severe abdominal pain/vomiting/rash/throat swelling or constant " nausea that prevents adequate food/water intake. Stop 2-3 weeks prior to any planned general anesthesia surgeries to reduce risk for something called a post operative ileus.     Gallstones can occur in about 1% of patients on this medication so update me if increase right upper abdominal pain after eating.     Start meals with protein first, separate beverages from meals by 20 minutes and work hard in between meals to get your 64-75 oz of water daily to reduce risks for severe constipation. Consider a fiber supplement like powdered psyllium husk in 12 oz water each night, stool softerners as needed and Miralax or milk of Magnesia if more than 3 days have passed without a Bowel Movement.     Check out [facility] for patient resources.  If you have weekends off, I recommend dosing Friday evenings.     Some people starve on this medication if not mindful about food intake. I recommend starting meals with the protein part of your meal first, chew thoroughly and separate beverages from meals by about 20 minutes to make sure you get your nourishment in first. Include vegetables/complex carbohydrates and unsaturated fat as part of your balanced diet but group these at the end of the meal, after your protein is mostly gone. Satiety will kick in too early if drinking too much with meals and under-nourishment can result.     It's not a bad idea to take a complete multivitamin most days of the week if using this medication. Adequate hydration is essential for feeling your best, efficient fat burning, waste elimination and constipation prevention. For those without fluid restrictions due to other disease, the goal is at least one ounce of water per gram of protein consumed with a  minimum of 64oz/day goal. Stool softners and fiber supplements as well as occasional laxative use (miralax, etc) may be necessary if getting too constipated.    Pancreatitis is a very rare but potentially serious side effect. Stop Zepbound if severe  mid abdominal pain/burning in nature or if unable to eat/drink due to severe nausea/discomfort.   People with strong history of pancreatitis without clear cause should stay clear of this medication as should those planning to get pregnant, those with strong personal or family history for medullary thyroid cancer or Multiple Endocrine Neoplasia (rare).     Kind Regards,  Bertram Gamboa MD  [facility]    Information about the Weight Loss Medication Phentermine    When combined with mindful eating and behavior changes, weight loss medications can be a nice additional tool to maximize your weight loss season.  There are no magic pills and without diet and behavior changes, weight loss will be minimal.  Think of this medication as a tool to make your diet and behavior changes easier and you'll enjoy a higher probability of success.  Remember not to skip meals, but use this medication to tolerate your reduced calories more easily.  If you are very hungry in the evenings, you are likely not eating enough in the first 10 hours of your day and need to focus on getting your protein requirements in at each of your 3 daily meals.      Phentermine is a stimulant medication related to the amphetamine class of medication but with a lower risk of dependence and addiction.  It is used for weight loss by suppressing the appetite region of the brain.  It also may speed up the metabolic rate and give a person more energy.  Like any medication there are potential side effects and the most common are:  Dry mouth occurs in almost everyone (hydrate well), fewer people experience Palpatations, fast heart rate, elevation of blood pressure, restlessness, insomnia, dizziness, change in mood, tremor, headache, changes in bowel movements,itchiness, changes in sex drive.  If you are or may have become pregnant, do not use phentermine as it increases the risk for birth defects/miscarriage.  Do not use if  "breastfeeding.    Some people can develop serious side effects which include:  Heart strain (\"ischemia\").  Tachycardia (fast heart rate or irregular heart rate).  Hypertension  Pulmonary Hypertension  Psychosis  Dependency and abuse has occurred in some.  If you've been on high dose (37.5mg) for long periods, phentermine should be tapered down over a few weeks before abruptly stopping as seizures have been reported rarely.    We do not recommend taking it in combination with the following medications due to potential drug interactions which can increase the risk of side effects and/or potential for seizures:    Absolutely contraindicated are:  Amphetamines or other stimulants like ADHD medication: (dextroamphetamine, amphetamine, diethylpropion, isocarboxazid, methamphetamine, lisdexamfetamine, benzphetamine,dexmethylphenidate, methylphenidate, selegiline patch, sibutramine, tranylcypromine.    Avoid use with:   Dopamine, dobutamine, ephedra, ephedrine, epinephrine, isoproterenol, linezolid, norepinephrine, phenylephrine injection, venlafaxine (Effexor).    Monitor or modify dose with:  Acebutolol, atenolol, betaxolol, bisoprolol, carvedilol, droxidopa, esmolol, labetalol, magnesium citrate, metoprolol, nadolol, nebivolol, penbutolol, pindolol, propranolol, sotalol, timolol.    Caution with: armodafinil,betaxolol eye drops, brexpiprazole, bupropion, busulfan, caffeine, carteolol drops, enzalutaminde, ginseng, green tea, guarana, levobunolol drops, lindane cream, modafinil, afrin nasal spray (oxymetazoline), pamabrom, phenylephrine oral and nasal spray, pseudoephedrine (sudafed), rasgiline, sleegiline, bowel prep, tiagabine, timolol drops,  TRAMADOL due to increased risk of seizures.    The current cheapest place to fill your prescription is [facility] or [place] [facility] or [facility] and is around $22for 90 tablets.  Occasionally, [employer] and [employer] have price matched, so call " "around and get the best price for you.  Other pharmacies may charge closer to$70- $100 for the same prescription. You don't have to be a member to use the pharmacy at [facility] currently.  An alternative some patients have tried is using a voucher system through [employer].  $25 paid on their website gets you a  voucher that can allows you to pick your meds up at [facility] without paying anything more.  [facility] may also offer discounted coupons and give prices around you.    Dosing:  We start with half a tablet for the first 2-3 weeks and if tolerating it without problems, you can take up to one full tablet daily in the morning after breakfast.  For those with evening hunger problems, sometimes half a tablet in the morning and half a tablet around 1 pm can be effective, however, risks of nighttime insomnia/restless increase with afternoon dosing so call me at the clinic if considering this regimen or having any issues.  You only have to use the amount effective for you, not to exceed one full tablet.  It can also be used situationaly and does not have to be taken every day. For more sensitive individuals,: get a pill cutter and cut the half tab into quarters and use a quarter of a tablet, about 9mg, for a couple weeks before increasing to half a tablet (18.75mg) if needed.  As always, if any questions give us a call at the [facility] telephone:  [phone].     Don't use Phentermine if sick/ill or using other stimulants/cold medications and it's OK to skip days that you don't feel the need for appetite suppressant assistance.  This medication works the day you take it and doesn't require \"building up\" in the system.        "

## 2024-03-05 ENCOUNTER — TELEPHONE (OUTPATIENT)
Dept: SURGERY | Facility: CLINIC | Age: 34
End: 2024-03-05

## 2024-03-05 LAB
ALBUMIN SERPL BCG-MCNC: 4.9 G/DL (ref 3.5–5.2)
ALP SERPL-CCNC: 101 U/L (ref 40–150)
ALT SERPL W P-5'-P-CCNC: 83 U/L (ref 0–50)
ANION GAP SERPL CALCULATED.3IONS-SCNC: 10 MMOL/L (ref 7–15)
AST SERPL W P-5'-P-CCNC: 39 U/L (ref 0–45)
BILIRUB SERPL-MCNC: 0.3 MG/DL
BUN SERPL-MCNC: 13.8 MG/DL (ref 6–20)
CALCIUM SERPL-MCNC: 9.4 MG/DL (ref 8.6–10)
CHLORIDE SERPL-SCNC: 102 MMOL/L (ref 98–107)
CREAT SERPL-MCNC: 0.5 MG/DL (ref 0.51–0.95)
DEPRECATED HCO3 PLAS-SCNC: 26 MMOL/L (ref 22–29)
EGFRCR SERPLBLD CKD-EPI 2021: >90 ML/MIN/1.73M2
GLUCOSE SERPL-MCNC: 96 MG/DL (ref 70–99)
POTASSIUM SERPL-SCNC: 3.6 MMOL/L (ref 3.4–5.3)
PROT SERPL-MCNC: 8.1 G/DL (ref 6.4–8.3)
SODIUM SERPL-SCNC: 138 MMOL/L (ref 135–145)
VIT B12 SERPL-MCNC: 466 PG/ML (ref 232–1245)
VIT D+METAB SERPL-MCNC: 18 NG/ML (ref 20–50)

## 2024-03-05 NOTE — TELEPHONE ENCOUNTER
Patient called and said that the Zepbound is not covered by her insurance.  It looks like an EPA was started but we haven't heard back from them yet on the determination. Patient is wanting to try Wegovy if it comes back denied by insurance.   Concepcion Tapia RN

## 2024-03-06 DIAGNOSIS — E66.811 CLASS 1 DRUG-INDUCED OBESITY WITHOUT SERIOUS COMORBIDITY WITH BODY MASS INDEX (BMI) OF 30.0 TO 30.9 IN ADULT: Primary | ICD-10-CM

## 2024-03-06 DIAGNOSIS — E66.1 CLASS 1 DRUG-INDUCED OBESITY WITHOUT SERIOUS COMORBIDITY WITH BODY MASS INDEX (BMI) OF 30.0 TO 30.9 IN ADULT: Primary | ICD-10-CM

## 2024-03-06 DIAGNOSIS — R73.03 PREDIABETES: ICD-10-CM

## 2024-03-06 RX ORDER — SEMAGLUTIDE 1 MG/.5ML
1 INJECTION, SOLUTION SUBCUTANEOUS
Qty: 2 ML | Refills: 0 | Status: SHIPPED | OUTPATIENT
Start: 2024-03-06 | End: 2024-04-03

## 2024-03-06 RX ORDER — SEMAGLUTIDE 0.25 MG/.5ML
0.25 INJECTION, SOLUTION SUBCUTANEOUS WEEKLY
Qty: 2 ML | Refills: 0 | Status: SHIPPED | OUTPATIENT
Start: 2024-03-06 | End: 2024-04-04

## 2024-03-06 RX ORDER — SEMAGLUTIDE 0.5 MG/.5ML
0.5 INJECTION, SOLUTION SUBCUTANEOUS
Qty: 2 ML | Refills: 0 | Status: SHIPPED | OUTPATIENT
Start: 2024-03-06 | End: 2024-04-04

## 2024-03-07 ENCOUNTER — VIRTUAL VISIT (OUTPATIENT)
Dept: SURGERY | Facility: CLINIC | Age: 34
End: 2024-03-07
Payer: COMMERCIAL

## 2024-03-07 ENCOUNTER — TELEPHONE (OUTPATIENT)
Dept: SURGERY | Facility: CLINIC | Age: 34
End: 2024-03-07

## 2024-03-07 DIAGNOSIS — E66.9 OBESITY (BMI 30-39.9): Primary | ICD-10-CM

## 2024-03-07 DIAGNOSIS — E66.811 CLASS 1 DRUG-INDUCED OBESITY WITHOUT SERIOUS COMORBIDITY WITH BODY MASS INDEX (BMI) OF 30.0 TO 30.9 IN ADULT: ICD-10-CM

## 2024-03-07 DIAGNOSIS — E66.1 CLASS 1 DRUG-INDUCED OBESITY WITHOUT SERIOUS COMORBIDITY WITH BODY MASS INDEX (BMI) OF 30.0 TO 30.9 IN ADULT: ICD-10-CM

## 2024-03-07 PROCEDURE — 97802 MEDICAL NUTRITION INDIV IN: CPT | Mod: 95

## 2024-03-07 NOTE — LETTER
3/7/2024         RE: Reginaldo Ferraro  [address] [facility] [address]        Dear Colleague,    Thank you for referring your patient, Reginaldo Ferraro, to the [facility]. Please see a copy of my visit note below.    Reginaldo Ferraro is a 33 year old who is being evaluated via a billable video visit.        How would you like to obtain your AVS? MyChart  If the video visit is dropped, the invitation should be resent by: Text to cell phone: [phone]  Will anyone else be joining your video visit? No          Medical Weight Loss Initial Diet Evaluation  Assessment:  This patient was referred by Dr. Gamboa for MNT as treatment for Obesity.  Reginaldo is presenting today for a new weight management nutrition consultation. Pt has had an initial appointment with Dr. Gamboa.    Weight loss medication: Semaglutide.       Anthropometrics:    Pt's weight is 159 lbs  Initial weight: 159 lbs  Weight change: n/a  BMI: There is no height or weight on file to calculate BMI.   Ideal body weight: 46.6 kg (102 lb 13.5 oz)  Adjusted ideal body weight: 56.8 kg (125 lb 4.9 oz)  Estimated RMR ([place]-St Jeor equation):  1360 kcals x 1.2 (sedentary) = 1630 kcals (for weight maintenance)  1360 kcals x 1.3 (light active) = 1865 kcals (for weight maintenance)    Recommended Protein Intake: 60-80 grams of protein/day    Medical History:  Patient Active Problem List   Diagnosis     Cervical high risk HPV (human papillomavirus) test positive     Insomnia, unspecified insomnia     Vitamin D deficiency     Mild episode of recurrent major depressive disorder (H24)     Adjustment disorder with depressed mood     Mixed personality disorder (H)     Chronic maxillary sinusitis     Moderate persistent asthma     Allergic rhinitis due to animals     Allergic rhinitis due to dust mite     Seasonal allergic rhinitis due to pollen     Pain in both lower legs     Chronic  pansinusitis     Purulent rhinitis     Staph aureus infection     Weight gain due to medication          Nutrition History:   Food allergies/intolerances/cultural or religous food customs: No     Weight loss history: time management interrupts meal planning. Patient works from home and has a 8-4 job hours.   Wakes up at ~6:30, goes to bed at 10:30-12 pm Normally going to bed at ~3pm.   Is currently in school so homework is priority.   Typical weight ~125 lbs - weight gain enhanced from medications.    Vitamins/Mineral Supplementation: none    Dietary Recall:      Breakfast: skips OR  Egg OR cereal Or fruit veggie smoothie Or specialty coffee   Lunch 12-4pm: Small portion of rice with a type of beans and meat with a side salad OR soup for the day   Dinner:  skips OR Fruit bars OR rice cakes OR ham sandwich OR egg Or leftovers   Typical Snacks: fruit,leftovers   Overnight eating: No -wakes up d/t hunger   Eating out: normally 1x per week, right now consistently (5x per week)  -  meals     Beverages:   Water  1% milk or almond milk - will add to smoothies   Juice- will add water to dilute     Exercise: Recreational dance for 1 hours 2 per week, x2 soccer games/ week. Enjoys going to the gym.     Nutrition Diagnosis (PES statement):     Obesity related to poor nutritional habits as evidence by frequent restaurant intake, low vegetable intake and BMI of 30.54     Disordered Eating Pattern (NB 1.5) related to intake of food exceeding RMR as evidenced by frequent grazing, skipping meals     Nutrition Intervention  Food and/or Nutrient Delivery   Placed emphasis on importance of developing a healthy meal routine, aiming for 3 meals a day and no snacks.  Discussed using a protein supplement as a meal replacement.  Nutrition Education   Discussed with patient how to build a meal: the importance of including a lean/low fat protein at each meal, include a source of vegetables at a minimum of lunch and dinner and  limiting carbohydrate intake to 25% per meal.  Educated on sources of lean protein, portion sizes, the amount of grams found in each source. Recommend patient to aim for 20-30g protein at each meal.  Discussed the importance of adequate hydration, with emphasis on drinking 64oz of water or zero calorie beverages per day.  Nutrition Counseling   Encouraged importance of developing routine exercise for health benefits and weight loss.      Goals established by patient:   Aim for 60-80g protein per day   Stay within 1,200-1,500  Try to implement breakfast (1 meal) within 1-1.5 hours of waking  Aim for meal to be 4-6 hours apart.         Handouts provided:  Intro to MWM    Assessment/Plan:    Pt will follow up in 4 month(s) with bariatrician and 3 month(s) with dietitian.       Video-Visit Details    Type of service:  Video Visit    Video Start Time (time video started): 11:31 AM    Video End Time (time video stopped): 11:55 AM    Originating Location (pt. Location): Home      Distant Location (provider location):  Off-site    Mode of Communication:  Video Conference via [facility]    Physician has received verbal consent for a Video Visit from the patient? Yes      Lorraine Sinha RD         Again, thank you for allowing me to participate in the care of your patient.        Sincerely,        Lorraine Sinha RD

## 2024-03-07 NOTE — PATIENT INSTRUCTIONS
INITIAL WEIGHT LOSS CONSULT     My Plate    Protein Sources for Weight Loss     Carbohydrates     Food Label: The 10 - 10 Rule     Making Sense of Food Labels        Eat Better ? Move More ? Live Well    Eat 3 nutrient-rich meals each day     Don't skip meals--it will cause you to overeat later in the day!     Eating fiber (vegetables/fruits/whole grains) and protein with meals helps you stay full longer     Choose foods with less than 10 grams of sugar and 5 grams of fat per serving to prevent excess calories and weight re-gain   Eat around the same times each day to develop a routine eating schedule    Avoid snacking unless physically hungry.   Planned snacks: 1-2 times per day and no more than 150 calories    Eat protein first    Protein helps with healing, maintaining adequate muscle mass, reducing hunger and optimizing nutritional status    Aim for 60-80 grams of protein per day   Fill up on Fiber    Fiber comes from plants--fruits, veggies, whole grains, nuts/seeds and beans    Fiber is low in calories, high in phytonutrients and helps you stay full longer    Aim for 25-35 grams per day by eating fiber with meals and snacks  Eat S-[id number]    Take 20-30 minutes to eat each meal by taking small bites, chewing foods to applesauce consistency or 20-30 times before you swallow    Eating foods too fast can delay satiety/fullness signals and increase overeating   Slow down your eating by using toddler utensils, putting your fork/spoon down between bites and not watching TV or emailing during meals!   Keep a Journal          Writing down what you eat, how you feel and when you are active helps you identify new changes to work on from week to week          Look for ways to cut 100 calories from your current diet 2-3 times per day  Drink 64 ounces of 0-Calorie drinks between meals    Water    Zero calorie Propel  or Vitamin Water      SoBe Lif[place]ater  Zero Calories    Crystal Light , Sugar-Free Vijay-Aid , and other  sugar-free lemonade or flavored sherwood    Keep Caffeine to less than 300mg per day ie: 3-6oz cups coffee     Work up to 45-60 minutes of physical activity most days of the week    Helps with losing weight and prevent regaining those extra pounds!     Do a combo of cardio (walking/water exercises) and strength training (lifting weights/Vinyasa yoga)    Avoid Mindless Eating    Be present when you eat--take note of the smell, taste and quality of your food    Make a list of alternative activities you could do to prevent eating out of boredom/stress  Go for a walk, call a friend, chew gum, paint your nails, re-organize the garage, etc      LEAN PROTEIN SOURCES    Protein Source Portion Calories Grams of Protein                           Nonfat, plain Greek yogurt    (10 grams sugar or less) 3/4 cup (6 oz)  12-17   Light Yogurt (10 grams sugar or less) 3/4 cup (6 oz)  6-8   Protein Shake 1 shake 110-180 15-30   Skim/1% Milk or lactose-free milk 1 cup ( 8 oz)  8   Plain or light, flavored soymilk 1 cup  7-8   Plain or light, hemp milk 1 cup 110 6   Fat Free or 1% Cottage Cheese 1/2 cup 90 15   Part skim ricotta cheese 1/2 cup 100 14   Part skim or reduced fat cheese slices 1/4cup, 3 dice 65-80 8     Mozzarella String Cheese 1 80 8   Canned tuna, chicken, crab or salmon  (canned in water)  1/2 cup 100 15-20   White fish (broiled, grilled, baked) 3 ounces 100 21   [place]/Tuna (broiled, grilled, baked) 3 ounces 150-180 21   Shrimp, Scallops, Lobster, Crab 3 ounces 100 21   Pork loin, Pork Tenderloin 3 ounces 150 21   Boneless, skinless chicken /turkey breast                          (broiled, grilled, baked) 3 ounces 120 21   [place], [place], [place], and Venison 3 ounces 120 21   Lean cuts of red meat and pork (sirloin,   round, tenderloin, flank, ground 93%-96%) 3 ounces 170 21   Lean or Extra Lean Ground Turkey 1/2 cup 150 20   90-95% Lean [place] Burger 1 antwan 140-180 21   Low-fat casserole with lean  meat 3/4 cup 200 17   Luncheon Meats                                                        (turkey, lean ham, roast beef, chicken) 3 ounces 100 21   Egg (boiled, poached, scrambled) 1 Egg 60 7   Egg Substitute 1/2 cup 70 10   Nuts (limit to 1 serving per day)  3 Tbsp. 150 7   Nut [place] (peanut, almond)  Limit to 1 serving or less daily 1 Tbsp. 90 4   Soy Burger (varies) 1  10-15   Edamame  1/2 cup ~95 9   Garbanzo, Black, Tobar Beans 1/2 cup 110 7   Refried Beans 1/2 cup 100 7   Kidney and Lima beans 1/2 cup 110 7   Tempeh 3 oz 175 18   Vegan crumbles 1/2 cup 100 14   Tofu 1/2 cup 110 14   Chili (beans and extra lean beef or turkey) 1 cup 200 23   Lentil Stew/Soup 1 cup 150 12   Black Bean Soup 1 cup 175 12     Carbohydrates  Carbohydrates fuel your body with glucose (sugar)--the energy your body needs so you can do your daily activities.  Carbohydrates offer an immediate source of energy for your body. They provide the fuel for your muscles and organs, such as your brain.     Types of Carbohydrates     Complex Carbohydrates are higher in fiber and keep you feeling full longer--helping you eat less.   These are found in nearly all plant-based foods and usually take longer for the body to digest.  They are most commonly found in whole-wheat bread, whole-grain pasta, brown rice, starchy vegetables,   and fruits  Refined Carbohydrates require almost NO WORK for digestion and break down into glucose more quickly   than complex carbohydrates. Refined carbohydrates are usually high in calories and low in nutrients and fiber--  eating more of these can lead to weight gain.  Thinking about eliminating carbohydrates???  If you do not eat enough carbohydrates, the following can occur:  Fatigue  Muscle cramps  Poor mental function  Fatigue easily results from deprivation of carbohydrates, which is seen in people who fast, possibly interfering with activities of daily living.      Thinking about eliminating  carbohydrates???  If you do not eat enough carbohydrates, the following can occur:  Fatigue  Muscle cramps  Poor mental function  Fatigue easily results from deprivation of carbohydrates, which is seen in people who fast, possibly interfering with activities of daily living    Carbohydrates are your body's first choice for fuel. If given a choice of several types of foods simultaneously, your body will use the energy from carbohydrates first.    What foods contain carbohydrates?  Choose the following foods containing carbohydrates (the BEST ones to eat):   Fruit-fresh, frozen, canned in their own juices  Whole grains:  Whole-wheat breads  Brown rice  Oatmeal  Whole-grain cereals  Other starchy foods containing a minimum of 3 grams (g) fiber/100 calories  The ingredient label should list whole wheat or whole grain as one of the first ingredients (bulgur, quinoa, buckwheat, millet, spelt, faro, kasha)  Milk or yogurt (a natural source of carbohydrates):  Low-fat milk  Fat-free milk  Yogurt   Beans or legumes     Starchy vegetables, raw or frozen:  Potatoes  Peas  Corn    AVOID or limit the following foods containing carbohydrates:  Refined sugars, such as in:  Candy  Desserts-ice cream, cakes, pies, brownies, frozen yogurt, sherbet/sorbet  Cookies  White flour: bread/pasta/crackers/rice/tortillas  Sugary snacks: sweetened cereal, granola bars, cereal bars, donuts, muffins, bagels  Sugary Drinks:  Fruit Juice, Smoothies  Sports Drinks  Regular Soda    What are typical serving sizes or portions?  The following are some serving and portion sizes for foods containing carbohydrates:  One medium piece of fruit, about 4?5 ounces (oz) (-tennis ball)  1 cup (C) berries or melon    C canned fruit    C juice (100% vegetable)    C starchy vegetables, cooked or chopped  One slice whole-grain bread  ? C brown rice, quinoa, buckwheat, millet, spelt, faro, kasha    C oatmeal (dry)    C bulgur  One small tortilla (less than 6inch  diameter)    C wheat germ  1 oz pretzels     C flaked cereal        Calorie-Controlled Sample Meal Plans    Examples of small healthy meals    Breakfast   Omelet made with   cup to   cup egg substitute or 2 eggs    cup chopped vegetables  1-2 tbsp. of light cheese     cup salsa  Medium banana    1 cup non-fat plain, Greek yogurt mixed with 1 cup berries and 1-2 Tbsp nuts or cereal   -3/4 cup skim or 1% cottage cheese    cup unsweetened whole-grain cereal  1/2 cup of fresh strawberries  Whole-wheat English muffin or mini bagel, 1 scrambled egg and 1 slice Swiss cheese   Small orange  Protein Bar or Shake (15-30 grams protein and 15-25 grams Carbohydrates)    cup cottage cheese, low-fat    cup fresh fruit    11 ounces of Slim Fast Low Carb (only), Gabriel's Advantage, EAS Carb Control    Lunch/Dinner  2-3 slices roasted turkey breast  1 tbsp. of fat free mayonnaise  2 slices of  whole-wheat bread, Medium apple  10 baby carrots with 1 tbsp. of low-fat dip     cup water packed tuna or chicken  1 tablespoons of low-fat mayonnaise  1-2 tbsp. dill relish  1 serving of whole-grain crackers  1 cup of strawberries   6 inch turkey sub sandwich with light mayonnaise,   cup cottage cheese                                                                                                                                                      Black bean and low-fat cheese on a whole wheat tortilla with salsa and light sour cream  Grilled chicken sandwich  Tossed salad with light dressing    Baked potato with 3/4 cup of extra lean ground beef, light shredded cheese and salsa  Fresh fruit                                                 Chicken chunks with lettuce and vegetables stuffed in karis  Steamed broccoli                                                 3 oz boneless/skinless chicken breast  1/2 cup brown rice with stir-fried vegetables    grapefruit  3 ounces of salmon, trout, or tuna  1 cup of steamed asparagus  1 small slice whole  grain Italian bread  Broiled white or pink fish  3/4 cup whole wheat pasta with tomatoes  3/4 cup of roasted red peppers  3 oz. of extra lean (93/7) hamburger on a Arnold's Campbell Thins  Tossed salad with light dressing       Black bean or Tuscan bean soup with grated mozzarella cheese    of a flour tortilla    3 ounces of grilled pork loin with 1 tbsp. of low-sugar barbeque sauce, 1 cup of green beans seasoned with pepper  Small dinner roll or   cup of grapefruit sections    1-2 cups of torn arminda    cup of garbanzo beans or diced skinless chicken breast  5-6 cherry tomatoes  1  tbsp. of crumbled feta cheese  1 tbsp. of roasted soy nuts  1 tsp. of olive oil and 2-3 Tbsp. of balsamic or red wine vinegar  Small whole-wheat dinner roll or   cup of cut up pineapple

## 2024-03-07 NOTE — PROGRESS NOTES
Reginaldo Ferraro is a 33 year old who is being evaluated via a billable video visit.        How would you like to obtain your AVS? MyChart  If the video visit is dropped, the invitation should be resent by: Text to cell phone: [phone]  Will anyone else be joining your video visit? No          Medical Weight Loss Initial Diet Evaluation  Assessment:  This patient was referred by Dr. Gamboa for MNT as treatment for Obesity.  Reginaldo is presenting today for a new weight management nutrition consultation. Pt has had an initial appointment with Dr. Gamboa.    Weight loss medication: Semaglutide.       Anthropometrics:    Pt's weight is 159 lbs  Initial weight: 159 lbs  Weight change: n/a  BMI: There is no height or weight on file to calculate BMI.   Ideal body weight: 46.6 kg (102 lb 13.5 oz)  Adjusted ideal body weight: 56.8 kg (125 lb 4.9 oz)  Estimated RMR ([place]-St Jeor equation):  1360 kcals x 1.2 (sedentary) = 1630 kcals (for weight maintenance)  1360 kcals x 1.3 (light active) = 1865 kcals (for weight maintenance)    Recommended Protein Intake: 60-80 grams of protein/day    Medical History:  Patient Active Problem List   Diagnosis    Cervical high risk HPV (human papillomavirus) test positive    Insomnia, unspecified insomnia    Vitamin D deficiency    Mild episode of recurrent major depressive disorder (H24)    Adjustment disorder with depressed mood    Mixed personality disorder (H)    Chronic maxillary sinusitis    Moderate persistent asthma    Allergic rhinitis due to animals    Allergic rhinitis due to dust mite    Seasonal allergic rhinitis due to pollen    Pain in both lower legs    Chronic pansinusitis    Purulent rhinitis    Staph aureus infection    Weight gain due to medication          Nutrition History:   Food allergies/intolerances/cultural or religous food customs: No     Weight loss history: time management interrupts meal planning. Patient works from home and has a 8-4 job hours.    Wakes up at ~6:30, goes to bed at 10:30-12 pm Normally going to bed at ~3pm.   Is currently in school so homework is priority.   Typical weight ~125 lbs - weight gain enhanced from medications.    Vitamins/Mineral Supplementation: none    Dietary Recall:      Breakfast: skips OR  Egg OR cereal Or fruit veggie smoothie Or specialty coffee   Lunch 12-4pm: Small portion of rice with a type of beans and meat with a side salad OR soup for the day   Dinner:  skips OR Fruit bars OR rice cakes OR ham sandwich OR egg Or leftovers   Typical Snacks: fruit,leftovers   Overnight eating: No -wakes up d/t hunger   Eating out: normally 1x per week, right now consistently (5x per week)  -  meals     Beverages:   Water  1% milk or almond milk - will add to smoothies   Juice- will add water to dilute     Exercise: Recreational dance for 1 hours 2 per week, x2 soccer games/ week. Enjoys going to the gym.     Nutrition Diagnosis (PES statement):     Obesity related to poor nutritional habits as evidence by frequent restaurant intake, low vegetable intake and BMI of 30.54     Disordered Eating Pattern (NB 1.5) related to intake of food exceeding RMR as evidenced by frequent grazing, skipping meals     Nutrition Intervention  Food and/or Nutrient Delivery   Placed emphasis on importance of developing a healthy meal routine, aiming for 3 meals a day and no snacks.  Discussed using a protein supplement as a meal replacement.  Nutrition Education   Discussed with patient how to build a meal: the importance of including a lean/low fat protein at each meal, include a source of vegetables at a minimum of lunch and dinner and limiting carbohydrate intake to 25% per meal.  Educated on sources of lean protein, portion sizes, the amount of grams found in each source. Recommend patient to aim for 20-30g protein at each meal.  Discussed the importance of adequate hydration, with emphasis on drinking 64oz of water or zero calorie beverages  per day.  Nutrition Counseling   Encouraged importance of developing routine exercise for health benefits and weight loss.      Goals established by patient:   Aim for 60-80g protein per day   Stay within 1,200-1,500  Try to implement breakfast (1 meal) within 1-1.5 hours of waking  Aim for meal to be 4-6 hours apart.         Handouts provided:  Intro to MWM    Assessment/Plan:    Pt will follow up in 4 month(s) with bariatrician and 3 month(s) with dietitian.       Video-Visit Details    Type of service:  Video Visit    Video Start Time (time video started): 11:31 AM    Video End Time (time video stopped): 11:55 AM    Originating Location (pt. Location): Home      Distant Location (provider location):  Off-site    Mode of Communication:  Video Conference via [facility]    Physician has received verbal consent for a Video Visit from the patient? Yes      Lorraine Sinha RD

## 2024-03-07 NOTE — PROGRESS NOTES
Can see if Wegovy covered as she reports Zepbound was not. Once starting doses received, can send in the top two doses if tolerating the 0.25mg, 0.5mg and 1mg/week doses well.  Bertram Gamboa MD

## 2024-03-08 NOTE — TELEPHONE ENCOUNTER
Retail Pharmacy Prior Authorization Team   Phone: [phone]    [place] Portal Approval: Once EPA Approvals are obtained, the Rx order is released to the filling pharmacy.    Medication: WEGOVY 0.25 MG/0.5ML SC SOAJ  Authorization Effective Date: 3/7/2024  Authorization Expiration Date: 10/7/2024  Approved Dose/Quantity:   Reference #:     Insurance Company: [employer] ([facility] [facility]) - Phone [phone] Fax [phone]  Expected CoPay: $    CoPay Card Available: No    Financial Assistance Needed: n/a  Which Pharmacy is filling the prescription: [id number] - [place], [place] - [address] AT [facility] & [place]  Pharmacy Notified: No  Patient Notified: No

## 2024-03-12 RX ORDER — CHOLECALCIFEROL (VITAMIN D3) 125 MCG
50 CAPSULE ORAL DAILY
Qty: 180 CAPSULE | Refills: 1 | Status: SHIPPED | OUTPATIENT
Start: 2024-03-12

## 2024-03-28 ENCOUNTER — PATIENT OUTREACH (OUTPATIENT)
Dept: OBGYN | Facility: CLINIC | Age: 34
End: 2024-03-28
Payer: COMMERCIAL

## 2024-04-04 ENCOUNTER — MYC MEDICAL ADVICE (OUTPATIENT)
Dept: FAMILY MEDICINE | Facility: CLINIC | Age: 34
End: 2024-04-04
Payer: COMMERCIAL

## 2024-04-04 DIAGNOSIS — J45.40 MODERATE PERSISTENT ASTHMA WITHOUT COMPLICATION: ICD-10-CM

## 2024-04-04 DIAGNOSIS — R73.03 PREDIABETES: ICD-10-CM

## 2024-04-04 DIAGNOSIS — E66.811 CLASS 1 DRUG-INDUCED OBESITY WITHOUT SERIOUS COMORBIDITY WITH BODY MASS INDEX (BMI) OF 30.0 TO 30.9 IN ADULT: ICD-10-CM

## 2024-04-04 DIAGNOSIS — E66.1 CLASS 1 DRUG-INDUCED OBESITY WITHOUT SERIOUS COMORBIDITY WITH BODY MASS INDEX (BMI) OF 30.0 TO 30.9 IN ADULT: ICD-10-CM

## 2024-04-04 DIAGNOSIS — F41.0 PANIC ATTACK: ICD-10-CM

## 2024-04-04 RX ORDER — SEMAGLUTIDE 0.25 MG/.5ML
0.25 INJECTION, SOLUTION SUBCUTANEOUS WEEKLY
Qty: 2 ML | Refills: 0 | Status: SHIPPED | OUTPATIENT
Start: 2024-04-04 | End: 2024-04-05

## 2024-04-04 RX ORDER — SEMAGLUTIDE 0.5 MG/.5ML
0.5 INJECTION, SOLUTION SUBCUTANEOUS
Qty: 2 ML | Refills: 0 | Status: SHIPPED | OUTPATIENT
Start: 2024-04-25 | End: 2024-06-04 | Stop reason: DRUGHIGH

## 2024-04-05 DIAGNOSIS — R73.03 PREDIABETES: ICD-10-CM

## 2024-04-05 DIAGNOSIS — E66.811 CLASS 1 DRUG-INDUCED OBESITY WITHOUT SERIOUS COMORBIDITY WITH BODY MASS INDEX (BMI) OF 30.0 TO 30.9 IN ADULT: ICD-10-CM

## 2024-04-05 DIAGNOSIS — E66.1 CLASS 1 DRUG-INDUCED OBESITY WITHOUT SERIOUS COMORBIDITY WITH BODY MASS INDEX (BMI) OF 30.0 TO 30.9 IN ADULT: ICD-10-CM

## 2024-04-05 RX ORDER — SEMAGLUTIDE 0.25 MG/.5ML
0.25 INJECTION, SOLUTION SUBCUTANEOUS WEEKLY
Qty: 2 ML | Refills: 0 | Status: SHIPPED | OUTPATIENT
Start: 2024-04-05 | End: 2024-06-04 | Stop reason: DRUGHIGH

## 2024-04-05 NOTE — TELEPHONE ENCOUNTER
Patient called the clinic. She states that she continues to use Both   Ventolin (Albuterol) inhaler & Alprazolam as needed.     Patient reports that when her Asthma flares up, her anxiety gets worse. She uses the Alprazolam very sparingly. She has 4 pills left today. However, her  Ventolin inhaler is empty.     Patient would like to get a spacer for her inhaler so that she can maximize it's effectiveness.     Please review and advise.     Sakina Hull RN BSN  [facility]

## 2024-04-05 NOTE — PROGRESS NOTES
Can continue with 0.25mg/week dose of Wegovy until her 0.5mg/week dose comes in.  New rx sent.  Bertram Gamboa MD

## 2024-04-05 NOTE — TELEPHONE ENCOUNTER
VENTOLIN  (90 Base) MCG/ACT inhaler (Discontinued)   Reason for Discontinue: Therapy completed (No AVS)     ALPRAZolam (XANAX) 0.25 MG tablet (Discontinued)   Reason for Discontinue: Therapy completed (No AVS)     Called patient. Left voice message to return call at [phone].    When patient returns call, please clarify if she is still taking medications and further triage asthma flare.    Marco Antonio Cuba, BENJAMINN RN  [facility]

## 2024-04-08 DIAGNOSIS — R05.9 COUGH: Primary | ICD-10-CM

## 2024-04-08 DIAGNOSIS — J45.909 ASTHMA: ICD-10-CM

## 2024-04-08 RX ORDER — INHALER, ASSIST DEVICES
SPACER (EA) MISCELLANEOUS
Qty: 1 EACH | Refills: 1 | OUTPATIENT
Start: 2024-04-08

## 2024-04-08 RX ORDER — ALBUTEROL SULFATE 90 UG/1
1-2 AEROSOL, METERED RESPIRATORY (INHALATION) EVERY 4 HOURS PRN
Qty: 18 G | Refills: 1 | Status: SHIPPED | OUTPATIENT
Start: 2024-04-08

## 2024-04-08 RX ORDER — ALPRAZOLAM 0.25 MG
0.25 TABLET ORAL
Qty: 30 TABLET | Refills: 0 | Status: SHIPPED | OUTPATIENT
Start: 2024-04-08

## 2024-04-09 ENCOUNTER — MYC REFILL (OUTPATIENT)
Dept: FAMILY MEDICINE | Facility: CLINIC | Age: 34
End: 2024-04-09

## 2024-04-09 ENCOUNTER — MYC MEDICAL ADVICE (OUTPATIENT)
Dept: FAMILY MEDICINE | Facility: CLINIC | Age: 34
End: 2024-04-09

## 2024-04-09 DIAGNOSIS — J45.40 MODERATE PERSISTENT ASTHMA WITHOUT COMPLICATION: Primary | ICD-10-CM

## 2024-04-09 NOTE — TELEPHONE ENCOUNTER
Ok to use provider approval or hospital follow up for patient. We can fill out forms at her appointment.   Cally Rucker PA-C

## 2024-04-10 RX ORDER — BENZONATATE 200 MG/1
200 CAPSULE ORAL 3 TIMES DAILY PRN
Qty: 40 CAPSULE | Refills: 0 | Status: SHIPPED | OUTPATIENT
Start: 2024-04-10 | End: 2024-06-24

## 2024-04-10 RX ORDER — FLUTICASONE FUROATE AND VILANTEROL 100; 25 UG/1; UG/1
1 POWDER RESPIRATORY (INHALATION) DAILY
Qty: 1 EACH | Refills: 0 | Status: SHIPPED | OUTPATIENT
Start: 2024-04-10 | End: 2024-04-23

## 2024-04-11 ASSESSMENT — ASTHMA QUESTIONNAIRES
ACT_TOTALSCORE: 12
QUESTION_2 LAST FOUR WEEKS HOW OFTEN HAVE YOU HAD SHORTNESS OF BREATH: THREE TO SIX TIMES A WEEK
EMERGENCY_ROOM_LAST_YEAR_TOTAL: ONE
QUESTION_3 LAST FOUR WEEKS HOW OFTEN DID YOUR ASTHMA SYMPTOMS (WHEEZING, COUGHING, SHORTNESS OF BREATH, CHEST TIGHTNESS OR PAIN) WAKE YOU UP AT NIGHT OR EARLIER THAN USUAL IN THE MORNING: TWO OR THREE NIGHTS A WEEK
ACT_TOTALSCORE: 12
QUESTION_1 LAST FOUR WEEKS HOW MUCH OF THE TIME DID YOUR ASTHMA KEEP YOU FROM GETTING AS MUCH DONE AT WORK, SCHOOL OR AT HOME: MOST OF THE TIME
HOSPITALIZATION_OVERNIGHT_LAST_YEAR_TOTAL: ONE
QUESTION_5 LAST FOUR WEEKS HOW WOULD YOU RATE YOUR ASTHMA CONTROL: SOMEWHAT CONTROLLED
QUESTION_4 LAST FOUR WEEKS HOW OFTEN HAVE YOU USED YOUR RESCUE INHALER OR NEBULIZER MEDICATION (SUCH AS ALBUTEROL): ONE OR TWO TIMES PER DAY

## 2024-04-11 ASSESSMENT — PATIENT HEALTH QUESTIONNAIRE - PHQ9
SUM OF ALL RESPONSES TO PHQ QUESTIONS 1-9: 21
SUM OF ALL RESPONSES TO PHQ QUESTIONS 1-9: 21
10. IF YOU CHECKED OFF ANY PROBLEMS, HOW DIFFICULT HAVE THESE PROBLEMS MADE IT FOR YOU TO DO YOUR WORK, TAKE CARE OF THINGS AT HOME, OR GET ALONG WITH OTHER PEOPLE: VERY DIFFICULT

## 2024-04-12 ENCOUNTER — OFFICE VISIT (OUTPATIENT)
Dept: FAMILY MEDICINE | Facility: CLINIC | Age: 34
End: 2024-04-12
Payer: COMMERCIAL

## 2024-04-12 VITALS
TEMPERATURE: 97.8 F | WEIGHT: 160.8 LBS | HEART RATE: 85 BPM | DIASTOLIC BLOOD PRESSURE: 69 MMHG | OXYGEN SATURATION: 98 % | SYSTOLIC BLOOD PRESSURE: 105 MMHG | BODY MASS INDEX: 30.89 KG/M2

## 2024-04-12 DIAGNOSIS — M94.0 COSTOCHONDRITIS: ICD-10-CM

## 2024-04-12 DIAGNOSIS — J45.40 MODERATE PERSISTENT ASTHMA WITHOUT COMPLICATION: Primary | ICD-10-CM

## 2024-04-12 PROCEDURE — 99214 OFFICE O/P EST MOD 30 MIN: CPT

## 2024-04-12 RX ORDER — IBUPROFEN 200 MG
400 TABLET ORAL
COMMUNITY
End: 2024-07-29

## 2024-04-12 RX ORDER — PREDNISONE 10 MG/1
TABLET ORAL
COMMUNITY
Start: 2024-04-10 | End: 2024-05-17

## 2024-04-12 RX ORDER — CODEINE PHOSPHATE AND GUAIFENESIN 10; 100 MG/5ML; MG/5ML
10 SOLUTION ORAL
COMMUNITY
Start: 2024-04-09 | End: 2024-06-24

## 2024-04-12 RX ORDER — AZELASTINE HYDROCHLORIDE 0.5 MG/ML
1 SOLUTION/ DROPS OPHTHALMIC
COMMUNITY
Start: 2023-05-03 | End: 2024-05-26

## 2024-04-12 RX ORDER — IPRATROPIUM BROMIDE AND ALBUTEROL SULFATE 2.5; .5 MG/3ML; MG/3ML
1 SOLUTION RESPIRATORY (INHALATION) EVERY 6 HOURS PRN
COMMUNITY
End: 2024-04-23

## 2024-04-12 NOTE — PROGRESS NOTES
"  Assessment & Plan     Moderate persistent asthma without complication  Improving since hospitalization. Patient has not met with pulmonology since 2022 at [facility]. Referral placed for further assistance to prevent asthma exacerbations. Patient has planned follow up appointment with her allergist Dr. Mccain.   - Adult Pulmonary Medicine  Referral; Future    Costochondritis  Chest wall pain most likely costochondritis due to frequent coughing attacks. Patient reports no current pain and that it only occurs when she is coughing.     MED REC REQUIRED  Post Medication Reconciliation Status: discharge medications reconciled, continue medications without change  BMI  Estimated body mass index is 30.89 kg/m  as calculated from the following:    Height as of 3/4/24: 1.537 m (5' 0.5\").    Weight as of this encounter: 72.9 kg (160 lb 12.8 oz).             Judith Hair is a 33 year old, presenting for the following health issues:  Hospital F/U      4/12/2024     9:51 AM   Additional Questions   Roomed by juan c [email or web address]         4/12/2024   Forms   Any forms needing to be completed Yes     HPI     Hospital Follow-up Visit:    Hospital/Nursing Home/IP Rehab Facility:  [facility]  Date of Admission: 4/6/24   Date of Discharge: 4/9/24  Reason(s) for Admission: asthma exacerbation, cough, respiratory failure  Was the patient in the ICU or did the patient experience delirium during hospitalization?  No  Do you have any other stressors you would like to discuss with your provider? Yes, does not want to take prednisone ever.  Side effects gained over 30lbs and prediabetic since taking prednisone.     Problems taking medications regularly:  None  Medication changes since discharge: codeine-guaiFENesin  mg/5 mL liquid   predniSONE 10 mg tablet   Problems adhering to non-medication therapy:  scheduling conflicts with work.     Summary of hospitalization:  CareEverywhere information obtained and " reviewed  Diagnostic Tests/Treatments reviewed.  Follow up needed: none  Other Healthcare Providers Involved in Patient s Care:         Specialist appointment - Allergy  Update since discharge: improved.       Plan of care communicated with patient           Has improved since hosptailiaztion, little to no cough. Pain to left upper chest when cough, no heart flutter, started during hospitalization, can start to feel it when she coughs. SpO2 normal today.         Objective    /69 (BP Location: Left arm, Patient Position: Sitting, Cuff Size: Adult Regular)   Pulse 85   Temp 97.8  F (36.6  C) (Temporal)   Wt 72.9 kg (160 lb 12.8 oz)   LMP 03/23/2024 (Exact Date)   SpO2 98%   BMI 30.89 kg/m    Body mass index is 30.89 kg/m .  Physical Exam   GENERAL: alert and no distress  RESP: lungs clear to auscultation - no rales, rhonchi or wheezes  CV: regular rate and rhythm, normal S1 S2, no S3 or S4, no murmur, click or rub, no peripheral edema            Signed Electronically by: VIANEY Carbajal CNP    Answers submitted by the patient for this visit:  Patient Health Questionnaire (Submitted on 4/11/2024)  If you checked off any problems, how difficult have these problems made it for you to do your work, take care of things at home, or get along with other people?: Very difficult  PHQ9 TOTAL SCORE: 21

## 2024-04-22 ASSESSMENT — ASTHMA QUESTIONNAIRES
QUESTION_4 LAST FOUR WEEKS HOW OFTEN HAVE YOU USED YOUR RESCUE INHALER OR NEBULIZER MEDICATION (SUCH AS ALBUTEROL): ONE OR TWO TIMES PER DAY
ACT_TOTALSCORE: 17
QUESTION_5 LAST FOUR WEEKS HOW WOULD YOU RATE YOUR ASTHMA CONTROL: SOMEWHAT CONTROLLED
QUESTION_2 LAST FOUR WEEKS HOW OFTEN HAVE YOU HAD SHORTNESS OF BREATH: ONCE OR TWICE A WEEK
EMERGENCY_ROOM_LAST_YEAR_TOTAL: TWO
HOSPITALIZATION_OVERNIGHT_LAST_YEAR_TOTAL: ONE
ACT_TOTALSCORE: 17
QUESTION_3 LAST FOUR WEEKS HOW OFTEN DID YOUR ASTHMA SYMPTOMS (WHEEZING, COUGHING, SHORTNESS OF BREATH, CHEST TIGHTNESS OR PAIN) WAKE YOU UP AT NIGHT OR EARLIER THAN USUAL IN THE MORNING: ONCE OR TWICE
QUESTION_1 LAST FOUR WEEKS HOW MUCH OF THE TIME DID YOUR ASTHMA KEEP YOU FROM GETTING AS MUCH DONE AT WORK, SCHOOL OR AT HOME: A LITTLE OF THE TIME

## 2024-04-23 ENCOUNTER — OFFICE VISIT (OUTPATIENT)
Dept: ALLERGY | Facility: CLINIC | Age: 34
End: 2024-04-23
Payer: COMMERCIAL

## 2024-04-23 VITALS — HEART RATE: 91 BPM | SYSTOLIC BLOOD PRESSURE: 122 MMHG | OXYGEN SATURATION: 93 % | DIASTOLIC BLOOD PRESSURE: 71 MMHG

## 2024-04-23 DIAGNOSIS — J45.40 UNCONTROLLED MODERATE PERSISTENT ASTHMA: Primary | ICD-10-CM

## 2024-04-23 DIAGNOSIS — D72.119 HYPEREOSINOPHILIC SYNDROME, UNSPECIFIED TYPE: ICD-10-CM

## 2024-04-23 PROCEDURE — 99214 OFFICE O/P EST MOD 30 MIN: CPT | Performed by: ALLERGY & IMMUNOLOGY

## 2024-04-23 RX ORDER — IPRATROPIUM BROMIDE AND ALBUTEROL SULFATE 2.5; .5 MG/3ML; MG/3ML
1 SOLUTION RESPIRATORY (INHALATION) EVERY 6 HOURS PRN
Qty: 90 ML | Refills: 1 | Status: SHIPPED | OUTPATIENT
Start: 2024-04-23

## 2024-04-23 RX ORDER — ALBUTEROL SULFATE 90 UG/1
2 AEROSOL, METERED RESPIRATORY (INHALATION) EVERY 4 HOURS PRN
Qty: 18 G | Refills: 1 | Status: SHIPPED | OUTPATIENT
Start: 2024-04-23

## 2024-04-23 RX ORDER — FLUTICASONE FUROATE AND VILANTEROL 200; 25 UG/1; UG/1
1 POWDER RESPIRATORY (INHALATION) DAILY
Qty: 1 EACH | Refills: 2 | Status: SHIPPED | OUTPATIENT
Start: 2024-04-23 | End: 2024-05-17

## 2024-04-23 NOTE — Clinical Note
4/23/2024         RE: Reginaldo Ferraro  [address] [facility] [address]        Dear Colleague,    Thank you for referring your patient, Reginaldo Ferraro, to the [facility]. Please see a copy of my visit note below.    Reginaldo Ferraro was seen in the Allergy Clinic at {Allergyclinics:[id number]}.      Reginaldo Ferraro is a 33 year old Not  or  female who is seen today for a follow-up visit.    She was feeling well until March 23rd. Thought she had a cold and was taking OTC medications. Ended up in the ED and was admitted from 4/6-4/9 with an asthma exacerbation. Felt well for the next week and was able to engage in her usual activities including playing soccer. The next week her symptoms returned and she went back to the ED. She was given multiple neb treatments and started on steroids and discharged.    Hasn't been able to find the Breo medication - last took it a few months ago. Found an old Symbicort inhaler at home and started using it in the last few days.    Has had 3 courses of prednisone in the last 6 months - November, March, April  History of Present Illness      Results        Past Medical History:   Diagnosis Date    Abnormal Pap smear of cervix 12/23/2011 9/21 LSIL    Cervical high risk HPV (human papillomavirus) test positive 2013    10/31/14, 5/21/18    Depressive disorder 12/23/2011    possibly chronic    Environmental allergies     Influenza A with pneumonia 12/24/2021    Migraine     Migraines     headaches with allergies    Moderate major depression 12/23/2011    Moderate major depression (H) 12/23/2011    Moderate persistent asthma 3/17/2022    Nasal obstruction 2/15/2022    Added automatically from request for surgery [id number]    Nasal septal deviation 2/15/2022    Added automatically from request for surgery [id number]    Nasal turbinate hypertrophy 2/15/2022    Added automatically from request for surgery [id number]    NO ACTIVE PROBLEMS      Seasonal allergic rhinitis     Sepsis (H) 12/24/2021     Family History   Problem Relation Age of Onset    Arthritis Mother     Lupus Mother     Heart Disease Father     Cerebrovascular Disease Father         stroke    Hyperlipidemia Father     Diabetes Paternal Grandmother     Hypertension Paternal Grandmother     Respiratory Paternal Grandmother         asthma    Colon Cancer No family hx of     Breast Cancer No family hx of     Coronary Artery Disease No family hx of      Social History     Tobacco Use    Smoking status: Never     Passive exposure: Never    Smokeless tobacco: Never   Vaping Use    Vaping status: Never Used   Substance Use Topics    Alcohol use: Yes     Comment: Social    Drug use: No     Social History     Social History Narrative    ENVIRONMENTAL HISTORY: [place] The home is heated with a forced air. They does have central air conditioning. The patient's bedroom is furnished with feather/wool bedding or pillows and carpeting in bedroom.  Pets inside the house include 2 cat(s) and 1 dog(s). There is no history of cockroach or mice infestation. There is/are 0 smokers living in the house.  There is/are 0 who smoke ecigarettes/vape living in the house.The house does not have a damp basement.             Past medical, family, and social history were reviewed.        Current Outpatient Medications:     ALPRAZolam (XANAX) 0.25 MG tablet, Take 1 tablet (0.25 mg) by mouth at bedtime as needed, may repeat once for anxiety, Disp: 30 tablet, Rfl: 0    azelastine (OPTIVAR) 0.05 % ophthalmic solution, Apply 1 drop to eye, Disp: , Rfl:     benzonatate (TESSALON) 200 MG capsule, Take 1 capsule (200 mg) by mouth 3 times daily as needed for cough, Disp: 40 capsule, Rfl: 0    cholecalciferol (D3 HIGH POTENCY) 50 MCG (2000 UT) CAPS, Take 50 mcg by mouth daily (Patient not taking: Reported on 4/12/2024), Disp: 180 capsule, Rfl: 1    Fexofenadine HCl (ALLEGRA ALLERGY  PO), , Disp: , Rfl:     fluticasone-vilanterol (BREO ELLIPTA) 100-25 MCG/ACT inhaler, Inhale 1 puff into the lungs daily (Patient not taking: Reported on 4/12/2024), Disp: 1 each, Rfl: 0    guaiFENesin-codeine (ROBITUSSIN AC) 100-10 MG/5ML solution, Take 10 mLs by mouth, Disp: , Rfl:     ibuprofen (ADVIL/MOTRIN) 200 MG tablet, Take 400 mg by mouth, Disp: , Rfl:     ipratropium - albuterol 0.5 mg/2.5 mg/3 mL (DUONEB) 0.5-2.5 (3) MG/3ML neb solution, Take 1 vial by nebulization every 6 hours as needed for shortness of breath, wheezing or cough, Disp: , Rfl:     ORDER FOR ALLERGEN IMMUNOTHERAPY, Name of Mix: Mix #1  Dust Mite, Cat, Dog Cat Hair, Standardized A.P. 10,000 BAU/mL, HS  2.0 ml Dog Hair-Dander, UF  1:650 w/v, HS  1.0 ml Dust Mites DF. 10,000 AU/mL, HS  1.0 ml Dust Mites DP. 10,000 AU/mL, HS  1.0 ml  Diluent: HSA qs to 5ml, Disp: 5 mL, Rfl: PRN    ORDER FOR ALLERGEN IMMUNOTHERAPY, Name of Mix: Mix #2  Tree  Sylvain, White 1:20 w/v, HS  0.5 ml Birch Mix PRW 1:20 w/v, HS  0.5 ml Boxelder-Maple Mix BHR (Boxelder Hard Red) 1:20 w/v, HS  0.5 ml [place], Common 1:20 w/v, HS  0.5 ml Oak Mix RVW 1:20 w/v, HS 0.5 ml Pine Mix 1:20 w/v, HS 0.5 ml [place] Tree, Black 1:20 w/v, HS 0.5 ml Diluent: HSA qs to 5ml, Disp: 5 mL, Rfl: PRN    ORDER FOR ALLERGEN IMMUNOTHERAPY, Name of Mix: Mix #3  Grass, Weeds Prabhu Grass 1:20 w/v, HS 0.5 ml Steve Grass (Std) 100,000 BAU/mL, HS 0.4 ml Lamb's Quarters 1:20 w/v, HS 0.5 ml Nettle 1:20 w/v, HS 0.5 ml Plantain, English 1:20 w/v, HS 0.5 ml Ragweed, Mix (Giant, Short) 1:20 w/v, HS 0.5 ml Russian Thistle 1:20 w/v, HS 0.5 ml Sorrel, Sheep 1:20 w/v, HS 0.5 ml Diluent: HSA qs to 5ml, Disp: 5 mL, Rfl: PRN    predniSONE (DELTASONE) 10 MG tablet, 40 mg daily x2 days than 30 mg daily x2 days than 20 mg daily x2 days than 10 mg daily x2 days than stop, Disp: , Rfl:     Semaglutide-Weight Management (WEGOVY) 0.25 MG/0.5ML pen, Inject 0.25 mg Subcutaneous once a week 4 pens. Will ramp up dose  monthly if tolerating well to goal of 2.4mg/week eventually. (Patient not taking: Reported on 4/12/2024), Disp: 2 mL, Rfl: 0    [START ON 4/25/2024] Semaglutide-Weight Management (WEGOVY) 0.5 MG/0.5ML pen, Inject 0.5 mg Subcutaneous every 7 days 4 pens (Patient not taking: Reported on 4/12/2024), Disp: 2 mL, Rfl: 0    VENTOLIN  (90 Base) MCG/ACT inhaler, Inhale 1-2 puffs into the lungs every 4 hours as needed for shortness of breath or wheezing, Disp: 18 g, Rfl: 1  No Known Allergies    EXAM:   LMP 03/23/2024 (Exact Date)   Physical Exam      WORKUP:  {ALLERGYWORKUP:764249}    ASSESSMENT/PLAN:  Reginaldo Ferraro is a 33 year old female here for a follow-up visit.  Assessment & Plan      ***    Follow-up in ***      Thank you for allowing me to participate in the care of Reginaldo Ferraro.      A total of *** minutes, outside of separately billable procedures and injections, was spent on the day of the encounter performing chart review, history and exam, documentation, and counseling and coordination of care as noted above.      Polo Mccain MD, FAAAAI  Allergy/Immunology  [facility]      Chart documentation done in part with Dragon Voice Recognition Software. Although reviewed after completion, some word and grammatical errors may remain.      Again, thank you for allowing me to participate in the care of your patient.        Sincerely,        Polo Mccain MD   Has The Patient Been Vaccinated For Covid-19?: Yes Previous Infection Text: The patient has recovered from a previous COVID-19 infection. Detail Level: Simple Has The Patient Been Infected With Covid-19 In The Past?: No Which Covid 19 Vaccine Did Patient Receive (Optional)?: Moderna

## 2024-04-23 NOTE — PATIENT INSTRUCTIONS
If you have any questions regarding your allergies, asthma, or what we discussed during your visit today please call the allergy clinic or contact us via [facility].    [facility] Allergy RN Line: [phone] - call this number with any questions during or after business/clinic hours  [facility] Allergy Scheduling - Adult Patients: [phone]  [facility] All[facility] Scheduling - Pediatric Patients: [phone]    All visits for food challenges, medication/drug allergy testing, and drug challenges MUST be scheduled through the allergy clinic nurse. Please call the nurse at [phone] or send a [facility] message for scheduling. Appointments for these visits that are made through the schedulers or via [facility] may be cancelled or rescheduled.    Clinic Schedule:   [place] - Monday, Tuesday, and Thursday  [address] [place] [address]    [facility] - Wednesday  [address] [place] [address]      Start the higher dose of Breo - 1 puff once a day. Rinse and brush your teeth afterwards    Schedule a lab appointment in 1 month

## 2024-04-23 NOTE — PROGRESS NOTES
Reginaldo Ferraro was seen in the Allergy Clinic at [facility].      Reginaldo Ferraro is a 33 year old Not  or  female who is seen today for a follow-up visit.    She was feeling well until March 23rd. Thought she had a cold and was taking OTC medications. Ended up in the ED and was admitted from 4/6-4/9 with an asthma exacerbation. Felt well for the next week and was able to engage in her usual activities including playing soccer. The next week her symptoms returned and she went back to the ED. She was given multiple neb treatments and started on steroids and discharged.    Hasn't been able to find the Breo medication - last took it a few months ago. Found an old Symbicort inhaler at home and started using it in the last few days.    Has had 3 courses of prednisone in the last 6 months - November, March, April  History of Present Illness      Results        Past Medical History:   Diagnosis Date    Abnormal Pap smear of cervix 12/23/2011 9/21 LSIL    Cervical high risk HPV (human papillomavirus) test positive 2013    10/31/14, 5/21/18    Depressive disorder 12/23/2011    possibly chronic    Environmental allergies     Influenza A with pneumonia 12/24/2021    Migraine     Migraines     headaches with allergies    Moderate major depression 12/23/2011    Moderate major depression (H) 12/23/2011    Moderate persistent asthma 3/17/2022    Nasal obstruction 2/15/2022    Added automatically from request for surgery [id number]    Nasal septal deviation 2/15/2022    Added automatically from request for surgery [id number]    Nasal turbinate hypertrophy 2/15/2022    Added automatically from request for surgery [id number]    NO ACTIVE PROBLEMS     Seasonal allergic rhinitis     Sepsis (H) 12/24/2021     Family History   Problem Relation Age of Onset    Arthritis Mother     Lupus Mother     Heart Disease Father     Cerebrovascular Disease Father         stroke    Hyperlipidemia Father      Diabetes Paternal Grandmother     Hypertension Paternal Grandmother     Respiratory Paternal Grandmother         asthma    Colon Cancer No family hx of     Breast Cancer No family hx of     Coronary Artery Disease No family hx of      Social History     Tobacco Use    Smoking status: Never     Passive exposure: Never    Smokeless tobacco: Never   Vaping Use    Vaping status: Never Used   Substance Use Topics    Alcohol use: Yes     Comment: Social    Drug use: No     Social History     Social History Narrative    ENVIRONMENTAL HISTORY: [place] The home is heated with a forced air. They does have central air conditioning. The patient's bedroom is furnished with feather/wool bedding or pillows and carpeting in bedroom.  Pets inside the house include 2 cat(s) and 1 dog(s). There is no history of cockroach or mice infestation. There is/are 0 smokers living in the house.  There is/are 0 who smoke ecigarettes/vape living in the house.The house does not have a damp basement.             Past medical, family, and social history were reviewed.        Current Outpatient Medications:     albuterol (PROAIR HFA/PROVENTIL HFA/VENTOLIN HFA) 108 (90 Base) MCG/ACT inhaler, Inhale 2 puffs into the lungs every 4 hours as needed for shortness of breath, wheezing or cough, Disp: 18 g, Rfl: 1    ALPRAZolam (XANAX) 0.25 MG tablet, Take 1 tablet (0.25 mg) by mouth at bedtime as needed, may repeat once for anxiety, Disp: 30 tablet, Rfl: 0    azelastine (OPTIVAR) 0.05 % ophthalmic solution, Apply 1 drop to eye, Disp: , Rfl:     benzonatate (TESSALON) 200 MG capsule, Take 1 capsule (200 mg) by mouth 3 times daily as needed for cough, Disp: 40 capsule, Rfl: 0    Fexofenadine HCl (ALLEGRA ALLERGY PO), , Disp: , Rfl:     fluticasone-vilanterol (BREO ELLIPTA) 200-25 MCG/ACT inhaler, Inhale 1 puff into the lungs daily, Disp: 1 each, Rfl: 2    guaiFENesin-codeine (ROBITUSSIN AC) 100-10 MG/5ML  solution, Take 10 mLs by mouth, Disp: , Rfl:     ipratropium - albuterol 0.5 mg/2.5 mg/3 mL (DUONEB) 0.5-2.5 (3) MG/3ML neb solution, Take 1 vial (3 mLs) by nebulization every 6 hours as needed for shortness of breath, wheezing or cough, Disp: 90 mL, Rfl: 1    predniSONE (DELTASONE) 10 MG tablet, 40 mg daily x2 days than 30 mg daily x2 days than 20 mg daily x2 days than 10 mg daily x2 days than stop, Disp: , Rfl:     Semaglutide-Weight Management (WEGOVY) 0.25 MG/0.5ML pen, Inject 0.25 mg Subcutaneous once a week 4 pens. Will ramp up dose monthly if tolerating well to goal of 2.4mg/week eventually., Disp: 2 mL, Rfl: 0    VENTOLIN  (90 Base) MCG/ACT inhaler, Inhale 1-2 puffs into the lungs every 4 hours as needed for shortness of breath or wheezing, Disp: 18 g, Rfl: 1    cholecalciferol (D3 HIGH POTENCY) 50 MCG (2000 UT) CAPS, Take 50 mcg by mouth daily (Patient not taking: Reported on 4/12/2024), Disp: 180 capsule, Rfl: 1    ibuprofen (ADVIL/MOTRIN) 200 MG tablet, Take 400 mg by mouth, Disp: , Rfl:     mometasone-formoterol (DULERA) 200-5 MCG/ACT inhaler, Inhale 2 puffs into the lungs 2 times daily, Disp: 13 g, Rfl: 2    ORDER FOR ALLERGEN IMMUNOTHERAPY, Name of Mix: Mix #1  Dust Mite, Cat, Dog Cat Hair, Standardized A.P. 10,000 BAU/mL, HS  2.0 ml Dog Hair-Dander, UF  1:650 w/v, HS  1.0 ml Dust Mites DF. 10,000 AU/mL, HS  1.0 ml Dust Mites DP. 10,000 AU/mL, HS  1.0 ml  Diluent: HSA qs to 5ml (Patient not taking: Reported on 4/23/2024), Disp: 5 mL, Rfl: PRN    ORDER FOR ALLERGEN IMMUNOTHERAPY, Name of Mix: Mix #2  Tree  Sylvain, White 1:20 w/v, HS  0.5 ml Birch Mix PRW 1:20 w/v, HS  0.5 ml Boxelder-Maple Mix BHR (Boxelder Hard Red) 1:20 w/v, HS  0.5 ml [place], Common 1:20 w/v, HS  0.5 ml Oak Mix RVW 1:20 w/v, HS 0.5 ml Pine Mix 1:20 w/v, HS 0.5 ml [place] Tree, Black 1:20 w/v, HS 0.5 ml Diluent: HSA qs to 5ml (Patient not taking: Reported on 4/23/2024), Disp: 5 mL, Rfl: PRN    ORDER FOR ALLERGEN IMMUNOTHERAPY,  Name of Mix: Mix #3  Grass, Weeds Prabhu Grass 1:20 w/v, HS 0.5 ml Steve Grass (Std) 100,000 BAU/mL, HS 0.4 ml Lamb's Quarters 1:20 w/v, HS 0.5 ml Nettle 1:20 w/v, HS 0.5 ml Plantain, English 1:20 w/v, HS 0.5 ml Ragweed, Mix (Giant, Short) 1:20 w/v, HS 0.5 ml Russian Thistle 1:20 w/v, HS 0.5 ml Sorrel, Sheep 1:20 w/v, HS 0.5 ml Diluent: HSA qs to 5ml (Patient not taking: Reported on 4/23/2024), Disp: 5 mL, Rfl: PRN    Semaglutide-Weight Management (WEGOVY) 0.5 MG/0.5ML pen, Inject 0.5 mg Subcutaneous every 7 days 4 pens (Patient not taking: Reported on 4/12/2024), Disp: 2 mL, Rfl: 0  No Known Allergies    EXAM:   /71 (BP Location: Left arm, Patient Position: Sitting, Cuff Size: Adult Large)   Pulse 91   LMP 03/23/2024 (Exact Date)   SpO2 93%   Physical Exam  Vitals and nursing note reviewed.   Constitutional:       Appearance: Normal appearance.   HENT:      Head: Normocephalic and atraumatic.      Right Ear: External ear normal.      Left Ear: External ear normal.      Nose: No rhinorrhea.      Mouth/Throat:      Mouth: Mucous membranes are moist. No oral lesions.      Pharynx: Oropharynx is clear. Uvula midline. No posterior oropharyngeal erythema.   Eyes:      General: Lids are normal.      Extraocular Movements: Extraocular movements intact.      Conjunctiva/sclera: Conjunctivae normal.   Neck:      Comments: No asymmetry, masses, or scars  Cardiovascular:      Rate and Rhythm: Normal rate and regular rhythm.      Heart sounds: S1 normal and S2 normal. No murmur heard.  Pulmonary:      Effort: Pulmonary effort is normal. No respiratory distress.      Breath sounds: Normal breath sounds and air entry.   Musculoskeletal:      Comments: No musculoskeletal defects appreciated   Skin:     General: Skin is warm and dry.      Findings: No lesion or rash.   Neurological:      General: No focal deficit present.      Mental Status: She is alert.   Psychiatric:         Mood and Affect: Mood and affect normal.            WORKUP:  None    ASSESSMENT/PLAN:  Reginaldo Ferraro is a 33 year old female here for a follow-up visit.  Assessment & Plan    1. Uncontrolled moderate persistent asthma - She has had several exacerbations and one hospitalization in the past 6 months. Most recently seen in the ED a few days ago and prescribed the third course of steroids in the past few months. Symptoms have not been well controlled and this is likely further compounded by the fact that she has not been taking ICS/LABA therapy regularly due to difficulty getting her medication supplied by the pharmacy. Plan to resume ICS/LABA therapy today and may switch to an alternative medication if needed. Additionally, review of past lab results is notable for an elevated eosinophil level. Plan to repeat this and initiate a work up for hypereosinophilia though will plan to wait on obtaining labs until 4 weeks after she has completed her course of prednisone. She also has upcoming evaluation and PFTs scheduled with pulmonary medicine.    - fluticasone-vilanterol (BREO ELLIPTA) 200-25 MCG/ACT inhaler; Inhale 1 puff into the lungs daily  Dispense: 1 each; Refill: 2  - ipratropium - albuterol 0.5 mg/2.5 mg/3 mL (DUONEB) 0.5-2.5 (3) MG/3ML neb solution; Take 1 vial (3 mLs) by nebulization every 6 hours as needed for shortness of breath, wheezing or cough  Dispense: 90 mL; Refill: 1  - albuterol (PROAIR HFA/PROVENTIL HFA/VENTOLIN HFA) 108 (90 Base) MCG/ACT inhaler; Inhale 2 puffs into the lungs every 4 hours as needed for shortness of breath, wheezing or cough  Dispense: 18 g; Refill: 1  - CBC with Platelets & Differential; Future  - IgE; Future  - Comprehensive metabolic panel; Future  - TSH with free T4 reflex; Future  - Troponin T, High Sensitivity; Future    2. Hypereosinophilic syndrome, unspecified type    - CBC with Platelets & Differential; Future  - IgE; Future  - Comprehensive metabolic panel; Future  - TSH with free T4 reflex; Future  - Troponin  T, High Sensitivity; Future      Follow-up in 2 months, sooner if needed      Thank you for allowing me to participate in the care of Reginaldo Ferraro.      Polo Mccain MD, FAAAAI  Allergy/Immunology  [facility]      Chart documentation done in part with Dragon Voice Recognition Software. Although reviewed after completion, some word and grammatical errors may remain.

## 2024-04-28 ENCOUNTER — MYC MEDICAL ADVICE (OUTPATIENT)
Dept: ALLERGY | Facility: CLINIC | Age: 34
End: 2024-04-28
Payer: COMMERCIAL

## 2024-04-28 DIAGNOSIS — J45.40 UNCONTROLLED MODERATE PERSISTENT ASTHMA: Primary | ICD-10-CM

## 2024-04-29 NOTE — TELEPHONE ENCOUNTER
Received fax from [employer]. Breo Ellipta is not covered by plan and prior authorization is required. Preferred alternatives are Advair HFA, AirDuo RespiClick, Arnuity Ellipta, Dulera, Breztri Aerosphere.     Forwarding to Dr. Mccain to advise.    Josefina Neal, BENJAMINN, RN

## 2024-05-17 ENCOUNTER — OFFICE VISIT (OUTPATIENT)
Dept: PULMONOLOGY | Facility: CLINIC | Age: 34
End: 2024-05-17
Payer: COMMERCIAL

## 2024-05-17 VITALS
SYSTOLIC BLOOD PRESSURE: 119 MMHG | DIASTOLIC BLOOD PRESSURE: 77 MMHG | OXYGEN SATURATION: 97 % | BODY MASS INDEX: 31.27 KG/M2 | HEART RATE: 99 BPM | WEIGHT: 162.8 LBS

## 2024-05-17 DIAGNOSIS — J30.1 SEASONAL ALLERGIC RHINITIS DUE TO POLLEN: ICD-10-CM

## 2024-05-17 DIAGNOSIS — J45.40 MODERATE PERSISTENT ASTHMA WITHOUT COMPLICATION: Primary | ICD-10-CM

## 2024-05-17 DIAGNOSIS — D72.10 EOSINOPHILIA, UNSPECIFIED TYPE: ICD-10-CM

## 2024-05-17 PROCEDURE — 99204 OFFICE O/P NEW MOD 45 MIN: CPT | Performed by: NURSE PRACTITIONER

## 2024-05-17 RX ORDER — FLUTICASONE FUROATE AND VILANTEROL 200; 25 UG/1; UG/1
1 POWDER RESPIRATORY (INHALATION) DAILY
Qty: 60 EACH | Refills: 11 | Status: SHIPPED | OUTPATIENT
Start: 2024-05-17

## 2024-05-17 ASSESSMENT — ASTHMA QUESTIONNAIRES
ACT_TOTALSCORE: 11
QUESTION_4 LAST FOUR WEEKS HOW OFTEN HAVE YOU USED YOUR RESCUE INHALER OR NEBULIZER MEDICATION (SUCH AS ALBUTEROL): THREE OR MORE TIMES PER DAY
QUESTION_3 LAST FOUR WEEKS HOW OFTEN DID YOUR ASTHMA SYMPTOMS (WHEEZING, COUGHING, SHORTNESS OF BREATH, CHEST TIGHTNESS OR PAIN) WAKE YOU UP AT NIGHT OR EARLIER THAN USUAL IN THE MORNING: TWO OR THREE NIGHTS A WEEK
HOSPITALIZATION_OVERNIGHT_LAST_YEAR_TOTAL: TWO
QUESTION_2 LAST FOUR WEEKS HOW OFTEN HAVE YOU HAD SHORTNESS OF BREATH: THREE TO SIX TIMES A WEEK
QUESTION_5 LAST FOUR WEEKS HOW WOULD YOU RATE YOUR ASTHMA CONTROL: POORLY CONTROLLED
QUESTION_1 LAST FOUR WEEKS HOW MUCH OF THE TIME DID YOUR ASTHMA KEEP YOU FROM GETTING AS MUCH DONE AT WORK, SCHOOL OR AT HOME: SOME OF THE TIME
EMERGENCY_ROOM_LAST_YEAR_TOTAL: THREE OR MORE
ACT_TOTALSCORE: 11

## 2024-05-17 NOTE — PROGRESS NOTES
Niox result was:  92 ppb.    Alan CONTRERAS CMA, M [facility]

## 2024-05-17 NOTE — PATIENT INSTRUCTIONS
It was a pleasure seeing you in clinic today. This is what we discussed:    Continue the Breo at the higher dose for a few months. If you continue to do well we can back off to the lower dose.   Use your albuterol every 4 hours as needed for cough, shortness of breath, wheeze, or chest tightness.   In the future additional inhaled steroid with your albuterol may be helpful. (AirSupra).   Follow up 1 year if refills are needed   If you have worsening symptoms, questions, or need to speak with the nurse please call [phone].

## 2024-05-17 NOTE — PROGRESS NOTES
Pulmonary Outpatient Consult Note  May 17, 2024      Assessment and Plan:   Reginaldo Ferraro is a 33 year old female, never smoker, with history of asthma, allergic rhinitis, MDD, and vitamin D deficiency presenting today for evaluation of asthma with frequent exacerbations.     PFTs in 2022 show mild obstruction with bronchodilator response and elevated FeNO at 186 ppb in 2022. 92 today.   Eos 06/2023-  elevated at 1.6     #. Asthma, likely allergic: ACT today is 11. Improvement in symptoms following both hospital stays and ED visit with use of prednisone. Previous airway obstruction with bronchodilator response, illness trigger, and response to prednisone would support asthma diagnosis although her last PFTs in 07/2023 did not show obstruction and she did well off daily ICS for many months. Previously elevated eosinophil level. Per last allergy visit, plan to initiate a work up for hypereosinophilia 4 weeks after she has completed her course of prednisone.   Follow up with allergy as planned for recheck of eosinophils and further work up.   Continue Breo (200),1 puff daily. I suggested she stay on the higher dose and a few months before starting to peel back therapy.   Continue Duonebs and albutero prn  In the future she may benefit from a prn ICS/JAI like AirSupra for exacerbation symptoms.   #. Environmental allergies: As above likely contributing to asthma control.   #. HCM: Initial COVID vaccine (needs booster), PCV 13, PCV 20, seasonal flu, and TDAP.     If symptoms exacerbate: prednisone 40mg daily x 5 days. Increase Duonebs to QID until symptoms improve.     RTC 1 year, sooner if needed    Archana Ellis, CNP  Pulmonary Medicine  ______________________________________________________________________________    CC:   Chief Complaint   Patient presents with    Consult     J45.40 (ICD-10-CM) - Moderate persistent asthma without complication-Cough,Inflammation & infection; SOB  Referred by Nano Rosario  MITZY, APRN CNP; [facility]/ Care Everywhere  Hospitalized 5/5/24-5/6/24; 4/6/24-4/9/24; Seen in ED 4/22/24  Principal Problem:  Acute severe exacerbation of asthma  Diagnoses impacting complexity:     Chronic Respiratory Failure: patient is on home oxygen   CXR 5/6/24; 4/6/24     HPI:   Reginaldo presents for evaluation of asthma.     Diagnosed with asthma years ago with good control for many years on prn albuterol or ICS/LABA.   Most recently prescribed Breo, prn Duonebs, and Allegra. Ran out of Breo and was unable to refill due to cost. Increased symptoms with frequent exacerbations over the past few months.   4/6-4/9/2024: Hospitalized for asthma exacerbation. Given prednisone and guaifenesin/codeine cough syrup. Discharged on 2 L oxygen continuous.    4/22: ED visit for asthma exacerbation. Given additional prednisone burst.   5/5-5/6/2024: Hospitalized for asthma exacerbation. Given prednisone burst. Changed to Dulera (200). Weaned to room air prior to discharge.     Currently has no symptoms. Is back to her baseline. Has been using Duonebs 0-1 time daily, has no been needing.         4/21/2024    11:04 PM 4/22/2024     4:23 PM 5/17/2024    11:50 AM   ACT Total Scores   ACT TOTAL SCORE (Goal Greater than or Equal to 20) 17 17 11   In the past 12 months, how many times did you visit the emergency room for your asthma without being admitted to the hospital? 1 2 3   In the past 12 months, how many times were you hospitalized overnight because of your asthma? 1 1 2     PMH:  Patient Active Problem List    Diagnosis Date Noted    Chronic pansinusitis 05/04/2023     Priority: Medium     Added automatically from request for surgery [id number]      Purulent rhinitis 05/04/2023     Priority: Medium     Added automatically from request for surgery [id number]      Staph aureus infection 05/04/2023     Priority: Medium     Added automatically from request for surgery [id number]      Pain in both lower legs 11/10/2022      Priority: Medium    Moderate persistent asthma 03/17/2022     Priority: Medium    Allergic rhinitis due to animals 03/17/2022     Priority: Medium    Allergic rhinitis due to dust mite 03/17/2022     Priority: Medium    Seasonal allergic rhinitis due to pollen 03/17/2022     Priority: Medium    Chronic maxillary sinusitis 02/15/2022     Priority: Medium     Added automatically from request for surgery [id number]      Weight gain due to medication 02/26/2021     Priority: Medium    Mild episode of recurrent major depressive disorder (H24) 06/11/2018     Priority: Medium    Adjustment disorder with depressed mood 08/15/2016     Priority: Medium    Mixed personality disorder (H) 08/15/2016     Priority: Medium    Insomnia, unspecified insomnia 11/02/2015     Priority: Medium    Vitamin D deficiency 11/02/2015     Priority: Medium    Cervical high risk HPV (human papillomavirus) test positive 01/17/2012     Priority: Medium     12/23/11 LSIL, cannot rule out HSIL @ age 21. Plan colposcopy within 3 months.  4/30/12 Colpo No bx taken. NIL pap neg HPV. Plan pap in 1 year  10/15/13 NIL pap, +HR HPV (58). Plan pap in 1 year   10/31/14 NIL pap, +HR HPV. Plan: cotest in 1 year  11/2/15 NIL pap, neg HPV. Plan: cotest in 3 yrs.  5/21/18 NIL pap, +HR HPV (not 16/18) @ age 27. Plan 1 year cotest per provider  9/17/20 NIL pap, HPV was not able to be added due to age of sample. Plan: Cotest in 1 yr  8/19/21 LSIL Pap, Neg HPV. [place] due by 11/19/21.    9/16/21 Colpo ECC neg. Plan cotest due 8/19/22.   9/2/22 NIL pap, Neg HPV. Plan: cotest in 1 year  10/13/23 NIL pap, +HR HPV (not 16/18). Plan: colposcopy due before 1/13/24 2/19/24 Colpo ECC neg. Plan: cotest in 1 year       PSH:  Past Surgical History:   Procedure Laterality Date    BIOPSY  2/2024    Colcoscopy    BREAST SURGERY  2018    Implants    DILATION AND EVACUATION N/A 02/16/2022    Procedure: DILATION AND EVACUATION, UTERUS;  Surgeon: Vianey Domingo MD;  Location:  OR     ENDOSCOPIC SINUS SURGERY Bilateral 04/04/2022    Procedure: FUNCTIONAL ENDOSCOPIC SINUS SURGERY, with bilateral maxillary antrostomies and bilateral michael bullosa reduction,;  Surgeon: Miquel Rutledge MD;  Location: [facility] OR    ENT SURGERY  2022 and 2023    EYE SURGERY  01/2013    Lasic surgery    GYN SURGERY      HEAD & NECK SURGERY      OPTICAL TRACKING SYSTEM ENDOSCOPIC SINUS SURGERY Bilateral 06/15/2023    Procedure: IMAGE GUIDED FUNCTIONAL ENDOSCOPIC SINUS SURGERY (FESS) WITHOUT SEPTOPLASTY, maxillary entrostomy, ethmoidectomy and turbinate reduction;  Surgeon: Atilio Murry MD;  Location: [facility] OR    VA BREAST AUGMENTATION  2018    SEPTOPLASTY, TURBINOPLASTY, COMBINED Bilateral 04/04/2022    Procedure: WITH NASAL SEPTOPLASTY and bilateral inferior turbinate reduction;  Surgeon: Miquel Rutledge MD;  Location: [facility] OR     Current Meds:  Current Outpatient Medications   Medication Sig Dispense Refill    albuterol (PROAIR HFA/PROVENTIL HFA/VENTOLIN HFA) 108 (90 Base) MCG/ACT inhaler Inhale 2 puffs into the lungs every 4 hours as needed for shortness of breath, wheezing or cough 18 g 1    ALPRAZolam (XANAX) 0.25 MG tablet Take 1 tablet (0.25 mg) by mouth at bedtime as needed, may repeat once for anxiety 30 tablet 0    azelastine (OPTIVAR) 0.05 % ophthalmic solution Apply 1 drop to eye      benzonatate (TESSALON) 200 MG capsule Take 1 capsule (200 mg) by mouth 3 times daily as needed for cough 40 capsule 0    Fexofenadine HCl (ALLEGRA ALLERGY PO)       fluticasone-vilanterol (BREO ELLIPTA) 200-25 MCG/ACT inhaler Inhale 1 puff into the lungs daily 60 each 11    guaiFENesin-codeine (ROBITUSSIN AC) 100-10 MG/5ML solution Take 10 mLs by mouth      ibuprofen (ADVIL/MOTRIN) 200 MG tablet Take 400 mg by mouth      ipratropium - albuterol 0.5 mg/2.5 mg/3 mL (DUONEB) 0.5-2.5 (3) MG/3ML neb solution Take 1 vial (3 mLs) by nebulization every 6 hours as needed for shortness of breath, wheezing or cough 90 mL 1     Semaglutide-Weight Management (WEGOVY) 0.25 MG/0.5ML pen Inject 0.25 mg Subcutaneous once a week 4 pens. Will ramp up dose monthly if tolerating well to goal of 2.4mg/week eventually. 2 mL 0    VENTOLIN  (90 Base) MCG/ACT inhaler Inhale 1-2 puffs into the lungs every 4 hours as needed for shortness of breath or wheezing 18 g 1    cholecalciferol (D3 HIGH POTENCY) 50 MCG (2000 UT) CAPS Take 50 mcg by mouth daily (Patient not taking: Reported on 4/12/2024) 180 capsule 1    ORDER FOR ALLERGEN IMMUNOTHERAPY Name of Mix: Mix #1  Dust Mite, Cat, Dog  Cat Hair, Standardized A.P. 10,000 BAU/mL, HS  2.0 ml  Dog Hair-Dander, UF  1:650 w/v, HS  1.0 ml  Dust Mites DF. 10,000 AU/mL, HS  1.0 ml  Dust Mites DP. 10,000 AU/mL, HS  1.0 ml   Diluent: HSA qs to 5ml (Patient not taking: Reported on 4/23/2024) 5 mL PRN    ORDER FOR ALLERGEN IMMUNOTHERAPY Name of Mix: Mix #2  Tree   Sylvain, White 1:20 w/v, HS  0.5 ml  Birch Mix PRW 1:20 w/v, HS  0.5 ml  Boxelder-Maple Mix BHR (Boxelder Hard Red) 1:20 w/v, HS  0.5 ml  Banner, Common 1:20 w/v, HS  0.5 ml  Oak Mix RVW 1:20 w/v, HS 0.5 ml  Pine Mix 1:20 w/v, HS 0.5 ml  Garrison Tree, Black 1:20 w/v, HS 0.5 ml  Diluent: HSA qs to 5ml (Patient not taking: Reported on 4/23/2024) 5 mL PRN    ORDER FOR ALLERGEN IMMUNOTHERAPY Name of Mix: Mix #3  Grass, Weeds  Prabhu Grass 1:20 w/v, HS 0.5 ml  Steve Grass (Std) 100,000 BAU/mL, HS 0.4 ml  Lamb's Quarters 1:20 w/v, HS 0.5 ml  Nettle 1:20 w/v, HS 0.5 ml  Plantain, English 1:20 w/v, HS 0.5 ml  Ragweed, Mix (Giant, Short) 1:20 w/v, HS 0.5 ml  Russian Thistle 1:20 w/v, HS 0.5 ml  Sorrel, Sheep 1:20 w/v, HS 0.5 ml  Diluent: HSA qs to 5ml (Patient not taking: Reported on 4/23/2024) 5 mL PRN    Semaglutide-Weight Management (WEGOVY) 0.5 MG/0.5ML pen Inject 0.5 mg Subcutaneous every 7 days 4 pens (Patient not taking: Reported on 4/12/2024) 2 mL 0     No current facility-administered medications for this visit.     Allergies:  No Known  Allergies    Social Hx:  Marital status: lives with parents and daughter   Number of children: 1  Resides in a house with no concern for mold.   Occupational history: works from home    service: none  Pets: 2 cats, 1 dog  Smoking history: never smoker   Recreational drug use: none, no vape   Recent Travel: none     Family HX: family history includes Arthritis in her mother; Cerebrovascular Disease in her father; Diabetes in her paternal grandmother; Heart Disease in her father; Hyperlipidemia in her father; Hypertension in her paternal grandmother; Lupus in her mother; Respiratory in her paternal grandmother.    ROS:   10-point review performed and notable for the above mentioned symptoms. The remainder reviewed and negative.     Physical Exam:  /77 (BP Location: Left arm, Patient Position: Sitting, Cuff Size: Adult Regular)   Pulse 99   Wt 73.8 kg (162 lb 12.8 oz)   LMP 03/23/2024 (Exact Date)   SpO2 97%   BMI 31.27 kg/m      Physical Exam  Constitutional:       General: She is not in acute distress.     Appearance: She is not ill-appearing or diaphoretic.   Cardiovascular:      Rate and Rhythm: Normal rate and regular rhythm.      Heart sounds: Normal heart sounds.   Pulmonary:      Effort: Pulmonary effort is normal. No respiratory distress.      Breath sounds: No wheezing or rhonchi.   Musculoskeletal:      Right lower leg: No edema.      Left lower leg: No edema.   Skin:     General: Skin is warm and dry.      Findings: No erythema.   Neurological:      Mental Status: She is alert.   Psychiatric:         Behavior: Behavior normal.       PFT's     07/2023  FVC 3.09L, 91% predicted  FEV1 2.58L, 90% predicted  FEV1/FVC 84% predicted  Impression: Normal spirometry    02/2022:  FVC 3.07L, 99% predicted  FEV1 2.29L, 85% predicted  FEV1/FVC 74% predicted  There is a significant bronchodilator response  FeNO: 186 ppb    Labs:   personally reviewed in the EMR.   Latest Reference Range & Units 06/09/23  08:15   Absolute Eosinophils 0.0 - 0.7 10e3/uL 1.6 (H)      Latest Reference Range & Units 02/04/21 11:50   Allergen A alternata <0.10 KU(A)/L <0.10   Allergen A fumigatus <0.10 KU(A)/L <0.10   Allergen C herbarum <0.10 KU(A)/L <0.10   Allergen Cat Dander <0.10 KU(A)/L >100.00 (H)   Allergen Cedar IgE <0.10 KU(A)/L <0.10   Allergen D farinae <0.10 KU(A)/L 0.29 (H)   Allergen, D Pteronyssinus <0.10 KU(A)/L 0.51 (H)   Allergen Dog Dander <0.10 KU(A)/L 15.40 (H)   Allergen Elm <0.10 KU(A)/L <0.10   Allergen Epicoccum purpurascens IgE <0.10 KU(A)/L 0.11 (H)   Allergen, Kochia/Firebush <0.10 KU(A)/L <0.10   Allergen Maple <0.10 KU(A)/L 0.16 (H)   Allergen Mugwort IgE <0.10 KU(A)/L <0.10   Allergen Oak(white) <0.10 KU(A)/L 9.64 (H)   Allergen P notatum <0.10 KU(A)/L <0.10   Allergen Red Iron Ridge IgE <0.10 KU(A)/L <0.10   Allergen Sagebrush Wormwood IgE <0.10 KU(A)/L <0.10   Allergen Steve <0.10 KU(A)/L 1.93 (H)   Allergen Tree White [place] IgE <0.10 KU(A)/L <0.10   Allergen Converse Tree IgE <0.10 KU(A)/L 0.12 (H)   Allergen Weed Nettle IgE <0.10 KU(A)/L 0.12 (H)   Allergen Issue <0.10 KU(A)/L 0.32 (H)   Allergen Cocksfoot <0.10 KU(A)/L 1.73 (H)   Allergen Latimer <0.10 KU(A)/L 0.18 (H)   Allergen, English Plantain <0.10 KU(A)/L 0.12 (H)   Allergen, Giant Ragweed <0.10 KU(A)/L 0.26 (H)   Allergen Prabhu Grass <0.10 KU(A)/L 0.16 (H)   Allergen, Lamb's Quarters <0.10 KU(A)/L 0.16 (H)   Allergen, Ragweed Short <0.10 KU(A)/L 3.82 (H)   Allergen Russian Thistle <0.10 KU(A)/L 0.17 (H)   Allergen Sheep Sorrel IgE <0.10 KU(A)/L 0.16 (H)   Allergen, Silver Birch <0.10 KU(A)/L 14.20 (H)   Allergen White Sylvain <0.10 KU(A)/L 0.12 (H)     Imaging studies: personally reviewed and interpreted. Below are the Radiology interpretations.    XR CHEST 1 VIEW PORTABLE, DATE: 5/6/2024   INDICATION: SOB   COMPARISON: 4/6/24, 10/22/23   IMPRESSION:   Negative chest.

## 2024-05-20 ENCOUNTER — MYC MEDICAL ADVICE (OUTPATIENT)
Dept: ALLERGY | Facility: CLINIC | Age: 34
End: 2024-05-20
Payer: COMMERCIAL

## 2024-05-26 ENCOUNTER — MYC REFILL (OUTPATIENT)
Dept: FAMILY MEDICINE | Facility: CLINIC | Age: 34
End: 2024-05-26
Payer: COMMERCIAL

## 2024-05-26 DIAGNOSIS — J31.0 PURULENT RHINITIS: Primary | ICD-10-CM

## 2024-05-28 ENCOUNTER — OFFICE VISIT (OUTPATIENT)
Dept: FAMILY MEDICINE | Facility: CLINIC | Age: 34
End: 2024-05-28
Payer: COMMERCIAL

## 2024-05-28 VITALS
BODY MASS INDEX: 31.77 KG/M2 | TEMPERATURE: 97.6 F | HEIGHT: 60 IN | HEART RATE: 68 BPM | OXYGEN SATURATION: 99 % | DIASTOLIC BLOOD PRESSURE: 69 MMHG | RESPIRATION RATE: 20 BRPM | SYSTOLIC BLOOD PRESSURE: 110 MMHG | WEIGHT: 161.8 LBS

## 2024-05-28 DIAGNOSIS — Z02.9 ADMINISTRATIVE ENCOUNTER: ICD-10-CM

## 2024-05-28 DIAGNOSIS — J45.40 MODERATE PERSISTENT ASTHMA WITHOUT COMPLICATION: Primary | ICD-10-CM

## 2024-05-28 PROCEDURE — 99213 OFFICE O/P EST LOW 20 MIN: CPT | Performed by: PHYSICIAN ASSISTANT

## 2024-05-28 RX ORDER — AZELASTINE HYDROCHLORIDE 0.5 MG/ML
1 SOLUTION/ DROPS OPHTHALMIC 2 TIMES DAILY
Qty: 5 ML | Refills: 0 | Status: SHIPPED | OUTPATIENT
Start: 2024-05-28 | End: 2024-05-31

## 2024-05-28 ASSESSMENT — PATIENT HEALTH QUESTIONNAIRE - PHQ9
10. IF YOU CHECKED OFF ANY PROBLEMS, HOW DIFFICULT HAVE THESE PROBLEMS MADE IT FOR YOU TO DO YOUR WORK, TAKE CARE OF THINGS AT HOME, OR GET ALONG WITH OTHER PEOPLE: NOT DIFFICULT AT ALL
SUM OF ALL RESPONSES TO PHQ QUESTIONS 1-9: 1
SUM OF ALL RESPONSES TO PHQ QUESTIONS 1-9: 1

## 2024-05-28 NOTE — PROGRESS NOTES
Assessment & Plan     1. Moderate persistent asthma without complication    2. Administrative encounter      We discussed her asthma treatment. Managed mainly by specialists- has follow up with pulm scheduled. Currently symptoms severe enough for 2 admissions in 1 month.   Will do intermittent FMLA for 4 times/month 1-2 days right now. Likely will use much less, but need to cover when severe.           BMI  Estimated body mass index is 31.6 kg/m  as calculated from the following:    Height as of this encounter: 1.524 m (5').    Weight as of this encounter: 73.4 kg (161 lb 12.8 oz).             Judith Hair is a 33 year old, presenting for the following health issues:  Asthma and Forms      5/28/2024     3:55 PM   Additional Questions   Roomed by memo deshpande         5/28/2024   Forms   Any forms needing to be completed Yes     History of Present Illness     Asthma:  She presents for follow up of asthma.  She has no cough, no wheezing, and no shortness of breath.  She is using a relief medication a few times a month. She does not miss any doses of her controller medication throughout the week. Patient is aware of the following triggers: same as previous visit. The patient has had a visit to the Emergency Room, Urgent Care or Hospital due to asthma since the last clinic visit. She has been to the Emergency Room or Urgent Care 1 time.She has had a Hospitalization 0 times.    Reason for visit:  Allergic asthma reaction    She eats 2-3 servings of fruits and vegetables daily.She consumes 0 sweetened beverage(s) daily.She exercises with enough effort to increase her heart rate 30 to 60 minutes per day.  She exercises with enough effort to increase her heart rate 3 or less days per week.   She is taking medications regularly.       Discuss forms  Patient needs FMLA filled out to cover her recent hospitalizations and ED visits. Asthma has been uncontrolled.   ED- 4/22  Admission 5/5-5/6    Needs time off for  speciality visits- scheduled monthly right now.   Also needs intermittent to cover possible flares.   Usually off work 2-3 days with a flare- as she is doing freq. Albuterol and needs to talk on the phone.                     Objective    /69 (BP Location: Left arm, Patient Position: Chair, Cuff Size: Adult Regular)   Pulse 68   Temp 97.6  F (36.4  C) (Temporal)   Resp 20   Ht 1.524 m (5')   Wt 73.4 kg (161 lb 12.8 oz)   LMP 05/28/2024 (Exact Date)   SpO2 99%   BMI 31.60 kg/m    Body mass index is 31.6 kg/m .  Physical Exam   GENERAL: alert and no distress  PSYCH: mentation appears normal, affect normal/bright            Signed Electronically by: Cally Rucker PA-C

## 2024-05-30 ENCOUNTER — TELEPHONE (OUTPATIENT)
Dept: FAMILY MEDICINE | Facility: CLINIC | Age: 34
End: 2024-05-30
Payer: COMMERCIAL

## 2024-05-30 ENCOUNTER — MYC MEDICAL ADVICE (OUTPATIENT)
Dept: FAMILY MEDICINE | Facility: CLINIC | Age: 34
End: 2024-05-30
Payer: COMMERCIAL

## 2024-05-30 DIAGNOSIS — J31.0 PURULENT RHINITIS: ICD-10-CM

## 2024-05-30 NOTE — TELEPHONE ENCOUNTER
Prior Authorization Retail Medication Request    Medication/Dose: azelastine (OPTIVAR) 0.05 % ophthalmic solution  Diagnosis and ICD code (if different than what is on RX):    New/renewal/insurance change PA/secondary ins. PA:  Previously Tried and Failed:    Rationale:      Insurance   Primary: [employer]  Insurance ID:  [id number]    Secondary (if applicable):Medicaid  Insurance ID:  [id number]    Pharmacy Information (if different than what is on RX)  Name:  [facility]  Phone:  [phone]  Fax:[phone]    Stephie BROOKS MA

## 2024-05-30 NOTE — TELEPHONE ENCOUNTER
Routing to provider - do you want to prescribe an alternative or go forward with PA?    Stephie Hampton BSN, RN

## 2024-05-31 RX ORDER — AZELASTINE HYDROCHLORIDE 0.5 MG/ML
1 SOLUTION/ DROPS OPHTHALMIC 2 TIMES DAILY
Qty: 5 ML | Refills: 0 | Status: SHIPPED | OUTPATIENT
Start: 2024-05-31 | End: 2024-06-07

## 2024-05-31 NOTE — TELEPHONE ENCOUNTER
PA has been started, closing encounter    Patient wondering if any over the counters are available in the mean time    Ann Marie Mejia RN  [facility]

## 2024-06-04 ENCOUNTER — MYC MEDICAL ADVICE (OUTPATIENT)
Dept: SURGERY | Facility: CLINIC | Age: 34
End: 2024-06-04
Payer: COMMERCIAL

## 2024-06-04 ENCOUNTER — TELEPHONE (OUTPATIENT)
Dept: FAMILY MEDICINE | Facility: CLINIC | Age: 34
End: 2024-06-04
Payer: COMMERCIAL

## 2024-06-04 DIAGNOSIS — E66.811 CLASS 1 DRUG-INDUCED OBESITY WITHOUT SERIOUS COMORBIDITY WITH BODY MASS INDEX (BMI) OF 30.0 TO 30.9 IN ADULT: Primary | ICD-10-CM

## 2024-06-04 DIAGNOSIS — E66.1 CLASS 1 DRUG-INDUCED OBESITY WITHOUT SERIOUS COMORBIDITY WITH BODY MASS INDEX (BMI) OF 30.0 TO 30.9 IN ADULT: Primary | ICD-10-CM

## 2024-06-04 NOTE — LETTER
June 4, 2024    To  Reginaldo Ferraro  [address] [place] [id number]    Your team at [facility] cares about your health. We have reviewed your chart and based on our findings; we are making the following recommendations to better manage your health.     You are in particular need of attention regarding the following:     Asthma Control Test     This screening tool helps us to assess how well your asthma is controlled.Good asthma control leads to fewer asthma symptoms and greater health. If your asthma is not in good control (score is 19 or less) or you have been to the ER or urgent care for your asthma, it is recommended you be seen by your provider for medication and lifestyle adjustments.      Please complete and return the attached Asthma Control Test respond below with you answers for each question: Adult ACT     This is valuable information that is requested by your Care Team.      If you have already completed these items, please contact the clinic via phone or   [facility] so your care team can review and update your records. Thank you for   choosing [facility] for your healthcare needs. For any questions,   concerns, or to schedule an appointment please contact our clinic.    Healthy Regards,      Your [facility] Care Team

## 2024-06-04 NOTE — TELEPHONE ENCOUNTER
Patient Quality Outreach    Patient is due for the following:   Asthma  -  ACT needed    Next Steps:   Patient was assigned appropriate questionnaire to complete    Type of outreach:    Sent letter. and Copy of ACT mailed to patient.      Questions for provider review:    None           Stephie Figueroa CMA  Chart routed to Care Team.

## 2024-06-07 RX ORDER — AZELASTINE HYDROCHLORIDE 0.5 MG/ML
1 SOLUTION/ DROPS OPHTHALMIC 2 TIMES DAILY
Qty: 5 ML | Refills: 0 | Status: SHIPPED | OUTPATIENT
Start: 2024-06-07

## 2024-06-07 NOTE — TELEPHONE ENCOUNTER
RN contacted pharmacy and prescription for Optivar was cancelled in their system, so new rx is needed. Script sent to pharmacy.    Archana Aviles RN  [facility]

## 2024-06-07 NOTE — TELEPHONE ENCOUNTER
Please clarify with patient. We have started a PA for the drops. The Zatidor I mentioned is over the counter and not covered by insurance either. We are waiting on her insurance to respond.   ASSESSMENT:  G

## 2024-06-10 NOTE — PROGRESS NOTES
"  Assessment & Plan     Acute bronchospasm  Still tight but improving   continue prednisone/ albuterol  Follow up next week with PCP   is currently unable to work due to wheezing   - fluticasone-salmeterol (ADVAIR) 250-50 MCG/DOSE inhaler; Inhale 1 puff into the lungs every 12 hours  - Adult Pulmonary Medicine Referral    Fetal cystic hygroma, single gestation  hopefully will be able to have D and C next week       Review of external notes as documented elsewhere in note  Review of the result(s) of each unique test - CXR  8 minutes spent on the date of the encounter doing review of outside records and review of test results        BMI:   Estimated body mass index is 26.07 kg/m  as calculated from the following:    Height as of this encounter: 1.549 m (5' 1\").    Weight as of this encounter: 62.6 kg (138 lb).       Rest, letter for work  See Patient Instructions    No follow-ups on file.    Constantino Amaya MD  [facility] GAVIN Hair is a 31 year old who presents for the following health issues     HPI       Hospital Follow-up Visit:    Hospital/Nursing Home/ Rehab Facility: [facility] Emergency Room  Date of Admission: 2/9/22  Date of Discharge: 2/9/22  Reason(s) for Admission: bronchospasm      Was your hospitalization related to COVID-19? No   Problems taking medications regularly:  None  Medication changes since discharge: None  Problems adhering to non-medication therapy:  None    Summary of hospitalization:  CareEverywhere information obtained and reviewed  Diagnostic Tests/Treatments reviewed.  Follow up needed: with PCP and GYN  Other Healthcare Providers Involved in Patient s Care:         Specialist appointment - GYN  Update since discharge: fluctuating course. Post Discharge Medication Reconciliation: discharge medications reconciled and changed, per note/orders.  Plan of care communicated with patient              Review of Systems " - continue vitamin D.    "  Constitutional, HEENT, cardiovascular, pulmonary, gi and gu systems are negative, except as otherwise noted.      Objective    /71 (BP Location: Left arm)   Pulse 79   Temp 97.3  F (36.3  C)   Ht 1.549 m (5' 1\")   Wt 62.6 kg (138 lb)   LMP 10/22/2021   SpO2 97%   BMI 26.07 kg/m    Body mass index is 26.07 kg/m .  Physical Exam   GENERAL: alert, mild distress and fatigued  HENT: ear canals and TM's normal, nose and mouth without ulcers or lesions  NECK: no adenopathy, no asymmetry, masses, or scars and thyroid normal to palpation  RESP: no rales , no rhonchi and expiratory wheezes throughout  CV: regular rate and rhythm, normal S1 S2, no S3 or S4, no murmur, click or rub, no peripheral edema and peripheral pulses strong  SKIN: no suspicious lesions or rashes  PSYCH: mentation appears normal, affect normal/bright, tearful, anxious and fatigued    Office Visit on 01/25/2022   Component Date Value Ref Range Status     MCCA Specimen 01/25/2022 SEND [facility]   Final     ABO/RH(D) 01/25/2022 O POS   Final     Antibody Screen 01/25/2022 Negative  Negative Final     SPECIMEN EXPIRATION DATE 01/25/2022 [id number]   Final     See Scanned Result 01/25/2022 Specimen received. Reordered and sent to performing laboratory. Report to follow up on completion.    Final     Performing Laboratory 01/25/2022 [facility]   Final     Test Name 01/25/2022 Cytogenetics Grow and Send   Final     Test Code 01/25/2022 [id number]   Final     Chorionic Villus, FISH 01/25/2022 See Note  Normal Final    Comment: Test Performed: Chorionic Villus FISH Reflex ([facility] F RFLX)  Specimen Type: Direct (uncultured) villi  Indication for Testing: Cystic Hygroma, Pleural Effusion   -----------------------------------------------------------  RESULT  Abnormal FISH Result (Female)    Monosomy X    This specimen is being reflexed to chromosome analysis.  -----------------------------------------------------------  INTERPRETATION  This analysis " showed a single hybridization signal for   chromosome X, consistent with monosomy X.    Aneuploidy of other chromosomes, structural abnormalities,   and mosaicism have not been ruled out by this analysis.   According to ACMG guidelines, clinical decision-making   should not be based on the result of this test alone.   Additional testing is recommended for the final   interpretation of this result; pending results will be   reported separately.    FISH analysis performed on [facility] presumes that the fetal   chromosome complement is accurately reflected in the                              extra-embryonic tissue. Rarely, testing of placenta/villi   will yield results that differ from those obtained from   testing the fetus or .     NOTE: Interphase FISH analysis cannot provide structural   information accounting for this loss. Please refer to the   pending chromosome analysis for further characterization of   the structure of this finding.    This analysis was performed with chromosome enumeration   probes for 13, 18, 21, X and Y using the AneuVysion probe   kit ([employer]). A total of 50 interphase cells were   scored for each probe.    Recommendation:  Genetic counseling    Health care providers with questions may contact an [facility]   genetic counselor at [phone] ext. [phone].    Cytogenomic Nomenclature (ISCN):  nuc jeri(DXZ1x1,DYZ3x0,A07J1u5),(RB1,A58W860/F72O721/K04E641)x2        This result has been reviewed and approved by Harrison Rosario, PhD, [facility]    A portion of this analysis was performed at the following   location(s):  [facility], [address] [place] [address]   [id number], : Harrison Rosario, PhD  INTERPRETIVE INFORMATION: Fluorescence in Situ                            Hybridization, [facility]    This test was developed and its performance characteristics   determined by [facility]. It has not been cleared or   approved by the [place] Food and  Drug Administration. This test   was performed in a CLIA certified laboratory and is   intended for clinical purposes.    Counseling and informed consent are recommended for genetic   testing. Consent forms are available online.  Performed by [facility],  [address] [place] [address],[place] [address] [phone]  [email or web address], Brisa Ramirez MD, Lab. Director

## 2024-06-10 NOTE — TELEPHONE ENCOUNTER
Central Prior Authorization Team   Phone: [phone]    PA Initiation    Medication: azelastine (OPTIVAR) 0.05 % ophthalmic solution  Insurance Company: [employer] ([facility]) - Phone [phone] Fax [phone]  Pharmacy Filling the Rx: [id number] - [place] - [address] AT [facility] & [address]  Filling Pharmacy Phone: [phone]  Filling Pharmacy Fax:    Start Date: 6/10/2024

## 2024-06-10 NOTE — TELEPHONE ENCOUNTER
Prior Authorization Not Needed per Insurance    Medication: azelastine (OPTIVAR) 0.05 % ophthalmic solution  Insurance Company: [employer] ([facility]) - Phone [phone] Fax [phone]  Expected CoPay:      Ph[facility] the Rx[id number] [place] - [address] AT [facility] & [address]  Pharmacy Notified:  Yes  Patient Notified:  **Instructed pharmacy to notify patient when script is ready to /ship.**

## 2024-06-11 ENCOUNTER — VIRTUAL VISIT (OUTPATIENT)
Dept: SURGERY | Facility: CLINIC | Age: 34
End: 2024-06-11
Payer: COMMERCIAL

## 2024-06-11 DIAGNOSIS — E66.9 OBESITY (BMI 30-39.9): Primary | ICD-10-CM

## 2024-06-11 PROCEDURE — 97803 MED NUTRITION INDIV SUBSEQ: CPT | Mod: 95

## 2024-06-11 NOTE — PROGRESS NOTES
Reginaldo Ferraro is a 33 year old who is being evaluated via a billable video visit.      How would you like to obtain your AVS? MyChart  If the video visit is dropped, the invitation should be resent by: Text to cell phone: [phone]  Will anyone else be joining your video visit? No         Medical  Weight Loss Follow-Up Diet Evaluation  Assessment:  Reginaldo is presenting today for a follow up weight management nutrition consultation.  This patient has had an initial appointment and was referred by Dr. Gamboa for MNT as treatment for Obesity.  Weight loss medication: Semaglutide.   Pt's weight is 161 lbs   Initial weight: 159 lbs   Weight change: no significant changes          No data to display              BMI: There is no height or weight on file to calculate BMI.  Ideal body weight: 45.5 kg (100 lb 4.9 oz)  Adjusted ideal body weight: 56.7 kg (124 lb 14.5 oz)    Estimated RMR (Ashley-St Jeor equation):   1360 kcals x 1.2 (sedentary) = 1630 kcals (for weight maintenance)  1360 kcals x 1.3 (light active) = 1865 kcals (for weight maintenance)     Recommended Protein Intake: 60-80 grams of protein/day  Patient Active Problem List:  Patient Active Problem List   Diagnosis    Cervical high risk HPV (human papillomavirus) test positive    Insomnia, unspecified insomnia    Vitamin D deficiency    Mild episode of recurrent major depressive disorder (H24)    Adjustment disorder with depressed mood    Mixed personality disorder (H)    Chronic maxillary sinusitis    Moderate persistent asthma    Allergic rhinitis due to animals    Allergic rhinitis due to dust mite    Seasonal allergic rhinitis due to pollen    Pain in both lower legs    Chronic pansinusitis    Purulent rhinitis    Staph aureus infection    Weight gain due to medication        Nutrition History:   Food allergies/intolerances/cultural or religous food customs: No      Weight loss history: time management interrupts meal planning. Patient works  from home and has a 8-4 job hours.   Wakes up at ~6:30, goes to bed at 10:30-12 pm Normally going to bed at ~3pm.   Is currently in school so homework is priority.   Typical weight ~125 lbs - weight gain enhanced from medications.       Progress on goals from last visit: Patient reports she has increased her protein by targeting higher protein foods (shakes, protein waffle/pancakes). Has started eating breakfast frequently.  - drinking less juice lately  - struggling with late night hunger (eats dinner around 8-9pm then hungry at 11pm)   - on the go often so snacking and grazing continues throughout the day     Aim for 60-80g protein per day - in progress   Stay within 1,200-1,500 - not tracking   Try to implement breakfast (1 meal) within 1-1.5 hours of waking - not daily  Aim for meal to be 4-6 hours apart. - not met     Dietary Recall:  Breakfast 8-9am : Quakers oatmeal   Lunch 12-2 pm : Lean protein with salad with small amount of rice   Dinner 8-9pm : similar to lunch   Eating out: more often lately   Beverages:   Water  1% milk or almond milk - will add to smoothies   Juice- will add water to dilute   Exercise:  just started her active routine- d/t relapse     Nutrition Diagnosis:    Obesity related to poor nutritional habits as evidence by frequent restaurant intake, low vegetable intake and BMI of 31.6    Disordered Eating Pattern (NB 1.5) related to intake of food exceeding RMR as evidenced by frequent grazing, skipping meals       Intervention:  Food and/or nutrient delivery: consistency with meals, adequate protein intake.    Nutrition education: benefits from eating dinner earlier in the evening   Nutrition counseling: support and goal setting  Coordination of nutrition care: none    Monitoring/Evaluation:    Goals:  Continue with protein goals 60-80g  Have dinner earlier in the evening (6-7pm)    Patient to follow up in 1 month(s) with bariatrician and 5 month(s) with RD        Video-Visit  Details    Type of service:  Video Visit    Video Start Time (time video started): 8:34 AM    Video End Time (time video stopped): 8:50 AM    Originating Location (pt. Location): Home      Distant Location (provider location):  Off-site    Mode of Communication:  Video Conference via [facility]    Physician has received verbal consent for a Video Visit from the patient? Yes      Lorraine Sinha RD

## 2024-06-11 NOTE — LETTER
6/11/2024      Reginaldo Ferraro  [address] [facility] [address]      Dear Colleague,    Thank you for referring your patient, Reginaldo Ferraro, to the [facility] [place]. Please see a copy of my visit note below.    Reginaldo Ferraro is a 33 year old who is being evaluated via a billable video visit.      How would you like to obtain your AVS? MyChart  If the video visit is dropped, the invitation should be resent by: Text to cell phone: [phone]  Will anyone else be joining your video visit? No         Medical  Weight Loss Follow-Up Diet Evaluation  Assessment:  Reginaldo is presenting today for a follow up weight management nutrition consultation.  This patient has had an initial appointment and was referred by Dr. Gamboa for MNT as treatment for Obesity.  Weight loss medication: Semaglutide.   Pt's weight is 161 lbs   Initial weight: 159 lbs   Weight change: no significant changes          No data to display              BMI: There is no height or weight on file to calculate BMI.  Ideal body weight: 45.5 kg (100 lb 4.9 oz)  Adjusted ideal body weight: 56.7 kg (124 lb 14.5 oz)    Estimated RMR (Marshallberg-St Jeor equation):   1360 kcals x 1.2 (sedentary) = 1630 kcals (for weight maintenance)  1360 kcals x 1.3 (light active) = 1865 kcals (for weight maintenance)     Recommended Protein Intake: 60-80 grams of protein/day  Patient Active Problem List:  Patient Active Problem List   Diagnosis     Cervical high risk HPV (human papillomavirus) test positive     Insomnia, unspecified insomnia     Vitamin D deficiency     Mild episode of recurrent major depressive disorder (H24)     Adjustment disorder with depressed mood     Mixed personality disorder (H)     Chronic maxillary sinusitis     Moderate persistent asthma     Allergic rhinitis due to animals     Allergic rhinitis due to dust mite     Seasonal allergic rhinitis due to pollen     Pain in both lower  legs     Chronic pansinusitis     Purulent rhinitis     Staph aureus infection     Weight gain due to medication        Nutrition History:   Food allergies/intolerances/cultural or religous food customs: No      Weight loss history: time management interrupts meal planning. Patient works from home and has a 8-4 job hours.   Wakes up at ~6:30, goes to bed at 10:30-12 pm Normally going to bed at ~3pm.   Is currently in school so homework is priority.   Typical weight ~125 lbs - weight gain enhanced from medications.       Progress on goals from last visit: Patient reports she has increased her protein by targeting higher protein foods (shakes, protein waffle/pancakes). Has started eating breakfast frequently.  - drinking less juice lately  - struggling with late night hunger (eats dinner around 8-9pm then hungry at 11pm)   - on the go often so snacking and grazing continues throughout the day     Aim for 60-80g protein per day - in progress   Stay within 1,200-1,500 - not tracking   Try to implement breakfast (1 meal) within 1-1.5 hours of waking - not daily  Aim for meal to be 4-6 hours apart. - not met     Dietary Recall:  Breakfast 8-9am : Quakers oatmeal   Lunch 12-2 pm : Lean protein with salad with small amount of rice   Dinner 8-9pm : similar to lunch   Eating out: more often lately   Beverages:   Water  1% milk or almond milk - will add to smoothies   Juice- will add water to dilute   Exercise:  just started her active routine- d/t relapse     Nutrition Diagnosis:    Obesity related to poor nutritional habits as evidence by frequent restaurant intake, low vegetable intake and BMI of 31.6    Disordered Eating Pattern (NB 1.5) related to intake of food exceeding RMR as evidenced by frequent grazing, skipping meals       Intervention:  Food and/or nutrient delivery: consistency with meals, adequate protein intake.    Nutrition education: benefits from eating dinner earlier in the evening   Nutrition counseling:  support and goal setting  Coordination of nutrition care: none    Monitoring/Evaluation:    Goals:  Continue with protein goals 60-80g  Have dinner earlier in the evening (6-7pm)    Patient to follow up in 1 month(s) with bariatrician and 5 month(s) with RD        Video-Visit Details    Type of service:  Video Visit    Video Start Time (time video started): 8:34 AM    Video End Time (time video stopped): 8:50 AM    Originating Location (pt. Location): Home      Distant Location (provider location):  Off-site    Mode of Communication:  Video Conference via [facility]    Physician has received verbal consent for a Video Visit from the patient? Yes      Lorraine Sinha RD           Again, thank you for allowing me to participate in the care of your patient.        Sincerely,        Lorraine Sinha RD

## 2024-06-11 NOTE — PATIENT INSTRUCTIONS
Protein Bars:    Quest  Pure Protein  Go Macro  RX Bar  Kind Protein  Think! High Protein Bars  Chowchilla Protein Bar  Epic Bars  Larabar Protein  Skout Bar  KiZe  Aldi Elevation Protein Nutrition Bar     **Protein Bars can be a convenient snack between meals, while traveling, or pre/post workout but they should not regularly replace a meal and are not recommended for every snack or multiple times a day. Many of these bars are calorie dense, so two or more per day to contribute a significant amount of calories and thus make weight loss more difficult.         150 Calories or Less Snack Ideas   1 hardboiled egg with   cup berries  1 small apple with 1 hardboiled egg  10 almonds with   cup berries  2 clementines with 1 light string cheese  1 light string cheese with   sliced apple  1 light string cheese wrapped in 2 slices of turkey  5 100% whole wheat crackers (e.g. Triscuit) with 1 light string cheese    c. cottage cheese with   cup fruit and 1 Tbsp sunflower seeds     cup cottage cheese with   of an avocado     can tuna fish with 1 cup sliced cucumbers   2 oz turkey slices with 1 cup carrots  1 container (6 oz) of low sugar (less than 10 grams of sugar) greek yogurt   3 Tablespoons of hummus with 1 cup sliced bell peppers, carrots or vegetable of your choice  4 Tablespoons ranch dip made with plain Greek Yogurt and 3 mini cucumbers  1/4 cup nuts (any kind)  1 Tablespoon peanut butter with 1 stalk celery   1 dill pickle wrapped in 1-2 slices of deli ham with 1 tsp of light cream cheese  5 100% whole wheat crackers (e.g. Triscuit) with 1 tsp each of guacamole/avocado topped with a cherry tomato - season with pepper or Everything Bagel Seasoning

## 2024-06-24 ENCOUNTER — OFFICE VISIT (OUTPATIENT)
Dept: FAMILY MEDICINE | Facility: CLINIC | Age: 34
End: 2024-06-24
Payer: COMMERCIAL

## 2024-06-24 VITALS
WEIGHT: 160 LBS | TEMPERATURE: 97.5 F | SYSTOLIC BLOOD PRESSURE: 103 MMHG | OXYGEN SATURATION: 98 % | HEART RATE: 72 BPM | DIASTOLIC BLOOD PRESSURE: 66 MMHG | HEIGHT: 60 IN | BODY MASS INDEX: 31.41 KG/M2 | RESPIRATION RATE: 18 BRPM

## 2024-06-24 DIAGNOSIS — L08.9 LOCAL INFECTION OF SKIN AND SUBCUTANEOUS TISSUE: Primary | ICD-10-CM

## 2024-06-24 PROCEDURE — 99213 OFFICE O/P EST LOW 20 MIN: CPT | Performed by: PHYSICIAN ASSISTANT

## 2024-06-24 RX ORDER — MUPIROCIN 20 MG/G
OINTMENT TOPICAL 3 TIMES DAILY
Qty: 15 G | Refills: 0 | Status: SHIPPED | OUTPATIENT
Start: 2024-06-24

## 2024-06-24 ASSESSMENT — ASTHMA QUESTIONNAIRES
ACT_TOTALSCORE: 24
QUESTION_5 LAST FOUR WEEKS HOW WOULD YOU RATE YOUR ASTHMA CONTROL: COMPLETELY CONTROLLED
QUESTION_1 LAST FOUR WEEKS HOW MUCH OF THE TIME DID YOUR ASTHMA KEEP YOU FROM GETTING AS MUCH DONE AT WORK, SCHOOL OR AT HOME: NONE OF THE TIME
QUESTION_4 LAST FOUR WEEKS HOW OFTEN HAVE YOU USED YOUR RESCUE INHALER OR NEBULIZER MEDICATION (SUCH AS ALBUTEROL): ONCE A WEEK OR LESS
HOSPITALIZATION_OVERNIGHT_LAST_YEAR_TOTAL: ONE
QUESTION_2 LAST FOUR WEEKS HOW OFTEN HAVE YOU HAD SHORTNESS OF BREATH: NOT AT ALL
QUESTION_3 LAST FOUR WEEKS HOW OFTEN DID YOUR ASTHMA SYMPTOMS (WHEEZING, COUGHING, SHORTNESS OF BREATH, CHEST TIGHTNESS OR PAIN) WAKE YOU UP AT NIGHT OR EARLIER THAN USUAL IN THE MORNING: NOT AT ALL
ACT_TOTALSCORE: 24
EMERGENCY_ROOM_LAST_YEAR_TOTAL: THREE OR MORE

## 2024-06-24 NOTE — PROGRESS NOTES
Assessment & Plan     Local infection of skin and subcutaneous tissue  Unsure of cause.  Maybe impetigo.  Try cream and follow up if worsening or not better in 5-7 days  - mupirocin (BACTROBAN) 2 % external ointment; Apply topically 3 times daily          BMI  Estimated body mass index is 31.25 kg/m  as calculated from the following:    Height as of this encounter: 1.524 m (5').    Weight as of this encounter: 72.6 kg (160 lb).   Weight management plan: not addressed           Subjective   Reginaldo is a 33 year old, presenting for the following health issues:  Vaginal Problem (Vaginal blisters )      6/24/2024     4:25 PM   Additional Questions   Roomed by Luz Elena   Accompanied by self     History of Present Illness       Reason for visit:  Blister or sore ozzing and causing pain  Symptom onset:  1-3 days ago  Symptoms include:  Oozing, swollen,  Symptom intensity:  Moderate  Symptom progression:  Worsening  Had these symptoms before:  No  What makes it worse:  Moving, touching area  What makes it better:  Staying still    She eats 2-3 servings of fruits and vegetables daily.She consumes 0 sweetened beverage(s) daily.She exercises with enough effort to increase her heart rate 20 to 29 minutes per day.  She exercises with enough effort to increase her heart rate 3 or less days per week.   She is taking medications regularly.     Started after going to the gym pool/hot tub.  Started as an itch and now continues to drain.  Did shave before that.  No fever.  No new partner.  Partner has herpes.  In past she has tested negative.  Is really uncomfortable.                    Objective    /66   Pulse 72   Temp 97.5  F (36.4  C) (Temporal)   Resp 18   Ht 1.524 m (5')   Wt 72.6 kg (160 lb)   LMP 05/28/2024 (Exact Date)   SpO2 98%   BMI 31.25 kg/m    Body mass index is 31.25 kg/m .  Physical Exam  Constitutional:       General: She is not in acute distress.  Skin:     Comments: Clear to yellow discharge on mons  pubis but no lesions or sores, not swollen or fluctuant.  Is tender to palpation    Neurological:      Mental Status: She is alert.                    Signed Electronically by: Melba Soriano PA-C

## 2024-07-02 ENCOUNTER — LAB (OUTPATIENT)
Dept: LAB | Facility: CLINIC | Age: 34
End: 2024-07-02
Payer: COMMERCIAL

## 2024-07-02 DIAGNOSIS — D72.119 HYPEREOSINOPHILIC SYNDROME, UNSPECIFIED TYPE: ICD-10-CM

## 2024-07-02 DIAGNOSIS — J45.40 UNCONTROLLED MODERATE PERSISTENT ASTHMA: ICD-10-CM

## 2024-07-02 LAB
ALBUMIN SERPL BCG-MCNC: 4.1 G/DL (ref 3.5–5.2)
ALP SERPL-CCNC: 72 U/L (ref 40–150)
ALT SERPL W P-5'-P-CCNC: 43 U/L (ref 0–50)
ANION GAP SERPL CALCULATED.3IONS-SCNC: 11 MMOL/L (ref 7–15)
AST SERPL W P-5'-P-CCNC: 28 U/L (ref 0–45)
BASOPHILS # BLD AUTO: 0.1 10E3/UL (ref 0–0.2)
BASOPHILS NFR BLD AUTO: 1 %
BILIRUB SERPL-MCNC: 0.3 MG/DL
BUN SERPL-MCNC: 14.3 MG/DL (ref 6–20)
CALCIUM SERPL-MCNC: 9.1 MG/DL (ref 8.6–10)
CHLORIDE SERPL-SCNC: 103 MMOL/L (ref 98–107)
CREAT SERPL-MCNC: 0.55 MG/DL (ref 0.51–0.95)
DEPRECATED HCO3 PLAS-SCNC: 24 MMOL/L (ref 22–29)
EGFRCR SERPLBLD CKD-EPI 2021: >90 ML/MIN/1.73M2
EOSINOPHIL # BLD AUTO: 1 10E3/UL (ref 0–0.7)
EOSINOPHIL NFR BLD AUTO: 11 %
ERYTHROCYTE [DISTWIDTH] IN BLOOD BY AUTOMATED COUNT: 12.8 % (ref 10–15)
GLUCOSE SERPL-MCNC: 89 MG/DL (ref 70–99)
HCT VFR BLD AUTO: 37 % (ref 35–47)
HGB BLD-MCNC: 12.8 G/DL (ref 11.7–15.7)
IMM GRANULOCYTES # BLD: 0 10E3/UL
IMM GRANULOCYTES NFR BLD: 0 %
LYMPHOCYTES # BLD AUTO: 3 10E3/UL (ref 0.8–5.3)
LYMPHOCYTES NFR BLD AUTO: 33 %
MCH RBC QN AUTO: 31.7 PG (ref 26.5–33)
MCHC RBC AUTO-ENTMCNC: 34.6 G/DL (ref 31.5–36.5)
MCV RBC AUTO: 92 FL (ref 78–100)
MONOCYTES # BLD AUTO: 0.5 10E3/UL (ref 0–1.3)
MONOCYTES NFR BLD AUTO: 5 %
NEUTROPHILS # BLD AUTO: 4.5 10E3/UL (ref 1.6–8.3)
NEUTROPHILS NFR BLD AUTO: 50 %
PLATELET # BLD AUTO: 479 10E3/UL (ref 150–450)
POTASSIUM SERPL-SCNC: 3.8 MMOL/L (ref 3.4–5.3)
PROT SERPL-MCNC: 6.9 G/DL (ref 6.4–8.3)
RBC # BLD AUTO: 4.04 10E6/UL (ref 3.8–5.2)
SODIUM SERPL-SCNC: 138 MMOL/L (ref 135–145)
TROPONIN T SERPL HS-MCNC: <6 NG/L
TSH SERPL DL<=0.005 MIU/L-ACNC: 2.03 UIU/ML (ref 0.3–4.2)
WBC # BLD AUTO: 9 10E3/UL (ref 4–11)

## 2024-07-02 PROCEDURE — 82785 ASSAY OF IGE: CPT

## 2024-07-02 PROCEDURE — 84443 ASSAY THYROID STIM HORMONE: CPT

## 2024-07-02 PROCEDURE — 80053 COMPREHEN METABOLIC PANEL: CPT

## 2024-07-02 PROCEDURE — 84484 ASSAY OF TROPONIN QUANT: CPT

## 2024-07-02 PROCEDURE — 85025 COMPLETE CBC W/AUTO DIFF WBC: CPT

## 2024-07-02 PROCEDURE — 36415 COLL VENOUS BLD VENIPUNCTURE: CPT

## 2024-07-03 ENCOUNTER — OFFICE VISIT (OUTPATIENT)
Dept: PULMONOLOGY | Facility: CLINIC | Age: 34
End: 2024-07-03
Payer: COMMERCIAL

## 2024-07-03 DIAGNOSIS — J45.909 ASTHMA: ICD-10-CM

## 2024-07-03 DIAGNOSIS — R05.9 COUGH: ICD-10-CM

## 2024-07-03 LAB — HGB BLD-MCNC: 13 G/DL

## 2024-07-03 PROCEDURE — 94726 PLETHYSMOGRAPHY LUNG VOLUMES: CPT | Mod: 26 | Performed by: INTERNAL MEDICINE

## 2024-07-03 PROCEDURE — 94060 EVALUATION OF WHEEZING: CPT | Mod: 26 | Performed by: INTERNAL MEDICINE

## 2024-07-03 PROCEDURE — 94729 DIFFUSING CAPACITY: CPT | Mod: 26 | Performed by: INTERNAL MEDICINE

## 2024-07-03 PROCEDURE — 85018 HEMOGLOBIN: CPT | Mod: QW

## 2024-07-04 LAB — IGE SERPL-ACNC: 3858 KU/L (ref 0–114)

## 2024-07-05 ENCOUNTER — MYC MEDICAL ADVICE (OUTPATIENT)
Dept: FAMILY MEDICINE | Facility: CLINIC | Age: 34
End: 2024-07-05
Payer: COMMERCIAL

## 2024-07-05 DIAGNOSIS — R11.0 NAUSEA: ICD-10-CM

## 2024-07-06 LAB
DLCOCOR-%PRED-PRE: 123 %
DLCOCOR-PRE: 24.75 ML/MIN/MMHG
DLCOUNC-%PRED-PRE: 121 %
DLCOUNC-PRE: 24.44 ML/MIN/MMHG
DLCOUNC-PRED: 20.09 ML/MIN/MMHG
ERV-%PRED-PRE: 52 %
ERV-PRE: 0.58 L
ERV-PRED: 1.11 L
EXPTIME-PRE: 6.79 SEC
FEF2575-%PRED-POST: 65 %
FEF2575-%PRED-PRE: 44 %
FEF2575-POST: 1.98 L/SEC
FEF2575-PRE: 1.35 L/SEC
FEF2575-PRED: 3.02 L/SEC
FEFMAX-%PRED-PRE: 100 %
FEFMAX-PRE: 6.55 L/SEC
FEFMAX-PRED: 6.51 L/SEC
FEV1-%PRED-PRE: 73 %
FEV1-PRE: 1.93 L
FEV1FEV6-PRE: 73 %
FEV1FEV6-PRED: 85 %
FEV1FVC-PRE: 73 %
FEV1FVC-PRED: 85 %
FEV1SVC-PRE: 65 %
FEV1SVC-PRED: 81 %
FIFMAX-PRE: 6.2 L/SEC
FRCPLETH-%PRED-PRE: 133 %
FRCPLETH-PRE: 3.31 L
FRCPLETH-PRED: 2.49 L
FVC-%PRED-PRE: 86 %
FVC-PRE: 2.65 L
FVC-PRED: 3.08 L
IC-%PRED-PRE: 103 %
IC-PRE: 2.3 L
IC-PRED: 2.22 L
RVPLETH-%PRED-PRE: 198 %
RVPLETH-PRE: 2.63 L
RVPLETH-PRED: 1.33 L
TLCPLETH-%PRED-PRE: 128 %
TLCPLETH-PRE: 5.62 L
TLCPLETH-PRED: 4.38 L
VA-%PRED-PRE: 104 %
VA-PRE: 4.52 L
VC-%PRED-PRE: 91 %
VC-PRE: 2.98 L
VC-PRED: 3.25 L

## 2024-07-08 ENCOUNTER — TELEPHONE (OUTPATIENT)
Dept: ALLERGY | Facility: CLINIC | Age: 34
End: 2024-07-08

## 2024-07-08 ENCOUNTER — OFFICE VISIT (OUTPATIENT)
Dept: ALLERGY | Facility: CLINIC | Age: 34
End: 2024-07-08
Payer: COMMERCIAL

## 2024-07-08 VITALS — OXYGEN SATURATION: 97 % | SYSTOLIC BLOOD PRESSURE: 98 MMHG | HEART RATE: 74 BPM | DIASTOLIC BLOOD PRESSURE: 65 MMHG

## 2024-07-08 DIAGNOSIS — D72.119 HYPEREOSINOPHILIC SYNDROME, UNSPECIFIED TYPE: ICD-10-CM

## 2024-07-08 DIAGNOSIS — J32.4 CHRONIC PANSINUSITIS: ICD-10-CM

## 2024-07-08 DIAGNOSIS — J45.40 UNCONTROLLED MODERATE PERSISTENT ASTHMA: Primary | ICD-10-CM

## 2024-07-08 LAB
FEF 25/75: NORMAL
FEV-1: NORMAL
FEV1/FVC: NORMAL
FVC: NORMAL

## 2024-07-08 PROCEDURE — 99214 OFFICE O/P EST MOD 30 MIN: CPT | Mod: 25 | Performed by: ALLERGY & IMMUNOLOGY

## 2024-07-08 PROCEDURE — 94010 BREATHING CAPACITY TEST: CPT | Performed by: ALLERGY & IMMUNOLOGY

## 2024-07-08 NOTE — LETTER
7/8/2024      Reginaldo Ferraro  [address] [facility] [address]      Dear Colleague,    Thank you for referring your patient, Reginaldo Ferraro, to the [facility]. Please see a copy of my visit note below.    Reginaldo Ferraro was seen in the Allergy Clinic at [facility].      Reginaldo Ferraro is a 33 year old Not  or  female who is seen today for a follow-up visit.    Reports she has been well over the last couple of months.  Has not had any exacerbations since last.  And steroids since she was last long.  Continues on the higher dose Breo and has not been experiencing any side effects.  Was evaluated in the pulmonary medicine clinic in the and had PFTs done last week.  PFTs demonstrate moderate airflow obstruction without significant bronchodilator response.    Reginaldo reports she has had increased nasal congestion and sinus pressure in the last few months.  She feels her current symptoms are similar to what she experienced prior to her last 2 sinus surgeries.  Her most recent surgery was in June 2023.  She has continued to take fexofenadine daily.  She has not found nasal steroids to be effective for her in the past.    Component      Latest Ref Rng 7/2/2024  8:02 AM   WBC      4.0 - 11.0 10e3/uL 9.0    RBC Count      3.80 - 5.20 10e6/uL 4.04    Hemoglobin      11.7 - 15.7 g/dL 12.8    Hematocrit      35.0 - 47.0 % 37.0    MCV      78 - 100 fL 92    MCH      26.5 - 33.0 pg 31.7    MCHC      31.5 - 36.5 g/dL 34.6    RDW      10.0 - 15.0 % 12.8    Platelet Count      150 - 450 10e3/uL 479 (H)    % Neutrophils      % 50    % Lymphocytes      % 33    % Monocytes      % 5    % Eosinophils      % 11    % Basophils      % 1    % Immature Granulocytes      % 0    Absolute Neutrophils      1.6 - 8.3 10e3/uL 4.5    Absolute Lymphocytes      0.8 - 5.3 10e3/uL 3.0    Absolute Monocytes      0.0 - 1.3 10e3/uL 0.5    Absolute Eosinophils      0.0 -  0.7 10e3/uL 1.0 (H)    Absolute Basophils      0.0 - 0.2 10e3/uL 0.1    Absolute Immature Granulocytes      <=0.4 10e3/uL 0.0    Sodium      135 - 145 mmol/L 138    Potassium      3.4 - 5.3 mmol/L 3.8    Carbon Dioxide (CO2)      22 - 29 mmol/L 24    Anion Gap      7 - 15 mmol/L 11    Urea Nitrogen      6.0 - 20.0 mg/dL 14.3    Creatinine      0.51 - 0.95 mg/dL 0.55    GFR Estimate      >60 mL/min/1.73m2 >90    Calcium      8.6 - 10.0 mg/dL 9.1    Chloride      98 - 107 mmol/L 103    Glucose      70 - 99 mg/dL 89    Alkaline Phosphatase      40 - 150 U/L 72    AST      0 - 45 U/L 28    ALT      0 - 50 U/L 43    Protein Total      6.4 - 8.3 g/dL 6.9    Albumin      3.5 - 5.2 g/dL 4.1    Bilirubin Total      <=1.2 mg/dL 0.3    IGE      0 - 114 kU/L 3,858 (H)    TSH      0.30 - 4.20 uIU/mL 2.03    Troponin T, High Sensitivity      <=14 ng/L <6       Legend:  (H) High    PFTs 7/3/24:  The FVC, FEV1 and FEV1/FVC ratio are reduced, but the PKC90-75% is within normal limits.  The inspiratory flow rates are within normal limits.  The FVC is reduced relative to the SVC indicating air trapping.  While the TLC, FRC and SVC are within normal   limits, the RV is increased.  Following administration of bronchodilators, there is no significant response.  The diffusing capacity is increasedl.     Mild  airway obstruction is present.     IMPRESSION:     Mild Airflow Obstruction when adjusting FEV1/FVC ratio to LLN.     No significant post bronchodilator response     MIld hyperinflation and moderate air trapping.     Diffusion capacity is increased.     Past Medical History:   Diagnosis Date     Abnormal Pap smear of cervix 12/23/2011 9/21 LSIL     Cervical high risk HPV (human papillomavirus) test positive 2013    10/31/14, 5/21/18     Depressive disorder 12/23/2011    possibly chronic     Environmental allergies      Influenza A with pneumonia 12/24/2021     Migraine      Migraines     headaches with allergies     Moderate major  depression 12/23/2011     Moderate major depression (H) 12/23/2011     Moderate persistent asthma 3/17/2022     Nasal obstruction 2/15/2022    Added automatically from request for surgery [id number]     Nasal septal deviation 2/15/2022    Added automatically from request for surgery [id number]     Nasal turbinate hypertrophy 2/15/2022    Added automatically from request for surgery [id number]     NO ACTIVE PROBLEMS      Seasonal allergic rhinitis      Sepsis (H) 12/24/2021     Family History   Problem Relation Age of Onset     Arthritis Mother      Lupus Mother      Heart Disease Father      Cerebrovascular Disease Father         stroke     Hyperlipidemia Father      Diabetes Paternal Grandmother      Hypertension Paternal Grandmother      Respiratory Paternal Grandmother         asthma     Colon Cancer No family hx of      Breast Cancer No family hx of      Coronary Artery Disease No family hx of      Social History     Tobacco Use     Smoking status: Never     Passive exposure: Never     Smokeless tobacco: Never   Vaping Use     Vaping status: Never Used   Substance Use Topics     Alcohol use: Yes     Comment: Social     Drug use: No     Social History     Social History Narrative    ENVIRONMENTAL HISTORY: The family lives in a [facility] [place]. The home is heated with a forced air. They does have central air conditioning. The patient's bedroom is furnished with feather/wool bedding or pillows and carpeting in bedroom.  Pets inside the house include 2 cat(s) and 1 dog(s). There is no history of cockroach or mice infestation. There is/are 0 smokers living in the house.  There is/are 0 who smoke ecigarettes/vape living in the house.The house does not have a damp basement.             Past medical, family, and social history were reviewed.        Current Outpatient Medications:      albuterol (PROAIR HFA/PROVENTIL HFA/VENTOLIN HFA) 108 (90 Base) MCG/ACT inhaler, Inhale 2 puffs into the lungs every 4 hours as  needed for shortness of breath, wheezing or cough, Disp: 18 g, Rfl: 1     ALPRAZolam (XANAX) 0.25 MG tablet, Take 1 tablet (0.25 mg) by mouth at bedtime as needed, may repeat once for anxiety, Disp: 30 tablet, Rfl: 0     azelastine (OPTIVAR) 0.05 % ophthalmic solution, Apply 1 drop to eye 2 times daily, Disp: 5 mL, Rfl: 0     cholecalciferol (D3 HIGH POTENCY) 50 MCG (2000 UT) CAPS, Take 50 mcg by mouth daily, Disp: 180 capsule, Rfl: 1     Fexofenadine HCl (ALLEGRA ALLERGY PO), , Disp: , Rfl:      fluticasone-vilanterol (BREO ELLIPTA) 200-25 MCG/ACT inhaler, Inhale 1 puff into the lungs daily, Disp: 60 each, Rfl: 11     ibuprofen (ADVIL/MOTRIN) 200 MG tablet, Take 400 mg by mouth, Disp: , Rfl:      ipratropium - albuterol 0.5 mg/2.5 mg/3 mL (DUONEB) 0.5-2.5 (3) MG/3ML neb solution, Take 1 vial (3 mLs) by nebulization every 6 hours as needed for shortness of breath, wheezing or cough, Disp: 90 mL, Rfl: 1     mepolizumab (NUCALA) 100 MG/ML auto-injector, Inject 1 mL (100 mg) Subcutaneous every 28 days, Disp: 1 mL, Rfl: 5     mupirocin (BACTROBAN) 2 % external ointment, Apply topically 3 times daily, Disp: 15 g, Rfl: 0     Semaglutide-Weight Management (WEGOVY) 1.7 MG/0.75ML pen, Inject 1.7 mg Subcutaneous once a week, Disp: 3 mL, Rfl: 0     Semaglutide-Weight Management (WEGOVY) 2.4 MG/0.75ML pen, Inject 2.4 mg Subcutaneous once a week, Disp: 3 mL, Rfl: 3     VENTOLIN  (90 Base) MCG/ACT inhaler, Inhale 1-2 puffs into the lungs every 4 hours as needed for shortness of breath or wheezing, Disp: 18 g, Rfl: 1     ORDER FOR ALLERGEN IMMUNOTHERAPY, Name of Mix: Mix #1  Dust Mite, Cat, Dog Cat Hair, Standardized A.P. 10,000 BAU/mL, HS  2.0 ml Dog Hair-Dander, UF  1:650 w/v, HS  1.0 ml Dust Mites DF. 10,000 AU/mL, HS  1.0 ml Dust Mites DP. 10,000 AU/mL, HS  1.0 ml  Diluent: HSA qs to 5ml (Patient not taking: Reported on 7/8/2024), Disp: 5 mL, Rfl: PRN     ORDER FOR ALLERGEN IMMUNOTHERAPY, Name of Mix: Mix #2  Tree  Sylvain,  White 1:20 w/v, HS  0.5 ml Birch Mix PRW 1:20 w/v, HS  0.5 ml Boxelder-Maple Mix BHR (Boxelder Hard Red) 1:20 w/v, HS  0.5 ml [place], Common 1:20 w/v, HS  0.5 ml Oak Mix RVW 1:20 w/v, HS 0.5 ml Pine Mix 1:20 w/v, HS 0.5 ml [place] Tree, Black 1:20 w/v, HS 0.5 ml Diluent: HSA qs to 5ml (Patient not taking: Reported on 7/8/2024), Disp: 5 mL, Rfl: PRN     ORDER FOR ALLERGEN IMMUNOTHERAPY, Name of Mix: Mix #3  Grass, Weeds Prabhu Grass 1:20 w/v, HS 0.5 ml Steve Grass (Std) 100,000 BAU/mL, HS 0.4 ml Lamb's Quarters 1:20 w/v, HS 0.5 ml Nettle 1:20 w/v, HS 0.5 ml Plantain, English 1:20 w/v, HS 0.5 ml Ragweed, Mix (Giant, Short) 1:20 w/v, HS 0.5 ml Russian Thistle 1:20 w/v, HS 0.5 ml Sorrel, Sheep 1:20 w/v, HS 0.5 ml Diluent: HSA qs to 5ml (Patient not taking: Reported on 7/8/2024), Disp: 5 mL, Rfl: PRN  No Known Allergies    EXAM:   BP 98/65 (BP Location: Left arm, Patient Position: Sitting, Cuff Size: Adult Regular)   Pulse 74   LMP 05/28/2024 (Exact Date)   SpO2 97%   Physical Exam  Vitals and nursing note reviewed.   Constitutional:       Appearance: Normal appearance.   HENT:      Head: Normocephalic and atraumatic.      Right Ear: External ear normal.      Left Ear: External ear normal.      Nose: Congestion present. No mucosal edema or rhinorrhea.      Comments: Possible nasal polyp in right naris     Mouth/Throat:      Mouth: Mucous membranes are moist. No oral lesions.      Pharynx: Oropharynx is clear. Uvula midline. No posterior oropharyngeal erythema.   Eyes:      General: Lids are normal.      Extraocular Movements: Extraocular movements intact.      Conjunctiva/sclera: Conjunctivae normal.   Neck:      Comments: No asymmetry, masses, or scars  Cardiovascular:      Rate and Rhythm: Normal rate and regular rhythm.      Heart sounds: S1 normal and S2 normal. No murmur heard.  Pulmonary:      Effort: Pulmonary effort is normal. No respiratory distress.      Breath sounds: Normal breath sounds and air  entry.   Musculoskeletal:      Comments: No musculoskeletal defects noted   Skin:     General: Skin is warm and dry.      Findings: No lesion or rash.   Neurological:      General: No focal deficit present.      Mental Status: She is alert.   Psychiatric:         Mood and Affect: Mood and affect normal.         WORKUP:  Spirometry    SPIROMETRY       FVC 2.84L (86% of predicted).     FEV1 2.07L (75% of predicted).     FEV1/FVC 73%      I have reviewed and interpreted these results. Testing meets criteria for acceptability and reproducibility. Values are consistent with mild airflow obstruction.    Asthma Control Test (ACT) total score: 24       ASSESSMENT/PLAN:  Reginaldo Ferraro is a 33 year old female here for a follow-up visit.    1. Uncontrolled moderate persistent asthma - Asthma has improved with higher dose of ICS/LABA therapy though overall symptoms are not well controlled. She has had several exacerbations requiring oral steroids in the last 6-8 months. Spirometry today and recent PFTs demonstrate ongoing airflow obstruction. She is interested in pursuing treatment with biologic medications in an effort to improve her overall symptom control. She has co-morbid chronic sinus disease with a possible nasal polyp visualized on her exam today. She also has demonstrated eosinophilia which is likely due to her underlying asthma and sinus disease. Discussed starting Nucala in addition to her current medication regimen and the potential side effects were reviewed.    - continue Breo 200/25mcg - 1 puff daily  - Spirometry, Breathing Capacity: Normal Order, Clinic Performed  - mepolizumab (NUCALA) 100 MG/ML auto-injector; Inject 1 mL (100 mg) Subcutaneous every 28 days  Dispense: 1 mL; Refill: 5    2. Chronic pansinusitis    - mepolizumab (NUCALA) 100 MG/ML auto-injector; Inject 1 mL (100 mg) Subcutaneous every 28 days  Dispense: 1 mL; Refill: 5    3. Hypereosinophilic syndrome, unspecified type - Eosinophil count  remains elevated but has improved overall. Other labs are normal.    - mepolizumab (NUCALA) 100 MG/ML auto-injector; Inject 1 mL (100 mg) Subcutaneous every 28 days  Dispense: 1 mL; Refill: 5      Follow-up in 3 months, sooner if needed      Thank you for allowing me to participate in the care of Reginaldo Ferraro.      Polo Mccain MD, [facility]  Allergy/Immunology  [facility]      Consent was obtained from the patient to use an AI documentation tool in the creation of this note.    Chart documentation done in part with Dragon Voice Recognition Software. Although reviewed after completion, some word and grammatical errors may remain.      Again, thank you for allowing me to participate in the care of your patient.        Sincerely,        Polo Mccain MD

## 2024-07-08 NOTE — PATIENT INSTRUCTIONS
If you have any questions regarding your allergies, asthma, or what we discussed during your visit today please call the allergy clinic or contact us via [employer].    [facility] Allergy RN Line: [phone] - call this number with any questions during or after business/clinic hours  [facility] Allergy Scheduling - Adult Patients: [phone]  [facility] Allergy Scheduling - Pediatric Patients: [phone]    All visits for food challenges, medication/drug allergy testing, and drug challenges MUST be scheduled through the allergy clinic nurse. Please call the nurse at [phone] or send a [employer] message for scheduling. Appointments for these visits that are made through the schedulers or via [employer] may be cancelled or rescheduled.    Clinic Schedule:   [place] - Monday, Tuesday, and Thursday  [address] [place] [address]    [facility] - Wednesday  [address] [place] [address]      Plan to start another medication called Nucala - injection every 4 weeks    Schedule a follow-up visit with ENT

## 2024-07-08 NOTE — TELEPHONE ENCOUNTER
PA Initiation    Medication: NUCALA 100 MG/ML SC SOAJ  Insurance Company: [employer] ([facility]) - Phone [phone] Fax [phone]  Pharmacy Filling the Rx: [facility] - [address]  Filling Pharmacy Phone:    Filling Pharmacy Fax:    Start Date: 7/8/2024   Test claim indicates [employer]    ANEL Benoit, [facility]  Specialty Pharmacy Clinic Liaison     [facility]    [email or web address]     Phone: [phone]  Fax: [phone]

## 2024-07-08 NOTE — PROGRESS NOTES
Reginaldo Ferraro was seen in the Allergy Clinic at [facility].      Reginaldo Ferraro is a 33 year old Not  or  female who is seen today for a follow-up visit.    Reports she has been well over the last couple of months.  Has not had any exacerbations since last.  And steroids since she was last long.  Continues on the higher dose Breo and has not been experiencing any side effects.  Was evaluated in the pulmonary medicine clinic in the and had PFTs done last week.  PFTs demonstrate moderate airflow obstruction without significant bronchodilator response.    Reginaldo reports she has had increased nasal congestion and sinus pressure in the last few months.  She feels her current symptoms are similar to what she experienced prior to her last 2 sinus surgeries.  Her most recent surgery was in June 2023.  She has continued to take fexofenadine daily.  She has not found nasal steroids to be effective for her in the past.    Component      Latest Ref Rng 7/2/2024  8:02 AM   WBC      4.0 - 11.0 10e3/uL 9.0    RBC Count      3.80 - 5.20 10e6/uL 4.04    Hemoglobin      11.7 - 15.7 g/dL 12.8    Hematocrit      35.0 - 47.0 % 37.0    MCV      78 - 100 fL 92    MCH      26.5 - 33.0 pg 31.7    MCHC      31.5 - 36.5 g/dL 34.6    RDW      10.0 - 15.0 % 12.8    Platelet Count      150 - 450 10e3/uL 479 (H)    % Neutrophils      % 50    % Lymphocytes      % 33    % Monocytes      % 5    % Eosinophils      % 11    % Basophils      % 1    % Immature Granulocytes      % 0    Absolute Neutrophils      1.6 - 8.3 10e3/uL 4.5    Absolute Lymphocytes      0.8 - 5.3 10e3/uL 3.0    Absolute Monocytes      0.0 - 1.3 10e3/uL 0.5    Absolute Eosinophils      0.0 - 0.7 10e3/uL 1.0 (H)    Absolute Basophils      0.0 - 0.2 10e3/uL 0.1    Absolute Immature Granulocytes      <=0.4 10e3/uL 0.0    Sodium      135 - 145 mmol/L 138    Potassium      3.4 - 5.3 mmol/L 3.8    Carbon Dioxide (CO2)      22 - 29 mmol/L  24    Anion Gap      7 - 15 mmol/L 11    Urea Nitrogen      6.0 - 20.0 mg/dL 14.3    Creatinine      0.51 - 0.95 mg/dL 0.55    GFR Estimate      >60 mL/min/1.73m2 >90    Calcium      8.6 - 10.0 mg/dL 9.1    Chloride      98 - 107 mmol/L 103    Glucose      70 - 99 mg/dL 89    Alkaline Phosphatase      40 - 150 U/L 72    AST      0 - 45 U/L 28    ALT      0 - 50 U/L 43    Protein Total      6.4 - 8.3 g/dL 6.9    Albumin      3.5 - 5.2 g/dL 4.1    Bilirubin Total      <=1.2 mg/dL 0.3    IGE      0 - 114 kU/L 3,858 (H)    TSH      0.30 - 4.20 uIU/mL 2.03    Troponin T, High Sensitivity      <=14 ng/L <6       Legend:  (H) High    PFTs 7/3/24:  The FVC, FEV1 and FEV1/FVC ratio are reduced, but the IOB01-00% is within normal limits.  The inspiratory flow rates are within normal limits.  The FVC is reduced relative to the SVC indicating air trapping.  While the TLC, FRC and SVC are within normal   limits, the RV is increased.  Following administration of bronchodilators, there is no significant response.  The diffusing capacity is increasedl.     Mild  airway obstruction is present.     IMPRESSION:     Mild Airflow Obstruction when adjusting FEV1/FVC ratio to LLN.     No significant post bronchodilator response     MIld hyperinflation and moderate air trapping.     Diffusion capacity is increased.     Past Medical History:   Diagnosis Date    Abnormal Pap smear of cervix 12/23/2011 9/21 LSIL    Cervical high risk HPV (human papillomavirus) test positive 2013    10/31/14, 5/21/18    Depressive disorder 12/23/2011    possibly chronic    Environmental allergies     Influenza A with pneumonia 12/24/2021    Migraine     Migraines     headaches with allergies    Moderate major depression 12/23/2011    Moderate major depression (H) 12/23/2011    Moderate persistent asthma 3/17/2022    Nasal obstruction 2/15/2022    Added automatically from request for surgery [id number]    Nasal septal deviation 2/15/2022    Added automatically  from request for surgery [id number]    Nasal turbinate hypertrophy 2/15/2022    Added automatically from request for surgery [id number]    NO ACTIVE PROBLEMS     Seasonal allergic rhinitis     Sepsis (H) 12/24/2021     Family History   Problem Relation Age of Onset    Arthritis Mother     Lupus Mother     Heart Disease Father     Cerebrovascular Disease Father         stroke    Hyperlipidemia Father     Diabetes Paternal Grandmother     Hypertension Paternal Grandmother     Respiratory Paternal Grandmother         asthma    Colon Cancer No family hx of     Breast Cancer No family hx of     Coronary Artery Disease No family hx of      Social History     Tobacco Use    Smoking status: Never     Passive exposure: Never    Smokeless tobacco: Never   Vaping Use    Vaping status: Never Used   Substance Use Topics    Alcohol use: Yes     Comment: Social    Drug use: No     Social History     Social History Narrative    ENVIRONMENTAL HISTORY: [place] The home is heated with a forced air. They does have central air conditioning. The patient's bedroom is furnished with feather/wool bedding or pillows and carpeting in bedroom.  Pets inside the house include 2 cat(s) and 1 dog(s). There is no history of cockroach or mice infestation. There is/are 0 smokers living in the house.  There is/are 0 who smoke ecigarettes/vape living in the house.The house does not have a damp basement.             Past medical, family, and social history were reviewed.        Current Outpatient Medications:     albuterol (PROAIR HFA/PROVENTIL HFA/VENTOLIN HFA) 108 (90 Base) MCG/ACT inhaler, Inhale 2 puffs into the lungs every 4 hours as needed for shortness of breath, wheezing or cough, Disp: 18 g, Rfl: 1    ALPRAZolam (XANAX) 0.25 MG tablet, Take 1 tablet (0.25 mg) by mouth at bedtime as needed, may repeat once for anxiety, Disp: 30 tablet, Rfl: 0    azelastine (OPTIVAR) 0.05 % ophthalmic solution, Apply 1  drop to eye 2 times daily, Disp: 5 mL, Rfl: 0    cholecalciferol (D3 HIGH POTENCY) 50 MCG (2000 UT) CAPS, Take 50 mcg by mouth daily, Disp: 180 capsule, Rfl: 1    Fexofenadine HCl (ALLEGRA ALLERGY PO), , Disp: , Rfl:     fluticasone-vilanterol (BREO ELLIPTA) 200-25 MCG/ACT inhaler, Inhale 1 puff into the lungs daily, Disp: 60 each, Rfl: 11    ibuprofen (ADVIL/MOTRIN) 200 MG tablet, Take 400 mg by mouth, Disp: , Rfl:     ipratropium - albuterol 0.5 mg/2.5 mg/3 mL (DUONEB) 0.5-2.5 (3) MG/3ML neb solution, Take 1 vial (3 mLs) by nebulization every 6 hours as needed for shortness of breath, wheezing or cough, Disp: 90 mL, Rfl: 1    mepolizumab (NUCALA) 100 MG/ML auto-injector, Inject 1 mL (100 mg) Subcutaneous every 28 days, Disp: 1 mL, Rfl: 5    mupirocin (BACTROBAN) 2 % external ointment, Apply topically 3 times daily, Disp: 15 g, Rfl: 0    Semaglutide-Weight Management (WEGOVY) 1.7 MG/0.75ML pen, Inject 1.7 mg Subcutaneous once a week, Disp: 3 mL, Rfl: 0    Semaglutide-Weight Management (WEGOVY) 2.4 MG/0.75ML pen, Inject 2.4 mg Subcutaneous once a week, Disp: 3 mL, Rfl: 3    VENTOLIN  (90 Base) MCG/ACT inhaler, Inhale 1-2 puffs into the lungs every 4 hours as needed for shortness of breath or wheezing, Disp: 18 g, Rfl: 1    ORDER FOR ALLERGEN IMMUNOTHERAPY, Name of Mix: Mix #1  Dust Mite, Cat, Dog Cat Hair, Standardized A.P. 10,000 BAU/mL, HS  2.0 ml Dog Hair-Dander, UF  1:650 w/v, HS  1.0 ml Dust Mites DF. 10,000 AU/mL, HS  1.0 ml Dust Mites DP. 10,000 AU/mL, HS  1.0 ml  Diluent: HSA qs to 5ml (Patient not taking: Reported on 7/8/2024), Disp: 5 mL, Rfl: PRN    ORDER FOR ALLERGEN IMMUNOTHERAPY, Name of Mix: Mix #2  Tree  Sylvain, White 1:20 w/v, HS  0.5 ml Birch Mix PRW 1:20 w/v, HS  0.5 ml Boxelder-Maple Mix BHR (Boxelder Hard Red) 1:20 w/v, HS  0.5 ml [place], Common 1:20 w/v, HS  0.5 ml Oak Mix RVW 1:20 w/v, HS 0.5 ml Pine Mix 1:20 w/v, HS 0.5 ml [place] Tree, Black 1:20 w/v, HS 0.5 ml Diluent: HSA qs to 5ml  (Patient not taking: Reported on 7/8/2024), Disp: 5 mL, Rfl: PRN    ORDER FOR ALLERGEN IMMUNOTHERAPY, Name of Mix: Mix #3  Grass, Weeds Prabhu Grass 1:20 w/v, HS 0.5 ml Steve Grass (Std) 100,000 BAU/mL, HS 0.4 ml Lamb's Quarters 1:20 w/v, HS 0.5 ml Nettle 1:20 w/v, HS 0.5 ml Plantain, English 1:20 w/v, HS 0.5 ml Ragweed, Mix (Giant, Short) 1:20 w/v, HS 0.5 ml Russian Thistle 1:20 w/v, HS 0.5 ml Sorrel, Sheep 1:20 w/v, HS 0.5 ml Diluent: HSA qs to 5ml (Patient not taking: Reported on 7/8/2024), Disp: 5 mL, Rfl: PRN  No Known Allergies    EXAM:   BP 98/65 (BP Location: Left arm, Patient Position: Sitting, Cuff Size: Adult Regular)   Pulse 74   LMP 05/28/2024 (Exact Date)   SpO2 97%   Physical Exam  Vitals and nursing note reviewed.   Constitutional:       Appearance: Normal appearance.   HENT:      Head: Normocephalic and atraumatic.      Right Ear: External ear normal.      Left Ear: External ear normal.      Nose: Congestion present. No mucosal edema or rhinorrhea.      Comments: Possible nasal polyp in right naris     Mouth/Throat:      Mouth: Mucous membranes are moist. No oral lesions.      Pharynx: Oropharynx is clear. Uvula midline. No posterior oropharyngeal erythema.   Eyes:      General: Lids are normal.      Extraocular Movements: Extraocular movements intact.      Conjunctiva/sclera: Conjunctivae normal.   Neck:      Comments: No asymmetry, masses, or scars  Cardiovascular:      Rate and Rhythm: Normal rate and regular rhythm.      Heart sounds: S1 normal and S2 normal. No murmur heard.  Pulmonary:      Effort: Pulmonary effort is normal. No respiratory distress.      Breath sounds: Normal breath sounds and air entry.   Musculoskeletal:      Comments: No musculoskeletal defects noted   Skin:     General: Skin is warm and dry.      Findings: No lesion or rash.   Neurological:      General: No focal deficit present.      Mental Status: She is alert.   Psychiatric:         Mood and Affect: Mood and  affect normal.         WORKUP:  Spirometry    SPIROMETRY       FVC 2.84L (86% of predicted).     FEV1 2.07L (75% of predicted).     FEV1/FVC 73%      I have reviewed and interpreted these results. Testing meets criteria for acceptability and reproducibility. Values are consistent with mild airflow obstruction.    Asthma Control Test (ACT) total score: 24       ASSESSMENT/PLAN:  Reginaldo Ferraro is a 33 year old female here for a follow-up visit.    1. Uncontrolled moderate persistent asthma - Asthma has improved with higher dose of ICS/LABA therapy though overall symptoms are not well controlled. She has had several exacerbations requiring oral steroids in the last 6-8 months. Spirometry today and recent PFTs demonstrate ongoing airflow obstruction. She is interested in pursuing treatment with biologic medications in an effort to improve her overall symptom control. She has co-morbid chronic sinus disease with a possible nasal polyp visualized on her exam today. She also has demonstrated eosinophilia which is likely due to her underlying asthma and sinus disease. Discussed starting Nucala in addition to her current medication regimen and the potential side effects were reviewed.    - continue Breo 200/25mcg - 1 puff daily  - Spirometry, Breathing Capacity: Normal Order, Clinic Performed  - mepolizumab (NUCALA) 100 MG/ML auto-injector; Inject 1 mL (100 mg) Subcutaneous every 28 days  Dispense: 1 mL; Refill: 5    2. Chronic pansinusitis    - mepolizumab (NUCALA) 100 MG/ML auto-injector; Inject 1 mL (100 mg) Subcutaneous every 28 days  Dispense: 1 mL; Refill: 5    3. Hypereosinophilic syndrome, unspecified type - Eosinophil count remains elevated but has improved overall. Other labs are normal.    - mepolizumab (NUCALA) 100 MG/ML auto-injector; Inject 1 mL (100 mg) Subcutaneous every 28 days  Dispense: 1 mL; Refill: 5      Follow-up in 3 months, sooner if needed      Thank you for allowing me to participate in the  care of Reginaldo Ferraro.      Polo Mccain MD, FAAAAI  Allergy/Immunology  [facility]      Consent was obtained from the patient to use an AI documentation tool in the creation of this note.    Chart documentation done in part with Dragon Voice Recognition Software. Although reviewed after completion, some word and grammatical errors may remain.

## 2024-07-09 NOTE — TELEPHONE ENCOUNTER
Prior Authorization Approval    Medication: NUCALA 100 MG/ML SC SOAJ  Authorization Effective Date: 7/9/2024  Authorization Expiration Date: 1/8/2025  Approved Dose/Quantity: 1  Reference #: [id number]   Insurance Company: [employer] ([facility]) - Phone [phone] Fax [phone]  Expected CoPay: $    CoPay Card Available:      Financial Assistance Needed: AN  [facility] is filling the prescription: [facility] - [address]          ANEL Benoit, [facility]  Specialty Pharmacy Clinic Liaison     [facility]    [email or web address]     Phone: [phone]  Fax: [phone]

## 2024-07-09 NOTE — TELEPHONE ENCOUNTER
"Received call from patient. She states that she last took this medication 2 months ago and now her supply has run out. She had stopped it because her symptoms of \"nausea\" subsided. She was just taking it sporadically, for nausea, but not on a consistent daily basis as prescribed. Currently she is feeling nausea to the point of vomiting at times. She reports a longstanding hx of n/v but did not wish to elaborate on this further, frustrated regarding having to explain her need for a refill. She would like refill sent to:    [id number] [place], [place] - [address] AT [facility] & [address]     JALIL Roldan RN  [facility] [place]  Primary Care  "

## 2024-07-10 NOTE — PROGRESS NOTES
Bariatric Clinic Follow-Up Visit:    Reginlado Ferraro is a 33 year old  female with There is no height or weight on file to calculate BMI.  presenting here today for follow-up on non-surgical efforts for weight loss. Original Intake visit occurred on 3/4/24 with a weight of 159 lbs and BMI of 30.5 and body fat of 41.6%.  Along with diet and behavior changes, she has been using Wegovy to assist her weight loss goals. She had severe nausea/vomiting upon review today at the 1mg/week dose so we re-initiated a re-ramping trial on 7/11/24 visit.     See her intake visit notes for details on identified contributors to weight gain in the past. Prednisone induced weight gain for numerous asthma flares in the past. Chart review shows Dietician calculated RMR of 1360kcal/day and protein intake goal of 60-80g/day.    Weight:   Wt Readings from Last 5 Encounters:   06/24/24 72.6 kg (160 lb)   05/28/24 73.4 kg (161 lb 12.8 oz)   05/17/24 73.8 kg (162 lb 12.8 oz)   04/12/24 72.9 kg (160 lb 12.8 oz)   03/04/24 72.1 kg (159 lb)    pounds    Intake labs showed low vitamin D, insulin resistance with A1c of 5.9% on 3/4/24.   Comorbidities:  Patient Active Problem List   Diagnosis    Cervical high risk HPV (human papillomavirus) test positive    Insomnia, unspecified insomnia    Vitamin D deficiency    Mild episode of recurrent major depressive disorder (H24)    Adjustment disorder with depressed mood    Mixed personality disorder (H)    Chronic maxillary sinusitis    Moderate persistent asthma    Allergic rhinitis due to animals    Allergic rhinitis due to dust mite    Seasonal allergic rhinitis due to pollen    Pain in both lower legs    Chronic pansinusitis    Purulent rhinitis    Staph aureus infection    Weight gain due to medication       Current Outpatient Medications:     ondansetron (ZOFRAN ODT) 4 MG ODT tab, Take 1 tablet (4 mg) by mouth every 8 hours as needed for nausea, Disp: 20 tablet, Rfl: 1    albuterol (PROAIR  HFA/PROVENTIL HFA/VENTOLIN HFA) 108 (90 Base) MCG/ACT inhaler, Inhale 2 puffs into the lungs every 4 hours as needed for shortness of breath, wheezing or cough, Disp: 18 g, Rfl: 1    ALPRAZolam (XANAX) 0.25 MG tablet, Take 1 tablet (0.25 mg) by mouth at bedtime as needed, may repeat once for anxiety, Disp: 30 tablet, Rfl: 0    azelastine (OPTIVAR) 0.05 % ophthalmic solution, Apply 1 drop to eye 2 times daily, Disp: 5 mL, Rfl: 0    cholecalciferol (D3 HIGH POTENCY) 50 MCG (2000 UT) CAPS, Take 50 mcg by mouth daily, Disp: 180 capsule, Rfl: 1    Fexofenadine HCl (ALLEGRA ALLERGY PO), , Disp: , Rfl:     fluticasone-vilanterol (BREO ELLIPTA) 200-25 MCG/ACT inhaler, Inhale 1 puff into the lungs daily, Disp: 60 each, Rfl: 11    ibuprofen (ADVIL/MOTRIN) 200 MG tablet, Take 400 mg by mouth, Disp: , Rfl:     ipratropium - albuterol 0.5 mg/2.5 mg/3 mL (DUONEB) 0.5-2.5 (3) MG/3ML neb solution, Take 1 vial (3 mLs) by nebulization every 6 hours as needed for shortness of breath, wheezing or cough, Disp: 90 mL, Rfl: 1    mepolizumab (NUCALA) 100 MG/ML auto-injector, Inject 1 mL (100 mg) Subcutaneous every 28 days, Disp: 1 mL, Rfl: 5    mupirocin (BACTROBAN) 2 % external ointment, Apply topically 3 times daily, Disp: 15 g, Rfl: 0    omeprazole (PRILOSEC) 20 MG DR capsule, Take 1 capsule (20 mg) by mouth daily, Disp: 90 capsule, Rfl: 1    ORDER FOR ALLERGEN IMMUNOTHERAPY, Name of Mix: Mix #1  Dust Mite, Cat, Dog Cat Hair, Standardized A.P. 10,000 BAU/mL, HS  2.0 ml Dog Hair-Dander, UF  1:650 w/v, HS  1.0 ml Dust Mites DF. 10,000 AU/mL, HS  1.0 ml Dust Mites DP. 10,000 AU/mL, HS  1.0 ml  Diluent: HSA qs to 5ml (Patient not taking: Reported on 7/8/2024), Disp: 5 mL, Rfl: PRN    ORDER FOR ALLERGEN IMMUNOTHERAPY, Name of Mix: Mix #2  Tree  Sylvain, White 1:20 w/v, HS  0.5 ml Birch Mix PRW 1:20 w/v, HS  0.5 ml Boxelder-Maple Mix BHR (Boxelder Hard Red) 1:20 w/v, HS  0.5 ml Burt, Common 1:20 w/v, HS  0.5 ml Oak Mix RVW 1:20 w/v, HS 0.5  ml Pine Mix 1:20 w/v, HS 0.5 ml [place] Tree, Black 1:20 w/v, HS 0.5 ml Diluent: HSA qs to 5ml (Patient not taking: Reported on 7/8/2024), Disp: 5 mL, Rfl: PRN    ORDER FOR ALLERGEN IMMUNOTHERAPY, Name of Mix: Mix #3  Grass, Weeds Prabhu Grass 1:20 w/v, HS 0.5 ml Steve Grass (Std) 100,000 BAU/mL, HS 0.4 ml Lamb's Quarters 1:20 w/v, HS 0.5 ml Nettle 1:20 w/v, HS 0.5 ml Plantain, English 1:20 w/v, HS 0.5 ml Ragweed, Mix (Giant, Short) 1:20 w/v, HS 0.5 ml Russian Thistle 1:20 w/v, HS 0.5 ml Sorrel, Sheep 1:20 w/v, HS 0.5 ml Diluent: HSA qs to 5ml (Patient not taking: Reported on 7/8/2024), Disp: 5 mL, Rfl: PRN    Semaglutide-Weight Management (WEGOVY) 1.7 MG/0.75ML pen, Inject 1.7 mg Subcutaneous once a week, Disp: 3 mL, Rfl: 0    Semaglutide-Weight Management (WEGOVY) 2.4 MG/0.75ML pen, Inject 2.4 mg Subcutaneous once a week, Disp: 3 mL, Rfl: 3    VENTOLIN  (90 Base) MCG/ACT inhaler, Inhale 1-2 puffs into the lungs every 4 hours as needed for shortness of breath or wheezing, Disp: 18 g, Rfl: 1      Interim: Since our last visit, she has had to delay medication due to asthma flare, now up to 1mg/week of Wegovy. Has some nausea side effects at 1mg/week dose  Had to move recently. No longer with a functional kitchen. Currently living w/ family while closing   Plan:   1.  Diet: aiming for 1225-1350kcal/day with 65-75 grams of lean protein daily and 75 oz of water daily to feel your best during this phase of weight reduction. Bare minimum keep intake above 1050kcal/day and 60grams of lean protein and 64 oz of water daily.    2. Exercise: as asthma allows ramp back up.     3. Medication: Wegovy 1mg/week too strong to be well tolerated due to nausea/vomiting sx now 2 week into use. We'll try one re-ramping per prescribing guidance and half of the people intolerant to the first ramping will tolerate a second attempt. Start 0.25mg/week 10 days after your last use of the 1mg/week dose and ramp up every 4 weeks if  "tolerated. New prescriptions sent today.     If intolerant of this second re-ramping, we can try Zepbound if covered well by your insurance.     4. Vitamin D3 2000 international unit(s)/day recommended given low levels on 3/4/24 labs.     5. Goals: 0.8-1.6 lbs/week of weight loss anticipated with consistency in diet. If stable, it suggests the stress/routine has gone off track at least 2 days weekly.     6. Track intake for precision in finding your own sweet spot. The numbers above shoot for a 500-650kcal/day restriction in calories and provide enough protein to limit muscle mass losses during weight loss. [employer] is a good natalya for tracking.      We discussed [facility] Bas[facility] including:  -eating 3 meals daily  -reviewed metabolic needs for weight loss based on Resting Metabolic Rate  -protein goals supportive of healthy weight loss  -avoiding/limiting calorie containing beverages  -We discussed the importance of restorative sleep and stress management in maintaining a healthy weight.  -We discussed the National Weight Control Registry healthy weight maintenance strategies and ways to optimize metabolism.  -We discussed the importance of physical activity including cardiovascular and strength training in maintaining a healthier weight and explored viable options.      Most recent labs:  Lab Results   Component Value Date    WBC 9.0 07/02/2024    HGB 13.0 07/03/2024    HCT 37.0 07/02/2024    MCV 92 07/02/2024     (H) 07/02/2024     Lab Results   Component Value Date    CHOL 202 (H) 10/13/2023     Lab Results   Component Value Date    HDL 55 10/13/2023     No components found for: \"LDLCALC\"  Lab Results   Component Value Date    TRIG 83 10/13/2023     No results found for: \"CHOLHDL\"  Lab Results   Component Value Date    ALT 43 07/02/2024    AST 28 07/02/2024    ALKPHOS 72 07/02/2024     No results found for: \"HGBA1C\"  Lab Results   Component Value Date    B12 466 03/04/2024     No components found " "for: \"VITDT1\"  Lab Results   Component Value Date    YING 35 10/13/2023     No results found for: \"PTHI\"  No results found for: \"ZN\"  No results found for: \"VIB1WB\"  Lab Results   Component Value Date    TSH 2.03 07/02/2024     No results found for: \"TEST\"    DIETARY HISTORY  See above      PHYSICAL ACTIVITY PATTERNS:  Cardiovascular: soccer,   Strength Training: gym    REVIEW OF SYSTEMS  Severe nausea vomiting at 1mg/week Wegovy dose. High stress load, between homes and living with family until new home closes in a month or two. .  PHYSICAL EXAM:  Vitals: LMP 05/28/2024 (Exact Date)   Weight:   Wt Readings from Last 3 Encounters:   06/24/24 72.6 kg (160 lb)   05/28/24 73.4 kg (161 lb 12.8 oz)   05/17/24 73.8 kg (162 lb 12.8 oz)         GEN: Pleasant, well groomed, in no acute distress  HEENT:  normal facies/voice .  NECK: No swelling.  HEART: .  LUNGS: No respiratory difficulty noted. No cough. .  ABDOMEN.  EXTREMITIES: No tremor. Ambulation is ..  NEURO: Alert and Oriented X3, fluent speech.  SKIN: No visible rashes. .    Interim study results: Last Comprehensive Metabolic Panel:  Sodium   Date Value Ref Range Status   07/02/2024 138 135 - 145 mmol/L Final     Potassium   Date Value Ref Range Status   07/02/2024 3.8 3.4 - 5.3 mmol/L Final     Chloride   Date Value Ref Range Status   07/02/2024 103 98 - 107 mmol/L Final     Carbon Dioxide (CO2)   Date Value Ref Range Status   07/02/2024 24 22 - 29 mmol/L Final     Anion Gap   Date Value Ref Range Status   07/02/2024 11 7 - 15 mmol/L Final     Glucose   Date Value Ref Range Status   07/02/2024 89 70 - 99 mg/dL Final     GLUCOSE BY METER POCT   Date Value Ref Range Status   02/16/2022 91 70 - 99 mg/dL Final     Comment:     /RN Notified     Urea Nitrogen   Date Value Ref Range Status   07/02/2024 14.3 6.0 - 20.0 mg/dL Final     Creatinine   Date Value Ref Range Status   07/02/2024 0.55 0.51 - 0.95 mg/dL Final     GFR Estimate   Date Value Ref Range Status "   07/02/2024 >90 >60 mL/min/1.73m2 Final     Comment:     eGFR calculated using 2021 CKD-EPI equation.     Calcium   Date Value Ref Range Status   07/02/2024 9.1 8.6 - 10.0 mg/dL Final     Bilirubin Total   Date Value Ref Range Status   07/02/2024 0.3 <=1.2 mg/dL Final     Alkaline Phosphatase   Date Value Ref Range Status   07/02/2024 72 40 - 150 U/L Final     ALT   Date Value Ref Range Status   07/02/2024 43 0 - 50 U/L Final     Comment:     Reference intervals for this test were updated on 6/12/2023 to more accurately reflect our healthy population. There may be differences in the flagging of prior results with similar values performed with this method. Interpretation of those prior results can be made in the context of the updated reference intervals.       AST   Date Value Ref Range Status   07/02/2024 28 0 - 45 U/L Final     Comment:     Reference intervals for this test were updated on 6/12/2023 to more accurately reflect our healthy population. There may be differences in the flagging of prior results with similar values performed with this method. Interpretation of those prior results can be made in the context of the updated reference intervals.               TSH   Date Value Ref Range Status   07/02/2024 2.03 0.30 - 4.20 uIU/mL Final   10/31/2014 1.13 0.40 - 4.00 mU/L Final     Comment:     Effective 7/30/2014, the reference range for this assay has changed to reflect   new instrumentation/methodology.       Lab Results   Component Value Date    A1C 5.9 03/04/2024       Recent Labs   Lab Test 10/13/23  0922   CHOL 202*   HDL 55   *   TRIG 83     Vitamin D Deficiency Screening Results:  Lab Results   Component Value Date    VITDT 18 (L) 03/04/2024    VITDT 19 (L) 11/02/2015    VITDT 21 (L) 10/31/2014     .      30 minutes spent by me on the date of the encounter doing chart review, history and exam, documentation and further activities per the note   Bertram Gamboa MD  [facility]  5:11 PM  7/10/2024

## 2024-07-11 ENCOUNTER — TELEPHONE (OUTPATIENT)
Dept: SURGERY | Facility: CLINIC | Age: 34
End: 2024-07-11

## 2024-07-11 ENCOUNTER — VIRTUAL VISIT (OUTPATIENT)
Dept: SURGERY | Facility: CLINIC | Age: 34
End: 2024-07-11
Payer: COMMERCIAL

## 2024-07-11 DIAGNOSIS — R11.2 DRUG-INDUCED NAUSEA AND VOMITING: ICD-10-CM

## 2024-07-11 DIAGNOSIS — E66.811 CLASS 1 DRUG-INDUCED OBESITY WITHOUT SERIOUS COMORBIDITY WITH BODY MASS INDEX (BMI) OF 30.0 TO 30.9 IN ADULT: Primary | ICD-10-CM

## 2024-07-11 DIAGNOSIS — T50.905A DRUG-INDUCED NAUSEA AND VOMITING: ICD-10-CM

## 2024-07-11 DIAGNOSIS — E66.1 CLASS 1 DRUG-INDUCED OBESITY WITHOUT SERIOUS COMORBIDITY WITH BODY MASS INDEX (BMI) OF 30.0 TO 30.9 IN ADULT: Primary | ICD-10-CM

## 2024-07-11 PROCEDURE — 99214 OFFICE O/P EST MOD 30 MIN: CPT | Mod: 95 | Performed by: EMERGENCY MEDICINE

## 2024-07-11 RX ORDER — SEMAGLUTIDE 2.4 MG/.75ML
2.4 INJECTION, SOLUTION SUBCUTANEOUS
Qty: 3 ML | Refills: 6 | Status: SHIPPED | OUTPATIENT
Start: 2024-11-09 | End: 2024-07-29

## 2024-07-11 RX ORDER — ONDANSETRON 4 MG/1
4 TABLET, ORALLY DISINTEGRATING ORAL EVERY 8 HOURS PRN
Qty: 20 TABLET | Refills: 1 | Status: SHIPPED | OUTPATIENT
Start: 2024-07-11

## 2024-07-11 RX ORDER — SEMAGLUTIDE 1 MG/.5ML
1 INJECTION, SOLUTION SUBCUTANEOUS
Qty: 2 ML | Refills: 0 | Status: SHIPPED | OUTPATIENT
Start: 2024-09-14 | End: 2024-07-29

## 2024-07-11 RX ORDER — SEMAGLUTIDE 1.7 MG/.75ML
1.7 INJECTION, SOLUTION SUBCUTANEOUS
Qty: 3 ML | Refills: 0 | Status: SHIPPED | OUTPATIENT
Start: 2024-10-12 | End: 2024-07-29

## 2024-07-11 RX ORDER — SEMAGLUTIDE 1 MG/.5ML
1 INJECTION, SOLUTION SUBCUTANEOUS
Qty: 2 ML | Refills: 0 | Status: SHIPPED | OUTPATIENT
Start: 2024-09-14 | End: 2024-07-11 | Stop reason: DRUGHIGH

## 2024-07-11 RX ORDER — SEMAGLUTIDE 0.25 MG/.5ML
0.25 INJECTION, SOLUTION SUBCUTANEOUS WEEKLY
Qty: 2 ML | Refills: 0 | Status: SHIPPED | OUTPATIENT
Start: 2024-07-20 | End: 2024-07-29

## 2024-07-11 RX ORDER — SEMAGLUTIDE 0.5 MG/.5ML
0.5 INJECTION, SOLUTION SUBCUTANEOUS
Qty: 2 ML | Refills: 0 | Status: SHIPPED | OUTPATIENT
Start: 2024-08-17 | End: 2024-07-29

## 2024-07-11 ASSESSMENT — PAIN SCALES - GENERAL: PAINLEVEL: NO PAIN (0)

## 2024-07-11 NOTE — PATIENT INSTRUCTIONS
Plan:   1.  Diet: aiming for 1225-1350kcal/day with 65-75 grams of lean protein daily and 75 oz of water daily to feel your best during this phase of weight reduction. Bare minimum keep intake above 1050kcal/day and 60grams of lean protein and 64 oz of water daily.    2. Exercise: as asthma allows ramp back up.     3. Medication: Wegovy 1mg/week too strong to be well tolerated due to nausea/vomiting sx now 2 week into use. We'll try one re-ramping per prescribing guidance and half of the people intolerant to the first ramping will tolerate a second attempt. Start 0.25mg/week 10 days after your last use of the 1mg/week dose and ramp up every 4 weeks if tolerated. New prescriptions sent today.     If intolerant of this second re-ramping, we can try Zepbound if covered well by your insurance.     Zofran 4-8mg ODT can be used once or twice daily if adjusting to medication increase (anti-nausea medication).     4. Vitamin D3 2000 international unit(s)/day recommended given low levels on 3/4/24 labs.     5. Goals: 0.8-1.6 lbs/week of weight loss anticipated with consistency in diet. If stable, it suggests the stress/routine has gone off track at least 2 days weekly.     6. Track intake for precision in finding your own sweet spot. The numbers above shoot for a 500-650kcal/day restriction in calories and provide enough protein to limit muscle mass losses during weight loss. appMobi is a good natalya for tracking.        To help lose weight in a safe way and sustainable way, I'd like to start you on a 1300 calorie diet each day.  Understanding that every 3500 calorie deficit adds up to a pound of weight reduction, with the combination of light aerobic exercise, some modest weight training and diet, we should be able to lose around 1 to 2.5lbs weekly depending on your starting height and weight and exercise routine with this goal intake. Dropping abut 1% total body weight weekly is exceptional weight loss and very sustainable  once in a good routine.    Hunger and fatigue are the enemies of weight loss and behavior change in general.  We become much more reactive in our eating when we're hungry and tired and decrease our levels of self control.  It's not a personal failing, it's just body chemistry.   We can combat this by trying to avoid being tired and hungry at the same time.  To help this,  try to front load your calories in the first 10 hours of you day so you get into the fatigued evening hours reasonably full and you can control impulses/mindless eating a lot better and avoid those bedtime snacks/evening treats that the tired brain craves.  If you can getting your exercise in the beginning of your day has also been shown to have superior results (but anytime is better than none).  We want to build up to 150 minutes or more as you progress through the first 2-3 months of weight loss season (300 minutes weekly is ideal for maintaining weight loss for most after the end of weight loss season).     Weight loss goes through ups and downs and plateaus but if you stay on the program, you will enjoy success.   Commit to the process, try to be on track at least 19 days out of 20 and continue to think about why you're doing this and what you're working towards. If you haven't thought of your reward for hitting your weight loss goals, think about it now. Using these little victory bribes along the way helps a lot.    Finding a diet that is satisfying and repeatable-- day in and day out, improves success.  The following uses the concept of Daily Caloric Restriction, eating less every day.  An outline of how to break up the day's food is given below.  It is a starting point and example of what a 1300 calorie diet looks like.  You can modify the listed foods to suite your particular tastes, but pay attention to portions and protein content.   Do not skip breakfast on this plan as it will leave you hungry and lead to overeating at some point  during the rest of the day.  If you can make supper the last food for the day more days of the week then not, it will help a lot.  That means that the intake during those first 10-12 hours of the day and hitting your protein/intake targets for all three meals is vital to your success and evening hunger control.     Start reading labels so you know that you're getting what you think you are and start measuring foods so you can eventually look at a portion and understand how it will provide the fuel you want.    Prepping raw veggies after you buy them (washing and putting into bags/tupperware for easy access), cooking several chicken breasts/proteins for the next 2-3 lunches and generally being able to grab and go what will keep you on target when time is short will greatly aid your success.  Prepare and plan ahead and success will follow. It really only requires a couple days weekly to optimize the access to the right food at the right time of day.  Food delivery and stopping at fast food is a sign of reactive eating and usually will signal a stalling of your weight loss.    Read labels:  for protein portions/yogurt, protein bars etc looking for items with more than 10 grams of protein and less than 10 grams of sugar is very helpful.  Frozen meals should have at least 18 grams of protein, under 10 grams of sugar as well (typically around 300-380 calories).    Please note: if you've had previous bariatric surgery: wait 20-30 minutes before/after eating to drink your beverages to avoid early fullness/dumping syndrome/worsening malabsorption, early loss of fullness and hunger.  Start with eating your protein first, slow down your meals and chew thoroughly.          1300 calorie diet:  Think Big Breakfast, Medium Lunch, smaller dinner.  Note: it's OK to consolidate the calories/protein of the mid morning snack with breakfast and the midafternoon snack with lunch if time doesn't allow or if your don't wish to have those  snacks. No snacks recommended after supper. If you're prone to late afternoon nibbling, that is a great time to get your fitness in so you can get to supper without the extra.    Breakfast goal of 300 calories-350 calories (egg, 1/3 to 1/2 cup cooked old fashioned oatmeal or steel cut oats and berries,3-4oz greek yogurt.)Glass of water and if you like coffee (black) or tea.        Mid morning Snack or part of lunch, about 2-2.5 hrs later: 100 calories (cottage cheese/string cheese/ fruit or banana) and a handful of cruchy/green veggies (cucumber/celery/green peppers/broccoli).  Small glass of water    Lunch:  300 calories (3.5-4 oz tuna with little conde (no bread), apple, salad with drizzle of olive oil/balsamic vinegar for example)  Water    Snack:  100 calories.  4-5 oz Greek Yogurt with at least 17 grams of protein per serving.    Or Cottage cheese (lower sodium version preferable). Get this in about 2 hrs before you plan to have dinner. Protein drinks with at least 15-20 grams of protein and less than 6 grams of sugar could be used here to hit protein goals and decrease afternoon/evening hunger.  Glass of water    Dinner:  350 calories (4 oz meat or fish with cooked veggies or salad with minimal dressing, one piece of bread).  Glass of water or unsweetened tea with lemon  Dessert: 100 calories Medium Apple or Small handful of nuts, about 2/3 of an ounce (almonds/walnuts/cashews or pistachios are ideal).   Glass of water.    Try to avoid all soda and juice (low sodium V8 ok once a day).   You can do it! Embrace the healthier you and give up the inflammatory sugary treats that accelerate disease.    Choose an activity that is fun/interesting and available to you such as  Going for a swim for 15 minutes, walking 40 minutes, elliptical 20 minutes or cycling 20 minutes as many days a week as you can.  Having a walking or workout daisy can make it even more enjoyable and keep you on track the days your  motivation/energy may be lower.  If those times are too long for your fitness level, start at 10 minutes of movement and each week try to increase by 2 minutes each week.  The first 70 minutes a week (10 minutes a day only) of exercise drops the mortality rate of a sedentary person 30%!!!!  Our goal is to work up to 150 minutes or more per week of moderate to vigorous exercise  to optimize metabolism and prevent weight regain during maintenance. If time is short and your fitness allows it, high intensity interval training can be a nice way to cut the workout time in half:  warm up 2-4 minutes then 3-6 intervals of increased intensity effort (70% of max heart rate) followed by an equal amount or more of recovery before repeating, then 1-3 minutes of cooldown.     10 minutes of weight lifting can be helpful as well or using some body weight exercises like wall squats, pushups (with assistance as needed or standing/wall pushups), seat presses, yoga moves. At least twice weekly helps maintain a good strength to weight ratio, more days is better.  [employer] is a great resource for free video demonstrations.    If you are into strenuous weight lifting or prolonged exercise, use an online calculator for how many calories you've burned and if your exercise is lasting over 60 minutes, replace 20-30% of those calories burned immediately after exercise .  For the more limited exercise (less than 500 calories burned), there is no need to add extra food unless you notice a lot of hunger on your food journal.  Usually  Even a  calorie load after longer workouts is more than adequate.  For longer efforts, hunger will increase if you don't refuel afterwards and can get meal plan off track due to hunger, so replenish immediately after the workout to keep on the diet plan and feeling good.    Exercise example:  If you burned 1000 calories during the exercise, immediately (within 30 minutes) have a snack/replacement beverage  "totaling 200-300 calories and ideally have a 3:1 ratio of carbohydrate grams to protein grams to keep muscles ready for exercise the next day.  Have your next, full meal within 2-3 hours of exercise.    Tips for success:  KEEP A FOOD JOURNAL and a log of daily weights.  Pencil and paper works fine for most. Otherwise, Myfitnesspal, fitbit, LogicNets, Loseit, garmin are all good tracker apps/programs or websites for food/fitness.  Remembering to ask yourself, \"How did my nourishment affect me today\" and comparing \"good days\" to \"hungry days\" to solidify what helps your body, in your life, feel it's best while losing weight.    1.  Prepare proteins ahead of time (broil chicken breasts in bulk so you can grab and go), steel cut oats can be stored in casserole dish/bowl in the fridge for quick scoop in the morning and rewarm in microwave, make use of crock pot recipes (watch salt content).    2.  Drink a 8-12 oz glass of water every 2-3 hours when awake.  We often mistake hunger for thirst, especially when losing weight.    3. Remember your Reward and Motivation when things get hard.    4.  Weigh yourself every morning and record, you'll stay on track better and learn how your body loses weight. Don't worry about 1 or 2 day patterns, but when on track you'll notice good trend downward of weight over 3-4 day segments.    5. Call or use [employer] messaging if problems/concerns .    6.  Find a handful of meals/foods that keep you on track and get into a boring routine that is sustainable for you.    7.  Take a complete multivitamin just to make sure all micronutrients are adequate during weight loss.    8. If losing hair/brittle nails it often means you are not taking enough protein.  Minimum goal is 60 grams daily of protein, most people with normal kidneys do well with upwards of 100-120 grams/day of protein. Consider taking Biotin as supplement or a \"Hair and Nail\" multivitamin.  If you are hypothyroid and losing hair, see " you doctor for a check up of your levels if you haven't had one recently.    9.  Getting adequate sleep is very important for starting your day properly, when we are sleep deprived, our morning appetite is suppressed and without eating an adequate breakfast, we overeat later in the day when we're tired.  Our body heals in our sleep and our mental and immune health depends on this rest.  Aim for 7-8 hours of sleep nightly if possible.  If you sleep is disturbed, perform some introspection on stressors/depression/anxiety/PTSD events or possible sleep disorders and we can trouble shoot solutions/evaulations if issues persist.    Exercise during the day, meditative breathing before bed and after waking and removing the television from the bedroom are easy ways to improve quality of sleep.      10. Relaxation.  Controlled breathing exercises can lower stress levels in the brain.  One technique is 4, 7, 8 breathing:  Place the tip of your tongue behind your front teeth, breath in through your mouth for 4 seconds, Hold it for 7 seconds and breath out slowly, making a leaky tire noise for 8 seconds.  Repeat 4 times.  Ideally do at the start and end of your day or if feeling stressed.  It works and it's why meditation/yoga/martial arts are often very breath based activities.  There are breathing techniques for alertness as well as relaxation out there and they can be quite helpful.      LEAN PROTEIN SOURCES  Getting 20-30 grams of protein, 3 meals daily, is appropriate for most people, some need more but more than about 40 grams per meal is not useful.  General rule is drinking one ounce of water per gram of protein eaten over the course of the day:  70 grams of protein each day, drink 70 oz of water.  Protein Source Portion Calories Grams of Protein                           Nonfat, plain Greek yogurt    (10 grams sugar or less) 3/4 cup (6 oz)  12-17   Light Yogurt (10 grams sugar or less) 3/4 cup (6 oz)  6-8    Protein Shake 1 shake 110-180 15-30   Skim/1% Milk or lactose-free milk 1 cup ( 8 oz)  8   Plain or light, flavored soymilk 1 cup  7-8   Plain or light, hemp milk 1 cup 110 6   Fat Free or 1% Cottage Cheese 1/2 cup 90 15   Part skim ricotta cheese 1/2 cup 100 14   Part skim or reduced fat cheese slices 1 ounce 65-80 8     Mozzarella String Cheese 1 80 8   Canned tuna, chicken, crab or salmon  (canned in water)  1/2 cup 100 15-20   White fish (broiled, grilled, baked) 3 ounces 100 21   [place]/Tuna (broiled, grilled, baked) 3 ounces 150-180 21   Shrimp, Scallops, Lobster, Crab 3 ounces 100 21   Pork loin, Pork Tenderloin 3 ounces 150 21   Boneless, skinless chicken /turkey breast                          (broiled, grilled, baked) 3 ounces 120 21   [place], [place], [place], and Venison 3 ounces 120 21   Lean cuts of red meat and pork (sirloin,   round, tenderloin, flank, ground 93%-96%) 3 ounces 170 21   Lean or Extra Lean Ground Turkey 1/2 cup 150 20   90-95% Lean [place] Burger 1 antwan 140-180 21   Low-fat casserole with lean meat 3/4 cup 200 17   Luncheon Meats                                                        (turkey, lean ham, roast beef, chicken) 3 ounces 100 21   Egg (boiled, poached, scrambled) 1 Egg 60 7   Egg Substitute 1/2 cup 70 10   Nuts (limit to 1 serving per day)  3 Tbsp. 150 7   Nut [place] (peanut, almond)  Limit to 1 serving or less daily 1 Tbsp. 90 4   Soy Burger (varies) 1  15   Garbanzo, Black, Tobar Beans 1/2 cup 110 7   Refried Beans 1/2 cup 100 7   Kidney and Lima beans 1/2 cup 110 7   Tempeh 3 oz 175 18   Vegan crumbles 1/2 cup 100 14   Tofu 1/2 cup 110 14   Chili (beans and extra lean beef or turkey) 1 cup 200 23   Lentil Stew/Soup 1 cup 150 12   Black Bean Soup 1 cup 175 12       On-the-Go Breakfast Ideas  As of 2015, the latest research shows what a huge impact eating breakfast has on losing weight and feeling your best. People lose more weight when they make breakfast  their biggest meal of the day compared to Dinner, but even if you cannot go to that degree, getting a breakfast that has at least 20 grams of protein and even a moderate amount of fat is ideal for maintaining good energy through the day and limits overeating in the evening hours.  The following are some quick and easy suggestions for at least getting something of substance into your body in the morning.  Enjoy!    Eating breakfast within 90 minutes of waking up is an important part of taking care of your body on a restricted calorie diet plan.  After sleeping for hours, your body is in need of fuel.  An ideal breakfast is a combination of protein, whole-grain carbohydrates, or fruit.  Here s why:    -Protein digests very slowly in the body, helping you feel more satisfied.  -Whole grains provide dietary fiber, which also digests slowly and helps keep your gut clean.  -Fruit is a great source of vitamins, minerals, and fiber.     Each one of these breakfast combinations has between 200-300 calories and 15-20 grams of protein.  Feel free to mix and match!    Bone Broth (chicken bone broth or beef bone broth) is a great way to boost protein content. 8oz of bone broth will typically have 9-12grams of protein for 40kcal of energy.    Protein: Choose  -1/2 cup low-fat cottage cheese  -2 hard boiled eggs , or one cooked in olive oil (low/slow heat).  -1 low fat string cheese stick  -1 Tablespoon natural peanut butter  -Free For Kids vegetarian sausage antwan (found in freezer section)  -1 slice lowfat cheese  -6 oz 2% or lowfat Greek yogurt, such as Fage or Oikos.    PLUS    Whole Grains:  Choose   -1 whole wheat English muffin  -1 whole wheat karis, half  -1/2 Fiber One frozen muffin, thawed  -1/2 Fiber One toaster pastry  -1 whole wheat bagel thin  -1/2 cup Kashi cereal  -1 Kashi waffle (or other whole grain high-fiber waffle)  Aim for whole grain/sprouted breads with at least 3g of fiber/slice if having bread. Silver  [employer] is one such brand.    OR    Fruit: Choose  -1/3 cup blueberries  -1/2 banana (or a plantain- similar to a banana, yet smaller)  -1/2 cup cantaloupe cubes  -1 small apple  -1 small orange  -1/2 cup strawberries  -handful raspberries/blackberries (each berry is about 1 calorie).    *Adapted from [employer], Fall 20    Ten Breakfasts Under 250 calories    Ideally, getting between 350-600 calories  (depending on starting height and weight)for breakfast is ideal for avoiding hunger later in the day, adjust/add to the following accordingly:    One- 250 calories, 8.5 g protein  1 slice whole-grain toast   1 Tbsp peanut butter    banana    Two- 250 calories, 8 g protein    cup nonfat/lowfat yogurt  1/3rd cup diced no-sugar peaches  1/3rd cup cereal (like Special K, Cheerios, or bran flakes)    Three- 250 calories, 25 g protein  1 egg scrambled with 1 oz skim milk    cup shredded cheddar    whole grain English muffin  1 oz [place]ian sommer  1 tsp margarine spread    Four- 225 calories, 25 g protein  1/2 cup Kashi Go-Lean cereal    cup skim milk mixed with 1 scoop Bariatric Advantage protein powder    cup no-sugar diced pears    Five- 250 calories, 20 g protein    cup oatmeal prepared with skim milk, 1 scoop protein powder, and sugar-free maple syrup    Six- 200 calories, 5 g protein  1 whole grain waffle, toasted  1 tablespoon creamy peanut or almond butter    Seven-  250 calories, 19 g protein  Breakfast sandwich: 1 slice whole grain toast, cut in half.  Add 1 scrambled egg and one slice cheddar  cheese.    Eight-  250 calories, 15 g protein  2 eggs scrambled with 1/3 cup frozen spinach (heat before adding to eggs) and 2 tablespoons low fat cream cheese.    Nine-  150 calories, 15 g protein  2/3rd cup cottage cheese    cup cantaloupe    Ten- 200 calories, 20 g protein  Fruit smoothie made with 4 oz. nonfat Greek yogurt,   cup berries, 1 scoop protein powder, and 4 oz skim milk.    Ten Lunches Under 250  Calories    Aim for lunch to be around 300-400 calories a day when trying to lose weight and get that protein in!    One- 200 calories, 11 g protein  1/3 cup tuna salad made with light conde on 1 slice whole grain bread  1 small peeled apple    Two- 250 calories, 16 g protein  1/3 cup lowfat cottage cheese    cup cooked green beans    small fruit cocktail (in natural juice)    Three- 200 calories, 11 g protein    grilled cheese sandwich on whole grain bread with lowfat cheese  2/3rd cup of tomato soup    Four- 250 calories, 22 g protein  Deli wrap: 1 oz sliced turkey, 1 oz sliced ham, 1 oz sliced chicken rolled up with 1 slice low-fat cheese  1 small orange    Five- 250 calories, 28 g protein  2/3rd cup chili with 1 oz shredded cheese  4 saltine crackers    Six- 250 calories, 22 g protein  1 cup fresh spinach with 2 oz chicken, 1/3rd cup mandarin oranges, and 2 tablespoons sliced almonds with 1 tablespoon  vinaigrette dressing    Seven- 200 calories, 11 g protein  1 Tbsp sugar-free preserves and 1 Tbsp peanut butter on 1 slice whole grain toast    cup nonfat/lowfat Greek yogurt    Eight- 250 calories, 18 g protein  1 small soft-shell chicken taco with 1 oz shredded cheese, lettuce, tomato, salsa, and 1 Tbsp light sour cream    cup black beans    Nine- 225 calories, 13 g protein  2 ounces baked chicken  1/4 cup mashed potatoes    cup green beans    Ten- 200 calories, 21 g protein  Deli karis: 2 oz roast beef or other deli meat with 1 tsp Narciso mayonnaise and sliced tomato, onion, and lettuce  1/3rd cup cottage cheese      Ten Dinners Under 300 calories    If you're eating a large breakfast and medium lunch, keep dinner small.  300-400 calories is ideal for most people depending on their caloric needs.    One- 300 calories, 12 g protein  1-inch thick slice of turkey meatloaf    cup baked butternut squash    Two- 200 calories, 9 g protein  Bread-less BLT: 3 slices turkey sommer, sliced tomato, wrapped in a large lettuce  leaf    cup peeled fruit    Three- 275 calories, 36 g protein  3 oz roasted chicken    cup cooked broccoli    cup shredded cheddar cheese    cup unsweetened applesauce    Four- 200 calories, 25 g protein  3 oz baked tilapia  1/3rd cup cooked carrots    cup yogurt    Five- 250 calories, 20 g protein  Grilled ham  n  Swiss: spread 2 tsp ghee or butter on 1 slice of whole grain bread.  Cut bread in half, layer 2 oz deli ham with 1 piece of Swiss cheese and grill until cheese is melted.    cup cooked vegetables    Six- 250 calories, 18 g protein  Vegetarian cheeseburger: 1 [place] cheeseburger topped with lettuce, onion, tomato, and ketchup/mustard    cup sweet potato fries    Seven- 250 calories, 18 g protein  Pork pot roast: 2 oz roasted pork loin, 1/3rd cup roasted carrots,   medium potato, cooked with   cup gravy    Eight- 330 calories, 25 g protein  2 oz meatballs (about 2 small meatballs)    cup spaghetti sauce  1/2 piece toast topped with 1 tsp ghee or butterand topped with garlic powder, toasted in oven    Nine- 250 calories, 16 g protein  Mexican pizza: one 8  corn tortilla topped with 2 oz chicken,   cup salsa, 2 tablespoons black beans, 2 tablespoons shredded cheese.  Bake until cheese is melted.    Ten- 250 calories, 22 g protein  Shrimp stir-villanueva: 3 oz cooked shrimp, 1/6th onion,   pepper,   cup chopped carrots sautéed in 1 tablespoon olive oil, topped with 2 tablespoons stir [place]an[place] sauce and a pinch of sesame seeds        150 Calories or Less Snack Ideas   1 hardboiled egg with   cup berries  1 small apple with 1 hardboiled egg  10 almonds with   cup berries  2 clementines with 1 light string cheese  1 light string cheese with   sliced apple  1 light string cheese wrapped in 2 slices of turkey  4 100% whole wheat crackers (e.g. Triscuit) with 1 light string cheese    c. cottage cheese with   cup fruit and 1 Tbsp sunflower seeds     cup cottage cheese with   of an avocado     can tuna fish with 1 cup sliced  cucumbers     cup roasted garbanzo beans with paprika and cayenne pepper    baked sweet potato with   cup chili beans or   cup cottage cheese  2 oz. nitrate free turkey slices with 1 cup carrots  1 container (6 oz) of low sugar (less than 10 grams of sugar) greek yogurt   3 Tablespoons of hummus with 1 cup sliced bell peppers   2 Tablespoons of hummus with 15 baby carrots  4 Tablespoons ranch dip made with plain Greek Yogurt and 3 mini cucumbers  1/4 cup nuts (any kind)  1 Tablespoon peanut butter with 1 stalk celery   1 dill pickle wrapped in 1-2 slices of deli ham with 1 tsp of mayonnaise/mustard.      Wegovy (semaglutide) is a very effective satiety boosting appetite suppressant. It needs to be ramped up slowly to be tolerated adequately.  About 1/10 people will not tolerate this medication. Each month, you move up to a higher dose until eventually reaching the 2.4mg/week dose if tolerated. When in plentiful supply, one try at re-ramping is recommended if the initial ramping isn't tolerated well and if that re-ramping isn't tolerated well, it's best to avoid this medication.       Wegovy starts at 0.25mg/week for 4 weeks then increases to 0.5mg/week for 4 weeks then 1mg/week for 4 weeks then 1.7mg/week for 4 weeksthen 2.4mg/week usually for 6-9 months if tolerated well.  Injections can be given after cleansing the skin with alcohol prep pad or swab (available OTC).     Stop Wegovy if severe abdominal pain/vomiting/rash/throat swelling or constant nausea that prevents adequate food/water intake. Stop 2-3 weeks prior to any planned general anesthesia surgeries to reduce risk for something called a post operative ileus.     Gallstones can occur in about 1% of patients on this medication so update me if increase right upper abdominal pain after eating.     Start meals with protein first, separate beverages from meals by 20 minutes and work hard in between meals to get your 64-75 oz of water daily to reduce risks for  severe constipation. Consider a fiber supplement like powdered psyllium husk in 12 oz water each night, stool softerners as needed and Miralax or milk of Magnesia if more than 3 days have passed without a Bowel Movement.     Check out [employer] for patient resources.  If you have weekends off, I recommend dosing Friday evenings.     Some people starve on this medication if not mindful about food intake. I recommend starting meals with the protein part of your meal first, chew thoroughly and separate beverages from meals by about 20 minutes to make sure you get your nourishment in first. Include vegetables/complex carbohydrates and unsaturated fat as part of your balanced diet but group these at the end of the meal, after protein is mostly gone. Satiety will kick in too early if drinking too much with meals and under nourishment can result.     It's not a bad idea to take a complete multivitamin most days of the week if using this medication.     Pancreatitis is a very rare but potentially serious side effect. Stop Wegovy if severe mid abdominal pain/burning in nature or if unable to eat/drink due to severe nausea/discomfort.   People with strong history of pancreatitis without clear cause should stay clear of this medication as should those with strong personal or family history for medullary thyroid cancer or Multiple Endocrine Neoplasia (rare).     Stop Wegovy at least 2 weeks prior to any planned surgery.    Kind Regards,  Bertram Gamboa MD  [facility]

## 2024-07-11 NOTE — NURSING NOTE
Current patient location: [address] [place] [id number]    Is the patient currently in the state of [place]? YES    Visit mode:VIDEO    If the visit is dropped, the patient can be reconnected by: VIDEO VISIT: Text to cell phone:   Telephone Information:   Mobile [phone]       Will anyone else be joining the visit? NO  (If patient encounters technical issues they should call [phone]    How would you like to obtain your AVS? MyChart    Are changes needed to the allergy or medication list? No    Are refills needed on medications prescribed by this physician? NO    Reason for visit: RECHECK    Pt states no height/weight available within last week.    Boris SEVERINO

## 2024-07-11 NOTE — LETTER
7/11/2024      Reginaldo Ferraro  [address] [facility] [address]      Dear Colleague,    Thank you for referring your patient, Reginaldo Ferraro, to the [facility] AND [facility] [place]. Please see a copy of my visit note below.    Bariatric Clinic Follow-Up Visit:    Reginaldo Ferraro is a 33 year old  female with There is no height or weight on file to calculate BMI.  presenting here today for follow-up on non-surgical efforts for weight loss. Original Intake visit occurred on 3/4/24 with a weight of 159 lbs and BMI of 30.5 and body fat of 41.6%.  Along with diet and behavior changes, she has been using Wegovy to assist her weight loss goals. She had severe nausea/vomiting upon review today at the 1mg/week dose so we re-initiated a re-ramping trial on 7/11/24 visit.     See her intake visit notes for details on identified contributors to weight gain in the past. Prednisone induced weight gain for numerous asthma flares in the past. Chart review shows Dietician calculated RMR of 1360kcal/day and protein intake goal of 60-80g/day.    Weight:   Wt Readings from Last 5 Encounters:   06/24/24 72.6 kg (160 lb)   05/28/24 73.4 kg (161 lb 12.8 oz)   05/17/24 73.8 kg (162 lb 12.8 oz)   04/12/24 72.9 kg (160 lb 12.8 oz)   03/04/24 72.1 kg (159 lb)    pounds    Intake labs showed low vitamin D, insulin resistance with A1c of 5.9% on 3/4/24.   Comorbidities:  Patient Active Problem List   Diagnosis     Cervical high risk HPV (human papillomavirus) test positive     Insomnia, unspecified insomnia     Vitamin D deficiency     Mild episode of recurrent major depressive disorder (H24)     Adjustment disorder with depressed mood     Mixed personality disorder (H)     Chronic maxillary sinusitis     Moderate persistent asthma     Allergic rhinitis due to animals     Allergic rhinitis due to dust mite     Seasonal allergic rhinitis due to pollen     Pain in both lower legs     Chronic  pansinusitis     Purulent rhinitis     Staph aureus infection     Weight gain due to medication       Current Outpatient Medications:      ondansetron (ZOFRAN ODT) 4 MG ODT tab, Take 1 tablet (4 mg) by mouth every 8 hours as needed for nausea, Disp: 20 tablet, Rfl: 1     albuterol (PROAIR HFA/PROVENTIL HFA/VENTOLIN HFA) 108 (90 Base) MCG/ACT inhaler, Inhale 2 puffs into the lungs every 4 hours as needed for shortness of breath, wheezing or cough, Disp: 18 g, Rfl: 1     ALPRAZolam (XANAX) 0.25 MG tablet, Take 1 tablet (0.25 mg) by mouth at bedtime as needed, may repeat once for anxiety, Disp: 30 tablet, Rfl: 0     azelastine (OPTIVAR) 0.05 % ophthalmic solution, Apply 1 drop to eye 2 times daily, Disp: 5 mL, Rfl: 0     cholecalciferol (D3 HIGH POTENCY) 50 MCG (2000 UT) CAPS, Take 50 mcg by mouth daily, Disp: 180 capsule, Rfl: 1     Fexofenadine HCl (ALLEGRA ALLERGY PO), , Disp: , Rfl:      fluticasone-vilanterol (BREO ELLIPTA) 200-25 MCG/ACT inhaler, Inhale 1 puff into the lungs daily, Disp: 60 each, Rfl: 11     ibuprofen (ADVIL/MOTRIN) 200 MG tablet, Take 400 mg by mouth, Disp: , Rfl:      ipratropium - albuterol 0.5 mg/2.5 mg/3 mL (DUONEB) 0.5-2.5 (3) MG/3ML neb solution, Take 1 vial (3 mLs) by nebulization every 6 hours as needed for shortness of breath, wheezing or cough, Disp: 90 mL, Rfl: 1     mepolizumab (NUCALA) 100 MG/ML auto-injector, Inject 1 mL (100 mg) Subcutaneous every 28 days, Disp: 1 mL, Rfl: 5     mupirocin (BACTROBAN) 2 % external ointment, Apply topically 3 times daily, Disp: 15 g, Rfl: 0     omeprazole (PRILOSEC) 20 MG DR capsule, Take 1 capsule (20 mg) by mouth daily, Disp: 90 capsule, Rfl: 1     ORDER FOR ALLERGEN IMMUNOTHERAPY, Name of Mix: Mix #1  Dust Mite, Cat, Dog Cat Hair, Standardized A.P. 10,000 BAU/mL, HS  2.0 ml Dog Hair-Dander, UF  1:650 w/v, HS  1.0 ml Dust Mites DF. 10,000 AU/mL, HS  1.0 ml Dust Mites DP. 10,000 AU/mL, HS  1.0 ml  Diluent: HSA qs to 5ml (Patient not taking:  Reported on 7/8/2024), Disp: 5 mL, Rfl: PRN     ORDER FOR ALLERGEN IMMUNOTHERAPY, Name of Mix: Mix #2  Tree  Sylvain, White 1:20 w/v, HS  0.5 ml Birch Mix PRW 1:20 w/v, HS  0.5 ml Boxelder-Maple Mix BHR (Boxelder Hard Red) 1:20 w/v, HS  0.5 ml [place], Common 1:20 w/v, HS  0.5 ml Oak Mix RVW 1:20 w/v, HS 0.5 ml Pine Mix 1:20 w/v, HS 0.5 ml [place] Tree, Black 1:20 w/v, HS 0.5 ml Diluent: HSA qs to 5ml (Patient not taking: Reported on 7/8/2024), Disp: 5 mL, Rfl: PRN     ORDER FOR ALLERGEN IMMUNOTHERAPY, Name of Mix: Mix #3  Grass, Weeds Prabhu Grass 1:20 w/v, HS 0.5 ml Steve Grass (Std) 100,000 BAU/mL, HS 0.4 ml Lamb's Quarters 1:20 w/v, HS 0.5 ml Nettle 1:20 w/v, HS 0.5 ml Plantain, English 1:20 w/v, HS 0.5 ml Ragweed, Mix (Giant, Short) 1:20 w/v, HS 0.5 ml Russian Thistle 1:20 w/v, HS 0.5 ml Sorrel, Sheep 1:20 w/v, HS 0.5 ml Diluent: HSA qs to 5ml (Patient not taking: Reported on 7/8/2024), Disp: 5 mL, Rfl: PRN     Semaglutide-Weight Management (WEGOVY) 1.7 MG/0.75ML pen, Inject 1.7 mg Subcutaneous once a week, Disp: 3 mL, Rfl: 0     Semaglutide-Weight Management (WEGOVY) 2.4 MG/0.75ML pen, Inject 2.4 mg Subcutaneous once a week, Disp: 3 mL, Rfl: 3     VENTOLIN  (90 Base) MCG/ACT inhaler, Inhale 1-2 puffs into the lungs every 4 hours as needed for shortness of breath or wheezing, Disp: 18 g, Rfl: 1      Interim: Since our last visit, she has had to delay medication due to asthma flare, now up to 1mg/week of Wegovy. Has some nausea side effects at 1mg/week dose  Had to move recently. No longer with a functional kitchen. Currently living w/ family while closing   Plan:   1.  Diet: aiming for 1225-1350kcal/day with 65-75 grams of lean protein daily and 75 oz of water daily to feel your best during this phase of weight reduction. Bare minimum keep intake above 1050kcal/day and 60grams of lean protein and 64 oz of water daily.    2. Exercise: as asthma allows ramp back up.     3. Medication: Wegovy 1mg/week too  "strong to be well tolerated due to nausea/vomiting sx now 2 week into use. We'll try one re-ramping per prescribing guidance and half of the people intolerant to the first ramping will tolerate a second attempt. Start 0.25mg/week 10 days after your last use of the 1mg/week dose and ramp up every 4 weeks if tolerated. New prescriptions sent today.     If intolerant of this second re-ramping, we can try Zepbound if covered well by your insurance.     4. Vitamin D3 2000 international unit(s)/day recommended given low levels on 3/4/24 labs.     5. Goals: 0.8-1.6 lbs/week of weight loss anticipated with consistency in diet. If stable, it suggests the stress/routine has gone off track at least 2 days weekly.     6. Track intake for precision in finding your own sweet spot. The numbers above shoot for a 500-650kcal/day restriction in calories and provide enough protein to limit muscle mass losses during weight loss. [employer] is a good natalya for tracking.      We discussed [facility] Bariat[facility] Basics including:  -eating 3 meals daily  -reviewed metabolic needs for weight loss based on Resting Metabolic Rate  -protein goals supportive of healthy weight loss  -avoiding/limiting calorie containing beverages  -We discussed the importance of restorative sleep and stress management in maintaining a healthy weight.  -We discussed the National Weight Control Registry healthy weight maintenance strategies and ways to optimize metabolism.  -We discussed the importance of physical activity including cardiovascular and strength training in maintaining a healthier weight and explored viable options.      Most recent labs:  Lab Results   Component Value Date    WBC 9.0 07/02/2024    HGB 13.0 07/03/2024    HCT 37.0 07/02/2024    MCV 92 07/02/2024     (H) 07/02/2024     Lab Results   Component Value Date    CHOL 202 (H) 10/13/2023     Lab Results   Component Value Date    HDL 55 10/13/2023     No components found for: \"LDLCALC\"  Lab " "Results   Component Value Date    TRIG 83 10/13/2023     No results found for: \"CHOLHDL\"  Lab Results   Component Value Date    ALT 43 07/02/2024    AST 28 07/02/2024    ALKPHOS 72 07/02/2024     No results found for: \"HGBA1C\"  Lab Results   Component Value Date    B12 466 03/04/2024     No components found for: \"VITDT1\"  Lab Results   Component Value Date    YING 35 10/13/2023     No results found for: \"PTHI\"  No results found for: \"ZN\"  No results found for: \"VIB1WB\"  Lab Results   Component Value Date    TSH 2.03 07/02/2024     No results found for: \"TEST\"    DIETARY HISTORY  See above      PHYSICAL ACTIVITY PATTERNS:  Cardiovascular: soccer,   Strength Training: gym    REVIEW OF SYSTEMS  Severe nausea vomiting at 1mg/week Wegovy dose. High stress load, between homes and living with family until new home closes in a month or two. .  PHYSICAL EXAM:  Vitals: LMP 05/28/2024 (Exact Date)   Weight:   Wt Readings from Last 3 Encounters:   06/24/24 72.6 kg (160 lb)   05/28/24 73.4 kg (161 lb 12.8 oz)   05/17/24 73.8 kg (162 lb 12.8 oz)         GEN: Pleasant, well groomed, in no acute distress  HEENT:  normal facies/voice .  NECK: No swelling.  HEART: .  LUNGS: No respiratory difficulty noted. No cough. .  ABDOMEN.  EXTREMITIES: No tremor. Ambulation is ..  NEURO: Alert and Oriented X3, fluent speech.  SKIN: No visible rashes. .    Interim study results: Last Comprehensive Metabolic Panel:  Sodium   Date Value Ref Range Status   07/02/2024 138 135 - 145 mmol/L Final     Potassium   Date Value Ref Range Status   07/02/2024 3.8 3.4 - 5.3 mmol/L Final     Chloride   Date Value Ref Range Status   07/02/2024 103 98 - 107 mmol/L Final     Carbon Dioxide (CO2)   Date Value Ref Range Status   07/02/2024 24 22 - 29 mmol/L Final     Anion Gap   Date Value Ref Range Status   07/02/2024 11 7 - 15 mmol/L Final     Glucose   Date Value Ref Range Status   07/02/2024 89 70 - 99 mg/dL Final     GLUCOSE BY METER POCT   Date Value Ref Range " Status   02/16/2022 91 70 - 99 mg/dL Final     Comment:     Dr/RN Notified     Urea Nitrogen   Date Value Ref Range Status   07/02/2024 14.3 6.0 - 20.0 mg/dL Final     Creatinine   Date Value Ref Range Status   07/02/2024 0.55 0.51 - 0.95 mg/dL Final     GFR Estimate   Date Value Ref Range Status   07/02/2024 >90 >60 mL/min/1.73m2 Final     Comment:     eGFR calculated using 2021 CKD-EPI equation.     Calcium   Date Value Ref Range Status   07/02/2024 9.1 8.6 - 10.0 mg/dL Final     Bilirubin Total   Date Value Ref Range Status   07/02/2024 0.3 <=1.2 mg/dL Final     Alkaline Phosphatase   Date Value Ref Range Status   07/02/2024 72 40 - 150 U/L Final     ALT   Date Value Ref Range Status   07/02/2024 43 0 - 50 U/L Final     Comment:     Reference intervals for this test were updated on 6/12/2023 to more accurately reflect our healthy population. There may be differences in the flagging of prior results with similar values performed with this method. Interpretation of those prior results can be made in the context of the updated reference intervals.       AST   Date Value Ref Range Status   07/02/2024 28 0 - 45 U/L Final     Comment:     Reference intervals for this test were updated on 6/12/2023 to more accurately reflect our healthy population. There may be differences in the flagging of prior results with similar values performed with this method. Interpretation of those prior results can be made in the context of the updated reference intervals.               TSH   Date Value Ref Range Status   07/02/2024 2.03 0.30 - 4.20 uIU/mL Final   10/31/2014 1.13 0.40 - 4.00 mU/L Final     Comment:     Effective 7/30/2014, the reference range for this assay has changed to reflect   new instrumentation/methodology.       Lab Results   Component Value Date    A1C 5.9 03/04/2024       Recent Labs   Lab Test 10/13/23  0922   CHOL 202*   HDL 55   *   TRIG 83     Vitamin D Deficiency Screening Results:  Lab Results    Component Value Date    VITDT 18 (L) 03/04/2024    VITDT 19 (L) 11/02/2015    VITDT 21 (L) 10/31/2014     .      30 minutes spent by me on the date of the encounter doing chart review, history and exam, documentation and further activities per the note   Bertram Gamboa MD  [facility]  5:11 PM  7/10/2024    Virtual Visit Details    Type of service:  Video Visit   Video Start Time:  2:08pm  Video End Time:2:36 PM    Originating Location (pt. Location): Home    Distant Location (provider location):  On-site  Platform used for Video Visit: CarlaWell      Again, thank you for allowing me to participate in the care of your patient.        Sincerely,        Bertram Gamboa MD

## 2024-07-11 NOTE — PROGRESS NOTES
Virtual Visit Details    Type of service:  Video Visit   Video Start Time:  2:08pm  Video End Time:2:36 PM    Originating Location (pt. Location): Home    Distant Location (provider location):  On-site  Platform used for Video Visit: Jere

## 2024-07-11 NOTE — TELEPHONE ENCOUNTER
I called to schedule a follow-up but patient would like to call back and schedule it later.     Payal REGAN [facility]  Surgery Clinic [facility]  Weight Management Clinic - [address] [address]  Office: [phone]  Fax: [phone]      
aching

## 2024-07-29 ENCOUNTER — TELEPHONE (OUTPATIENT)
Dept: OBGYN | Facility: CLINIC | Age: 34
End: 2024-07-29

## 2024-07-29 ENCOUNTER — VIRTUAL VISIT (OUTPATIENT)
Dept: OBGYN | Facility: CLINIC | Age: 34
End: 2024-07-29
Payer: COMMERCIAL

## 2024-07-29 DIAGNOSIS — Z34.80 PRENATAL CARE, SUBSEQUENT PREGNANCY, UNSPECIFIED TRIMESTER: Primary | ICD-10-CM

## 2024-07-29 PROCEDURE — 99207 PR NO CHARGE NURSE ONLY: CPT | Mod: 93

## 2024-07-29 NOTE — PATIENT INSTRUCTIONS
Learning About Pregnancy  Your Care Instructions     Your health in the early weeks of your pregnancy is particularly important for your baby's health. Take good care of yourself. Anything you do that harms your body can also harm your baby.  Make sure to go to all of your doctor appointments. Regular checkups will help keep you and your baby healthy.  How can you care for yourself at home?  Diet    Choose healthy foods like fruits, vegetables, whole grains, lean proteins, and healthy fats.     Choose foods that are good sources of calcium, iron, and folate. You can try dairy products, dark leafy greens, fortified orange juice and cereals, almonds, broccoli, dried fruit, and beans.     Do not skip meals or go for many hours without eating. If you are nauseated, try to eat a small, healthy snack every 2 to 3 hours.     Avoid fish that are high in mercury. These include shark, swordfish, cielo mackerel, marlin, orange roughy, and bigeye tuna, as well as tilefish from the [facility].     It's okay to eat up to 8 to 12 ounces a week of fish that are low in mercury or up to 4 ounces a week of fish that have medium levels of mercury. Some fish that are low in mercury are salmon, shrimp, canned light tuna, cod, and tilapia. Some fish that have medium levels of mercury are halibut and white albacore tuna.     Drink plenty of fluids. If you have kidney, heart, or liver disease and have to limit fluids, talk with your doctor before you increase the amount of fluids you drink.     Limit caffeine to about 200 to 300 mg per day. On average, a cup of brewed coffee has around 80 to 100 mg of caffeine.     Do not drink alcohol, such as beer, wine, or hard liquor.     Take a multivitamin that contains at least 400 micrograms (mcg) of folic acid to help prevent birth defects. Fortified cereal and whole wheat bread are good additional sources of folic acid.     Increase the calcium in your diet. Try to drink a quart of skim milk  each day. You may also take calcium supplements and choose foods such as cheese and yogurt.   Lifestyle    Make sure you go to your follow-up appointments.     Get plenty of rest. You may be unusually tired while you are pregnant.     Get at least 30 minutes of exercise on most days of the week. Walking is a good choice. If you have not exercised in the past, start out slowly. Take several short walks each day.     Do not smoke. If you need help quitting, talk to your doctor about stop-smoking programs. These can increase your chances of quitting for good.     Do not touch cat feces or litter boxes. Also, wash your hands after you handle raw meat, and fully cook all meat before you eat it. Wear gloves when you work in the yard or garden, and wash your hands well when you are done. Cat feces, raw or undercooked meat, and contaminated dirt can cause an infection that may harm your baby or lead to a miscarriage.     Avoid things that can make your body too hot and may be harmful to your baby, such as a hot tub or sauna. Or talk with your doctor before doing anything that raises your body temperature. Your doctor can tell you if it's safe.     Avoid chemical fumes, paint fumes, or poisons.     Do not use illegal drugs, marijuana, or alcohol.   Medicines    Review all of your medicines with your doctor. Some of your routine medicines may need to be changed to protect your baby.     Use acetaminophen (Tylenol) to relieve minor problems, such as a mild headache or backache or a mild fever with cold symptoms. Do not use nonsteroidal anti-inflammatory drugs (NSAIDs), such as ibuprofen (Advil, Motrin) or naproxen (Aleve), unless your doctor says it is okay.     Do not take two or more pain medicines at the same time unless the doctor told you to. Many pain medicines have acetaminophen, which is Tylenol. Too much acetaminophen (Tylenol) can be harmful.     Take your medicines exactly as prescribed. Call your doctor if you  "think you are having a problem with your medicine.   To manage morning sickness    Keep food in your stomach, but not too much at once. Try eating five or six small meals a day instead of three large meals.     For nausea when you wake up, eat a small snack, such as a couple of crackers or pretzels, before rising. Allow a few minutes for your stomach to settle before you slowly get up.     Try to avoid smells and foods that make you feel nauseated. High-fat or greasy foods, milk, and coffee may make nausea worse. Some foods that may be easier to tolerate include cold, spicy, sour, and salty foods.     Drink enough fluids. Water and other caffeine-free drinks are good choices.     Take your prenatal vitamins at night on a full stomach.     Try foods and drinks made with bella. Bella may help with nausea.     Get lots of rest. Morning sickness may be worse when you are tired.     Talk to your doctor about over-the-counter products, such as vitamin B6 or doxylamine, to help relieve symptoms.     Try a P6 acupressure wrist band. These anti-nausea wristbands help some people.   Follow-up care is a key part of your treatment and safety. Be sure to make and go to all appointments, and call your doctor if you are having problems. It's also a good idea to know your test results and keep a list of the medicines you take.  Where can you learn more?  Go to [email or web address]://[email or web address] [email or web address]/patiented  Enter E868 in the search box to learn more about \"Learning About Pregnancy.\"  Current as of: July 10, 2023               Content Version: 14.0    0264-0197 [employer].   Care instructions adapted under license by your healthcare professional. If you have questions about a medical condition or this instruction, always ask your healthcare professional. [employer] disclaims any warranty or liability for your use of this information.      Weeks 6 to 10 of Your Pregnancy: Care Instructions  During these weeks of " "pregnancy, your body goes through many changes. You may start to feel different, both in your body and your emotions. Each pregnancy is different, so there's no \"right\" way to feel. These early weeks are a time to make healthy choices for you and your pregnancy.    Take a daily prenatal vitamin. Choose one with folic acid in it.    Avoid alcohol, tobacco, and drugs (including marijuana). If you need help quitting, talk to your doctor.    Drink plenty of liquids.  Be sure to drink enough water. And limit sodas, other sweetened drinks, and caffeine.     Choose foods that are good sources of calcium, iron, and folate.  You can try dairy products, dark leafy greens, fortified orange juice and cereals, almonds, broccoli, dried fruit, and beans.     Avoid foods that may be harmful.  Don't eat raw meat, deli meat, raw seafood, or raw eggs. Avoid soft cheese and unpasteurized dairy, like Brie and blue cheese. And don't eat fish that contains a lot of mercury, like shark and swordfish.     Don't touch elodia litter or cat poop.  They can cause an infection that could be harmful during pregnancy.     Avoid things that can make your body too hot.  For example, avoid hot tubs and saunas.     Soothe morning sickness.  Try eating 5 or 6 small meals a day, getting some fresh air, or using steffi to control symptoms.     Ask your doctor about flu and COVID-19 shots.  Getting them can help protect against infection.   Follow-up care is a key part of your treatment and safety. Be sure to make and go to all appointments, and call your doctor if you are having problems. It's also a good idea to know your test results and keep a list of the medicines you take.  Where can you learn more?  Go to https://www.Geofusion.net/patiented  Enter G112 in the search box to learn more about \"Weeks 6 to 10 of Your Pregnancy: Care Instructions.\"  Current as of: July 10, 2023               Content Version: 14.0    9475-6946 [employer]. "   Care instructions adapted under license by your healthcare professional. If you have questions about a medical condition or this instruction, always ask your healthcare professional. [employer] disclaims any warranty or liability for your use of this information.         Managing Morning Sickness (01:55)  Your health professional recommends that you watch this short online health video.  Learn tips for dealing with morning sickness, no matter what time of day you have it.  Purpose:  Gives tips for managing morning sickness, including eating small low-fat meals and avoiding caffeine and spicy food.  Goal:  The user will learn tips for dealing with morning sickness during pregnancy.     How to watch the video    Scan the QR code   OR Visit the website    [email or web address]   Current as of: July 10, 2023               Content Version: 14.0    0790-3572 [employer].   Care instructions adapted under license by your healthcare professional. If you have questions about a medical condition or this instruction, always ask your healthcare professional. [employer] disclaims any warranty or liability for your use of this information.      Pregnancy and Heartburn: Care Instructions  Overview     Heartburn is a common problem during pregnancy.  Heartburn happens when stomach acid backs up into the tube that carries food to the stomach. This tube is called the esophagus. Early in pregnancy, heartburn is caused by hormone changes that slow down digestion. Later on, it's also caused by the large uterus pushing up on the stomach.  Even though you can't fix the cause, there are things you can do to get relief. Treating heartburn during pregnancy focuses first on making lifestyle changes, like changing what and how you eat, and on taking medicines.  Heartburn usually improves or goes away after childbirth.  Follow-up care is a key part of your treatment and safety. Be sure to make  "and go to all appointments, and call your doctor if you are having problems. It's also a good idea to know your test results and keep a list of the medicines you take.  How can you care for yourself at home?  Eat small, frequent meals.  Avoid foods that make your symptoms worse, such as chocolate, peppermint, and spicy foods. Avoid drinks with caffeine, such as coffee, tea, and sodas.  Avoid bending over or lying down after meals.  Take a short walk after you eat.  If heartburn is a problem at night, do not eat for 2 hours before bedtime.  Take antacids like Mylanta, Maalox, Rolaids, or Tums. Do not take antacids that have sodium bicarbonate, magnesium trisilicate, or aspirin. Be careful when you take over-the-counter antacid medicines. Many of these medicines have aspirin in them. While you are pregnant, do not take aspirin or medicines that contain aspirin unless your doctor says it is okay.  If you're not getting relief, talk to your doctor. You may be able to take a stronger acid-reducing medicine.  When should you call for help?   Call your doctor now or seek immediate medical care if:    You have new or worse belly pain.     You are vomiting.   Watch closely for changes in your health, and be sure to contact your doctor if:    You have new or worse symptoms of reflux.     You are losing weight.     You have trouble or pain swallowing.     You do not get better as expected.   Where can you learn more?  Go to https://www.Banter!.net/patiented  Enter U946 in the search box to learn more about \"Pregnancy and Heartburn: Care Instructions.\"  Current as of: July 10, 2023               Content Version: 14.0    4053-5252 [employer].   Care instructions adapted under license by your healthcare professional. If you have questions about a medical condition or this instruction, always ask your healthcare professional. [employer] disclaims any warranty or liability for your use of this " information.      Constipation: Care Instructions  Overview     Constipation means that you have a hard time passing stools (bowel movements). People pass stools from 3 times a day to once every 3 days. What is normal for you may be different. Constipation may occur with pain in the rectum and cramping. The pain may get worse when you try to pass stools. Sometimes there are small amounts of bright red blood on toilet paper or the surface of stools. This is because of enlarged veins near the rectum (hemorrhoids).  A few changes in your diet and lifestyle may help you avoid ongoing constipation. Your doctor may also prescribe medicine to help loosen your stool.  Some medicines can cause constipation. These include pain medicines and antidepressants. Tell your doctor about all the medicines you take. Your doctor may want to make a medicine change to ease your symptoms.  Follow-up care is a key part of your treatment and safety. Be sure to make and go to all appointments, and call your doctor if you are having problems. It's also a good idea to know your test results and keep a list of the medicines you take.  How can you care for yourself at home?  Drink plenty of fluids. If you have kidney, heart, or liver disease and have to limit fluids, talk with your doctor before you increase the amount of fluids you drink.  Include high-fiber foods in your diet each day. These include fruits, vegetables, beans, and whole grains.  Get at least 30 minutes of exercise on most days of the week. Walking is a good choice. You also may want to do other activities, such as running, swimming, cycling, or playing tennis or team sports.  Take a fiber supplement, such as Citrucel or Metamucil, every day. Read and follow all instructions on the label.  Schedule time each day for a bowel movement. A daily routine may help. Take your time having a bowel movement, but don't sit for more than 10 minutes at a time. And don't strain too  "much.  Support your feet with a small step stool when you sit on the toilet. This helps flex your hips and places your pelvis in a squatting position.  Your doctor may recommend an over-the-counter laxative to relieve your constipation. Examples are Milk of Magnesia and MiraLax. Read and follow all instructions on the label. Do not use laxatives on a long-term basis.  When should you call for help?   Call your doctor now or seek immediate medical care if:    You have new or worse belly pain.     You have new or worse nausea or vomiting.     You have blood in your stools.   Watch closely for changes in your health, and be sure to contact your doctor if:    Your constipation is getting worse.     You do not get better as expected.   Where can you learn more?  Go to [email or web address]://[email or web address]/[email or web address]  Enter P343 in the search box to learn more about \"Constipation: Care Instructions.\"  Current as of: October 19, 2023               Content Version: 14.0    6747-0494 [employer].   Care instructions adapted under license by your healthcare professional. If you have questions about a medical condition or this instruction, always ask your healthcare professional. [employer] disclaims any warranty or liability for your use of this information.      Learning About High-Iron Foods  What foods are high in iron?     The foods you eat contain nutrients, such as vitamins and minerals. Iron is a nutrient. Your body needs the right amount to stay healthy and work as it should. You can use the list below to help you make choices about which foods to eat.  Here are some foods that contain iron. They have 1 to 2 milligrams of iron per serving.  Fruits  Figs (dried), 5 figs  Vegetables  Asparagus (canned), 6 aaron  Riana, beet, Swiss chard, or turnip greens, 1 cup  Dried peas, cooked,   cup  Seaweed, spirulina (dried),   cup  Spinach, (cooked)   cup or (raw) 1 cup  Grains  Cereals, fortified with iron, 1 " "cup  Grits (instant, cooked), fortified with iron,   cup  Meats and other protein foods  Beans (kidney, lima, navy, white), canned or cooked,   cup  Beef or lamb, 3 oz  Chicken giblets, 3 oz  Chickpeas (garbanzo beans),   cup  Liver of beef, lamb, or pork, 3 oz  Oysters (cooked), 3 oz  Sardines (canned), 3 oz  Soybeans (boiled),   cup  Tofu (firm),   cup  Work with your doctor to find out how much of this nutrient you need. Depending on your health, you may need more or less of it in your diet.  Where can you learn more?  Go to https://www.OWM.net/patiented  Enter R005 in the search box to learn more about \"Learning About High-Iron Foods.\"  Current as of: September 20, 2023               Content Version: 14.0    8360-7133 [employer].   Care instructions adapted under license by your healthcare professional. If you have questions about a medical condition or this instruction, always ask your healthcare professional. [employer] disclaims any warranty or liability for your use of this information.      Rh Antibodies Screening During Pregnancy: About This Test  What is it?     The Rh antibodies screening test is a blood test. It checks your blood for Rh antibodies. If you have Rh-negative blood and have been exposed to Rh-positive blood, your immune system may make antibodies to attack the Rh-positive blood. When a pregnant woman has these antibodies, it is called Rh sensitization.  Why is this test done?  The Rh antibodies screening test is done during pregnancy to find out if your baby is at risk for Rh disease. This can happen if you have Rh-negative blood and your baby has Rh-positive blood. If your Rh-negative blood mixes with Rh-positive blood, your immune system will make antibodies to attack the Rh-positive blood.  During pregnancy, these antibodies could attach to the baby's red blood cells. This can cause your baby to have serious health problems. The results of this test " "will help your doctor know how to best care for you and your baby during your pregnancy.  How do you prepare for the test?  In general, there's nothing you have to do before this test, unless your doctor tells you to.  How is the test done?  A health professional uses a needle to take a blood sample, usually from the arm.  What happens after the test?  You will probably be able to go home right away. It depends on the reason for the test.  You can go back to your usual activities right away.  Follow-up care is a key part of your treatment and safety. Be sure to make and go to all appointments, and call your doctor if you are having problems. It's also a good idea to keep a list of the medicines you take. Ask your doctor when you can expect to have your test results.  Where can you learn more?  Go to [email or web address]  Enter P722 in the search box to learn more about \"Rh Antibodies Screening During Pregnancy: About This Test.\"  Current as of: July 10, 2023               Content Version: 14.0    5665-3433 [employer].   Care instructions adapted under license by your healthcare professional. If you have questions about a medical condition or this instruction, always ask your healthcare professional. [employer] disclaims any warranty or liability for your use of this information.      Learning About Preventing Rh Disease  What is Rh disease?     Rh disease can be a serious problem in pregnancy. It happens when substances called antibodies in the mother's blood cause red blood cells in her baby's blood to be destroyed. This can occur when the blood types of a mother and her baby do not match.  All blood has an Rh factor. This is what makes a blood type positive or negative. When you are Rh-negative, your baby may be Rh-negative or Rh-positive. If your baby has Rh-positive blood and it mixes with yours, your body will make antibodies. This is called Rh sensitization.  Most of " "the time, this is not a problem in a first pregnancy. But in future pregnancies, it could cause Rh disease.  A  with Rh disease has mild anemia and may have jaundice. In severe cases, anemia, jaundice, and swelling can be very dangerous or fatal. Some babies need to be delivered early. Some need special care in the NICU. A very sick baby will need a blood transfusion before or after birth.  Fortunately, Rh sensitization is usually easy to prevent.  That's why it's important to get your Rh status checked in your first trimester. It doesn't cause any warning signs. A blood test is the only way to know if you are Rh-sensitive or are at risk for it.  How can you prevent Rh disease?  If you are Rh-negative, your doctor gives you an Rh immune globulin shot (such as RhoGAM). It helps prevent your body from making the antibodies that attack your baby's red blood cells.  Timing is important. You need the shot at certain times during your pregnancy. And you need one anytime there is a chance that your baby's blood might mix with yours. That can happen with certain prenatal tests or when you have pregnancy bleeding, such as:  Right after any pregnancy loss, amniocentesis, or CVS testing.  After turning of a breech baby.  Before and maybe after childbirth. Your doctor gives you a shot around week 28. If your  is Rh-positive, you will have another shot.  Follow-up care is a key part of your treatment and safety. Be sure to make and go to all appointments, and call your doctor if you are having problems. It's also a good idea to know your test results and keep a list of the medicines you take.  Where can you learn more?  Go to https://www.Candy [employer].net/patiented  Enter W177 in the search box to learn more about \"Learning About Preventing Rh Disease.\"  Current as of: July 10, 2023               Content Version: 14.0    1002-4525 [employer] Inc[employer].   Care instructions adapted under license by [facility] healthcare " professional. If you have questions about a medical condition or this instruction, always ask your healthcare professional. Healthwise, [facility] disclaims any warranty or liability for your use of this information.      Learning About Rh Immunoglobulin Shots  Introduction     An Rh immunoglobulin shot is given to pregnant women who have Rh-negative blood.  You may have Rh-negative blood, and your baby may have Rh-positive blood. If the two types of blood mix, your body will make antibodies. This is called Rh sensitization. Most of the time, this is not a problem the first time you're pregnant. But it could cause problems in future pregnancies.  This shot keeps your body from making the antibodies. You get the shot around 28 weeks of pregnancy. After the birth, your baby's blood is tested. If the blood is Rh positive, you will get another shot. You may also get the shot if you have vaginal bleeding while you are pregnant or if you have a miscarriage. These shots protect future pregnancies.  Women with Rh negative blood will need this shot each time they get pregnant.  Example  Rh immunoglobulin (HypRho-D, MICRhoGAM, and RhoGAM)  Possible side effects  Rare side effects may include:  Some mild pain where you got the shot.  A slight fever.  An allergic reaction.  You may have other side effects not listed here. Check the information that comes with your medicine.  What to know about taking this medicine  You may need more than one shot. You may need the shot again:  After amniocentesis, fetal blood sampling, or chorionic villus sampling tests.  If you have bleeding in your second or third trimester.  After turning of a breech baby.  After an injury to the belly while you are pregnant.  After a miscarriage or an .  Before or right after treatment for an ectopic or a partial molar pregnancy.  Tell your doctor if you have any allergies or have had a bad response to medicines in the past.  If you get this shot  "within 3 months of getting a live-virus vaccine, the vaccine may not work. Your doctor will tell you if you need more vaccine.  Check with your doctor or pharmacist before you use any other medicines. This includes over-the-counter medicines. Make sure your doctor knows all of the medicines, vitamins, herbs, and supplements you take. Taking some medicines at the same time can cause problems.  Where can you learn more?  Go to [email or web address]://[email or web address]/[email or web address]  Enter V615 in the search box to learn more about \"Learning About Rh Immunoglobulin Shots.\"  Current as of: July 10, 2023               Content Version: 14.0    3720-8259 [employer].   Care instructions adapted under license by your healthcare professional. If you have questions about a medical condition or this instruction, always ask your healthcare professional. [employer] disclaims any warranty or liability for your use of this information.      Rubella (Afghan Measles): Care Instructions  Overview  Rubella, also called Afghan measles or 3-day measles, is a disease caused by a virus. It spreads by coughs, sneezes, and close contact. Rubella usually is mild and does not cause long-term problems. But if you are pregnant and get it, you can give the disease to your unborn baby. This can cause serious birth defects.  While you have rubella, you may get a rash and a mild fever, and the lymph glands in your neck may swell. Older children often have a fever, eye pain, a sore throat, and body aches. You can relieve most symptoms with care at home. Avoid being around others, especially pregnant people, until your rash has been gone for at least 4 days. People who have not had this disease before or have not had the vaccine have the greatest chance of getting the virus.  Follow-up care is a key part of your treatment and safety. Be sure to make and go to all appointments, and call your doctor if you are having problems. It's also a good " "idea to know your test results and keep a list of the medicines you take.  How can you care for yourself at home?  Drink plenty of fluids. If you have kidney, heart, or liver disease and have to limit fluids, talk with your doctor before you increase the amount of fluids you drink.  Get plenty of rest to help your body heal.  Take an over-the-counter pain medicine, such as acetaminophen (Tylenol), ibuprofen (Advil, Motrin), or naproxen (Aleve), to reduce fever and discomfort. Read and follow all instructions on the label. Do not give aspirin to anyone younger than 20. It has been linked to Reye syndrome, a serious illness.  Do not take two or more pain medicines at the same time unless the doctor told you to. Many pain medicines have acetaminophen, which is Tylenol. Too much acetaminophen (Tylenol) can be harmful.  Try not to scratch the rash. Put cold, wet cloths on the rash to reduce itching.  Do not smoke. Smoking can make your symptoms worse. If you need help quitting, talk to your doctor about stop-smoking programs and medicines. These can increase your chances of quitting for good.  Avoid contact with people who have never had rubella and who have not been immunized.  When should you call for help?   Call your doctor now or seek immediate medical care if:    You have a fever with a stiff neck or a severe headache.     You are sensitive to light or feel very sleepy or confused.   Watch closely for changes in your health, and be sure to contact your doctor if:    You do not get better as expected.   Where can you learn more?  Go to https://www.Tavern.net/patiented  Enter B812 in the search box to learn more about \"Rubella (Romanian Measles): Care Instructions.\"  Current as of: June 12, 2023               Content Version: 14.0    9871-3472 [employer].   Care instructions adapted under license by your healthcare professional. If you have questions about a medical condition or this instruction, " always ask your healthcare professional. Healthwise, [facility] disclaims any warranty or liability for your use of this information.      Gonorrhea and Chlamydia: About These Tests  What is it?  These tests use a sample of urine or other body fluid to look for the bacteria that cause these sexually transmitted infections (STIs). The fluid sample can come from the cervix, vagina, rectum, throat, or eyes.  Why is this test done?  These tests may be done to:  Find out if symptoms are caused by gonorrhea or chlamydia.  Check people who are at high risk of being infected with gonorrhea or chlamydia.  Retest people several months after they have been treated for gonorrhea or chlamydia.  Check for infection in your  if you had a gonorrhea or chlamydia infection at the time of delivery.  How can you prepare for the test?  If you are going to have a urine test, do not urinate for at least 1 hour before the test.  If you think you may have chlamydia or gonorrhea, don't have sexual intercourse until you get your test results. And you may want to have tests for other STIs, such as HIV.  How is the test done?  For a direct sample, a swab is used to collect body fluid from the cervix, vagina, rectum, throat, or eyes. Your doctor may collect the sample. Or you may be given instructions on how to collect your own sample.  For a urine sample, you will collect the urine that comes out when you first start to urinate. Don't wipe the genital area clean before you urinate.  How long does the test take?  The test will take a few minutes.  What happens after the test?  You will be able to go home right away.  You can go back to your usual activities right away.  If you do have an infection, don't have sexual intercourse for 7 days after you start treatment. And your sex partner(s) should also be treated.  Follow-up care is a key part of your treatment and safety. Be sure to make and go to all appointments, and call your doctor  "if you are having problems. It's also a good idea to keep a list of the medicines you take. Ask your doctor when you can expect to have your test results.  Where can you learn more?  Go to https://www.RethinkDB.net/patiented  Enter K976 in the search box to learn more about \"Gonorrhea and Chlamydia: About These Tests.\"  Current as of: November 27, 2023               Content Version: 14.0    5557-4038 [employer].   Care instructions adapted under license by your healthcare professional. If you have questions about a medical condition or this instruction, always ask your healthcare professional. [employer] disclaims any warranty or liability for your use of this information.      Trichomoniasis: About This Test  What is it?     This test uses a sample of urine or other body fluid to look for the tiny parasite that causes trichomoniasis (also called trich). The fluid sample can come from the vagina, cervix, or urethra. Your doctor may choose to use one or more of many available tests.  Why is it done?  A trich test may be done to:  Find out if symptoms are caused by trich.  Check people who are at high risk for being infected with trich.  Check after treatment to make sure that the infection is gone.  How do you prepare for the test?  If you are going to have a urine test, do not urinate for at least 1 hour before the test.  How is the test done?  For a direct sample, a swab is used to collect body fluid from the cervix, vagina, or urethra. Your doctor may collect the sample. Or you may be given instructions on how to collect your own sample.  For a urine sample, you will collect the urine that comes out when you first start to urinate. Don't wipe the area clean before you urinate.  How long does the test take?  It will take a few minutes to collect a sample.  What happens after the test?  You can go home right away.  You can go back to your usual activities right away.  You may get the " test results the same day or several days later. It depends on the test used.  If you do have an infection, don't have sexual intercourse for 7 days after you start treatment. Your sex partner or partners should also be treated.  Follow-up care is a key part of your treatment and safety. Be sure to make and go to all appointments, and call your doctor if you are having problems. Ask your doctor when you can expect to have your test results.  Current as of: November 27, 2023               Content Version: 14.0    8403-5611 [employer].   Care instructions adapted under license by your healthcare professional. If you have questions about a medical condition or this instruction, always ask your healthcare professional. [employer] disclaims any warranty or liability for your use of this information.      HIV Testing: Care Instructions  Overview  You can get tested for the human immunodeficiency virus (HIV). Most doctors use a blood test to check for HIV antibodies and antigens in your blood. It may also check for the genetic material (RNA) of HIV. Some tests use saliva to check for HIV antibodies. But these aren't as accurate. For example, they may give a false result if you've just been infected.  What do the results mean?    Normal (negative)    No HIV antibodies, antigens, or RNA were found.  You may need more testing. It can make sure your test results are correct.    Uncertain (indeterminate)    Test results didn't clearly show if you have an HIV infection.  HIV antibodies or antigens may not have formed yet.  Some other type of antibody or antigen may have affected the results.  You will need another test to be sure.    Abnormal (positive)    HIV antibodies, antigens, or RNA were found.  If you haven't had an RNA test yet, one will be done. If it's positive, you have HIV.  If your test result is positive, your doctor will talk to you. You will discuss starting treatment.  Follow-up care  "is a key part of your treatment and safety. Be sure to make and go to all appointments, and call your doctor if you are having problems. It's also a good idea to know your test results and keep a list of the medicines you take.  Where can you learn more?  Go to https://www.AudioCaseFiles.net/patiented  Enter T792 in the search box to learn more about \"HIV Testing: Care Instructions.\"  Current as of: June 12, 2023               Content Version: 14.0    0969-7096 [employer].   Care instructions adapted under license by your healthcare professional. If you have questions about a medical condition or this instruction, always ask your healthcare professional. [employer] disclaims any warranty or liability for your use of this information.      Hepatitis C Virus Tests: About These Tests  What are they?     Hepatitis C virus tests are blood tests that check for substances in the blood that show whether you have hepatitis C now or had it in the past. The tests can also tell you what type of hepatitis C you have and how severe the disease is. This can help your doctor with treatment.  If the tests show that you have long-term hepatitis C, you need to take steps to prevent spreading the disease.  Why are these tests done?  You may need these tests if:  You have symptoms of hepatitis.  You may have been exposed to the virus. You are more likely to have been exposed to the virus if you inject drugs or are exposed to body fluids (such as if you are a health care worker).  You've had other tests that show you have liver problems.  You are 18 to 79 years old.  You have an HIV infection.  The tests also are done to help your doctor decide about your treatment and see how well it works.  How do you prepare for the test?  In general, there's nothing you have to do before this test, unless your doctor tells you to.  How is the test done?  A health professional uses a needle to take a blood sample, usually from " "the arm.  What happens after these tests?  You will probably be able to go home right away.  You can go back to your usual activities right away.  Follow-up care is a key part of your treatment and safety. Be sure to make and go to all appointments, and call your doctor if you are having problems. It's also a good idea to keep a list of the medicines you take. Ask your doctor when you can expect to have your test results.  Where can you learn more?  Go to https://[email or web address]/patiented  Enter W551 in the search box to learn more about \"Hepatitis C Virus Tests: About These Tests.\"  Current as of: June 12, 2023               Content Version: 14.0    9182-2404 [employer].   Care instructions adapted under license by your healthcare professional. If you have questions about a medical condition or this instruction, always ask your healthcare professional. [employer] disclaims any warranty or liability for your use of this information.      Learning About Fetal Ultrasound Results  What is a fetal ultrasound?     Fetal ultrasound is a test that lets your doctor see an image of your baby. Your doctor learns information about your baby from this picture. You may find out, for example, if you are having a boy or a girl. But the main reason you have this test is to get information about your baby's health.  (You may hear your baby called a fetus. This is a common medical term for a baby that's growing in the mother's uterus.)  What kind of information can you learn from this test?  The findings of an ultrasound fall into two categories, normal and abnormal.  Normal  The fetus is the right size for its age.  The placenta is the expected size and does not cover the cervix.  There is enough amniotic fluid in the uterus.  No birth defects can be seen.  Abnormal  The fetus is small or large for its age.  The placenta covers the cervix.  There is too much or too little amniotic fluid in the " uterus.  The fetus may have a birth defect.  What does an abnormal result mean?  Abnormal seems to imply that something is wrong with your baby. But what it means is that the test has shown something the doctor wants to take a closer look at.  And that's what happens next. Your doctor will talk to you about what further test or tests you may need.  What do the results mean?  Some of the things your doctor may see on an abnormal ultrasound include:  Echogenic bowel.  The bowel looks very bright on the screen. This could mean that there's blood in the bowel. Or it could mean that something is blocking the small bowel.  Increased nuchal translucency.  The ultrasound measures the thickness at the back of the baby's neck. An increase in thickness is sometimes an early sign of Down syndrome.  Increased or decreased amniotic fluid.  The doctor will look for a reason for the level of amniotic fluid and will watch the pregnancy closely as it progresses.  Large ventricles.  Ventricles in the brain look larger than they should. Your doctor may take a closer look at the brain.  Renal pyelectasis/hydronephrosis.  The ultrasound measures the fluid around the kidney. If there is more fluid than expected, there is a chance of urinary tract or kidney problems.  Short long bones.  The ultrasound measures certain arm and leg bones. A long bone (humerus or femur) that is shorter than average could be a sign of Down syndrome.  Subchorionic hemorrhage.  An ultrasound can show bleeding under one of the membranes that surrounds the fetus. Some women don't have symptoms of bleeding. The ultrasound can find this problem when women are not bleeding from their vagina. Women who have this condition have a slightly higher chance of miscarriage.  What do you do now?  Take a deep breath, and let it out. Keep in mind that an abnormal finding on an ultrasound, after it's coupled with more information, may:  Turn out to be nothing.  Turn out to be  "something mild that won't affect the baby.  Turn out to be something more serious. But if this happens, early diagnosis helps you and your doctor plan treatment options sooner rather than later.  Your medical team is there for you. So are your family and friends. Ask questions, and get the help and support you need.  Follow-up care is a key part of your treatment and safety. Be sure to make and go to all appointments, and call your doctor if you are having problems. It's also a good idea to know your test results and keep a list of the medicines you take.  Where can you learn more?  Go to https://www.TerraWi.net/patiented  Enter K451 in the search box to learn more about \"Learning About Fetal Ultrasound Results.\"  Current as of: July 10, 2023               Content Version: 14.0    [id number] [employer].   Care instructions adapted under license by your healthcare professional. If you have questions about a medical condition or this instruction, always ask your healthcare professional. [employer] disclaims any warranty or liability for your use of this information.      Learning About Prenatal Visits  Overview     Regular prenatal visits are very important during any pregnancy. These quick office visits may seem simple and routine. But they can help you have a safe and healthy pregnancy. Your doctor is watching for problems that can only be found through regular checkups. The visits also give you and your doctor time to build a good relationship.  After your first visit, you will most likely start on a schedule of monthly visits. In your third trimester, the visits will get more frequent. Based on your health, your age, and if you've had a normal, full-term pregnancy before, your doctor may want to see you more or less often.  At different times in your pregnancy, you will have exams and tests. Some are routine. Others are done only when there is a chance of a problem. Everything healthy " you do for your body helps you have a healthy pregnancy. Rest when you need it. Eat well, drink plenty of water, and exercise regularly.  What happens during a prenatal visit?  You will have blood pressure checks, along with urine tests. You also may have blood tests. If you need to go to the bathroom while waiting for the doctor, tell the nurse. You will be given a sample cup so your urine can be tested.  You will be weighed and have your belly measured.  Your doctor may listen to the fetal heartbeat with a special device.  At about 24 weeks, and possibly earlier in your pregnancy, your doctor will check your blood sugar (glucose tolerance test) for diabetes that can occur during pregnancy. This is gestational diabetes, which can be harmful.  You will have tests to check for infections that could harm your . These include group B streptococcus and hepatitis B.  Your doctor may do ultrasounds to check for problems. This also checks the position of the fetus. An ultrasound uses sound waves to produce a picture of the fetus.  You may get your vaccines updated.  Your doctor may ask you questions to check for signs of anxiety or depression. Tell your doctor if you feel sad, anxious, or hopeless for more than a few days.  You may have other tests at any time during your pregnancy.  Use your visits to discuss with your doctor any concerns you have.  How can you care for yourself at home?  Get plenty of rest.  Try to exercise every day, if your doctor says it is okay. If you have not exercised in the past, start out slowly. For example, you can take short walks each day.  Choose healthy foods, such as fruits, vegetables, whole grains, lean proteins, low-fat dairy, and healthy fats.  Drink plenty of fluids. Cut down on drinks with caffeine, such as coffee, tea, and cola. If you have kidney, heart, or liver disease and have to limit fluids, talk with your doctor before you increase the amount of fluids you drink.  Try  "to avoid chemical fumes, paint fumes, and poisons.  If you smoke, vape, or use alcohol, marijuana, or other drugs, quit or cut back as much as you can. Talk to your doctor if you need help quitting.  Review all of your medicines, including over-the-counter medicines and supplements, with your doctor. Some of your routine medicines may need to be changed. Do not stop or start taking any medicines without talking to your doctor first.  Follow-up care is a key part of your treatment and safety. Be sure to make and go to all appointments, and call your doctor if you are having problems. It's also a good idea to know your test results and keep a list of the medicines you take.  Where can you learn more?  Go to https://www.Akermin.net/patiented  Enter J502 in the search box to learn more about \"Learning About Prenatal Visits.\"  Current as of: July 10, 2023               Content Version: 14.0    2942-1976 [employer].   Care instructions adapted under license by your healthcare professional. If you have questions about a medical condition or this instruction, always ask your healthcare professional. [employer] disclaims any warranty or liability for your use of this information.      Intimate Partner Violence: Care Instructions  Overview     If you want to save this information but don't think it is safe to take it home, see if a trusted friend can keep it for you. Plan ahead. Know who you can call for help, and memorize the phone number.   Be careful online too. Your online activity may be seen by others. Do not use your personal computer or device to read about this topic. Use a safe computer, such as one at work, a friend's home, or a library.    Intimate partner violence--a type of domestic abuse--is different from an argument now and then. It is a pattern of abuse that one person may use to control another person's behavior. It may start with threats and name-calling. Then, it may lead to " more serious acts, like pushing and slapping. The abuse also may occur in other areas. For example, the abuser may withhold money or spend a partner's money without their knowledge.  Abuse can cause serious harm. You are more likely to have a long-term health problem from the injuries and stress of living in a violent relationship. People who are sexually abused by their partners have more sexually transmitted infections and unplanned pregnancies. Anyone who is abused also faces emotional pain. Anyone can be abused in relationships. In some relationships, both people use abusive behavior.  If you are pregnant, abuse can cause problems such as poor weight gain, infections, and bleeding. Abuse during this time may increase your baby's risk of low birth weight, premature birth, and death.  Follow-up care is a key part of your treatment and safety. Be sure to make and go to all appointments, and call your doctor if you are having problems. It's also a good idea to know your test results and keep a list of the medicines you take.  How can you care for yourself at home?  If you do not have a safe place to stay, discuss this with your doctor before you leave.  Have a plan for where to go, how to leave your home, and where to stay in case of an emergency. Do not tell your partner about your plan. Contact:  The National Domestic Violence Hotline toll-free at 1-696.908.4346. They can help you find resources in your area.  Your local police department, hospital, or clinic for information about shelters and safe homes near you.  Talk to a trusted friend or neighbor, a counselor, or a remington leader. Do not feel that you have to hide what happened.  Teach your children how to call for help in an emergency.  Be alert to warning signs, such as threats, heavy alcohol use, or drug use. This can help you avoid danger.  If you can, make sure that there are no guns or other weapons in your home.  When should you call for help?   Call [phone]  "anytime you think you may need emergency care. For example, call if:    You or someone else has just been abused.     You think you or someone else is in danger of being abused.   Watch closely for changes in your health, and be sure to contact your doctor if you have any problems.  Where can you learn more?  Go to https://www.You[employer] Noise.net/patiented  Enter G282 in the search box to learn more about \"Intimate Partner Violence: Care Instructions.\"  Current as of: June 24, 2023               Content Version: 14.0    9175-5374 [employer].   Care instructions adapted under license by your healthcare professional. If you have questions about a medical condition or this instruction, always ask your healthcare professional. [employer] disclaims any warranty or liability for your use of this information.      Intimate Partner Violence Safety Instructions: Care Instructions  Overview     If you want to save this information but don't think it is safe to take it home, see if a trusted friend can keep it for you. Plan ahead. Know who you can call for help, and memorize the phone number.   Be careful online too. Your online activity may be seen by others. Do not use your personal computer or device to read about this topic. Use a safe computer, such as one at work, a friend's home, or a library.    When you are abused by a spouse or partner, you can take actions to protect yourself and your children.  You can increase your safety whether you decide to stay with your spouse or partner or you decide to leave. You may want to make a safety plan and pack a bag ahead of time. This will help you leave quickly when you decide to. Remember, you cannot change your partner's actions, but you can find help for you and your children. No one deserves to be abused.  Follow-up care is a key part of your treatment and safety. Be sure to make and go to all appointments, and call your doctor if you are having " problems. It's also a good idea to know your test results and keep a list of the medicines you take.  How can you care for yourself at home?  Make a plan for your safety   If you decide to stay with your abusive spouse or partner, you can do the following to increase your safety:  Decide what works best to keep you safe in an emergency.  Know who you can call to help you in an emergency.  Decide if you will call the police if you get hurt again. If you can, agree on a signal with your children or neighbor to call the police for you if you need help. You can flash lights or hang something out of a window.  Choose a safe place to go for a short time if you need to leave home. Memorize the address and phone number.  Learn escape routes out of your home in case you need to leave in a hurry. Teach your children different ways to get out of your home quickly if they need to.  If you can, hide or lock up things that can be used as weapons (guns, knives, hammers).  Learn the number of a domestic violence shelter. Talk to the people there about how they can help.  Find out about other community resources that can help you.  Take pictures of bruises or other injuries if you can. You can also take pictures of things your abuser has broken.  Teach your children that violence is never okay. Tell them that they do not deserve to be hurt.  Pack a bag   Prepare a bag with things you will need if you leave suddenly. Leave it with a friend, a relative, or someone else you trust. You could include the following items in the bag:  A set of keys to your home and car.  Emergency phone numbers and addresses.  Money such as cash or checks. You can also ask a friend, a relative, or someone else you trust to hold money for you.  Copies of legal documents such as house and car titles or rent receipts, birth certificates, Social Security card, voter registration, marriage and 's licenses, and your children's health records.  Personal  "items you would need for a few days, such as clothes, a toothbrush, toothpaste, and any medicines you or your children need.  A favorite toy or book for your child or children.  Diapers and bottles, if you have very young children.  Pictures that show signs of abuse and violence. You may also add pictures of your abuser.  If you leave   If you decide to leave, you can take the following steps:  Go to the emergency room at a hospital if you have been hurt.  Think about asking the police to be with you as you leave. They can protect you as you leave your home.  If you decide to leave secretly, remember that activities can be tracked. Your abuser may still have access to your cell phone, email, and credit cards. It may be possible for these to be traced. Always be aware of your surroundings.  If this is an emergency, do not worry about gathering up anything. Just leave--your safety is most important.  If your abuser moves out, change the locks on the doors. If you have a security system, change the access code.  When should you call for help?   Call 911 anytime you think you may need emergency care. For example, call if:    You or someone else has just been abused.     You think you or someone else is in danger of being abused.   Watch closely for changes in your health, and be sure to contact your doctor if you have any problems.  Where can you learn more?  Go to https://www.[employer].net/patiented  Enter A752 in the search box to learn more about \"Intimate Partner Violence Safety Instructions: Care Instructions.\"  Current as of: June 24, 2023               Content Version: 14.0    7244-1413 [employer].   Care instructions adapted under license by your healthcare professional. If you have questions about a medical condition or this instruction, always ask your healthcare professional. [employer] disclaims any warranty or liability for your use of this information.      Learning About " Intimate Partner Violence  What is intimate partner violence?  Intimate partner violence is a type of domestic abuse. It's threatening, emotionally harmful, or violent behavior in a personal relationship. It can happen between past or current partners or spouses. In some relationships both people abuse each other. One partner may be more abusive. Or the abuse may be equal.  Abuse can affect people of any ethnic group, race, or [place]ondoism. It can affect teens, adults, or the elderly. And it can happen to people of any sexual orientation, gender, or social status.  Abusers use fear, bullying, and threats to control their partners. They may control what their partners do. They may control where their partners go or who they see. They may act jealous, controlling, or possessive. These early signs of abuse may happen soon after the start of the relationship. Sometimes it can be hard to notice abuse at first. But after the relationship becomes more serious, the abuse may get worse.  If you are being abused in your relationship, it's important to get help. The abuse is not your fault. You don't have to face it alone.  Be careful  It may not be safe to take home domestic abuse information like this handout. Some people ask a trusted friend to keep it for them. It's also important to plan ahead and to memorize the phone number of places you can go for help. If you are concerned about your safety, do not use your computer, smartphone, or tablet to read about domestic abuse.   What are the types of intimate partner violence?  Abuse can happen in different ways. Each type can happen on its own or in combination with others.  Emotional abuse  Emotional abuse is a pattern of threats, insults, or controlling behavior. It includes verbal abuse. It goes beyond healthy disagreements in a relationship. It's a sign of an unhealthy relationship.  Do you feel threatened, intimidated, or controlled?  Does your partner:  Threaten your  children, other family members, or pets?  Use jokes meant to embarrass or shame you?  Call you names?  Tell you that you are a bad parent?  Threaten to take away your children?  Threaten to have you or your family members deported?  Control your access to money or other basic needs?  Control what you do, who you see or talk to, or where you go?  Another form of emotional abuse is denying that it is happening. Or the abuser may act like the abuse is no big deal or is your fault.  Sexual abuse  With sexual abuse, abusers may try to convince or force you to have sex. They may force you into sex acts you're not comfortable with. Or they may sexually assault you. Sexual abuse can happen even if you are in a committed relationship.  Physical abuse  Physical abuse means that a partner hits, kicks, or does something else to physically hurt you. Physical abuse that starts with a slap might lead to kicking, shoving, and choking over time. The abuser may also threaten to hurt or kill you.  Stalking  Stalking means that an abuser gives you attention that you do not want and that causes you fear. Examples of stalking include:  Following you.  Showing up at places where the abuser isn't invited, such as at your work or school.  Constantly calling or texting you.  What problems can  to?  Intimate partner violence can be very dangerous. It can cause serious, repeated injury. It can even lead to death.  All forms of abuse can cause long-term health problems from the stress of a violent relationship. Verbal abuse can lead to sexual and physical abuse.  Abuse causes:  Emotional pain.  Depression.  Anxiety.  Post-traumatic stress.  Sexual abuse can lead to sexually transmitted infections (such as HIV/AIDS) and unplanned pregnancy.  Pregnancy can be a very dangerous time for people in abusive relationships. Abuse can cause or increase the risk of problems during pregnancy. These include low weight gain, anemia, infections, and  "bleeding. Abuse may also increase your baby's risk of low birth weight, premature birth, and death.  It can be hard for some victims of abuse to ask for help or to leave their relationship. You may feel scared, stuck, or not sure what steps to take. But it's important not to ignore abuse. Talking to someone you trust could be the first step to ending the abuse and taking care of your own health and happiness again. There are resources available that can help keep you safe.  Where can you get help?  Talk to a trusted friend. Find a local advocacy group, or talk to your doctor about the abuse.  Contact the National Domestic Violence Hotline at 1-597-930-BNLV (1-453.121.1388) for more safety tips. They can guide you to groups in your area that can help. Or go to the National Coalition Against Domestic Violence website at www.[employer]hotl[employer]Link Solutions.org to learn more.  Domestic violence groups or a counselor in your area can help you make a safety plan for yourself and your children.  When to call for help  Call 911 anytime you think you may need emergency care. For example, call if:  You think that you or someone you know is in danger of being abused.  You have been hurt and can't have someone safely take you to emergency care.  You have just been abused.  A family member has just been abused.  Where can you learn more?  Go to https://www.[employer].net/patiented  Enter S665 in the search box to learn more about \"Learning About Intimate Partner Violence.\"  Current as of: June 24, 2023               Content Version: 14.0    4213-0616 [employer].   Care instructions adapted under license by your healthcare professional. If you have questions about a medical condition or this instruction, always ask your healthcare professional. [employer] disclaims any warranty or liability for your use of this information.      Vaginal Bleeding During Pregnancy: Care Instructions  Overview     It's common to have some " vaginal spotting when you are pregnant. In some cases, the bleeding isn't serious. And there aren't any more problems with the pregnancy.  But sometimes bleeding is a sign of a more serious problem. This is more common if the bleeding is heavy or painful. Examples of more serious problems include miscarriage, an ectopic pregnancy, and a problem with the placenta.  You may have to see your doctor again to be sure everything is okay. You may also need more tests to find the cause of the bleeding.  Home treatment may be all you need. But it depends on what is causing the bleeding. Be sure to tell your doctor if you have any new symptoms or if your symptoms get worse.  The doctor has checked you carefully, but problems can develop later. If you notice any problems or new symptoms, get medical treatment right away.  Follow-up care is a key part of your treatment and safety. Be sure to make and go to all appointments, and call your doctor if you are having problems. It's also a good idea to know your test results and keep a list of the medicines you take.  How can you care for yourself at home?  If your doctor prescribed medicines, take them exactly as directed. Call your doctor if you think you are having a problem with your medicine.  Do not have vaginal sex until your doctor says it's okay.  Do not put anything in your vagina until your doctor says it's okay.  Ask your doctor about other activities you can or can't do.  Get a lot of rest. Being pregnant can make you tired.  Do not use nonsteroidal anti-inflammatory drugs (NSAIDs), such as ibuprofen (Advil, Motrin), naproxen (Aleve), or aspirin, unless your doctor says it is okay.  When should you call for help?   Call 911 anytime you think you may need emergency care. For example, call if:    You passed out (lost consciousness).     You have severe vaginal bleeding. This means you are soaking through a pad each hour for 2 or more hours.     You have sudden, severe pain  "in your belly or pelvis.   Call your doctor now or seek immediate medical care if:    You have new or worse vaginal bleeding.     You are dizzy or lightheaded, or you feel like you may faint.     You have pain in your belly, pelvis, or lower back.     You think that you are in labor.     You have a sudden release of fluid from your vagina.     You've been having regular contractions for an hour. This means that you've had at least 8 contractions within 1 hour or at least 4 contractions within 20 minutes, even after you change your position and drink fluids.     You notice that your baby has stopped moving or is moving much less than normal.   Watch closely for changes in your health, and be sure to contact your doctor if you have any problems.  Where can you learn more?  Go to https://www.[employer].net/patiented  Enter N829 in the search box to learn more about \"Vaginal Bleeding During Pregnancy: Care Instructions.\"  Current as of: July 10, 2023               Content Version: 14.0    5362-5601 [employer].   Care instructions adapted under license by your healthcare professional. If you have questions about a medical condition or this instruction, always ask your healthcare professional. [employer] disclaims any warranty or liability for your use of this information.      "

## 2024-07-29 NOTE — TELEPHONE ENCOUNTER
LMP: 5/27, 9w0d.    Pt is scheduled for an OB intake on 8/12 and her first provider visit on 8/14.    Rescheduled pt's intake to today, 7/29, and her first provider visit to 8/2.    Marisol Garzon RN- OB/GYN group

## 2024-07-29 NOTE — PROGRESS NOTES
Telephone visit with patient for New Prenatal Intake and Education. This is patient's 4th pregnancy. Handouts reviewed and will be provided at next prenatal appointment. Scheduled for New Prenatal with Dr. Fitzgerald on 8/2.       Prenatal OB Questionnaire  Patient supplied answers from flow sheet for:  Prenatal OB Questionnaire.  Past Medical History  Have you ever recieved care for your mental health? : (!) Yes (sees a therapist)  Have you ever been in a major accident or suffered serious trauma?: No  Within the last year, has anyone hit, slapped, kicked or otherwise hurt you?: No  In the last year, has anyone forced you to have sex when you didn't want to?: No    Past Medical History 2   Have you ever received a blood transfusion?: No  Would you accept a blood transfusion if was medically recommended?: Yes  Does anyone in your home smoke?: No   Is your blood type Rh negative?: No  Have you ever ?: (!) Yes  Have you been hospitalized for a nonsurgical reason excluding normal delivery?: (!) Yes (several times in the past 2 years due to asthma flare ups)  Have you ever had an abnormal pap smear?: (!) Yes    Past Medical History (Continued)  Do you have a history of abnormalities of the uterus?: No  Did your mother take PALMIRA or any other hormones when she was pregnant with you?: Unknown  Do you have any other problems we have not asked about which you feel may be important to this pregnancy?: (!) Yes (previous pregnancy that she terminated was because it was a high risk pregnancy and pt wants to know if this pregnancy will have a genetic anomaly as well)      Allergies as of 7/29/2024:    Allergies as of 07/29/2024    (No Known Allergies)       Current medications are:  Current Outpatient Medications   Medication Sig Dispense Refill    albuterol (PROAIR HFA/PROVENTIL HFA/VENTOLIN HFA) 108 (90 Base) MCG/ACT inhaler Inhale 2 puffs into the lungs every 4 hours as needed for shortness of breath, wheezing or cough  18 g 1    ALPRAZolam (XANAX) 0.25 MG tablet Take 1 tablet (0.25 mg) by mouth at bedtime as needed, may repeat once for anxiety 30 tablet 0    azelastine (OPTIVAR) 0.05 % ophthalmic solution Apply 1 drop to eye 2 times daily 5 mL 0    cholecalciferol (D3 HIGH POTENCY) 50 MCG (2000 UT) CAPS Take 50 mcg by mouth daily 180 capsule 1    Fexofenadine HCl (ALLEGRA ALLERGY PO)       fluticasone-vilanterol (BREO ELLIPTA) 200-25 MCG/ACT inhaler Inhale 1 puff into the lungs daily 60 each 11    ibuprofen (ADVIL/MOTRIN) 200 MG tablet Take 400 mg by mouth      ipratropium - albuterol 0.5 mg/2.5 mg/3 mL (DUONEB) 0.5-2.5 (3) MG/3ML neb solution Take 1 vial (3 mLs) by nebulization every 6 hours as needed for shortness of breath, wheezing or cough 90 mL 1    mepolizumab (NUCALA) 100 MG/ML auto-injector Inject 1 mL (100 mg) Subcutaneous every 28 days 1 mL 5    mupirocin (BACTROBAN) 2 % external ointment Apply topically 3 times daily 15 g 0    omeprazole (PRILOSEC) 20 MG DR capsule Take 1 capsule (20 mg) by mouth daily 90 capsule 1    ondansetron (ZOFRAN ODT) 4 MG ODT tab Take 1 tablet (4 mg) by mouth every 8 hours as needed for nausea 20 tablet 1    ORDER FOR ALLERGEN IMMUNOTHERAPY Name of Mix: Mix #1  Dust Mite, Cat, Dog  Cat Hair, Standardized A.P. 10,000 BAU/mL, HS  2.0 ml  Dog Hair-Dander, UF  1:650 w/v, HS  1.0 ml  Dust Mites DF. 10,000 AU/mL, HS  1.0 ml  Dust Mites DP. 10,000 AU/mL, HS  1.0 ml   Diluent: HSA qs to 5ml (Patient not taking: Reported on 7/8/2024) 5 mL PRN    ORDER FOR ALLERGEN IMMUNOTHERAPY Name of Mix: Mix #2  Tree   Sylvain, White 1:20 w/v, HS  0.5 ml  Birch Mix PRW 1:20 w/v, HS  0.5 ml  Boxelder-Maple Mix BHR (Boxelder Hard Red) 1:20 w/v, HS  0.5 ml  [place], Common 1:20 w/v, HS  0.5 ml  Oak Mix RVW 1:20 w/v, HS 0.5 ml  Pine Mix 1:20 w/v, HS 0.5 ml  [place] Tree, Black 1:20 w/v, HS 0.5 ml  Diluent: HSA qs to 5ml (Patient not taking: Reported on 7/8/2024) 5 mL PRN    ORDER FOR ALLERGEN IMMUNOTHERAPY Name of Mix:  Mix #3  Grass, Weeds  Prabhu Grass 1:20 w/v, HS 0.5 ml  Steve Grass (Std) 100,000 BAU/mL, HS 0.4 ml  Lamb's Quarters 1:20 w/v, HS 0.5 ml  Nettle 1:20 w/v, HS 0.5 ml  Plantain, English 1:20 w/v, HS 0.5 ml  Ragweed, Mix (Giant, Short) 1:20 w/v, HS 0.5 ml  Russian Thistle 1:20 w/v, HS 0.5 ml  Sorrel, Sheep 1:20 w/v, HS 0.5 ml  Diluent: HSA qs to 5ml (Patient not taking: Reported on 7/8/2024) 5 mL PRN    Semaglutide-Weight Management (WEGOVY) 0.25 MG/0.5ML pen Inject 0.25 mg subcutaneously once a week for 28 days 4 pens. Will ramp up dose monthly if tolerating well to goal of 2.4mg/week eventually. 2 mL 0    [START ON 8/17/2024] Semaglutide-Weight Management (WEGOVY) 0.5 MG/0.5ML pen Inject 0.5 mg subcutaneously every 7 days for 28 days 4 pens 2 mL 0    [START ON 9/14/2024] Semaglutide-Weight Management (WEGOVY) 1 MG/0.5ML pen Inject 1 mg subcutaneously every 7 days for 28 days 4 pens 2 mL 0    [START ON 10/12/2024] Semaglutide-Weight Management (WEGOVY) 1.7 MG/0.75ML pen Inject 1.7 mg subcutaneously every 7 days for 28 days 4 pens 3 mL 0    [START ON 11/9/2024] Semaglutide-Weight Management (WEGOVY) 2.4 MG/0.75ML pen Inject 2.4 mg subcutaneously every 7 days for 270 days 4 pens 3 mL 6    VENTOLIN  (90 Base) MCG/ACT inhaler Inhale 1-2 puffs into the lungs every 4 hours as needed for shortness of breath or wheezing 18 g 1         Early ultrasound screening tool:    Does patient have irregular periods?  No  Did patient use hormonal birth control in the three months prior to positive urine pregnancy test? No  Is the patient breastfeeding?  No  Is the patient 10 weeks or greater at time of education visit?  No      Dating US ordered per new standing orders.  Advised pt to schedule this for when she is 8 weeks along but advised to get done prior to seeing the provider for her first prenatal visit.    Marisol Garzon RN-[facility]

## 2024-07-29 NOTE — LETTER
July 29, 2024      Reginaldo Ferraro  [address]  [place] [address]        To Whom It May Concern:    The above patient is under my medical care for pregnancy.  She is not allowed to change her cat's litter box for her entire pregnancy.  This is not safe for her or her unborn child.          Sincerely,      Dr. Ene Fitzgerald

## 2024-07-29 NOTE — TELEPHONE ENCOUNTER
[facility]    Phone Message    May a detailed message be left on voicemail: yes     Reason for Call: Other: Pt scheduled first initial prenatal appts and is requesting to be seen sooner due to complications with her previous pregnancy. Please advise and call pt      Action Taken: Message routed to:  Other: [facility]    Travel Screening: Not Applicable

## 2024-08-01 ENCOUNTER — ANCILLARY PROCEDURE (OUTPATIENT)
Dept: ULTRASOUND IMAGING | Facility: CLINIC | Age: 34
End: 2024-08-01
Attending: OBSTETRICS & GYNECOLOGY
Payer: COMMERCIAL

## 2024-08-01 DIAGNOSIS — Z34.80 PRENATAL CARE, SUBSEQUENT PREGNANCY, UNSPECIFIED TRIMESTER: ICD-10-CM

## 2024-08-01 PROCEDURE — 76801 OB US < 14 WKS SINGLE FETUS: CPT | Mod: TC | Performed by: RADIOLOGY

## 2024-08-02 ENCOUNTER — MYC MEDICAL ADVICE (OUTPATIENT)
Dept: ALLERGY | Facility: CLINIC | Age: 34
End: 2024-08-02

## 2024-08-02 NOTE — TELEPHONE ENCOUNTER
[facility] Call [facility]    Phone Message    May a detailed message be left on voicemail: yes     Reason for Call: Other: Pt was calling to reschedule her First provider visit. Pt had appt today (8/2) that pt arrived late to. Pt scheduled for next available (8/16). Pt states she is high risk and is looking for a sooner appt. Please call pt back to discuss      Action Taken: Other: OBGYN    Travel Screening: Not Applicable     Date of Service:

## 2024-08-04 LAB
ABO/RH(D): NORMAL
ANTIBODY SCREEN: NEGATIVE
SPECIMEN EXPIRATION DATE: NORMAL

## 2024-08-05 ENCOUNTER — PRENATAL OFFICE VISIT (OUTPATIENT)
Dept: OBGYN | Facility: CLINIC | Age: 34
End: 2024-08-05
Payer: COMMERCIAL

## 2024-08-05 VITALS
DIASTOLIC BLOOD PRESSURE: 80 MMHG | WEIGHT: 151 LBS | OXYGEN SATURATION: 98 % | SYSTOLIC BLOOD PRESSURE: 120 MMHG | HEART RATE: 80 BPM | BODY MASS INDEX: 29.49 KG/M2

## 2024-08-05 DIAGNOSIS — Z34.80 PRENATAL CARE, SUBSEQUENT PREGNANCY, UNSPECIFIED TRIMESTER: ICD-10-CM

## 2024-08-05 DIAGNOSIS — O09.91 SUPERVISION OF HIGH RISK PREGNANCY IN FIRST TRIMESTER: Primary | ICD-10-CM

## 2024-08-05 DIAGNOSIS — O21.0 HYPEREMESIS GRAVIDARUM: ICD-10-CM

## 2024-08-05 DIAGNOSIS — Q99.9 HX OF CHROMOSOMAL ABNORMALITY: Primary | ICD-10-CM

## 2024-08-05 DIAGNOSIS — O36.8390 UNABLE TO HEAR FETAL HEART TONES AS REASON FOR ULTRASOUND SCAN: ICD-10-CM

## 2024-08-05 LAB
ALBUMIN UR-MCNC: NEGATIVE MG/DL
AMORPH CRY #/AREA URNS HPF: ABNORMAL /HPF
APPEARANCE UR: CLEAR
BACTERIA #/AREA URNS HPF: ABNORMAL /HPF
BILIRUB UR QL STRIP: NEGATIVE
COLOR UR AUTO: ABNORMAL
GLUCOSE UR STRIP-MCNC: NEGATIVE MG/DL
HBV SURFACE AG SERPL QL IA: NONREACTIVE
HCV AB SERPL QL IA: NONREACTIVE
HGB UR QL STRIP: ABNORMAL
HIV 1+2 AB+HIV1 P24 AG SERPL QL IA: NONREACTIVE
KETONES UR STRIP-MCNC: NEGATIVE MG/DL
LEUKOCYTE ESTERASE UR QL STRIP: NEGATIVE
NITRATE UR QL: NEGATIVE
PH UR STRIP: 7 [PH] (ref 5–7)
RBC #/AREA URNS AUTO: ABNORMAL /HPF
RUBV IGG SERPL QL IA: 2.04 INDEX
RUBV IGG SERPL QL IA: POSITIVE
SP GR UR STRIP: 1.01 (ref 1–1.03)
SQUAMOUS #/AREA URNS AUTO: ABNORMAL /LPF
T PALLIDUM AB SER QL: NONREACTIVE
UROBILINOGEN UR STRIP-MCNC: NORMAL MG/DL
WBC #/AREA URNS AUTO: ABNORMAL /HPF

## 2024-08-05 PROCEDURE — 86850 RBC ANTIBODY SCREEN: CPT | Performed by: OBSTETRICS & GYNECOLOGY

## 2024-08-05 PROCEDURE — 36415 COLL VENOUS BLD VENIPUNCTURE: CPT | Performed by: OBSTETRICS & GYNECOLOGY

## 2024-08-05 PROCEDURE — 86762 RUBELLA ANTIBODY: CPT | Performed by: OBSTETRICS & GYNECOLOGY

## 2024-08-05 PROCEDURE — 87086 URINE CULTURE/COLONY COUNT: CPT | Performed by: OBSTETRICS & GYNECOLOGY

## 2024-08-05 PROCEDURE — 87340 HEPATITIS B SURFACE AG IA: CPT | Performed by: OBSTETRICS & GYNECOLOGY

## 2024-08-05 PROCEDURE — 99459 PELVIC EXAMINATION: CPT | Performed by: OBSTETRICS & GYNECOLOGY

## 2024-08-05 PROCEDURE — 81001 URINALYSIS AUTO W/SCOPE: CPT | Performed by: OBSTETRICS & GYNECOLOGY

## 2024-08-05 PROCEDURE — 99214 OFFICE O/P EST MOD 30 MIN: CPT | Performed by: OBSTETRICS & GYNECOLOGY

## 2024-08-05 PROCEDURE — 86901 BLOOD TYPING SEROLOGIC RH(D): CPT | Performed by: OBSTETRICS & GYNECOLOGY

## 2024-08-05 PROCEDURE — 86780 TREPONEMA PALLIDUM: CPT | Performed by: OBSTETRICS & GYNECOLOGY

## 2024-08-05 PROCEDURE — 86803 HEPATITIS C AB TEST: CPT | Performed by: OBSTETRICS & GYNECOLOGY

## 2024-08-05 PROCEDURE — 87389 HIV-1 AG W/HIV-1&-2 AB AG IA: CPT | Performed by: OBSTETRICS & GYNECOLOGY

## 2024-08-05 PROCEDURE — 86900 BLOOD TYPING SEROLOGIC ABO: CPT | Performed by: OBSTETRICS & GYNECOLOGY

## 2024-08-05 RX ORDER — ONDANSETRON 4 MG/1
4 TABLET, FILM COATED ORAL EVERY 6 HOURS PRN
Qty: 30 TABLET | Refills: 1 | Status: SHIPPED | OUTPATIENT
Start: 2024-08-05

## 2024-08-05 NOTE — PROGRESS NOTES
Reginaldo is a 33 year old female, , O+ blood type, who is here today for her first OB visit at 10 2/7 weeks gestation.  She has had a positive home pregnancy test.  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  The father of this pregnancy is the same as this previous pregnancy but different than her livebirth 14 years ago.  She has been taking a PNV daily po but is experiencing daily nausea and emesis so requests a script for an anti-emetic.  Her social hx is negative for nicotine, etoh, and illicit drug abuse.  She admits to spotting daily for one week about 2 weeks ago but none since.  She was drinking etoh 1-3 mixed drinks socially per week but quit as soon as she found out that she was pregnant.  She has a hx of asthma and is currently waiting to receive a new inhaler from her pharmacy.  She had bilateral silicone breast implants surgically placed in 2018.  She prefers a urine collection for the GC/chlamydia tests and is not due to a pap update today.     ROS: Ten point review of systems was reviewed and negative except the above.    Gyn Hx:      Past Medical History:   Diagnosis Date    Abnormal Pap smear of cervix 2011 LSIL    Cervical high risk HPV (human papillomavirus) test positive 2013    10/31/14, 18    Depressive disorder 2011    possibly chronic    Environmental allergies     Influenza A with pneumonia 2021    Migraine     Migraines     headaches with allergies    Moderate major depression 2011    Moderate major depression (H) 2011    Moderate persistent asthma 3/17/2022    Nasal obstruction 2/15/2022    Added automatically from request for surgery [id number]    Nasal septal deviation 2/15/2022    Added automatically from request for surgery [id number]     Nasal turbinate hypertrophy 2/15/2022    Added automatically from request for surgery [id number]    NO ACTIVE PROBLEMS     Seasonal allergic rhinitis     Sepsis (H) 12/24/2021     Past Surgical History:   Procedure Laterality Date    BIOPSY  2/2024    Colcoscopy    BREAST SURGERY  2018    Implants    DILATION AND EVACUATION N/A 02/16/2022    Procedure: DILATION AND EVACUATION, UTERUS;  Surgeon: Vianey Domingo MD;  [facility]: [facility]    ENDOSCOPIC SINUS SURGERY Bilateral 04/04/2022    Procedure: FUNCTIONAL ENDOSCOPIC SINUS SURGERY, with bilateral maxillary antrostomies and bilateral michael bullosa reduction,;  Surgeon: Miquel Rutledge MD;  Location: [facility] OR    ENT SURGERY  2022 and 2023    EYE SURGERY  01/2013    Lasic surgery    GYN SURGERY      HEAD & NECK SURGERY      OPTICAL TRACKING SYSTEM ENDOSCOPIC SINUS SURGERY Bilateral 06/15/2023    Procedure: IMAGE GUIDED FUNCTIONAL ENDOSCOPIC SINUS SURGERY (FESS) WITHOUT SEPTOPLASTY, maxillary entrostomy, ethmoidectomy and turbinate reduction;  Surgeon: Atilio Murry MD;  Location: [facility] OR    NE BREAST AUGMENTATION  2018    SEPTOPLASTY, TURBINOPLASTY, COMBINED Bilateral 04/04/2022    Procedure: WITH NASAL SEPTOPLASTY and bilateral inferior turbinate reduction;  Surgeon: Miquel Rutledge MD;  Location: [facility] OR     Patient Active Problem List   Diagnosis    Cervical high risk HPV (human papillomavirus) test positive    Insomnia, unspecified insomnia    Vitamin D deficiency    Mild episode of recurrent major depressive disorder (H24)    Adjustment disorder with depressed mood    Mixed personality disorder (H)    Chronic maxillary sinusitis    Moderate persistent asthma    Allergic rhinitis due to animals    Allergic rhinitis due to dust mite    Seasonal allergic rhinitis due to pollen    Pain in both lower legs    Chronic pansinusitis    Purulent rhinitis    Staph aureus infection    Weight gain due to medication     ALL/Meds: Her medication and allergy histories  were reviewed and are documented in their appropriate chart areas.    SH: Reviewed and documented in the appropriate area of the chart.    FH: Her family history was reviewed and documented in its appropriate chart area.    PE: /80 (BP Location: Right arm, Patient Position: Chair, Cuff Size: Adult Regular)   Pulse 80   Wt 68.5 kg (151 lb)   LMP 05/27/2024   SpO2 98%   BMI 29.49 kg/m    Body mass index is 29.49 kg/m .    Urine HCG was +  Neck - supple without palpable mass  Hrt - RRR without murmur  Lungs - CTAB  Breasts - normal with bilateral scars from previous implant surgery, no palpable mass nor nipple discharge  Abd - unable to hear fhts with the doppler today so a table-side OB US was performed and demonstrated an anterior placenta with active fetus, nontender, size consistent with dates  Pelvic - normal external female genitalia, normal vaginal mucosa, closed cervix without motion tenderness, normal nontender gravid uterus, no adnexal mass or tenderness  Ext - negative    A/P:  Supervision of other high-risk pregnancy, hx of monosomy X pregnancy, hyperemesis gravidarum   - I discussed with her nutrition and medication concerns related to pregnancy.  We discussed folic acid supplementation.  We reviewed prenatal care.  She is given the opportunity to ask questions and have them answered.  Refer to [facility] including genetic counselor due to her hx of a previous pregnancy affected by monosomy X and the finding of a fetal cystic hygroma.  She prefers to speak to the genetic counselor prior to deciding on getting the Prequel and/or Foresight.  We discussed her recent OB US results from 8/1 and all her questions and concerns were addressed.  We discussed the initiation of Zofran for her c/o hyperemesis gravidarum.  She is to get her asthma inhaler asap and this has already been ordered.  She preferred to have the GC and chlamydia tests obtained from the urine so was sent to lab for this along with other  needed tests.  She is to continue taking a PNV daily po.    30 minutes were spent face to face with the patient today discussing her history, diagnosis, and follow-up plan as noted above.  Over 50% of the visit was spent in counseling and coordination of care.    Total Visit Time: 30 minutes.    Orders Placed This Encounter   Procedures    Mat Fetal Med [facility]tr Referral - Pregnancy    Adult Type and Screen    Neisseria gonorrhoeae PCR    Chlamydia trachomatis PCR     Ene Fitzgerald DO  FACOG, FACS

## 2024-08-05 NOTE — TELEPHONE ENCOUNTER
Was able to offer appointment & schedule First Provider OB appointment for today at 1pm with Dr. Fitzgerald.     Valerie العراقي RN -  OB/GYN group

## 2024-08-05 NOTE — TELEPHONE ENCOUNTER
"10w0d    Patient has first provider OB appt scheduled for 8/16/2024    Patient is asking to be seen sooner than 8/16/2024.  Due to begin \"high risk\".  Pt has canceled/rescheduled her FIRST PROVIDER OB visit X3.    I have put two 15 min time slots on hold (10:45am & 11:15am) in [facility] schedule for 8/09/2024 - waiting for approval from provider to schedule patient at this time for 1st provider ob appt.   If not approved please take these time slots off hold.     Routing to provider for approval.     Valerie العراقي RN - [facility]    "

## 2024-08-05 NOTE — TELEPHONE ENCOUNTER
Patient was a no show for OB/GYN appointment on 08/02/2024.    Routing to Dr. Mccain to advise on Nucala and pregnancy.    BENJAMIN ChavezN, RN, PHN

## 2024-08-05 NOTE — PATIENT INSTRUCTIONS
If you wish to schedule another appointment, please call our office at [phone]. You can also make appointments through [employer]  Return to clinic:     Every 4 weeks till 28 weeks, then every 2 weeks till 36 weeks, then weekly till delivery    If anything comes up between your visits or you have concerns, please don't hesitate to contact us.                                                 If you have labs or imaging done, the results will automatically release in [employer] without an interpretation.  Your health care professional will review those results and send an interpretation with recommendations as soon as possible, but this may be 1-3 business days.    If you have any questions regarding your visit, please contact your care team.     [facility]: [phone]  Women s Health CLINIC HOURS TELEPHONE NUMBER   Ene FitzgeraldDOAmie    Desirae -Surgery Scheduler  Dina -     DANAE Damon, DANAE Walker RN   [place]    Monday 8:30 am-5:00 pm  Wednesday 8:30 am-5:00 pm  Friday 8:30 am-5:00 pm    Typical Surgery day: [facility]  [address]  [place] [address]  Phone:  [phone]  Fax: [phone]   Appointment [phone]    Imaging Scheduling-All Locations [phone]    [facility] Labor and Delivery  [address] [place] [address]   [place] [address] [phone]   **Surgeries** Our Surgery Schedulers will contact you to schedule. If you do not receive a call within 3 business days, please call [phone].  Urgent Care locations:  [facility] Monday-Friday   10 am - 8 pm  Saturday and    9 am - 5 pm [phone]   If you need a medication refill, please contact your pharmacy. Please allow 3 business days for your refill to be completed.  As always, Thank you for trusting us with your healthcare needs!  see additional instructions from your care team below

## 2024-08-06 LAB — BACTERIA UR CULT: NORMAL

## 2024-08-08 ENCOUNTER — MYC MEDICAL ADVICE (OUTPATIENT)
Dept: OBGYN | Facility: CLINIC | Age: 34
End: 2024-08-08
Payer: COMMERCIAL

## 2024-08-13 ENCOUNTER — PRE VISIT (OUTPATIENT)
Dept: MATERNAL FETAL MEDICINE | Facility: CLINIC | Age: 34
End: 2024-08-13
Payer: COMMERCIAL

## 2024-08-15 ENCOUNTER — OFFICE VISIT (OUTPATIENT)
Dept: MATERNAL FETAL MEDICINE | Facility: CLINIC | Age: 34
End: 2024-08-15
Attending: OBSTETRICS & GYNECOLOGY
Payer: COMMERCIAL

## 2024-08-15 ENCOUNTER — LAB (OUTPATIENT)
Dept: LAB | Facility: CLINIC | Age: 34
End: 2024-08-15
Attending: OBSTETRICS & GYNECOLOGY
Payer: COMMERCIAL

## 2024-08-15 ENCOUNTER — HOSPITAL ENCOUNTER (OUTPATIENT)
Dept: ULTRASOUND IMAGING | Facility: CLINIC | Age: 34
Discharge: HOME | End: 2024-08-15
Attending: OBSTETRICS & GYNECOLOGY
Payer: COMMERCIAL

## 2024-08-15 DIAGNOSIS — Q99.9 HX OF CHROMOSOMAL ABNORMALITY: ICD-10-CM

## 2024-08-15 DIAGNOSIS — Z36.9 ENCOUNTER FOR ANTENATAL SCREENING OF MOTHER: ICD-10-CM

## 2024-08-15 DIAGNOSIS — Z34.81 ENCOUNTER FOR SUPERVISION OF OTHER NORMAL PREGNANCY IN FIRST TRIMESTER: Primary | ICD-10-CM

## 2024-08-15 DIAGNOSIS — Z34.81 ENCOUNTER FOR SUPERVISION OF OTHER NORMAL PREGNANCY IN FIRST TRIMESTER: ICD-10-CM

## 2024-08-15 DIAGNOSIS — Q99.9 HX OF CHROMOSOMAL ABNORMALITY: Primary | ICD-10-CM

## 2024-08-15 PROCEDURE — 36415 COLL VENOUS BLD VENIPUNCTURE: CPT

## 2024-08-15 PROCEDURE — 76813 OB US NUCHAL MEAS 1 GEST: CPT | Mod: 26 | Performed by: OBSTETRICS & GYNECOLOGY

## 2024-08-15 PROCEDURE — 96040 HC GENETIC COUNSELING, EACH 30 MINUTES: CPT

## 2024-08-15 PROCEDURE — 76813 OB US NUCHAL MEAS 1 GEST: CPT

## 2024-08-15 NOTE — PROGRESS NOTES
[facility]  Genetic Counseling Consult    Patient:  Reginaldo Ferraro  Preferred Name: Reginaldo YOB: 1990   Date of Service:  8/15/24   MRN: [id number]    Reginaldo was seen at the [facility] [place] for genetic consultation. The indication for genetic counseling is  history of a previous pregnancy affected with 45,X as well as to discuss prenatal screening and diagnostic testing options.  The patient was accompanied to this visit by their partner, Kevon.    The session was conducted in English.      IMPRESSION/ PLAN   1. Reginaldo has not had genetic screening in this pregnancy but elected to have screening today.     2. During today's [facility] visit, Reginaldo had a blood draw for non-invasive prenatal testing (also called NIPT, NIPS, or cell-free DNA) through [facility] This NIPT screens for trisomy 21, 18, and 13 and the patient opted to screen for sex chromosome aneuploidies, including reported fetal sex. Results are expected in 7-10 days. The patient will be called with results and if they do not answer they requested a detailed message with results on their voicemail, including the predicted fetal sex information.  Patient was informed that results, including fetal sex, will be available in Delivery Agent.    3. Since the patient chose aneuploidy screening via NIPT, quad screen is NOT recommended in the second trimester. If the patient desires screening for open neural tube defects, maternal serum AFP only is recommended, ideally between 16-18 weeks gestation.    4. Reginaldo had a nuchal translucency ultrasound today. Please see the ultrasound report for further details.    5. Further recommendations include a fetal anatomy level II ultrasound with [facility]. The upcoming ultrasound has been scheduled for 10/04/2024.    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   This pregnancy was conceived spontaneously.    MEDICAL HISTORY   Reginaldo s reported medical history is not expected to impact pregnancy management or risks to fetal development.       FAMILY HISTORY   A three-generation pedigree was obtained today and is scanned under the \"Media\" tab in Epic. The family history was reported by Reginaldo and their partner.    There were no significant findings reported today. Reginaldo's partner, Kevon, is currently 33 years old and healthy.    The reported family history is unremarkable for multiple miscarriages, stillbirths, birth defects, intellectual disabilities, known genetic conditions, and consanguinity.       RISK ASSESSMENT FOR INHERITED CONDITIONS AND CARRIER SCREENING OPTIONS   Expanded carrier screening is available to screen for autosomal recessive conditions and X-linked conditions in a large list of genes. Carrier screening does not test the pregnancy but gives a risk assessment for the pregnancy and future pregnancies to have the condition. Expanded carrier screening is designed to identify carrier status for conditions that are primarily childhood or adolescent onset. Expanded carrier screening does not evaluate for adult-onset conditions such as hereditary cancer syndromes, dementia/ Alzheimer's disease, or cardiovascular disease risk factors. Additionally, expanded carrier screening is not comprehensive for all known genetic diseases or inherited conditions. Carrier screening does not test for all genetic and health conditions " or risk factors.     Autosomal recessive conditions happen when a mutation has been inherited from the egg and sperm and include conditions like cystic fibrosis, thalassemia, hearing loss, spinal muscular atrophy, and more. We reviewed that when both biological parents carry a harmful genetic change in a gene associated with autosomal recessive inheritance, each of their pregnancies has a 1 in 4 (25%) chance to be affected by that condition. X-linked conditions happen when a mutation has been inherited from the egg and include conditions like fragile X syndrome.With x-linked conditions, the specific risk generally depends on the chromosomal sex of the fetus, with XY individuals (generally male) being most severely affected.     [place] screening was reviewed. About [place]  Screening    The patient does NOT have a family history of known inherited conditions. This does NOT mean the patient and/or their partner is not a carrier of a condition. Approximately 90% of couples at an increased reproductive risk for an inherited condition have no family history of that condition.     The patient has not had carrier screening previously.     The patient declined the carrier screening options. They are aware the option will remain, and they can contact us if they would like to pursue screening. See below for the more detailed information we dicussed.  Carrier screening does not test the pregnancy but gives a risk assessment for the pregnancy and future pregnancies to have the condition  There are different size panels or list of conditions for carrier screening.  Some conditions cause health problems for carriers. We discussed that in the event of an incidental finding, further evaluation regarding screening or further testing may be recommended.   We reviewed that there is a law in place, the Genetic Information Nondiscrimination Act ([place]RISTIN), that protects patients from discrimination by health insurance companies and  employers based on their genetic information. [place] does not protect against discrimination by life insurance companies or disability insurance.  Carrier screening does not test for all genetic and health conditions or risk factors  The results typically take 2-3 weeks.     RISK ASSESSMENT FOR CHROMOSOME CONDITIONS   We explained that the risk for fetal chromosome abnormalities increases with maternal age. We discussed specific features of common chromosome abnormalities, including trisomy 21 (Down syndrome), trisomy 13, trisomy 18, and sex chromosome trisomies.    At age 34 at midtrimester, the risk to have a baby with Down syndrome is 1 in 342.   At age 34 at midtrimester, the risk to have a baby with any chromosome abnormality is 1 in  172.   At age 34 at delivery, the risk to have a baby with Down syndrome is 1 in 500.   At age 34 at delivery, the risk to have a baby with any chromosome abnormality is 1 in 253.     Reginaldo has not had genetic screening in this pregnancy but elected to have screening today.      GENETIC TESTING OPTIONS   Genetic testing during a pregnancy includes screening and diagnostic procedures.      Screening tests are non-invasive which means no risk to the pregnancy and includes ultrasounds and blood work. The benefits and limitations of screening were reviewed. Screening tests provide a risk assessment (chance) specific to the pregnancy for certain fetal chromosome abnormalities but cannot definitively diagnose or exclude a fetal chromosome abnormality. Follow-up genetic counseling and consideration of diagnostic testing is recommended with any abnormal screening result. Diagnostic testing during a pregnancy is more certain and can test for more conditions. However, the tests do have a risk of miscarriage that requires careful consideration. These tests can detect fetal chromosome abnormalities with greater than 99% certainty. Results can be compromised by maternal cell contamination  or mosaicism and are limited by the resolution of current genetic testing technology.     There is no screening or diagnostic test that detects all forms of birth defects or intellectual disability.     We discussed the following screening options:     Non-invasive prenatal testing (NIPT)  Also called cell-free DNA screening because it detects chromosomes from the placenta in the pregnant person's blood  Can be done any time after 10 weeks gestation  Standard recommendation for NIPT screens for trisomy 21, trisomy 18, trisomy 13, with the option of adding sex chromosome aneuploidies, without or without predicted sex  Cannot screen for open neural tube defects, maternal serum AFP after 15 weeks is recommended  New NIPT options include screening for other trisomies, microdeletion syndromes, and in some cases fetal blood antigens. Guidelines do not recommend these conditions are included in standard screening. These options have limitations and should be discussed with a genetic counselor.   However, current (2023) ACMG guidelines do recommend that screening for one microdeletion syndrome, called 22q11.2 deletion syndrome be offered to all pregnant patients. 22q11.2 deletion syndrome has an estimated prevalence of 1 in 990 to 1 in 2148 (0.05-0.1%). Risk is not thought to increase with maternal age. Clinical features are variable but include congenital heart defects, cleft palate, developmental delays, immune system deficiencies, and hearing loss. Approximately 90% of cases are de adam (a sporadic new change in a pregnancy). Cell-free DNA screening for 22q11.2 deletion syndrome is available with the inclusion of other microdeletion syndromes. There is less data about the performance of cell-free DNA screening for more rare microdeletions and the chance for false positives or negative may be increased.  We discussed the limitations of cell-free DNA screening in detecting microdeletions and the possiblity of false  positives and false negatives. The patient declined microdeletion syndrome screening.    We discussed the following ultrasound options:    Nuchal translucency (NT) ultrasound  Ultrasound between 02d3h-30c2a that includes nuchal translucency measurement and nasal bone assessments  Nuchal translucency refers to the space at the back of the neck where fluid builds up. All babies at this stage have fluid and there is only concern if there is too much fluid  Nasal bone refers to the small bone in the nose. There is concern for conditions like Down syndrome if the bone cannot be seen at all  This ultrasound can be done as part of first trimester screening, at the same time as another screen (NIPT), at the same time as a [facility], or if the patients does not want genetic screening.  Markers on ultrasound detects about 70% of pregnancies with aneuploidy  Abnormalities on NT ultrasound can also increase the risk for a birth defect, like a heart defect    Comprehensive level II ultrasound (Fetal Anatomy Ultrasound)  Ultrasound done between 18-20 weeks gestation  Screens for major birth defects and markers for aneuploidy (like trisomy 21 and trisomy 18)  Includes looking at the fetus/baby's growth, heart, organs (stomach, kidneys), placenta, and amniotic fluid    We discussed the following diagnostic options:     Chorionic villus sampling ([facility])  Invasive diagnostic procedure done between 10w0d and 13w6d  The procedure collects a small sample from the placenta for the purpose of chromosomal testing and/or other genetic testing  Diagnostic result; more than 99% sensitivity for fetal chromosome abnormalities  Cannot screen for open neural tube defects, maternal serum AFP after 15 weeks is recommended    Amniocentesis  Invasive diagnostic procedure done after 15 weeks gestation  The procedure collects a small sample of amniotic fluid for the purpose of chromosomal testing and/or other genetic testing  Diagnostic result; more than 99%  sensitivity for fetal chromosome abnormalities  Testing for AFP in the amniotic fluid can test for open neural tube defects    It was a pleasure to be involved with [facility] care. Face-to-face time of the meeting was 45 minutes.    Margi Payne GC, MS, [facility]  Board Certified and [place] Licensed Genetic Counselor  [facility]  Maternal Fetal Medicine  Office: [phone]  [facility]: [phone]   Fax: [phone]  [facility]

## 2024-08-15 NOTE — PROGRESS NOTES
The patient was seen for an ultrasound in the Maternal-Fetal Medicine Center at the [facility] today.  For a detailed report of the ultrasound examination, please see the ultrasound report which can be found under the imaging tab.    If you have questions regarding today's evaluation or if we can be of further service, please contact the Maternal-Fetal Medicine Center.    Mayra Abarca MD  , OB/GYN  Maternal-Fetal Medicine  [phone] (Pager)

## 2024-08-21 ENCOUNTER — TELEPHONE (OUTPATIENT)
Dept: MATERNAL FETAL MEDICINE | Facility: CLINIC | Age: 34
End: 2024-08-21
Payer: COMMERCIAL

## 2024-08-21 NOTE — TELEPHONE ENCOUNTER
August 21, 2024     Per patient request, I left a detailed message for Reginaldo Ferraro regarding her NIPT results.      Results indicate NO ANEUPLOIDY DETECTED for chromosomes 21, 18, 13, or the sex chromosomes (XX).      This puts her current pregnancy at low risk for Down syndrome, trisomy 18, trisomy 13, and sex chromosome abnormalities. This test is reported to have the following sensitivities: Down syndrome- >99.7%, trisomy 18- >97.9%, and trisomy 13- >99.0%. Although these results are reassuring, this does not replace a standard chromosome analysis from a chorionic villus sampling or amniocentesis.      MSAFP is the appropriate second trimester screening test for open neural tube defects; the maternal quad screen is not recommended.     Her results are available in her Epic chart for her primary OB to review.     Margi Payne MS, [facility]  Certified and Licensed Genetic Counselor  [facility]  Maternal Fetal Medicine  Office: [phone]  [facility]: [phone]   Fax: [phone]  [facility]

## 2024-08-22 LAB — SCANNED LAB RESULT: NORMAL

## 2024-08-28 ENCOUNTER — NURSE TRIAGE (OUTPATIENT)
Dept: NURSING | Facility: CLINIC | Age: 34
End: 2024-08-28
Payer: COMMERCIAL

## 2024-08-29 NOTE — TELEPHONE ENCOUNTER
"Patient reporting she has been vomiting at least once or twice a day during her pregnancy, but tonight noticed streaks of blood when she spit afterwards.  Denies blood in vomit, denies dizziness.    Disposition is to home care but call back if symptom continues or worsening symptoms.  Patient verbalizes understanding of care advice and agrees with plan.    Alisa Murcia RN  [facility]        Reason for Disposition   MILD-MODERATE vomiting (e.g., 1-5 times / day) or nausea    Additional Information   Negative: Sounds like a life-threatening emergency to the triager   Negative: [1] Vaginal bleeding AND [2] pregnant < 20 weeks   Negative: [1] Abdominal pain AND [2] pregnant < 20 weeks   Negative: [1] Vomiting AND [2] pregnant 20 or more weeks   Negative: [1] Vomiting AND [2] contains red blood or black (\"coffee ground\") material  (Exception: Few red streaks in vomit that only happened once.)   Negative: [1] Insulin-dependent diabetes (Type I) AND [2] glucose > 400 mg/dl (22 mmol/l)   Negative: Recent head injury (within last 3 days)   Negative: Recent abdominal injury (within last 3 days)   Negative: Severe pain in one eye   Negative: [1] SEVERE vomiting (e.g., 6 or more times / day) AND [2] present > 8 hours   Negative: [1] Drinking very little AND [2] dehydration suspected (e.g., no urine > 12 hours, very dry mouth, very lightheaded)   Negative: Patient sounds very sick or weak to the triager   Negative: [1] Unable to keep ANY liquids down (without vomiting) AND [2] present > 24 hours   Negative: Weight loss > 5 pounds (2.5 kg) in last 2 weeks   Negative: Vomiting an essential or prescribed medication  (Exception: Taking prenatal vitamins.)   Negative: Recently started on a new medication   Negative: [1] MODERATE vomiting (e.g., 3 - 5 times / day) AND [2] present > 3 days   Negative: Pain or burning with passing urine (urination)   Negative: Substance use (drug use) or unhealthy alcohol use, known or " suspected   Negative: High-risk adult (e.g., diabetes mellitus, AIDS/HIV)   Negative: [1] MILD vomiting (e.g., 1 - 2 times / day) AND [2] present > 3 days AND [3] started after the 9th week of pregnancy   Negative: [1] MILD vomiting AND [2] present > 1 week AND [3] no improvement after using Morning Sickness Care Advice    Protocols used: Pregnancy - Morning Sickness (Nausea and Vomiting of Pregnancy)-A-AH

## 2024-09-17 ENCOUNTER — TELEPHONE (OUTPATIENT)
Dept: PULMONOLOGY | Facility: CLINIC | Age: 34
End: 2024-09-17
Payer: COMMERCIAL

## 2024-09-17 ENCOUNTER — MYC MEDICAL ADVICE (OUTPATIENT)
Dept: ALLERGY | Facility: CLINIC | Age: 34
End: 2024-09-17
Payer: COMMERCIAL

## 2024-09-17 DIAGNOSIS — J45.40 UNCONTROLLED MODERATE PERSISTENT ASTHMA: Primary | ICD-10-CM

## 2024-09-17 NOTE — TELEPHONE ENCOUNTER
Isreal Valencia was admitted to [facility] from ED via bed accompanied by Other ED Staff. Reason for hospitalization is dyspnea on exertion and chest pain. Upon arrival, patient is stable. Patient has history significant for depression, DVT, gout. Patient oriented to bed, call light, , room and unit. Patient provided with the following educational materials upon admission:safety, advanced directives, infection control and pain. Level of understanding patient verbalized understanding. Admission orders received at this time. MD notified of patient arrival.   See Epic documentation for patient individualized nursing care plan.     AOx4  Independent in room Prior Authorization Retail Medication Request    Medication/Dose: Breo ellipta 200-25mcg  Diagnosis and ICD code (if different than what is on RX):    New/renewal/insurance change PA/secondary ins. PA:  Previously Tried and Failed:  dulera, symbicort  Rationale:  has been welll controlled on this medication    Insurance   Primary: [facility] CHOICE   Insurance ID:  [id number]     Secondary (if applicable):MEDICAID [place]   Insurance ID:  [id number]     Pharmacy Information (if different than what is on RX)  Name:  [facility] [place]  Phone:  [phone]  Fax:[phone]

## 2024-09-20 NOTE — TELEPHONE ENCOUNTER
PA Initiation    Medication: BREO ELLIPTA 200-25 MCG/ACT IN AEPB  Insurance Company: [place] Medicaid ([facility]) - Phone [phone] Fax [phone]  Pharmacy Filling the Rx: [email or web address] #[id number] - [place], [place] - [address] AT [facility] [address]  Filling Pharmacy Phone: [phone]  Filling Pharmacy Fax: [phone]  Start Date: 9/20/2024

## 2024-09-24 NOTE — TELEPHONE ENCOUNTER
I spoke with pharmacist at [facility] and informed her that we need a screenshot of patient's primary insurance payment and what patient responsibility is per [place] [employer]. She told me that she has over 500 prescriptions to fill and 18 vaccine appointments and that these take precedence. She took down my fax number and said she will fax to me within the next few days. I will follow up again.

## 2024-09-25 ENCOUNTER — MYC MEDICAL ADVICE (OUTPATIENT)
Dept: ALLERGY | Facility: CLINIC | Age: 34
End: 2024-09-25
Payer: COMMERCIAL

## 2024-09-25 DIAGNOSIS — J30.1 SEASONAL ALLERGIC RHINITIS DUE TO POLLEN: Primary | ICD-10-CM

## 2024-09-25 RX ORDER — CETIRIZINE HYDROCHLORIDE 10 MG/1
10 TABLET ORAL DAILY
Qty: 90 TABLET | Refills: 3 | Status: SHIPPED | OUTPATIENT
Start: 2024-09-25

## 2024-09-27 NOTE — TELEPHONE ENCOUNTER
Prior authorization encounter 9/17/2024.    Placed as high priority and routed to the prior authorization team.    BENJAMIN ChavezN, RN, PHN

## 2024-09-27 NOTE — TELEPHONE ENCOUNTER
Patient is completely out of this medication and is having asthma exacerbations without it which put patient in the emergency department.    This is a high priority referral.    Please advise.    BENJAMIN ChavezN, RN, PHN

## 2024-09-27 NOTE — TELEPHONE ENCOUNTER
"I spoke with Neel at [facility] and he stated that the screenshot from [facility] does not show the actual payment amount paid by primary insurance. The \"cash price\" and amount \"applied to deductible\" is not sufficient. He stated that they have receive these primary payement screenshots from [facility] all the time so it is something [facility] has the capacity to do. I talked to the pharmacist at [facility] and she insists that her system does not allow her to see the plan payment. She said she had her  helping her and he agreed with her. She insists that there is NOTHING further she can do. The pharmacist I spoke with today told me that she cannot help me anymore because she has a lot of vaccines she has to go do, which is also what the pharmacist I spoke with on Monday said to me. She suggested that we send this rx to another pharmacy who may be able to help us better.  "

## 2024-09-29 RX ORDER — BUDESONIDE AND FORMOTEROL FUMARATE DIHYDRATE 160; 4.5 UG/1; UG/1
AEROSOL RESPIRATORY (INHALATION)
Qty: 20.4 G | Refills: 11 | Status: SHIPPED | OUTPATIENT
Start: 2024-09-29

## 2024-09-30 NOTE — TELEPHONE ENCOUNTER
PRIOR AUTHORIZATION DENIED    Medication: BREO ELLIPTA 200-25 MCG/ACT IN AEPB  Insurance Company: [place] Medicaid ([facility]) - Phone [phone] Fax [phone]  Denial Date: 9/27/2024  Denial Reason(s):       Appeal Information:       Patient Notified: NO

## 2024-09-30 NOTE — TELEPHONE ENCOUNTER
Spoke with Reginaldo. Her allergy provider ordered the Symbicort as she is pregnant now and the Breo should not be using if pregnant. She will make a follow up appt after she has the baby.

## 2024-10-08 ENCOUNTER — OFFICE VISIT (OUTPATIENT)
Dept: ALLERGY | Facility: CLINIC | Age: 34
End: 2024-10-08
Attending: ALLERGY & IMMUNOLOGY
Payer: COMMERCIAL

## 2024-10-08 VITALS — SYSTOLIC BLOOD PRESSURE: 106 MMHG | OXYGEN SATURATION: 95 % | DIASTOLIC BLOOD PRESSURE: 65 MMHG | HEART RATE: 101 BPM

## 2024-10-08 DIAGNOSIS — J30.89 ALLERGIC RHINITIS DUE TO DUST MITE: ICD-10-CM

## 2024-10-08 DIAGNOSIS — J45.40 UNCONTROLLED MODERATE PERSISTENT ASTHMA: Primary | ICD-10-CM

## 2024-10-08 DIAGNOSIS — J32.4 CHRONIC PANSINUSITIS: ICD-10-CM

## 2024-10-08 DIAGNOSIS — D72.119 HYPEREOSINOPHILIC SYNDROME, UNSPECIFIED TYPE: ICD-10-CM

## 2024-10-08 DIAGNOSIS — J30.81 ALLERGIC RHINITIS DUE TO ANIMALS: ICD-10-CM

## 2024-10-08 DIAGNOSIS — J30.1 SEASONAL ALLERGIC RHINITIS DUE TO POLLEN: ICD-10-CM

## 2024-10-08 PROCEDURE — 99213 OFFICE O/P EST LOW 20 MIN: CPT | Performed by: ALLERGY & IMMUNOLOGY

## 2024-10-08 ASSESSMENT — ASTHMA QUESTIONNAIRES
QUESTION_2 LAST FOUR WEEKS HOW OFTEN HAVE YOU HAD SHORTNESS OF BREATH: ONCE OR TWICE A WEEK
EMERGENCY_ROOM_LAST_YEAR_TOTAL: ONE
ACT_TOTALSCORE: 18
QUESTION_5 LAST FOUR WEEKS HOW WOULD YOU RATE YOUR ASTHMA CONTROL: POORLY CONTROLLED
ACT_TOTALSCORE: 18
QUESTION_3 LAST FOUR WEEKS HOW OFTEN DID YOUR ASTHMA SYMPTOMS (WHEEZING, COUGHING, SHORTNESS OF BREATH, CHEST TIGHTNESS OR PAIN) WAKE YOU UP AT NIGHT OR EARLIER THAN USUAL IN THE MORNING: ONCE OR TWICE
QUESTION_4 LAST FOUR WEEKS HOW OFTEN HAVE YOU USED YOUR RESCUE INHALER OR NEBULIZER MEDICATION (SUCH AS ALBUTEROL): ONCE A WEEK OR LESS
QUESTION_1 LAST FOUR WEEKS HOW MUCH OF THE TIME DID YOUR ASTHMA KEEP YOU FROM GETTING AS MUCH DONE AT WORK, SCHOOL OR AT HOME: A LITTLE OF THE TIME

## 2024-10-08 NOTE — PROGRESS NOTES
Reginaldo Ferraro was seen in the Allergy Clinic at [facility].      Reginaldo Ferraro is a 33 year old Not  or  female who is seen today for a follow-up visit.    Currently has about 20 days left of Breo. Using nebs twice a day along with the Breo. Seen in the ED on 9/23/24. At that time she had been taking nebulizer treatments about 4 times per day.    Feels the Breo has been the most effective maintenance medication for her. Currently her insurance is requiring a prior authorization for coverage. A prescription for Symbicort was sent as an alternative to be used if she is unable to continue with Breo.    Past Medical History:   Diagnosis Date    Abnormal Pap smear of cervix 12/23/2011 9/21 LSIL    Cervical high risk HPV (human papillomavirus) test positive 2013    10/31/14, 5/21/18    Depressive disorder 12/23/2011    possibly chronic    Environmental allergies     Influenza A with pneumonia 12/24/2021    Migraine     Migraines     headaches with allergies    Moderate major depression 12/23/2011    Moderate major depression (H) 12/23/2011    Moderate persistent asthma 3/17/2022    Nasal obstruction 2/15/2022    Added automatically from request for surgery [id number]    Nasal septal deviation 2/15/2022    Added automatically from request for surgery [id number]    Nasal turbinate hypertrophy 2/15/2022    Added automatically from request for surgery [id number]    NO ACTIVE PROBLEMS     Seasonal allergic rhinitis     Sepsis (H) 12/24/2021     Family History   Problem Relation Age of Onset    Arthritis Mother     Lupus Mother     Heart Disease Father     Cerebrovascular Disease Father         stroke    Hyperlipidemia Father     Diabetes Paternal Grandmother     Hypertension Paternal Grandmother     Respiratory Paternal Grandmother         asthma    Colon Cancer No family hx of     Breast Cancer No family hx of     Coronary Artery Disease No family hx of      Social History     Tobacco  Use    Smoking status: Never     Passive exposure: Never    Smokeless tobacco: Never   Vaping Use    Vaping status: Never Used   Substance Use Topics    Alcohol use: Not Currently     Comment: Social    Drug use: No     Social History     Social History Narrative    ENVIRONMENTAL HISTORY: [place] The home is heated with a forced air. They does have central air conditioning. The patient's bedroom is furnished with feather/wool bedding or pillows and carpeting in bedroom.  Pets inside the house include 2 cat(s) and 1 dog(s). There is no history of cockroach or mice infestation. There is/are 0 smokers living in the house.  There is/are 0 who smoke ecigarettes/vape living in the house.The house does not have a damp basement.             Past medical, family, and social history were reviewed.        Current Outpatient Medications:     albuterol (PROAIR HFA/PROVENTIL HFA/VENTOLIN HFA) 108 (90 Base) MCG/ACT inhaler, Inhale 2 puffs into the lungs every 4 hours as needed for shortness of breath, wheezing or cough, Disp: 18 g, Rfl: 1    cetirizine (ZYRTEC) 10 MG tablet, Take 1 tablet (10 mg) by mouth daily., Disp: 90 tablet, Rfl: 3    fluticasone-vilanterol (BREO ELLIPTA) 200-25 MCG/ACT inhaler, Inhale 1 puff into the lungs daily, Disp: 60 each, Rfl: 11    ipratropium - albuterol 0.5 mg/2.5 mg/3 mL (DUONEB) 0.5-2.5 (3) MG/3ML neb solution, Take 1 vial (3 mLs) by nebulization every 6 hours as needed for shortness of breath, wheezing or cough, Disp: 90 mL, Rfl: 1    ondansetron (ZOFRAN ODT) 4 MG ODT tab, Take 1 tablet (4 mg) by mouth every 8 hours as needed for nausea, Disp: 20 tablet, Rfl: 1    ondansetron (ZOFRAN) 4 MG tablet, Take 1 tablet (4 mg) by mouth every 6 hours as needed for nausea or vomiting, Disp: 30 tablet, Rfl: 1    Prenatal Vit-Fe Fumarate-FA (PRENATAL VITAMINS PO), Take by mouth., Disp: , Rfl:     VENTOLIN  (90 Base) MCG/ACT inhaler, Inhale 1-2 puffs into  the lungs every 4 hours as needed for shortness of breath or wheezing, Disp: 18 g, Rfl: 1    ALPRAZolam (XANAX) 0.25 MG tablet, Take 1 tablet (0.25 mg) by mouth at bedtime as needed, may repeat once for anxiety (Patient not taking: Reported on 8/5/2024), Disp: 30 tablet, Rfl: 0    azelastine (OPTIVAR) 0.05 % ophthalmic solution, Apply 1 drop to eye 2 times daily (Patient not taking: Reported on 8/5/2024), Disp: 5 mL, Rfl: 0    budesonide-formoterol (SYMBICORT) 160-4.5 MCG/ACT Inhaler, Inhale 2 puffs twice daily plus 1-2 puffs every 4 hours as needed. May use up to 12 puffs per day. (Patient not taking: Reported on 10/8/2024), Disp: 20.4 g, Rfl: 11    cholecalciferol (D3 HIGH POTENCY) 50 MCG (2000 UT) CAPS, Take 50 mcg by mouth daily (Patient not taking: Reported on 8/5/2024), Disp: 180 capsule, Rfl: 1    Fexofenadine HCl (ALLEGRA ALLERGY PO), , Disp: , Rfl:     mepolizumab (NUCALA) 100 MG/ML auto-injector, Inject 1 mL (100 mg) Subcutaneous every 28 days (Patient not taking: Reported on 7/29/2024), Disp: 1 mL, Rfl: 5    mupirocin (BACTROBAN) 2 % external ointment, Apply topically 3 times daily (Patient not taking: Reported on 7/29/2024), Disp: 15 g, Rfl: 0    omeprazole (PRILOSEC) 20 MG DR capsule, Take 1 capsule (20 mg) by mouth daily (Patient not taking: Reported on 7/29/2024), Disp: 90 capsule, Rfl: 1    ORDER FOR ALLERGEN IMMUNOTHERAPY, Name of Mix: Mix #1  Dust Mite, Cat, Dog Cat Hair, Standardized A.P. 10,000 BAU/mL, HS  2.0 ml Dog Hair-Dander, UF  1:650 w/v, HS  1.0 ml Dust Mites DF. 10,000 AU/mL, HS  1.0 ml Dust Mites DP. 10,000 AU/mL, HS  1.0 ml  Diluent: HSA qs to 5ml (Patient not taking: Reported on 7/8/2024), Disp: 5 mL, Rfl: PRN    ORDER FOR ALLERGEN IMMUNOTHERAPY, Name of Mix: Mix #2  Tree  Sylvain, White 1:20 w/v, HS  0.5 ml Birch Mix PRW 1:20 w/v, HS  0.5 ml Boxelder-Maple Mix BHR (Boxelder Hard Red) 1:20 w/v, HS  0.5 ml Whiteclay, Common 1:20 w/v, HS  0.5 ml Oak Mix RVW 1:20 w/v, HS 0.5 ml Pine Mix 1:20  w/v, HS 0.5 ml [place] Tree, Black 1:20 w/v, HS 0.5 ml Diluent: HSA qs to 5ml (Patient not taking: Reported on 7/8/2024), Disp: 5 mL, Rfl: PRN    ORDER FOR ALLERGEN IMMUNOTHERAPY, Name of Mix: Mix #3  Grass, Weeds Prabhu Grass 1:20 w/v, HS 0.5 ml Steve Grass (Std) 100,000 BAU/mL, HS 0.4 ml Lamb's Quarters 1:20 w/v, HS 0.5 ml Nettle 1:20 w/v, HS 0.5 ml Plantain, English 1:20 w/v, HS 0.5 ml Ragweed, Mix (Giant, Short) 1:20 w/v, HS 0.5 ml Russian Thistle 1:20 w/v, HS 0.5 ml Sorrel, Sheep 1:20 w/v, HS 0.5 ml Diluent: HSA qs to 5ml (Patient not taking: Reported on 8/5/2024), Disp: 5 mL, Rfl: PRN  No Known Allergies    EXAM:   /65 (BP Location: Left arm, Patient Position: Sitting, Cuff Size: Adult Regular)   Pulse 101   LMP 05/27/2024   SpO2 95%   Physical Exam  Vitals and nursing note reviewed.   Constitutional:       Appearance: Normal appearance.   HENT:      Head: Normocephalic and atraumatic.      Right Ear: External ear normal.      Left Ear: External ear normal.      Nose: No rhinorrhea.      Mouth/Throat:      Mouth: Mucous membranes are moist. No oral lesions.      Pharynx: Oropharynx is clear. Uvula midline. No posterior oropharyngeal erythema.   Eyes:      General: Lids are normal.      Extraocular Movements: Extraocular movements intact.      Conjunctiva/sclera: Conjunctivae normal.   Neck:      Comments: No asymmetry, masses, or scars  Cardiovascular:      Rate and Rhythm: Normal rate and regular rhythm.      Heart sounds: S1 normal and S2 normal. No murmur heard.  Pulmonary:      Effort: Pulmonary effort is normal. No respiratory distress.      Breath sounds: Normal breath sounds and air entry.   Musculoskeletal:      Comments: No musculoskeletal defects appreciated   Skin:     General: Skin is warm and dry.      Findings: No lesion or rash.   Neurological:      General: No focal deficit present.      Mental Status: She is alert.   Psychiatric:         Mood and Affect: Mood and affect normal.            WORKUP:  None    ASSESSMENT/PLAN:  Reginaldo Ferraro is a 33 year old female here for a follow-up visit.    1. Uncontrolled moderate persistent asthma - Currently taking Breo daily but still needing rescue medications twice daily. Feels Breo has been the most effective maintenance medication for her. Plan to continue with ICS/LABA therapy - ideally with Breo given this has been most effective though will switch to Symbicort if she is unable to continue with Breo due to insurance restrictions. Discussed adding montelukast for additional symptom control however she declined to pursue this treatment after reviewing the potential side effects. She is a good candidate for starting biologic therapy, particularly in the setting of co-existing eosinophilia however this will be deferred until after her current pregnancy.    - continue Breo 200/25mcg - 1 puff daily  - take 2 to 4 puffs of albuterol HFA and/or use nebulizer every 4 hours as needed  - Adult Allergy Clinic Follow-Up Order  - Adult Allergy Clinic Follow-Up Order; Future    2. Chronic pansinusitis    - Adult Allergy Clinic Follow-Up Order  - Adult Allergy Clinic Follow-Up Order; Future    3. Hypereosinophilic syndrome, unspecified type    - Adult Allergy Clinic Follow-Up Order  - Adult Allergy Clinic Follow-Up Order; Future    4. Seasonal allergic rhinitis due to pollen - Switched to cetirizine from fexofenadine and taking this once to twice daily.    - Adult Allergy Clinic Follow-Up Order; Future    5. Allergic rhinitis due to animals    - Adult Allergy Clinic Follow-Up Order; Future    6. Allergic rhinitis due to dust mite    - Adult Allergy Clinic Follow-Up Order; Future      Follow-up in 3 months, sooner if needed      Thank you for allowing me to participate in the care of Reginaldo Ferraro.      Polo Mccain MD, FAAAAI  Allergy/Immunology  [facility]      Consent was  obtained from the patient to use an AI documentation tool in the creation of this note.    Chart documentation done in part with Dragon Voice Recognition Software. Although reviewed after completion, some word and grammatical errors may remain.

## 2024-10-08 NOTE — LETTER
10/8/2024      Reginaldo Ferraro  [address] [place] [address]      Dear Colleague,    Thank you for referring your patient, Reginaldo Ferraro, to the [facility]. Please see a copy of my visit note below.    Reginaldo Ferraro was seen in the Allergy Clinic at [facility].      Reginaldo Ferraro is a 33 year old Not  or  female who is seen today for a follow-up visit.    Currently has about 20 days left of Breo. Using nebs twice a day along with the Breo. Seen in the ED on 9/23/24. At that time she had been taking nebulizer treatments about 4 times per day.    Feels the Breo has been the most effective maintenance medication for her. Currently her insurance is requiring a prior authorization for coverage. A prescription for Symbicort was sent as an alternative to be used if she is unable to continue with Breo.    Past Medical History:   Diagnosis Date     Abnormal Pap smear of cervix 12/23/2011 9/21 LSIL     Cervical high risk HPV (human papillomavirus) test positive 2013    10/31/14, 5/21/18     Depressive disorder 12/23/2011    possibly chronic     Environmental allergies      Influenza A with pneumonia 12/24/2021     Migraine      Migraines     headaches with allergies     Moderate major depression 12/23/2011     Moderate major depression (H) 12/23/2011     Moderate persistent asthma 3/17/2022     Nasal obstruction 2/15/2022    Added automatically from request for surgery [id number]     Nasal septal deviation 2/15/2022    Added automatically from request for surgery [id number]     Nasal turbinate hypertrophy 2/15/2022    Added automatically from request for surgery [id number]     NO ACTIVE PROBLEMS      Seasonal allergic rhinitis      Sepsis (H) 12/24/2021     Family History   Problem Relation Age of Onset     Arthritis Mother      Lupus Mother      Heart Disease Father      Cerebrovascular Disease Father         stroke     Hyperlipidemia Father       Diabetes Paternal Grandmother      Hypertension Paternal Grandmother      Respiratory Paternal Grandmother         asthma     Colon Cancer No family hx of      Breast Cancer No family hx of      Coronary Artery Disease No family hx of      Social History     Tobacco Use     Smoking status: Never     Passive exposure: Never     Smokeless tobacco: Never   Vaping Use     Vaping status: Never Used   Substance Use Topics     Alcohol use: Not Currently     Comment: Social     Drug use: No     Social History     Social History Narrative    ENVIRONMENTAL HISTORY: [place] The home is heated with a forced air. They does have central air conditioning. The patient's bedroom is furnished with feather/wool bedding or pillows and carpeting in bedroom.  Pets inside the house include 2 cat(s) and 1 dog(s). There is no history of cockroach or mice infestation. There is/are 0 smokers living in the house.  There is/are 0 who smoke ecigarettes/vape living in the house.The house does not have a damp basement.             Past medical, family, and social history were reviewed.        Current Outpatient Medications:      albuterol (PROAIR HFA/PROVENTIL HFA/VENTOLIN HFA) 108 (90 Base) MCG/ACT inhaler, Inhale 2 puffs into the lungs every 4 hours as needed for shortness of breath, wheezing or cough, Disp: 18 g, Rfl: 1     cetirizine (ZYRTEC) 10 MG tablet, Take 1 tablet (10 mg) by mouth daily., Disp: 90 tablet, Rfl: 3     fluticasone-vilanterol (BREO ELLIPTA) 200-25 MCG/ACT inhaler, Inhale 1 puff into the lungs daily, Disp: 60 each, Rfl: 11     ipratropium - albuterol 0.5 mg/2.5 mg/3 mL (DUONEB) 0.5-2.5 (3) MG/3ML neb solution, Take 1 vial (3 mLs) by nebulization every 6 hours as needed for shortness of breath, wheezing or cough, Disp: 90 mL, Rfl: 1     ondansetron (ZOFRAN ODT) 4 MG ODT tab, Take 1 tablet (4 mg) by mouth every 8 hours as needed for nausea, Disp: 20 tablet, Rfl: 1     ondansetron  (ZOFRAN) 4 MG tablet, Take 1 tablet (4 mg) by mouth every 6 hours as needed for nausea or vomiting, Disp: 30 tablet, Rfl: 1     Prenatal Vit-Fe Fumarate-FA (PRENATAL VITAMINS PO), Take by mouth., Disp: , Rfl:      VENTOLIN  (90 Base) MCG/ACT inhaler, Inhale 1-2 puffs into the lungs every 4 hours as needed for shortness of breath or wheezing, Disp: 18 g, Rfl: 1     ALPRAZolam (XANAX) 0.25 MG tablet, Take 1 tablet (0.25 mg) by mouth at bedtime as needed, may repeat once for anxiety (Patient not taking: Reported on 8/5/2024), Disp: 30 tablet, Rfl: 0     azelastine (OPTIVAR) 0.05 % ophthalmic solution, Apply 1 drop to eye 2 times daily (Patient not taking: Reported on 8/5/2024), Disp: 5 mL, Rfl: 0     budesonide-formoterol (SYMBICORT) 160-4.5 MCG/ACT Inhaler, Inhale 2 puffs twice daily plus 1-2 puffs every 4 hours as needed. May use up to 12 puffs per day. (Patient not taking: Reported on 10/8/2024), Disp: 20.4 g, Rfl: 11     cholecalciferol (D3 HIGH POTENCY) 50 MCG (2000 UT) CAPS, Take 50 mcg by mouth daily (Patient not taking: Reported on 8/5/2024), Disp: 180 capsule, Rfl: 1     Fexofenadine HCl (ALLEGRA ALLERGY PO), , Disp: , Rfl:      mepolizumab (NUCALA) 100 MG/ML auto-injector, Inject 1 mL (100 mg) Subcutaneous every 28 days (Patient not taking: Reported on 7/29/2024), Disp: 1 mL, Rfl: 5     mupirocin (BACTROBAN) 2 % external ointment, Apply topically 3 times daily (Patient not taking: Reported on 7/29/2024), Disp: 15 g, Rfl: 0     omeprazole (PRILOSEC) 20 MG DR capsule, Take 1 capsule (20 mg) by mouth daily (Patient not taking: Reported on 7/29/2024), Disp: 90 capsule, Rfl: 1     ORDER FOR ALLERGEN IMMUNOTHERAPY, Name of Mix: Mix #1  Dust Mite, Cat, Dog Cat Hair, Standardized A.P. 10,000 BAU/mL, HS  2.0 ml Dog Hair-Dander, UF  1:650 w/v, HS  1.0 ml Dust Mites DF. 10,000 AU/mL, HS  1.0 ml Dust Mites DP. 10,000 AU/mL, HS  1.0 ml  Diluent: HSA qs to 5ml (Patient not taking: Reported on 7/8/2024), Disp: 5 mL,  Rfl: PRN     ORDER FOR ALLERGEN IMMUNOTHERAPY, Name of Mix: Mix #2  Tree  Sylvain, White 1:20 w/v, HS  0.5 ml Birch Mix PRW 1:20 w/v, HS  0.5 ml Boxelder-Maple Mix BHR (Boxelder Hard Red) 1:20 w/v, HS  0.5 ml [place], Common 1:20 w/v, HS  0.5 ml Oak Mix RVW 1:20 w/v, HS 0.5 ml Pine Mix 1:20 w/v, HS 0.5 ml Trinidad Tree, Black 1:20 w/v, HS 0.5 ml Diluent: HSA qs to 5ml (Patient not taking: Reported on 7/8/2024), Disp: 5 mL, Rfl: PRN     ORDER FOR ALLERGEN IMMUNOTHERAPY, Name of Mix: Mix #3  Grass, Weeds Prabhu Grass 1:20 w/v, HS 0.5 ml Steve Grass (Std) 100,000 BAU/mL, HS 0.4 ml Lamb's Quarters 1:20 w/v, HS 0.5 ml Nettle 1:20 w/v, HS 0.5 ml Plantain, English 1:20 w/v, HS 0.5 ml Ragweed, Mix (Giant, Short) 1:20 w/v, HS 0.5 ml Russian Thistle 1:20 w/v, HS 0.5 ml Sorrel, Sheep 1:20 w/v, HS 0.5 ml Diluent: HSA qs to 5ml (Patient not taking: Reported on 8/5/2024), Disp: 5 mL, Rfl: PRN  No Known Allergies    EXAM:   /65 (BP Location: Left arm, Patient Position: Sitting, Cuff Size: Adult Regular)   Pulse 101   LMP 05/27/2024   SpO2 95%   Physical Exam  Vitals and nursing note reviewed.   Constitutional:       Appearance: Normal appearance.   HENT:      Head: Normocephalic and atraumatic.      Right Ear: External ear normal.      Left Ear: External ear normal.      Nose: No rhinorrhea.      Mouth/Throat:      Mouth: Mucous membranes are moist. No oral lesions.      Pharynx: Oropharynx is clear. Uvula midline. No posterior oropharyngeal erythema.   Eyes:      General: Lids are normal.      Extraocular Movements: Extraocular movements intact.      Conjunctiva/sclera: Conjunctivae normal.   Neck:      Comments: No asymmetry, masses, or scars  Cardiovascular:      Rate and Rhythm: Normal rate and regular rhythm.      Heart sounds: S1 normal and S2 normal. No murmur heard.  Pulmonary:      Effort: Pulmonary effort is normal. No respiratory distress.      Breath sounds: Normal breath sounds and air entry.    Musculoskeletal:      Comments: No musculoskeletal defects appreciated   Skin:     General: Skin is warm and dry.      Findings: No lesion or rash.   Neurological:      General: No focal deficit present.      Mental Status: She is alert.   Psychiatric:         Mood and Affect: Mood and affect normal.           WORKUP:  None    ASSESSMENT/PLAN:  Reginaldo Ferraro is a 33 year old female here for a follow-up visit.    1. Uncontrolled moderate persistent asthma - Currently taking Breo daily but still needing rescue medications twice daily. Feels Breo has been the most effective maintenance medication for her. Plan to continue with ICS/LABA therapy - ideally with Breo given this has been most effective though will switch to Symbicort if she is unable to continue with Breo due to insurance restrictions. Discussed adding montelukast for additional symptom control however she declined to pursue this treatment after reviewing the potential side effects. She is a good candidate for starting biologic therapy, particularly in the setting of co-existing eosinophilia however this will be deferred until after her current pregnancy.    - continue Breo 200/25mcg - 1 puff daily  - take 2 to 4 puffs of albuterol HFA and/or use nebulizer every 4 hours as needed  - Adult Allergy Clinic Follow-Up Order  - Adult Allergy Clinic Follow-Up Order; Future    2. Chronic pansinusitis    - Adult Allergy Clinic Follow-Up Order  - Adult Allergy Clinic Follow-Up Order; Future    3. Hypereosinophilic syndrome, unspecified type    - Adult Allergy Clinic Follow-Up Order  - Adult Allergy Clinic Follow-Up Order; Future    4. Seasonal allergic rhinitis due to pollen - Switched to cetirizine from fexofenadine and taking this once to twice daily.    - Adult Allergy Clinic Follow-Up Order; Future    5. Allergic rhinitis due to animals    - Adult Allergy Clinic Follow-Up Order; Future    6. Allergic rhinitis due to dust mite    - Adult Allergy Clinic  Follow-Up Order; Future      Follow-up in 3 months, sooner if needed      Thank you for allowing me to participate in the care of Reginaldo Ferraro.      Polo Mccain MD, FAAAA  Allergy/Immunology  [facility]      Consent was obtained from the patient to use an AI documentation tool in the creation of this note.    Chart documentation done in part with Dragon Voice Recognition Software. Although reviewed after completion, some word and grammatical errors may remain.      Again, thank you for allowing me to participate in the care of your patient.        Sincerely,        Polo Mccain MD

## 2024-10-18 DIAGNOSIS — J45.40 MODERATE PERSISTENT ASTHMA WITHOUT COMPLICATION: ICD-10-CM

## 2024-10-20 RX ORDER — ALBUTEROL SULFATE 90 UG/1
2 INHALANT RESPIRATORY (INHALATION) EVERY 4 HOURS PRN
Qty: 18 G | Refills: 1 | Status: SHIPPED | OUTPATIENT
Start: 2024-10-20

## 2024-11-17 ENCOUNTER — HEALTH MAINTENANCE LETTER (OUTPATIENT)
Age: 34
End: 2024-11-17

## 2024-12-03 ENCOUNTER — MYC REFILL (OUTPATIENT)
Dept: PULMONOLOGY | Facility: CLINIC | Age: 34
End: 2024-12-03
Payer: COMMERCIAL

## 2024-12-03 ENCOUNTER — MYC REFILL (OUTPATIENT)
Dept: ALLERGY | Facility: CLINIC | Age: 34
End: 2024-12-03
Payer: COMMERCIAL

## 2024-12-03 ENCOUNTER — MYC REFILL (OUTPATIENT)
Dept: SURGERY | Facility: CLINIC | Age: 34
End: 2024-12-03
Payer: COMMERCIAL

## 2024-12-03 DIAGNOSIS — R79.89 LOW VITAMIN D LEVEL: ICD-10-CM

## 2024-12-03 DIAGNOSIS — J45.40 MODERATE PERSISTENT ASTHMA WITHOUT COMPLICATION: ICD-10-CM

## 2024-12-03 DIAGNOSIS — J45.40 UNCONTROLLED MODERATE PERSISTENT ASTHMA: ICD-10-CM

## 2024-12-03 RX ORDER — FLUTICASONE FUROATE AND VILANTEROL 200; 25 UG/1; UG/1
1 POWDER RESPIRATORY (INHALATION) DAILY
Qty: 60 EACH | Refills: 5 | Status: SHIPPED | OUTPATIENT
Start: 2024-12-03 | End: 2024-12-04

## 2024-12-03 RX ORDER — IPRATROPIUM BROMIDE AND ALBUTEROL SULFATE 2.5; .5 MG/3ML; MG/3ML
1 SOLUTION RESPIRATORY (INHALATION) EVERY 6 HOURS PRN
Qty: 90 ML | Refills: 0 | Status: SHIPPED | OUTPATIENT
Start: 2024-12-03

## 2024-12-03 NOTE — TELEPHONE ENCOUNTER
Pending Prescriptions:                       Disp   Refills    ipratropium - albuterol 0.5 mg/2.5 mg/3 m*90 mL  1            Sig: Take 1 vial (3 mLs) by nebulization every 6 hours           as needed for shortness of breath, wheezing or           cough.    Routing refill request to provider for review/approval because:  Last ACT=18 on 10/8/24    BENJAMIN MathiasN, RN, PHN

## 2024-12-04 ENCOUNTER — TELEPHONE (OUTPATIENT)
Dept: PULMONOLOGY | Facility: CLINIC | Age: 34
End: 2024-12-04
Payer: COMMERCIAL

## 2024-12-04 DIAGNOSIS — J45.40 MODERATE PERSISTENT ASTHMA WITHOUT COMPLICATION: ICD-10-CM

## 2024-12-04 RX ORDER — CHOLECALCIFEROL (VITAMIN D3) 125 MCG
50 CAPSULE ORAL DAILY
Qty: 180 CAPSULE | Refills: 1 | Status: SHIPPED | OUTPATIENT
Start: 2024-12-04

## 2024-12-04 RX ORDER — FLUTICASONE FUROATE AND VILANTEROL 200; 25 UG/1; UG/1
1 POWDER RESPIRATORY (INHALATION) DAILY
Qty: 60 EACH | Refills: 5 | Status: SHIPPED | OUTPATIENT
Start: 2024-12-04

## 2024-12-04 NOTE — TELEPHONE ENCOUNTER
Prior Authorization Retail Medication Request    Medication/Dose: Breo ellipta inhaler 200-25mcg  Diagnosis and ICD code (if different than what is on RX):  J45.40  New/renewal/insurance change PA/secondary ins. PA:  Previously Tried and Failed:  symbicort, advair  Rationale:  has been using this medicaiton since April 2024 with good relief of her symptoms    Insurance   Primary: [facility] CHOICE   Insurance ID:  [id number]     Secondary (if applicable):MEDICAID [place]   Insurance ID:  [id number]     Pharmacy Information (if different than what is on RX)  Name:  [facility]  Phone:  [phone]   Fax:[phone]     Clinic Information  Preferred routing pool for dept communication: [facility] RN POOL

## 2024-12-06 NOTE — TELEPHONE ENCOUNTER
Retail Pharmacy Prior Authorization Team   Phone: [phone]    PA Initiation    Medication: BREO ELLIPTA 200-25 MCG/ACT IN AEPB  Insurance Company: [place] Medicaid ([facility]) - Phone [phone] Fax [phone]  Pharmacy Filling the Rx: [facility] -- [place] - [place], [place] - [address]  Filling Pharmacy Phone: [phone]  Filling Pharmacy Fax:    Start Date: 12/6/2024    FILLED OUT FORM AND MANUALLY FAXED TO INSURANCE

## 2024-12-09 ENCOUNTER — MYC MEDICAL ADVICE (OUTPATIENT)
Dept: ALLERGY | Facility: CLINIC | Age: 34
End: 2024-12-09
Payer: COMMERCIAL

## 2024-12-09 DIAGNOSIS — J45.40 MODERATE PERSISTENT ASTHMA WITHOUT COMPLICATION: ICD-10-CM

## 2024-12-09 RX ORDER — ALBUTEROL SULFATE 90 UG/1
2 INHALANT RESPIRATORY (INHALATION) EVERY 4 HOURS PRN
Qty: 18 G | Refills: 1 | OUTPATIENT
Start: 2024-12-09

## 2024-12-09 NOTE — TELEPHONE ENCOUNTER
Pending Prescriptions:                       Disp   Refills    albuterol (PROAIR HFA/PROVENTIL HFA/JUAN*18 g   1            Sig: Inhale 2 puffs into the lungs every 4 hours as           needed for shortness of breath, wheezing or           cough.    Last OV: 10/8/2024    Chandrika العراقي MA

## 2024-12-09 NOTE — TELEPHONE ENCOUNTER
Refill request denied. Patient should have another refill on file.     BENJAMIN MathiasN, RN, PHN

## 2024-12-09 NOTE — TELEPHONE ENCOUNTER
Prior Authorization Approval    Medication: BREO ELLIPTA 200-25 MCG/ACT IN AEPB  Authorization Effective Date: 12/4/2024  Authorization Expiration Date: 12/4/2025  Insurance Company: [place] Medicaid ([facility]) - Phone [phone] Fax [phone]  Which Pharmacy is filling the prescription: [facility] -- [place], [place] - [address] [place] [address]. [address]  Pharmacy Notified: YES  Patient Notified: YES (faxed approval letter to pharmacy and notified patient via [employer] message)

## 2024-12-11 ENCOUNTER — TELEPHONE (OUTPATIENT)
Dept: FAMILY MEDICINE | Facility: CLINIC | Age: 34
End: 2024-12-11
Payer: COMMERCIAL

## 2024-12-11 NOTE — TELEPHONE ENCOUNTER
Patient Quality Outreach    Patient is due for the following:   Asthma  -  ACT needed      Topic Date Due    Flu Vaccine (1) 09/01/2024    COVID-19 Vaccine (3 - 2024-25 season) 09/01/2024       Action(s) Taken:   Patient was assigned appropriate questionnaire to complete    Type of outreach:    Sent [employer] message. and Copy of ACT mailed to patient.    Questions for provider review:    None           Stephie Figueroa CMA  Chart routed to Care Team.

## 2024-12-12 DIAGNOSIS — J45.40 MODERATE PERSISTENT ASTHMA WITHOUT COMPLICATION: ICD-10-CM

## 2024-12-12 RX ORDER — ALBUTEROL SULFATE 90 UG/1
2 INHALANT RESPIRATORY (INHALATION) EVERY 4 HOURS PRN
OUTPATIENT
Start: 2024-12-12

## 2024-12-12 NOTE — TELEPHONE ENCOUNTER
Requested Prescriptions   Pending Prescriptions Disp Refills    albuterol (PROAIR HFA/PROVENTIL HFA/VENTOLIN HFA) 108 (90 Base) MCG/ACT inhaler 18 g 1     Sig: Inhale 2 puffs into the lungs every 4 hours as needed for shortness of breath, wheezing or cough.       There is no refill protocol information for this order        Cally Danielle   [facility]

## 2025-01-23 ENCOUNTER — OFFICE VISIT (OUTPATIENT)
Dept: ALLERGY | Facility: CLINIC | Age: 35
End: 2025-01-23
Attending: ALLERGY & IMMUNOLOGY
Payer: COMMERCIAL

## 2025-01-23 VITALS — DIASTOLIC BLOOD PRESSURE: 63 MMHG | HEART RATE: 98 BPM | OXYGEN SATURATION: 96 % | SYSTOLIC BLOOD PRESSURE: 99 MMHG

## 2025-01-23 DIAGNOSIS — J32.4 CHRONIC PANSINUSITIS: ICD-10-CM

## 2025-01-23 DIAGNOSIS — J30.89 ALLERGIC RHINITIS DUE TO DUST MITE: ICD-10-CM

## 2025-01-23 DIAGNOSIS — J30.81 ALLERGIC RHINITIS DUE TO ANIMALS: ICD-10-CM

## 2025-01-23 DIAGNOSIS — J30.1 SEASONAL ALLERGIC RHINITIS DUE TO POLLEN: ICD-10-CM

## 2025-01-23 DIAGNOSIS — J45.40 UNCONTROLLED MODERATE PERSISTENT ASTHMA: ICD-10-CM

## 2025-01-23 DIAGNOSIS — D72.119 HYPEREOSINOPHILIC SYNDROME, UNSPECIFIED TYPE: ICD-10-CM

## 2025-01-23 RX ORDER — FAMOTIDINE 20 MG/1
20 TABLET, FILM COATED ORAL 2 TIMES DAILY
COMMUNITY

## 2025-01-23 RX ORDER — ALBUTEROL SULFATE 90 UG/1
2 AEROSOL, METERED RESPIRATORY (INHALATION) EVERY 4 HOURS PRN
Qty: 18 G | Refills: 1 | Status: SHIPPED | OUTPATIENT
Start: 2025-01-23

## 2025-01-23 ASSESSMENT — ASTHMA QUESTIONNAIRES: ACT_TOTALSCORE: 18

## 2025-01-23 NOTE — PROGRESS NOTES
Reginaldo Ferraro was seen in the Allergy Clinic at [facility].      Reginaldo Ferraro is a 34 year old Not  or  female who is seen today for a follow-up visit.    Reports she is currently doing well.  Last seen in the ED for an asthma exacerbation in early December.  Continues to take Breo daily and using albuterol as needed.  Currently at 34 weeks pregnant and plans to start Nucala after delivery.  No new concerns today but she is requesting medication refills.      Past Medical History:   Diagnosis Date    Abnormal Pap smear of cervix 12/23/2011 9/21 LSIL    Cervical high risk HPV (human papillomavirus) test positive 2013    10/31/14, 5/21/18    Depressive disorder 12/23/2011    possibly chronic    Environmental allergies     Influenza A with pneumonia 12/24/2021    Migraine     Migraines     headaches with allergies    Moderate major depression 12/23/2011    Moderate major depression (H) 12/23/2011    Moderate persistent asthma 3/17/2022    Nasal obstruction 2/15/2022    Added automatically from request for surgery [id number]    Nasal septal deviation 2/15/2022    Added automatically from request for surgery [id number]    Nasal turbinate hypertrophy 2/15/2022    Added automatically from request for surgery [id number]    NO ACTIVE PROBLEMS     Seasonal allergic rhinitis     Sepsis (H) 12/24/2021     Family History   Problem Relation Age of Onset    Arthritis Mother     Lupus Mother     Heart Disease Father     Cerebrovascular Disease Father         stroke    Hyperlipidemia Father     Diabetes Paternal Grandmother     Hypertension Paternal Grandmother     Respiratory Paternal Grandmother         asthma    Colon Cancer No family hx of     Breast Cancer No family hx of     Coronary Artery Disease No family hx of      Social History     Tobacco Use    Smoking status: Never     Passive exposure: Never    Smokeless tobacco: Never   Vaping Use    Vaping status: Never Used   Substance Use  Topics    Alcohol use: Not Currently     Comment: Social    Drug use: No     Social History     Social History Narrative    ENVIRONMENTAL HISTORY: [place] The home is heated with a forced air. They does have central air conditioning. The patient's bedroom is furnished with feather/wool bedding or pillows and carpeting in bedroom.  Pets inside the house include 2 cat(s) and 1 dog(s). There is no history of cockroach or mice infestation. There is/are 0 smokers living in the house.  There is/are 0 who smoke ecigarettes/vape living in the house.The house does not have a damp basement.             Past medical, family, and social history were reviewed.        Current Outpatient Medications:     albuterol (PROAIR HFA/PROVENTIL HFA/VENTOLIN HFA) 108 (90 Base) MCG/ACT inhaler, Inhale 2 puffs into the lungs every 4 hours as needed for shortness of breath, wheezing or cough., Disp: 18 g, Rfl: 1    Ascorbic Acid (VITAMIN C PO), Take by mouth., Disp: , Rfl:     azelastine (OPTIVAR) 0.05 % ophthalmic solution, Apply 1 drop to eye 2 times daily, Disp: 5 mL, Rfl: 0    cetirizine (ZYRTEC) 10 MG tablet, Take 1 tablet (10 mg) by mouth daily., Disp: 90 tablet, Rfl: 3    cholecalciferol (D3 HIGH POTENCY) 50 MCG (2000 UT) CAPS, Take 50 mcg by mouth daily., Disp: 180 capsule, Rfl: 1    famotidine (PEPCID) 20 MG tablet, Take 20 mg by mouth 2 times daily., Disp: , Rfl:     Ferrous Sulfate (IRON PO), Take by mouth., Disp: , Rfl:     fluticasone-vilanterol (BREO ELLIPTA) 200-25 MCG/ACT inhaler, Inhale 1 puff into the lungs daily., Disp: 60 each, Rfl: 5    ondansetron (ZOFRAN ODT) 4 MG ODT tab, Take 1 tablet (4 mg) by mouth every 8 hours as needed for nausea, Disp: 20 tablet, Rfl: 1    ondansetron (ZOFRAN) 4 MG tablet, Take 1 tablet (4 mg) by mouth every 6 hours as needed for nausea or vomiting, Disp: 30 tablet, Rfl: 1    Prenatal Vit-Fe Fumarate-FA (PRENATAL VITAMINS PO), Take by mouth., Disp:  , Rfl:     VENTOLIN  (90 Base) MCG/ACT inhaler, Inhale 2 puffs into the lungs every 4 hours as needed for shortness of breath, wheezing or cough., Disp: 18 g, Rfl: 1    ALPRAZolam (XANAX) 0.25 MG tablet, Take 1 tablet (0.25 mg) by mouth at bedtime as needed, may repeat once for anxiety (Patient not taking: Reported on 8/5/2024), Disp: 30 tablet, Rfl: 0    ipratropium - albuterol 0.5 mg/2.5 mg/3 mL (DUONEB) 0.5-2.5 (3) MG/3ML neb solution, Take 1 vial (3 mLs) by nebulization every 6 hours as needed for shortness of breath, wheezing or cough. (Patient not taking: Reported on 1/23/2025), Disp: 90 mL, Rfl: 0    mepolizumab (NUCALA) 100 MG/ML auto-injector, Inject 1 mL (100 mg) Subcutaneous every 28 days (Patient not taking: Reported on 1/23/2025), Disp: 1 mL, Rfl: 5  No Known Allergies    EXAM:   BP 99/63 (BP Location: Left arm, Patient Position: Sitting, Cuff Size: Adult Regular)   Pulse 98   LMP 05/27/2024   SpO2 96%   Physical Exam  Vitals and nursing note reviewed.   Constitutional:       Appearance: Normal appearance.   HENT:      Head: Normocephalic and atraumatic.      Right Ear: External ear normal.      Left Ear: External ear normal.      Nose: No rhinorrhea.      Mouth/Throat:      Mouth: Mucous membranes are moist. No oral lesions.      Pharynx: Oropharynx is clear. Uvula midline. No posterior oropharyngeal erythema.   Eyes:      General: Lids are normal.      Extraocular Movements: Extraocular movements intact.      Conjunctiva/sclera: Conjunctivae normal.   Neck:      Comments: No asymmetry, masses, or scars  Cardiovascular:      Rate and Rhythm: Normal rate and regular rhythm.      Heart sounds: S1 normal and S2 normal. No murmur heard.  Pulmonary:      Effort: Pulmonary effort is normal. No respiratory distress.      Breath sounds: Normal breath sounds and air entry.   Musculoskeletal:      Comments: No musculoskeletal defects appreciated   Skin:     General: Skin is warm and dry.      Findings:  No lesion or rash.   Neurological:      General: No focal deficit present.      Mental Status: She is alert.   Psychiatric:         Mood and Affect: Mood and affect normal.           WORKUP:  None    ASSESSMENT/PLAN:  Reginaldo Ferraro is a 34 year old female here for a follow-up visit.    1. Uncontrolled moderate persistent asthma - Has had several ED visits over the past year due to uncontrolled asthma. Planned to start anti-IL-5 therapy for eosinophilic asthma but this was not initiated due to her current pregnancy. She will start the medication after delivery.    - continue Breo 200/25mcg - 1 puff daily  - Adult Allergy Clinic Follow-Up Order  - VENTOLIN  (90 Base) MCG/ACT inhaler; Inhale 2 puffs into the lungs every 4 hours as needed for shortness of breath, wheezing or cough.  Dispense: 18 g; Refill: 1  - Adult Allergy Clinic Follow-Up Order; Future    2. Chronic pansinusitis    - Adult Allergy Clinic Follow-Up Order  - Adult Allergy Clinic Follow-Up Order; Future    3. Hypereosinophilic syndrome, unspecified type    - Adult Allergy Clinic Follow-Up Order  - Adult Allergy Clinic Follow-Up Order; Future    4. Seasonal allergic rhinitis due to pollen    - Adult Allergy Clinic Follow-Up Order  - Adult Allergy Clinic Follow-Up Order; Future    5. Allergic rhinitis due to animals    - Adult Allergy Clinic Follow-Up Order  - Adult Allergy Clinic Follow-Up Order; Future    6. Allergic rhinitis due to dust mite    - Adult Allergy Clinic Follow-Up Order  - Adult Allergy Clinic Follow-Up Order; Future      Follow-up in 6 months, sooner if needed      Thank you for allowing me to participate in the care of Reginaldo Ferraro.      Polo Mccain MD, FAAAAI  Allergy/Immunology  [facility]      Consent was obtained from the patient to use an AI documentation tool in the creation of this note.    Chart documentation done in part with  [employer] Voice Recognition Software. Although reviewed after completion, some word and grammatical errors may remain.

## 2025-01-23 NOTE — LETTER
1/23/2025      Reginaldo Ferraro  [address] [place] [address]      Dear Colleague,    Thank you for referring your patient, Reginaldo Ferraro, to the [facility]. Please see a copy of my visit note below.    Reginaldo Ferraro was seen in the Allergy Clinic at [facility].      Reginaldo Ferraro is a 34 year old Not  or  female who is seen today for a follow-up visit.    Reports she is currently doing well.  Last seen in the ED for an asthma exacerbation in early December.  Continues to take Breo daily and using albuterol as needed.  Currently at 34 weeks pregnant and plans to start Nucala after delivery.  No new concerns today but she is requesting medication refills.      Past Medical History:   Diagnosis Date     Abnormal Pap smear of cervix 12/23/2011 9/21 LSIL     Cervical high risk HPV (human papillomavirus) test positive 2013    10/31/14, 5/21/18     Depressive disorder 12/23/2011    possibly chronic     Environmental allergies      Influenza A with pneumonia 12/24/2021     Migraine      Migraines     headaches with allergies     Moderate major depression 12/23/2011     Moderate major depression (H) 12/23/2011     Moderate persistent asthma 3/17/2022     Nasal obstruction 2/15/2022    Added automatically from request for surgery [id number]     Nasal septal deviation 2/15/2022    Added automatically from request for surgery [id number]     Nasal turbinate hypertrophy 2/15/2022    Added automatically from request for surgery [id number]     NO ACTIVE PROBLEMS      Seasonal allergic rhinitis      Sepsis (H) 12/24/2021     Family History   Problem Relation Age of Onset     Arthritis Mother      Lupus Mother      Heart Disease Father      Cerebrovascular Disease Father         stroke     Hyperlipidemia Father      Diabetes Paternal Grandmother      Hypertension Paternal Grandmother      Respiratory Paternal Grandmother         asthma     Colon Cancer No  family hx of      Breast Cancer No family hx of      Coronary Artery Disease No family hx of      Social History     Tobacco Use     Smoking status: Never     Passive exposure: Never     Smokeless tobacco: Never   Vaping Use     Vaping status: Never Used   Substance Use Topics     Alcohol use: Not Currently     Comment: Social     Drug use: No     Social History     Social History Narrative    ENVIRONMENTAL HISTORY: [place] The home is heated with a forced air. They does have central air conditioning. The patient's bedroom is furnished with feather/wool bedding or pillows and carpeting in bedroom.  Pets inside the house include 2 cat(s) and 1 dog(s). There is no history of cockroach or mice infestation. There is/are 0 smokers living in the house.  There is/are 0 who smoke ecigarettes/vape living in the house.The house does not have a damp basement.             Past medical, family, and social history were reviewed.        Current Outpatient Medications:      albuterol (PROAIR HFA/PROVENTIL HFA/VENTOLIN HFA) 108 (90 Base) MCG/ACT inhaler, Inhale 2 puffs into the lungs every 4 hours as needed for shortness of breath, wheezing or cough., Disp: 18 g, Rfl: 1     Ascorbic Acid (VITAMIN C PO), Take by mouth., Disp: , Rfl:      azelastine (OPTIVAR) 0.05 % ophthalmic solution, Apply 1 drop to eye 2 times daily, Disp: 5 mL, Rfl: 0     cetirizine (ZYRTEC) 10 MG tablet, Take 1 tablet (10 mg) by mouth daily., Disp: 90 tablet, Rfl: 3     cholecalciferol (D3 HIGH POTENCY) 50 MCG (2000 UT) CAPS, Take 50 mcg by mouth daily., Disp: 180 capsule, Rfl: 1     famotidine (PEPCID) 20 MG tablet, Take 20 mg by mouth 2 times daily., Disp: , Rfl:      Ferrous Sulfate (IRON PO), Take by mouth., Disp: , Rfl:      fluticasone-vilanterol (BREO ELLIPTA) 200-25 MCG/ACT inhaler, Inhale 1 puff into the lungs daily., Disp: 60 each, Rfl: 5     ondansetron (ZOFRAN ODT) 4 MG ODT tab, Take 1 tablet (4 mg) by  mouth every 8 hours as needed for nausea, Disp: 20 tablet, Rfl: 1     ondansetron (ZOFRAN) 4 MG tablet, Take 1 tablet (4 mg) by mouth every 6 hours as needed for nausea or vomiting, Disp: 30 tablet, Rfl: 1     Prenatal Vit-Fe Fumarate-FA (PRENATAL VITAMINS PO), Take by mouth., Disp: , Rfl:      VENTOLIN  (90 Base) MCG/ACT inhaler, Inhale 2 puffs into the lungs every 4 hours as needed for shortness of breath, wheezing or cough., Disp: 18 g, Rfl: 1     ALPRAZolam (XANAX) 0.25 MG tablet, Take 1 tablet (0.25 mg) by mouth at bedtime as needed, may repeat once for anxiety (Patient not taking: Reported on 8/5/2024), Disp: 30 tablet, Rfl: 0     ipratropium - albuterol 0.5 mg/2.5 mg/3 mL (DUONEB) 0.5-2.5 (3) MG/3ML neb solution, Take 1 vial (3 mLs) by nebulization every 6 hours as needed for shortness of breath, wheezing or cough. (Patient not taking: Reported on 1/23/2025), Disp: 90 mL, Rfl: 0     mepolizumab (NUCALA) 100 MG/ML auto-injector, Inject 1 mL (100 mg) Subcutaneous every 28 days (Patient not taking: Reported on 1/23/2025), Disp: 1 mL, Rfl: 5  No Known Allergies    EXAM:   BP 99/63 (BP Location: Left arm, Patient Position: Sitting, Cuff Size: Adult Regular)   Pulse 98   LMP 05/27/2024   SpO2 96%   Physical Exam  Vitals and nursing note reviewed.   Constitutional:       Appearance: Normal appearance.   HENT:      Head: Normocephalic and atraumatic.      Right Ear: External ear normal.      Left Ear: External ear normal.      Nose: No rhinorrhea.      Mouth/Throat:      Mouth: Mucous membranes are moist. No oral lesions.      Pharynx: Oropharynx is clear. Uvula midline. No posterior oropharyngeal erythema.   Eyes:      General: Lids are normal.      Extraocular Movements: Extraocular movements intact.      Conjunctiva/sclera: Conjunctivae normal.   Neck:      Comments: No asymmetry, masses, or scars  Cardiovascular:      Rate and Rhythm: Normal rate and regular rhythm.      Heart sounds: S1 normal and S2  normal. No murmur heard.  Pulmonary:      Effort: Pulmonary effort is normal. No respiratory distress.      Breath sounds: Normal breath sounds and air entry.   Musculoskeletal:      Comments: No musculoskeletal defects appreciated   Skin:     General: Skin is warm and dry.      Findings: No lesion or rash.   Neurological:      General: No focal deficit present.      Mental Status: She is alert.   Psychiatric:         Mood and Affect: Mood and affect normal.           WORKUP:  None    ASSESSMENT/PLAN:  Reginaldo Ferraro is a 34 year old female here for a follow-up visit.    1. Uncontrolled moderate persistent asthma - Has had several ED visits over the past year due to uncontrolled asthma. Planned to start anti-IL-5 therapy for eosinophilic asthma but this was not initiated due to her current pregnancy. She will start the medication after delivery.    - continue Breo 200/25mcg - 1 puff daily  - Adult Allergy Clinic Follow-Up Order  - VENTOLIN  (90 Base) MCG/ACT inhaler; Inhale 2 puffs into the lungs every 4 hours as needed for shortness of breath, wheezing or cough.  Dispense: 18 g; Refill: 1  - Adult Allergy Clinic Follow-Up Order; Future    2. Chronic pansinusitis    - Adult Allergy Clinic Follow-Up Order  - Adult Allergy Clinic Follow-Up Order; Future    3. Hypereosinophilic syndrome, unspecified type    - Adult Allergy Clinic Follow-Up Order  - Adult Allergy Clinic Follow-Up Order; Future    4. Seasonal allergic rhinitis due to pollen    - Adult Allergy Clinic Follow-Up Order  - Adult Allergy Clinic Follow-Up Order; Future    5. Allergic rhinitis due to animals    - Adult Allergy Clinic Follow-Up Order  - Adult Allergy Clinic Follow-Up Order; Future    6. Allergic rhinitis due to dust mite    - Adult Allergy Clinic Follow-Up Order  - Adult Allergy Clinic Follow-Up Order; Future      Follow-up in 6 months, sooner if needed      Thank you for allowing me to participate in the care of Reginaldo MARCIAL  Ronan.      Polo Mccain MD, FAAAAI  Allergy/Immunology  [facility]      Consent was obtained from the patient to use an AI documentation tool in the creation of this note.    Chart documentation done in part with Dragon Voice Recognition Software. Although reviewed after completion, some word and grammatical errors may remain.      Again, thank you for allowing me to participate in the care of your patient.        Sincerely,        Polo Mccain MD    Electronically signed

## 2025-01-29 ENCOUNTER — PATIENT OUTREACH (OUTPATIENT)
Dept: FAMILY MEDICINE | Facility: CLINIC | Age: 35
End: 2025-01-29
Payer: COMMERCIAL

## 2025-02-27 ENCOUNTER — MEDICAL CORRESPONDENCE (OUTPATIENT)
Dept: HEALTH INFORMATION MANAGEMENT | Facility: CLINIC | Age: 35
End: 2025-02-27
Payer: COMMERCIAL

## (undated) DEVICE — SU CHROMIC 5-0 P-3 18" 687G

## (undated) DEVICE — TRACKER ENT OTS INSTRUMENT FUSION 9733533

## (undated) DEVICE — PAD CHUX UNDERPAD 30X36" P3036C

## (undated) DEVICE — SOL NACL 0.9% INJ 1000ML BAG 07983-09

## (undated) DEVICE — STRAP KNEE/BODY 31143004

## (undated) DEVICE — ENDO SHEATH STORZ SHARPSITE ENDOSCRUB 0DEG 4MM 1912000

## (undated) DEVICE — PACK MINOR SBA15MIFSE

## (undated) DEVICE — SUCTION VACUUM CANISTER STANDARD W/LID&CAPS 003987-901

## (undated) DEVICE — BLADE SINUS RAD12 CVD 4MM FUSION ROTATE W/TRACKING

## (undated) DEVICE — SPECIMEN CONTAINER 4OZ

## (undated) DEVICE — GLOVE PROTEXIS W/NEU-THERA 7.5  2D73TE75

## (undated) DEVICE — TUBING VACUUM COLLECTION SET LG 1/2"X6' BKT-506

## (undated) DEVICE — SPONGE COTTONOID 1/2X3" 80-1407

## (undated) DEVICE — BLADE INFERIOR TURBINATE 2.9MMX11CM 1882940HR

## (undated) DEVICE — DRAPE UNDER BUTTOCK 8483

## (undated) DEVICE — KIT FLEXITIP W/HEMADERM 2GM ENT0001-2

## (undated) DEVICE — NDL 19GA 1.5"

## (undated) DEVICE — DRAPE POUCH IRR 1016

## (undated) DEVICE — SUCTION CANNULA UTERINE 10MM CVD 022110-10

## (undated) DEVICE — LINEN GOWN X4 5410

## (undated) DEVICE — BLADE TURBINATE INFERIOR 2MM ROTATE  1882040HR

## (undated) DEVICE — DRAPE SPLIT EENT 76X124" 3X28" 9447

## (undated) DEVICE — Device

## (undated) DEVICE — ENDO SHEATH ENDOSCRUB 45DEG 4MM 1912015

## (undated) DEVICE — SU ETHILON 3-0 FS-1 18" 669H

## (undated) DEVICE — TUBING SUCTION 10'X3/16" N510

## (undated) DEVICE — SYR 01ML 27GA 0.5" NDL TBC 309623

## (undated) DEVICE — SOL WATER IRRIG 1000ML BOTTLE 07139-09

## (undated) DEVICE — DECANTER TRANSFER DEVICE 2008S

## (undated) DEVICE — SYR 10ML FINGER CONTROL W/O NDL 309695

## (undated) DEVICE — GLOVE BIOGEL PI MICRO SZ 7.5 48575

## (undated) DEVICE — GLOVE PROTEXIS W/NEU-THERA 6.5  2D73TE65

## (undated) DEVICE — SYR EAR BULB 3OZ 0035830

## (undated) DEVICE — NDL DENTAL 27GA LONG 8881400058

## (undated) DEVICE — SUCTION CANISTER MEDIVAC LINER 1500ML W/LID 65651-515

## (undated) DEVICE — LINEN TOWEL PACK X5 5464

## (undated) DEVICE — SU CHROMIC 4-0 P-3 18" 1654G

## (undated) DEVICE — MASK CONE SHAPED 00152

## (undated) DEVICE — SUCTION CANNULA UTERINE 12MM CVD 022112-10

## (undated) DEVICE — PREP POVIDONE IODINE SWABS X3

## (undated) DEVICE — SOL NACL 0.9% IRRIG 1000ML BOTTLE 2F7124

## (undated) DEVICE — SUCTION CANNULA UTERINE 16MM CVD 022116

## (undated) DEVICE — NDL SPINAL 25GA 3.5" QUINCKE 405180

## (undated) DEVICE — TRACKER PATIENT NON-INVASIVE AXIEM 9734887XOM

## (undated) DEVICE — GLOVE PROTEXIS BLUE W/NEU-THERA 7.0  2D73EB70

## (undated) DEVICE — NOVAPAK NASAL SINUS PACKING AND STENT

## (undated) DEVICE — SOL WATER IRRIG 1000ML BOTTLE 2F7114

## (undated) DEVICE — PREP SCRUB SOL EXIDINE 4% CHG 4OZ 29002-404

## (undated) RX ORDER — OXYCODONE HYDROCHLORIDE 5 MG/1
TABLET ORAL
Status: DISPENSED
Start: 2022-02-16

## (undated) RX ORDER — ACETAMINOPHEN 325 MG/1
TABLET ORAL
Status: DISPENSED
Start: 2023-06-15

## (undated) RX ORDER — LIDOCAINE HYDROCHLORIDE 20 MG/ML
INJECTION, SOLUTION INFILTRATION; PERINEURAL
Status: DISPENSED
Start: 2022-04-04

## (undated) RX ORDER — FENTANYL CITRATE 50 UG/ML
INJECTION, SOLUTION INTRAMUSCULAR; INTRAVENOUS
Status: DISPENSED
Start: 2022-02-16

## (undated) RX ORDER — ALBUTEROL SULFATE 0.83 MG/ML
SOLUTION RESPIRATORY (INHALATION)
Status: DISPENSED
Start: 2022-02-16

## (undated) RX ORDER — ACETAMINOPHEN 500 MG
TABLET ORAL
Status: DISPENSED
Start: 2022-02-16

## (undated) RX ORDER — COCAINE HYDROCHLORIDE 40 MG/ML
SOLUTION NASAL
Status: DISPENSED
Start: 2022-04-04

## (undated) RX ORDER — ONDANSETRON 2 MG/ML
INJECTION INTRAMUSCULAR; INTRAVENOUS
Status: DISPENSED
Start: 2023-06-15

## (undated) RX ORDER — GLYCOPYRROLATE 0.2 MG/ML
INJECTION, SOLUTION INTRAMUSCULAR; INTRAVENOUS
Status: DISPENSED
Start: 2023-06-15

## (undated) RX ORDER — ACETAMINOPHEN 325 MG/1
TABLET ORAL
Status: DISPENSED
Start: 2022-04-04

## (undated) RX ORDER — PROPOFOL 10 MG/ML
INJECTION, EMULSION INTRAVENOUS
Status: DISPENSED
Start: 2023-06-15

## (undated) RX ORDER — PROPOFOL 10 MG/ML
INJECTION, EMULSION INTRAVENOUS
Status: DISPENSED
Start: 2022-04-04

## (undated) RX ORDER — LIDOCAINE HYDROCHLORIDE AND EPINEPHRINE 10; 10 MG/ML; UG/ML
INJECTION, SOLUTION INFILTRATION; PERINEURAL
Status: DISPENSED
Start: 2022-04-04

## (undated) RX ORDER — CEFAZOLIN SODIUM 1 G/3ML
INJECTION, POWDER, FOR SOLUTION INTRAMUSCULAR; INTRAVENOUS
Status: DISPENSED
Start: 2022-04-04

## (undated) RX ORDER — OXYCODONE HYDROCHLORIDE 5 MG/1
TABLET ORAL
Status: DISPENSED
Start: 2022-04-04

## (undated) RX ORDER — OXYTOCIN 10 [USP'U]/ML
INJECTION, SOLUTION INTRAMUSCULAR; INTRAVENOUS
Status: DISPENSED
Start: 2022-02-16

## (undated) RX ORDER — COCAINE HYDROCHLORIDE 40 MG/ML
SOLUTION NASAL
Status: DISPENSED
Start: 2023-06-15

## (undated) RX ORDER — DEXAMETHASONE SODIUM PHOSPHATE 4 MG/ML
INJECTION, SOLUTION INTRA-ARTICULAR; INTRALESIONAL; INTRAMUSCULAR; INTRAVENOUS; SOFT TISSUE
Status: DISPENSED
Start: 2022-04-04

## (undated) RX ORDER — FENTANYL CITRATE 50 UG/ML
INJECTION, SOLUTION INTRAMUSCULAR; INTRAVENOUS
Status: DISPENSED
Start: 2022-04-04

## (undated) RX ORDER — LIDOCAINE HYDROCHLORIDE AND EPINEPHRINE BITARTRATE 20; .01 MG/ML; MG/ML
INJECTION, SOLUTION SUBCUTANEOUS
Status: DISPENSED
Start: 2022-04-04

## (undated) RX ORDER — LIDOCAINE HYDROCHLORIDE 10 MG/ML
INJECTION, SOLUTION EPIDURAL; INFILTRATION; INTRACAUDAL; PERINEURAL
Status: DISPENSED
Start: 2022-02-16

## (undated) RX ORDER — VASOPRESSIN 20 U/ML
INJECTION PARENTERAL
Status: DISPENSED
Start: 2022-02-16

## (undated) RX ORDER — CEFAZOLIN SODIUM 1 G/3ML
INJECTION, POWDER, FOR SOLUTION INTRAMUSCULAR; INTRAVENOUS
Status: DISPENSED
Start: 2023-06-15

## (undated) RX ORDER — DEXAMETHASONE SODIUM PHOSPHATE 4 MG/ML
INJECTION, SOLUTION INTRA-ARTICULAR; INTRALESIONAL; INTRAMUSCULAR; INTRAVENOUS; SOFT TISSUE
Status: DISPENSED
Start: 2023-06-15

## (undated) RX ORDER — ONDANSETRON 2 MG/ML
INJECTION INTRAMUSCULAR; INTRAVENOUS
Status: DISPENSED
Start: 2022-04-04

## (undated) RX ORDER — ALBUTEROL SULFATE 0.83 MG/ML
SOLUTION RESPIRATORY (INHALATION)
Status: DISPENSED
Start: 2021-12-23

## (undated) RX ORDER — LIDOCAINE HYDROCHLORIDE AND EPINEPHRINE 10; 10 MG/ML; UG/ML
INJECTION, SOLUTION INFILTRATION; PERINEURAL
Status: DISPENSED
Start: 2023-06-15

## (undated) RX ORDER — LIDOCAINE HYDROCHLORIDE AND EPINEPHRINE 10; 10 MG/ML; UG/ML
INJECTION, SOLUTION INFILTRATION; PERINEURAL
Status: DISPENSED
Start: 2022-02-16

## (undated) RX ORDER — FENTANYL CITRATE-0.9 % NACL/PF 10 MCG/ML
PLASTIC BAG, INJECTION (ML) INTRAVENOUS
Status: DISPENSED
Start: 2023-06-15